# Patient Record
Sex: FEMALE | Race: WHITE | NOT HISPANIC OR LATINO | Employment: OTHER | URBAN - METROPOLITAN AREA
[De-identification: names, ages, dates, MRNs, and addresses within clinical notes are randomized per-mention and may not be internally consistent; named-entity substitution may affect disease eponyms.]

---

## 2018-01-29 LAB
ALT SERPL-CCNC: 21 IU/L (ref 8–45)
AST SERPL-CCNC: 17 IU/L (ref 2–40)
CHOLEST SERPL-MCNC: 219 MG/DL (ref 100–199)
CREAT SERPL-MCNC: 0.69 MG/DL (ref 0.57–1.11)
GFR SERPL CREATININE-BSD FRML MDRD: >60 ML/MIN/1.73M2
GLUCOSE SERPL-MCNC: 107 MG/DL (ref 65–100)
HBA1C MFR BLD: 5.3 % (ref 0–5.7)
HDLC SERPL-MCNC: 45 MG/DL
LDLC SERPL CALC-MCNC: 129 MG/DL
NONHDLC SERPL-MCNC: 174 MG/DL
POTASSIUM SERPL-SCNC: 4.3 MMOL/L (ref 3.5–5)
TRIGL SERPL-MCNC: 225 MG/DL

## 2018-06-19 LAB — PHQ9 SCORE: 3

## 2018-07-26 ENCOUNTER — TRANSFERRED RECORDS (OUTPATIENT)
Dept: HEALTH INFORMATION MANAGEMENT | Facility: CLINIC | Age: 58
End: 2018-07-26

## 2018-07-26 LAB
CREAT SERPL-MCNC: 0.63 MG/DL (ref 0.57–1.11)
GFR SERPL CREATININE-BSD FRML MDRD: >60 ML/MIN/1.73M2
GLUCOSE SERPL-MCNC: 108 MG/DL (ref 65–100)
POTASSIUM SERPL-SCNC: 3.9 MMOL/L (ref 3.5–5)

## 2019-01-10 ENCOUNTER — HOSPITAL ENCOUNTER (EMERGENCY)
Facility: CLINIC | Age: 59
Discharge: HOME | End: 2019-01-10
Attending: FAMILY MEDICINE | Admitting: FAMILY MEDICINE
Payer: COMMERCIAL

## 2019-01-10 ENCOUNTER — APPOINTMENT (OUTPATIENT)
Dept: GENERAL RADIOLOGY | Facility: CLINIC | Age: 59
End: 2019-01-10
Payer: COMMERCIAL

## 2019-01-10 VITALS
SYSTOLIC BLOOD PRESSURE: 130 MMHG | TEMPERATURE: 98.4 F | RESPIRATION RATE: 20 BRPM | OXYGEN SATURATION: 99 % | DIASTOLIC BLOOD PRESSURE: 79 MMHG | HEART RATE: 71 BPM | WEIGHT: 236 LBS | BODY MASS INDEX: 39.58 KG/M2

## 2019-01-10 DIAGNOSIS — J40 BRONCHITIS: ICD-10-CM

## 2019-01-10 DIAGNOSIS — J06.9 VIRAL UPPER RESPIRATORY ILLNESS: ICD-10-CM

## 2019-01-10 DIAGNOSIS — H65.91 OME (OTITIS MEDIA WITH EFFUSION), RIGHT: ICD-10-CM

## 2019-01-10 LAB
ALBUMIN SERPL-MCNC: 3.2 G/DL (ref 3.4–5)
ALP SERPL-CCNC: 79 U/L (ref 40–150)
ALT SERPL W P-5'-P-CCNC: 19 U/L (ref 0–50)
ANION GAP SERPL CALCULATED.3IONS-SCNC: 5 MMOL/L (ref 3–14)
AST SERPL W P-5'-P-CCNC: 16 U/L (ref 0–45)
BASOPHILS # BLD AUTO: 0 10E9/L (ref 0–0.2)
BASOPHILS NFR BLD AUTO: 0.4 %
BILIRUB SERPL-MCNC: 0.4 MG/DL (ref 0.2–1.3)
BUN SERPL-MCNC: 14 MG/DL (ref 7–30)
CALCIUM SERPL-MCNC: 7.9 MG/DL (ref 8.5–10.1)
CHLORIDE SERPL-SCNC: 109 MMOL/L (ref 94–109)
CO2 SERPL-SCNC: 28 MMOL/L (ref 20–32)
CREAT SERPL-MCNC: 0.65 MG/DL (ref 0.52–1.04)
CRP SERPL-MCNC: 23.1 MG/L (ref 0–8)
DIFFERENTIAL METHOD BLD: NORMAL
EOSINOPHIL NFR BLD AUTO: 3.2 %
ERYTHROCYTE [DISTWIDTH] IN BLOOD BY AUTOMATED COUNT: 13.3 % (ref 10–15)
FLUAV+FLUBV AG SPEC QL: NEGATIVE
FLUAV+FLUBV AG SPEC QL: NEGATIVE
GFR SERPL CREATININE-BSD FRML MDRD: >90 ML/MIN/{1.73_M2}
GLUCOSE SERPL-MCNC: 107 MG/DL (ref 70–99)
HCT VFR BLD AUTO: 38.9 % (ref 35–47)
HGB BLD-MCNC: 12.7 G/DL (ref 11.7–15.7)
IMM GRANULOCYTES # BLD: 0 10E9/L (ref 0–0.4)
IMM GRANULOCYTES NFR BLD: 0.4 %
LACTATE BLD-SCNC: 1.6 MMOL/L (ref 0.7–2)
LYMPHOCYTES # BLD AUTO: 1.9 10E9/L (ref 0.8–5.3)
LYMPHOCYTES NFR BLD AUTO: 35.6 %
MCH RBC QN AUTO: 31.8 PG (ref 26.5–33)
MCHC RBC AUTO-ENTMCNC: 32.6 G/DL (ref 31.5–36.5)
MCV RBC AUTO: 97 FL (ref 78–100)
MONOCYTES # BLD AUTO: 0.5 10E9/L (ref 0–1.3)
MONOCYTES NFR BLD AUTO: 9.1 %
NEUTROPHILS # BLD AUTO: 2.8 10E9/L (ref 1.6–8.3)
NEUTROPHILS NFR BLD AUTO: 51.3 %
NRBC # BLD AUTO: 0 10*3/UL
NRBC BLD AUTO-RTO: 0 /100
NT-PROBNP SERPL-MCNC: 78 PG/ML (ref 0–900)
PLATELET # BLD AUTO: 243 10E9/L (ref 150–450)
POTASSIUM SERPL-SCNC: 3.6 MMOL/L (ref 3.4–5.3)
PROT SERPL-MCNC: 7 G/DL (ref 6.8–8.8)
RBC # BLD AUTO: 4 10E12/L (ref 3.8–5.2)
SODIUM SERPL-SCNC: 142 MMOL/L (ref 133–144)
SPECIMEN SOURCE: NORMAL
WBC # BLD AUTO: 5.4 10E9/L (ref 4–11)

## 2019-01-10 PROCEDURE — 71046 X-RAY EXAM CHEST 2 VIEWS: CPT | Mod: TC

## 2019-01-10 PROCEDURE — 99284 EMERGENCY DEPT VISIT MOD MDM: CPT | Mod: 25 | Performed by: FAMILY MEDICINE

## 2019-01-10 PROCEDURE — 94640 AIRWAY INHALATION TREATMENT: CPT

## 2019-01-10 PROCEDURE — 99284 EMERGENCY DEPT VISIT MOD MDM: CPT | Mod: Z6 | Performed by: FAMILY MEDICINE

## 2019-01-10 PROCEDURE — 40000274 ZZH STATISTIC RCP CONSULT EA 30 MIN

## 2019-01-10 PROCEDURE — 25000125 ZZHC RX 250: Performed by: FAMILY MEDICINE

## 2019-01-10 PROCEDURE — 40000275 ZZH STATISTIC RCP TIME EA 10 MIN

## 2019-01-10 PROCEDURE — 80053 COMPREHEN METABOLIC PANEL: CPT | Performed by: FAMILY MEDICINE

## 2019-01-10 PROCEDURE — 85025 COMPLETE CBC W/AUTO DIFF WBC: CPT | Performed by: FAMILY MEDICINE

## 2019-01-10 PROCEDURE — 86140 C-REACTIVE PROTEIN: CPT | Performed by: FAMILY MEDICINE

## 2019-01-10 PROCEDURE — 87804 INFLUENZA ASSAY W/OPTIC: CPT | Performed by: FAMILY MEDICINE

## 2019-01-10 PROCEDURE — 83880 ASSAY OF NATRIURETIC PEPTIDE: CPT | Performed by: FAMILY MEDICINE

## 2019-01-10 PROCEDURE — 83605 ASSAY OF LACTIC ACID: CPT | Performed by: FAMILY MEDICINE

## 2019-01-10 RX ORDER — PREDNISONE 20 MG/1
20 TABLET ORAL 2 TIMES DAILY
Qty: 10 TABLET | Refills: 0 | Status: SHIPPED | OUTPATIENT
Start: 2019-01-10 | End: 2019-01-15

## 2019-01-10 RX ORDER — IPRATROPIUM BROMIDE AND ALBUTEROL SULFATE 2.5; .5 MG/3ML; MG/3ML
3 SOLUTION RESPIRATORY (INHALATION) ONCE
Status: COMPLETED | OUTPATIENT
Start: 2019-01-10 | End: 2019-01-10

## 2019-01-10 RX ORDER — ALBUTEROL SULFATE 90 UG/1
2 AEROSOL, METERED RESPIRATORY (INHALATION) EVERY 4 HOURS PRN
Qty: 1 INHALER | Refills: 0 | Status: SHIPPED | OUTPATIENT
Start: 2019-01-10 | End: 2019-12-11

## 2019-01-10 RX ORDER — ACETAMINOPHEN 500 MG
1000 TABLET ORAL EVERY 6 HOURS PRN
COMMUNITY

## 2019-01-10 RX ORDER — AZITHROMYCIN 250 MG/1
TABLET, FILM COATED ORAL
Qty: 6 TABLET | Refills: 0 | Status: SHIPPED | OUTPATIENT
Start: 2019-01-10 | End: 2019-12-11

## 2019-01-10 RX ADMIN — IPRATROPIUM BROMIDE AND ALBUTEROL SULFATE 3 ML: .5; 3 SOLUTION RESPIRATORY (INHALATION) at 07:31

## 2019-01-10 ASSESSMENT — ENCOUNTER SYMPTOMS
CONFUSION: 0
VOMITING: 0
DIARRHEA: 0
FEVER: 0
NAUSEA: 0
SHORTNESS OF BREATH: 0
DIZZINESS: 0
LIGHT-HEADEDNESS: 0
WHEEZING: 1
POLYDIPSIA: 0
DIAPHORESIS: 0
DYSURIA: 0
SINUS PAIN: 0
SORE THROAT: 0
EYE REDNESS: 0
COUGH: 1
TROUBLE SWALLOWING: 0
EYE PAIN: 0
CHEST TIGHTNESS: 0
BACK PAIN: 0
ABDOMINAL PAIN: 0
HEADACHES: 0
SINUS PRESSURE: 0
FATIGUE: 1
WEAKNESS: 1

## 2019-01-10 NOTE — ED PROVIDER NOTES
"  History     Chief Complaint   Patient presents with     Cough     HPI  Mariluz Stoner is a 58 year old female who presents to the emergency department with cough and congestion.  2 weeks ago the patient contracted a \"cold\".  With symptoms of runny nose and head congestion.  This has progressed into a cough and some chest congestion.  She is also been experiencing some wheezing.  She has no history of smoking or asthma.  She has never required an inhaler or prednisone for her breathing.  Her  has similar symptoms.  She just returned from [place] where she was for 10 days.  She was hoping her cold will improve however it only worsened.  She denies any fever chills or night sweats at this time.  She has no chest pain.  The patient had a history of breast cancer 5 years ago and underwent mastectomy radiation and chemotherapy.  She was considered cancer free until it was discovered that she has metastases in her spine.  She is scheduled to start chemotherapy next week.    Problem List:    There are no active problems to display for this patient.       Past Medical History:    Past Medical History:   Diagnosis Date     Breast cancer (H)      Obesity        Past Surgical History:    Past Surgical History:   Procedure Laterality Date     BREAST SURGERY      left tissue expander, LEFT MASTECTOMY     CHOLECYSTECTOMY       GYN SURGERY      oophorectomy     REMOVE TISSUE EXPANDER BREAST  2014    Procedure: REMOVAL OF LEFT BREAST TISSUE EXPANDER, IRRIGATION AND DEBRIDEMENT OF LEFT BREAST;  Surgeon: Marlon Luz MD;  Location: [facility]       Family History:    History reviewed. No pertinent family history.    Social History:  Marital Status:   [2]  Social History     Tobacco Use     Smoking status: Former Smoker     Last attempt to quit: 2000     Years since quittin.0     Smokeless tobacco: Never Used   Substance Use Topics     Alcohol use: Yes     Comment: social drinking     Drug use: Yes     " Types: Marijuana     Comment: daily for the past 10 days        Medications:      acetaminophen (TYLENOL) 500 MG tablet   albuterol (PROAIR HFA/PROVENTIL HFA/VENTOLIN HFA) 108 (90 Base) MCG/ACT inhaler   azithromycin (ZITHROMAX) 250 MG tablet   predniSONE (DELTASONE) 20 MG tablet   Pseudoephedrine-Ibuprofen (ADVIL COLD/SINUS PO)   Clindamycin HCl (CLEOCIN PO)   HYDROcodone-acetaminophen (NORCO) 5-325 MG per tablet   HYDROcodone-acetaminophen (NORCO) 5-325 MG per tablet   Probiotic Product (MISC INTESTINAL NEGIN REGULAT) PACK         Review of Systems   Constitutional: Positive for fatigue. Negative for diaphoresis and fever.   HENT: Positive for congestion and ear pain. Negative for sinus pressure, sinus pain, sore throat and trouble swallowing.         Complains of severe right ear pain   Eyes: Negative for pain and redness.   Respiratory: Positive for cough and wheezing. Negative for chest tightness and shortness of breath.    Cardiovascular: Negative for chest pain and leg swelling.   Gastrointestinal: Negative for abdominal pain, diarrhea, nausea and vomiting.   Endocrine: Negative for polydipsia and polyuria.   Genitourinary: Negative for dysuria.   Musculoskeletal: Negative for back pain.   Skin: Negative for pallor and rash.   Allergic/Immunologic: Negative for immunocompromised state.   Neurological: Positive for weakness. Negative for dizziness, light-headedness and headaches.   Psychiatric/Behavioral: Negative for confusion.   All other systems reviewed and are negative.      Physical Exam   BP: 150/86  Heart Rate: 75  Temp: 98.4  F (36.9  C)  Resp: 20  Weight: 107 kg (236 lb)  SpO2: 94 %      Physical Exam   Constitutional: She is oriented to person, place, and time. She appears well-developed and well-nourished. No distress.   HENT:   Head: Normocephalic and atraumatic.   Right Ear: External ear normal.   Left Ear: External ear normal.   Nose: Nose normal.   Mouth/Throat: Oropharynx is clear and moist.    Eyes: Conjunctivae and EOM are normal. Pupils are equal, round, and reactive to light.   Neck: Normal range of motion.   Cardiovascular: Normal rate, regular rhythm, normal heart sounds and intact distal pulses.   Pulmonary/Chest: No stridor. No tachypnea. No respiratory distress. She has no decreased breath sounds. She has wheezes. She has rhonchi.   Bilateral end expiratory wheezing.  Resolved with one nebulization treatment.  No rales.  Congested but nonproductive cough.  Breathing at ease with O2 sats in the high 90s on room air.  No increased work of breathing.  No tachypnea.   Abdominal: Soft. Bowel sounds are normal.   Musculoskeletal: Normal range of motion.   Neurological: She is alert and oriented to person, place, and time.   Skin: Skin is warm and dry. Capillary refill takes less than 2 seconds.   Psychiatric: She has a normal mood and affect.   Nursing note and vitals reviewed.      ED Course        Procedures               Critical Care time:  none               Results for orders placed or performed during the hospital encounter of 01/10/19 (from the past 24 hour(s))   CBC with platelets differential   Result Value Ref Range    WBC 5.4 4.0 - 11.0 10e9/L    RBC Count 4.00 3.8 - 5.2 10e12/L    Hemoglobin 12.7 11.7 - 15.7 g/dL    Hematocrit 38.9 35.0 - 47.0 %    MCV 97 78 - 100 fl    MCH 31.8 26.5 - 33.0 pg    MCHC 32.6 31.5 - 36.5 g/dL    RDW 13.3 10.0 - 15.0 %    Platelet Count 243 150 - 450 10e9/L    Diff Method Automated Method     % Neutrophils 51.3 %    % Lymphocytes 35.6 %    % Monocytes 9.1 %    % Eosinophils 3.2 %    % Basophils 0.4 %    % Immature Granulocytes 0.4 %    Nucleated RBCs 0 0 /100    Absolute Neutrophil 2.8 1.6 - 8.3 10e9/L    Absolute Lymphocytes 1.9 0.8 - 5.3 10e9/L    Absolute Monocytes 0.5 0.0 - 1.3 10e9/L    Absolute Basophils 0.0 0.0 - 0.2 10e9/L    Abs Immature Granulocytes 0.0 0 - 0.4 10e9/L    Absolute Nucleated RBC 0.0    Comprehensive metabolic panel   Result Value Ref  Range    Sodium 142 133 - 144 mmol/L    Potassium 3.6 3.4 - 5.3 mmol/L    Chloride 109 94 - 109 mmol/L    Carbon Dioxide 28 20 - 32 mmol/L    Anion Gap 5 3 - 14 mmol/L    Glucose 107 (H) 70 - 99 mg/dL    Urea Nitrogen 14 7 - 30 mg/dL    Creatinine 0.65 0.52 - 1.04 mg/dL    GFR Estimate >90 >60 mL/min/[1.73_m2]    GFR Estimate If Black >90 >60 mL/min/[1.73_m2]    Calcium 7.9 (L) 8.5 - 10.1 mg/dL    Bilirubin Total 0.4 0.2 - 1.3 mg/dL    Albumin 3.2 (L) 3.4 - 5.0 g/dL    Protein Total 7.0 6.8 - 8.8 g/dL    Alkaline Phosphatase 79 40 - 150 U/L    ALT 19 0 - 50 U/L    AST 16 0 - 45 U/L   Lactic acid whole blood   Result Value Ref Range    Lactic Acid 1.6 0.7 - 2.0 mmol/L   Nt probnp inpatient (BNP)   Result Value Ref Range    N-Terminal Pro BNP Inpatient 78 0 - 900 pg/mL   CRP inflammation   Result Value Ref Range    CRP Inflammation 23.1 (H) 0.0 - 8.0 mg/L   Influenza A/B antigen   Result Value Ref Range    Influenza A/B Agn Specimen Nasopharyngeal     Influenza A Negative NEG^Negative    Influenza B Negative NEG^Negative   XR Chest 2 Views    Narrative    CHEST TWO VIEWS  1/10/2019 8:17 AM     HISTORY:  Cough for two weeks.    COMPARISON: None.    FINDINGS:  Postoperative changes status post left mastectomy and  axillary kaela dissection with persistent axillary kaela dissection  clips are noted. Slightly increased interstitial markings in the left  lung are noted and could represent scarring or mild asymmetric  vascular congestion. An infiltrate is considered less likely. Right  lung is clear. Heart size and pulmonary vascularity are within normal  limits. No pneumothorax, significant pleural fluid collection, or  fracture.      Impression    IMPRESSION:  1. Slightly increased interstitial markings in the left lung likely  represent asymmetric pulmonary vascular congestion versus scarring or  atelectasis. An infiltrate is considered unlikely but is difficult to  entirely exclude.  2. Postop changes status post  probable left mastectomy and axillary  kaela dissection.       Medications   ipratropium - albuterol 0.5 mg/2.5 mg/3 mL (DUONEB) neb solution 3 mL (3 mLs Nebulization Given 1/10/19 0731)       Assessments & Plan (with Medical Decision Making)   MDM--58-year-old female who presents the emergency room with a 2-week history of cough and congestion.  Symptoms started off as a head cold and hour progressed to chest congestion and bronchitis.  Her chest x-ray shows no infiltrate, she has no fever and her white count is normal.  No evidence of a bacterial pneumonia.  She does have a red painful ear and has a right otitis media.  I discussed the options of antibiotics and the risk for return of C. difficile and the patient would really prefer to be on an antibiotic and is aware that the risk of C. difficile.  She will he started on a Z-Kit azithromycin.  She had a good response to the albuterol so I got her an albuterol inhaler and will give her 5 days of prednisone 20 twice daily for 5 days.  She is to return to the ER if she develops fever or any further difficulty breathing.  The patient is comfortable with this evaluation treatment and discharge plan and all her questions answered to her satisfaction and she is discharged in improved condition.  I have reviewed the nursing notes.    I have reviewed the findings, diagnosis, plan and need for follow up with the patient.             Medication List      Started    albuterol 108 (90 Base) MCG/ACT inhaler  Commonly known as:  PROAIR HFA/PROVENTIL HFA/VENTOLIN HFA  2 puffs, Inhalation, EVERY 4 HOURS PRN     azithromycin 250 MG tablet  Commonly known as:  ZITHROMAX  Take 2 pills today then 1 pill a day for 4 days     predniSONE 20 MG tablet  Commonly known as:  DELTASONE  20 mg, Oral, 2 TIMES DAILY            Final diagnoses:   Viral upper respiratory illness   Bronchitis   OME (otitis media with effusion), right       1/10/2019   [facility] EMERGENCY DEPARTMENT      Reece, Chato YE MD  01/10/19 0918

## 2019-01-10 NOTE — ED TRIAGE NOTES
Pt reports a cough that started 12/28 when she left for vacation that has gotten worse. States she was told at that time she had bone cancer and is scheduled to start treatment next week. Pt states while on vacation her spouse got sick and was in to the doctor and diagnosed with pneumonia.

## 2019-01-10 NOTE — PROGRESS NOTES
"S- Respiratory Therapy  B- Cough  A- Patient is on room air SpO2 = 97%.  Breath sounds clear and diminished, occasional cough loose congested occasionally producing clear to white secretions.   No history or reactive airways.  Breath sounds unchanged post neb and no change with cough patient states it just feels\" more moist\" post neb.   R- RCP will follow prn.   "

## 2019-01-10 NOTE — ED AVS SNAPSHOT
[facility] Emergency Department  [address] [place] [address]  Phone:  [phone]  Fax:  [phone]                                    Mariluz Stoner   MRN: [id number]    Department:  [facility] Emergency Department   Date of Visit:  1/10/2019           After Visit Summary Signature Page    I have received my discharge instructions, and my questions have been answered. I have discussed any challenges I see with this plan with the nurse or doctor.    ..........................................................................................................................................  Patient/Patient Representative Signature      ..........................................................................................................................................  Patient Representative Print Name and Relationship to Patient    ..................................................               ................................................  Date                                   Time    ..........................................................................................................................................  Reviewed by Signature/Title    ...................................................              ..............................................  Date                                               Time          22EPIC Rev 08/18

## 2019-02-06 ENCOUNTER — TRANSFERRED RECORDS (OUTPATIENT)
Dept: HEALTH INFORMATION MANAGEMENT | Facility: CLINIC | Age: 59
End: 2019-02-06

## 2019-04-15 ENCOUNTER — HOSPITAL ENCOUNTER (EMERGENCY)
Facility: CLINIC | Age: 59
Discharge: HOME | End: 2019-04-15
Attending: EMERGENCY MEDICINE | Admitting: EMERGENCY MEDICINE
Payer: COMMERCIAL

## 2019-04-15 ENCOUNTER — APPOINTMENT (OUTPATIENT)
Dept: GENERAL RADIOLOGY | Facility: CLINIC | Age: 59
End: 2019-04-15
Attending: EMERGENCY MEDICINE
Payer: COMMERCIAL

## 2019-04-15 VITALS
TEMPERATURE: 103.2 F | OXYGEN SATURATION: 100 % | RESPIRATION RATE: 16 BRPM | SYSTOLIC BLOOD PRESSURE: 142 MMHG | HEIGHT: 65 IN | BODY MASS INDEX: 39.99 KG/M2 | WEIGHT: 240 LBS | DIASTOLIC BLOOD PRESSURE: 77 MMHG | HEART RATE: 68 BPM

## 2019-04-15 DIAGNOSIS — J11.1 INFLUENZA-LIKE ILLNESS: ICD-10-CM

## 2019-04-15 LAB
ALBUMIN SERPL-MCNC: 3.4 G/DL (ref 3.4–5)
ALP SERPL-CCNC: 95 U/L (ref 40–150)
ALT SERPL W P-5'-P-CCNC: 22 U/L (ref 0–50)
ANION GAP SERPL CALCULATED.3IONS-SCNC: 8 MMOL/L (ref 3–14)
AST SERPL W P-5'-P-CCNC: 11 U/L (ref 0–45)
BASOPHILS # BLD AUTO: 0 10E9/L (ref 0–0.2)
BASOPHILS NFR BLD AUTO: 0.2 %
BILIRUB SERPL-MCNC: 0.2 MG/DL (ref 0.2–1.3)
BUN SERPL-MCNC: 13 MG/DL (ref 7–30)
CALCIUM SERPL-MCNC: 8.5 MG/DL (ref 8.5–10.1)
CHLORIDE SERPL-SCNC: 106 MMOL/L (ref 94–109)
CO2 SERPL-SCNC: 26 MMOL/L (ref 20–32)
CREAT SERPL-MCNC: 0.64 MG/DL (ref 0.52–1.04)
DIFFERENTIAL METHOD BLD: NORMAL
EOSINOPHIL NFR BLD AUTO: 1 %
ERYTHROCYTE [DISTWIDTH] IN BLOOD BY AUTOMATED COUNT: 13.2 % (ref 10–15)
FLUAV+FLUBV AG SPEC QL: NEGATIVE
FLUAV+FLUBV AG SPEC QL: NEGATIVE
GFR SERPL CREATININE-BSD FRML MDRD: >90 ML/MIN/{1.73_M2}
GLUCOSE SERPL-MCNC: 96 MG/DL (ref 70–99)
HCT VFR BLD AUTO: 38 % (ref 35–47)
HGB BLD-MCNC: 12.4 G/DL (ref 11.7–15.7)
IMM GRANULOCYTES # BLD: 0 10E9/L (ref 0–0.4)
IMM GRANULOCYTES NFR BLD: 0.5 %
LACTATE BLD-SCNC: 1.2 MMOL/L (ref 0.7–2)
LYMPHOCYTES # BLD AUTO: 0.8 10E9/L (ref 0.8–5.3)
LYMPHOCYTES NFR BLD AUTO: 12.3 %
MCH RBC QN AUTO: 31.2 PG (ref 26.5–33)
MCHC RBC AUTO-ENTMCNC: 32.6 G/DL (ref 31.5–36.5)
MCV RBC AUTO: 96 FL (ref 78–100)
MONOCYTES # BLD AUTO: 0.8 10E9/L (ref 0–1.3)
MONOCYTES NFR BLD AUTO: 12 %
NEUTROPHILS # BLD AUTO: 4.6 10E9/L (ref 1.6–8.3)
NEUTROPHILS NFR BLD AUTO: 74 %
NRBC # BLD AUTO: 0 10*3/UL
NRBC BLD AUTO-RTO: 0 /100
PLATELET # BLD AUTO: 203 10E9/L (ref 150–450)
POTASSIUM SERPL-SCNC: 3.8 MMOL/L (ref 3.4–5.3)
PROT SERPL-MCNC: 6.9 G/DL (ref 6.8–8.8)
RBC # BLD AUTO: 3.98 10E12/L (ref 3.8–5.2)
SODIUM SERPL-SCNC: 140 MMOL/L (ref 133–144)
SPECIMEN SOURCE: NORMAL
WBC # BLD AUTO: 6.2 10E9/L (ref 4–11)

## 2019-04-15 PROCEDURE — 80053 COMPREHEN METABOLIC PANEL: CPT | Performed by: EMERGENCY MEDICINE

## 2019-04-15 PROCEDURE — 25000125 ZZHC RX 250: Performed by: EMERGENCY MEDICINE

## 2019-04-15 PROCEDURE — 83605 ASSAY OF LACTIC ACID: CPT | Performed by: EMERGENCY MEDICINE

## 2019-04-15 PROCEDURE — 71046 X-RAY EXAM CHEST 2 VIEWS: CPT

## 2019-04-15 PROCEDURE — 99283 EMERGENCY DEPT VISIT LOW MDM: CPT | Mod: Z6 | Performed by: EMERGENCY MEDICINE

## 2019-04-15 PROCEDURE — 96374 THER/PROPH/DIAG INJ IV PUSH: CPT | Performed by: EMERGENCY MEDICINE

## 2019-04-15 PROCEDURE — 25000128 H RX IP 250 OP 636: Performed by: PHYSICIAN ASSISTANT

## 2019-04-15 PROCEDURE — 99284 EMERGENCY DEPT VISIT MOD MDM: CPT | Mod: 25 | Performed by: EMERGENCY MEDICINE

## 2019-04-15 PROCEDURE — 87804 INFLUENZA ASSAY W/OPTIC: CPT | Performed by: EMERGENCY MEDICINE

## 2019-04-15 PROCEDURE — 25000128 H RX IP 250 OP 636: Performed by: EMERGENCY MEDICINE

## 2019-04-15 PROCEDURE — 25000132 ZZH RX MED GY IP 250 OP 250 PS 637: Performed by: EMERGENCY MEDICINE

## 2019-04-15 PROCEDURE — 85025 COMPLETE CBC W/AUTO DIFF WBC: CPT | Performed by: EMERGENCY MEDICINE

## 2019-04-15 PROCEDURE — 96361 HYDRATE IV INFUSION ADD-ON: CPT | Performed by: EMERGENCY MEDICINE

## 2019-04-15 RX ORDER — OSELTAMIVIR PHOSPHATE 75 MG/1
75 CAPSULE ORAL 2 TIMES DAILY
Qty: 10 CAPSULE | Refills: 0 | Status: SHIPPED | OUTPATIENT
Start: 2019-04-15 | End: 2019-04-20

## 2019-04-15 RX ORDER — HEPARIN SODIUM (PORCINE) LOCK FLUSH IV SOLN 100 UNIT/ML 100 UNIT/ML
5 SOLUTION INTRAVENOUS
Status: DISCONTINUED | OUTPATIENT
Start: 2019-04-15 | End: 2019-04-15 | Stop reason: HOSPADM

## 2019-04-15 RX ORDER — KETOROLAC TROMETHAMINE 30 MG/ML
30 INJECTION, SOLUTION INTRAMUSCULAR; INTRAVENOUS ONCE
Status: COMPLETED | OUTPATIENT
Start: 2019-04-15 | End: 2019-04-15

## 2019-04-15 RX ORDER — ACETAMINOPHEN 500 MG
1000 TABLET ORAL ONCE
Status: COMPLETED | OUTPATIENT
Start: 2019-04-15 | End: 2019-04-15

## 2019-04-15 RX ORDER — SODIUM CHLORIDE 9 MG/ML
1000 INJECTION, SOLUTION INTRAVENOUS CONTINUOUS
Status: DISCONTINUED | OUTPATIENT
Start: 2019-04-15 | End: 2019-04-15 | Stop reason: HOSPADM

## 2019-04-15 RX ORDER — HEPARIN SODIUM,PORCINE 10 UNIT/ML
5-10 VIAL (ML) INTRAVENOUS EVERY 24 HOURS
Status: DISCONTINUED | OUTPATIENT
Start: 2019-04-15 | End: 2019-04-15 | Stop reason: HOSPADM

## 2019-04-15 RX ORDER — LIDOCAINE 40 MG/G
CREAM TOPICAL
Status: DISCONTINUED | OUTPATIENT
Start: 2019-04-15 | End: 2019-04-15 | Stop reason: HOSPADM

## 2019-04-15 RX ORDER — HEPARIN SODIUM,PORCINE 10 UNIT/ML
5-10 VIAL (ML) INTRAVENOUS
Status: DISCONTINUED | OUTPATIENT
Start: 2019-04-15 | End: 2019-04-15 | Stop reason: HOSPADM

## 2019-04-15 RX ADMIN — KETOROLAC TROMETHAMINE 30 MG: 30 INJECTION, SOLUTION INTRAMUSCULAR; INTRAVENOUS at 19:06

## 2019-04-15 RX ADMIN — LIDOCAINE: 40 CREAM TOPICAL at 16:15

## 2019-04-15 RX ADMIN — ACETAMINOPHEN 1000 MG: 500 TABLET ORAL at 19:31

## 2019-04-15 RX ADMIN — SODIUM CHLORIDE 1000 ML: 9 INJECTION, SOLUTION INTRAVENOUS at 17:56

## 2019-04-15 RX ADMIN — Medication 5 ML: at 19:32

## 2019-04-15 RX ADMIN — SODIUM CHLORIDE 1000 ML: 9 INJECTION, SOLUTION INTRAVENOUS at 17:14

## 2019-04-15 ASSESSMENT — MIFFLIN-ST. JEOR: SCORE: 1669.51

## 2019-04-15 NOTE — LETTER
April 15, 2019      To Whom It May Concern:      Mariluz Stoner was seen in our Emergency Department today, 04/15/19.  I expect her condition to improve over the next 7 days.  She may return to work/school when improved.    Sincerely,        Rod Dee MD

## 2019-04-15 NOTE — ED TRIAGE NOTES
She is extremely short of breath and she is currently being treated for bone cancer in her spine.  She is also getting more weak as the day goes on. She feels like she is heavy and suffocating.

## 2019-04-15 NOTE — ED PROVIDER NOTES
History     Chief Complaint   Patient presents with     Shortness of Breath     HPI  Mariluz IVON Gopie is a 58 year old female who presents to the emergency department complaining of fevers, cough, congestion, headache, body aches, shortness of breath, headache.   She has a history of cancer and is on chemotherapy.    Allergies:  No Known Allergies    Problem List:    There are no active problems to display for this patient.       Past Medical History:    Past Medical History:   Diagnosis Date     Breast cancer (H)      Obesity        Past Surgical History:    Past Surgical History:   Procedure Laterality Date     BREAST SURGERY      left tissue expander, LEFT MASTECTOMY     CHOLECYSTECTOMY       GYN SURGERY      oophorectomy     REMOVE TISSUE EXPANDER BREAST  2014    Procedure: REMOVAL OF LEFT BREAST TISSUE EXPANDER, IRRIGATION AND DEBRIDEMENT OF LEFT BREAST;  Surgeon: Marlon Luz MD;  Location: [facility]       Family History:    History reviewed. No pertinent family history.    Social History:  Marital Status:   [2]  Social History     Tobacco Use     Smoking status: Former Smoker     Last attempt to quit: 2000     Years since quittin.2     Smokeless tobacco: Never Used   Substance Use Topics     Alcohol use: Yes     Comment: social drinking     Drug use: Yes     Types: Marijuana     Comment: daily for the past 10 days        Medications:      oseltamivir (TAMIFLU) 75 MG capsule   acetaminophen (TYLENOL) 500 MG tablet   albuterol (PROAIR HFA/PROVENTIL HFA/VENTOLIN HFA) 108 (90 Base) MCG/ACT inhaler   azithromycin (ZITHROMAX) 250 MG tablet   Clindamycin HCl (CLEOCIN PO)   HYDROcodone-acetaminophen (NORCO) 5-325 MG per tablet   HYDROcodone-acetaminophen (NORCO) 5-325 MG per tablet   Probiotic Product (MISC INTESTINAL NEGIN REGULAT) PACK   Pseudoephedrine-Ibuprofen (ADVIL COLD/SINUS PO)         Review of Systems   All other systems reviewed and are negative.      Physical Exam   BP:  "146/73  Pulse: 99  Temp: 103.2  F (39.6  C)  Resp: 20  Height: 165.1 cm (5' 5\")  Weight: 108.9 kg (240 lb)  SpO2: 100 %      Physical Exam   Constitutional: She is oriented to person, place, and time. She appears well-developed and well-nourished. No distress.   HENT:   Head: Normocephalic and atraumatic.   Eyes: No scleral icterus.   Neck: Normal range of motion. Neck supple.   Cardiovascular: Regular rhythm and normal heart sounds.   Tachycardic   Pulmonary/Chest: Effort normal and breath sounds normal. No stridor. No respiratory distress.   Abdominal: Soft. There is no tenderness.   Musculoskeletal: Normal range of motion. She exhibits no edema or deformity.   Neurological: She is alert and oriented to person, place, and time.   Skin: Skin is warm and dry. Capillary refill takes less than 2 seconds. No rash noted. She is not diaphoretic. No erythema. No pallor.   Psychiatric: She has a normal mood and affect.       ED Course        Procedures             Results for orders placed or performed during the hospital encounter of 04/15/19 (from the past 24 hour(s))   Influenza A/B antigen   Result Value Ref Range    Influenza A/B Agn Specimen Nasopharyngeal     Influenza A Negative NEG^Negative    Influenza B Negative NEG^Negative   XR Chest 2 Views    Narrative    XR CHEST TWO VIEWS   4/15/2019 4:38 PM     HISTORY: Shortness of breath.     COMPARISON: Chest x-rays dated 1/10/2019.    FINDINGS:  Since the prior study there has been placement of a right  chest wall [id number] with internal jugular central venous catheter  with catheter tip projected in the region of the mid superior vena  cava. No pneumothorax or significant pleural fluid collection. No  acute fracture. Surgical clips are projected in the left axillary  region and there has been either a large left lumpectomy or a left  mastectomy. Cholecystectomy clips are again seen in the right upper  abdomen. Lungs are grossly clear. No pneumothorax or " significant  pleural fluid collection. Heart is normal in size. Vascularity is  mildly prominent. There is mild parahilar parabronchial cuffing.      Impression    IMPRESSION:   1. Mild vascular congestion. No definite cardiomegaly or pleural  effusions to suggest congestive heart failure.  2. Interval placement of right chest wall [id number] with central  venous catheter in appropriate positions and no pneumothorax.  3. Postop changes status post left axillary kaela dissection and  probable left mastectomy versus large lumpectomy.  4. No definite pneumonia.    CANDE ADAM MD   CBC with platelets differential   Result Value Ref Range    WBC 6.2 4.0 - 11.0 10e9/L    RBC Count 3.98 3.8 - 5.2 10e12/L    Hemoglobin 12.4 11.7 - 15.7 g/dL    Hematocrit 38.0 35.0 - 47.0 %    MCV 96 78 - 100 fl    MCH 31.2 26.5 - 33.0 pg    MCHC 32.6 31.5 - 36.5 g/dL    RDW 13.2 10.0 - 15.0 %    Platelet Count 203 150 - 450 10e9/L    Diff Method Automated Method     % Neutrophils 74.0 %    % Lymphocytes 12.3 %    % Monocytes 12.0 %    % Eosinophils 1.0 %    % Basophils 0.2 %    % Immature Granulocytes 0.5 %    Nucleated RBCs 0 0 /100    Absolute Neutrophil 4.6 1.6 - 8.3 10e9/L    Absolute Lymphocytes 0.8 0.8 - 5.3 10e9/L    Absolute Monocytes 0.8 0.0 - 1.3 10e9/L    Absolute Basophils 0.0 0.0 - 0.2 10e9/L    Abs Immature Granulocytes 0.0 0 - 0.4 10e9/L    Absolute Nucleated RBC 0.0    Comprehensive metabolic panel   Result Value Ref Range    Sodium 140 133 - 144 mmol/L    Potassium 3.8 3.4 - 5.3 mmol/L    Chloride 106 94 - 109 mmol/L    Carbon Dioxide 26 20 - 32 mmol/L    Anion Gap 8 3 - 14 mmol/L    Glucose 96 70 - 99 mg/dL    Urea Nitrogen 13 7 - 30 mg/dL    Creatinine 0.64 0.52 - 1.04 mg/dL    GFR Estimate >90 >60 mL/min/[1.73_m2]    GFR Estimate If Black >90 >60 mL/min/[1.73_m2]    Calcium 8.5 8.5 - 10.1 mg/dL    Bilirubin Total 0.2 0.2 - 1.3 mg/dL    Albumin 3.4 3.4 - 5.0 g/dL    Protein Total 6.9 6.8 - 8.8 g/dL    Alkaline  Phosphatase 95 40 - 150 U/L    ALT 22 0 - 50 U/L    AST 11 0 - 45 U/L   Lactic acid whole blood   Result Value Ref Range    Lactic Acid 1.2 0.7 - 2.0 mmol/L       Medications   0.9% sodium chloride BOLUS (0 mLs Intravenous Stopped 4/15/19 1925)     Followed by   sodium chloride 0.9% infusion ( Intravenous Canceled Entry 4/15/19 1924)   lidocaine (LMX4) kit ( Topical Given 4/15/19 1615)   sodium chloride (PF) 0.9% PF flush 10-20 mL (has no administration in time range)   heparin lock flush 10 UNIT/ML injection 5-10 mL ( Intracatheter Canceled Entry 4/15/19 1924)   heparin lock flush 10 UNIT/ML injection 5-10 mL (has no administration in time range)   heparin 100 UNIT/ML injection 5 mL (5 mLs Intracatheter Given by Other 4/15/19 1932)   sodium chloride (PF) 0.9% PF flush 10-20 mL (has no administration in time range)   ketorolac (TORADOL) injection 30 mg (30 mg Intravenous Given 4/15/19 1906)   acetaminophen (TYLENOL) tablet 1,000 mg (1,000 mg Oral Given 4/15/19 1931)       Assessments & Plan (with Medical Decision Making)  Influenza-like symptoms with a negative influenza swab.  Given the low sensitivity of influenza swabs and the high incidence of influenza at this time I think she still could have influenza.  She is treated with IV fluids and Toradol.  She has a normal lactate and normal white blood cell count.  There is no evidence for bacterial pneumonia.  With her high fevers body aches headache and cough and congestion I think influenza is still more likely.  Given she is a chemotherapy patient and she is within 48 hours of illness I offered Tamiflu after risk-benefit discussion the patient wanted to proceed.  Return to ER precautions were discussed.     I have reviewed the nursing notes.    I have reviewed the findings, diagnosis, plan and need for follow up with the patient.         Medication List      Started    oseltamivir 75 MG capsule  Commonly known as:  TAMIFLU  75 mg, Oral, 2 TIMES DAILY        ASK  your doctor about these medications    predniSONE 20 MG tablet  Commonly known as:  DELTASONE  20 mg, Oral, 2 TIMES DAILY  Ask about: Should I take this medication?            Final diagnoses:   Influenza-like illness       4/15/2019   [facility] EMERGENCY DEPARTMENT     Rod Dee MD  04/15/19 6133

## 2019-04-17 ENCOUNTER — TRANSFERRED RECORDS (OUTPATIENT)
Dept: HEALTH INFORMATION MANAGEMENT | Facility: CLINIC | Age: 59
End: 2019-04-17

## 2019-04-19 LAB
CREAT SERPL-MCNC: 0.52 MG/DL (ref 0.55–1.02)
GFR SERPL CREATININE-BSD FRML MDRD: >60 ML/MIN
POTASSIUM SERPL-SCNC: 3.5 MMOL/L (ref 3.5–5.1)

## 2019-04-28 LAB — HEMOCCULT STL QL IA: NEGATIVE

## 2019-08-22 ENCOUNTER — TRANSFERRED RECORDS (OUTPATIENT)
Dept: HEALTH INFORMATION MANAGEMENT | Facility: CLINIC | Age: 59
End: 2019-08-22

## 2019-11-14 ENCOUNTER — TRANSFERRED RECORDS (OUTPATIENT)
Dept: MULTI SPECIALTY CLINIC | Facility: CLINIC | Age: 59
End: 2019-11-14

## 2019-11-14 LAB
ALT SERPL-CCNC: 16 U/L (ref 8–45)
AST SERPL-CCNC: 17 U/L (ref 5–41)
CREAT SERPL-MCNC: 0.66 MG/DL (ref 0.57–1.11)
GFR SERPL CREATININE-BSD FRML MDRD: >60 ML/MIN/1.73M2
GLUCOSE SERPL-MCNC: 101 MG/DL (ref 70–105)
POTASSIUM SERPL-SCNC: 4 MMOL/L (ref 3.5–5.1)

## 2019-12-03 ENCOUNTER — TRANSFERRED RECORDS (OUTPATIENT)
Dept: HEALTH INFORMATION MANAGEMENT | Facility: CLINIC | Age: 59
End: 2019-12-03

## 2019-12-09 ENCOUNTER — TRANSFERRED RECORDS (OUTPATIENT)
Dept: HEALTH INFORMATION MANAGEMENT | Facility: CLINIC | Age: 59
End: 2019-12-09

## 2019-12-11 ENCOUNTER — OFFICE VISIT (OUTPATIENT)
Dept: FAMILY MEDICINE | Facility: OTHER | Age: 59
End: 2019-12-11
Payer: COMMERCIAL

## 2019-12-11 VITALS
BODY MASS INDEX: 38.54 KG/M2 | DIASTOLIC BLOOD PRESSURE: 78 MMHG | OXYGEN SATURATION: 94 % | SYSTOLIC BLOOD PRESSURE: 132 MMHG | WEIGHT: 231.3 LBS | TEMPERATURE: 97.8 F | HEART RATE: 80 BPM | HEIGHT: 65 IN | RESPIRATION RATE: 18 BRPM

## 2019-12-11 DIAGNOSIS — F33.0 MILD EPISODE OF RECURRENT MAJOR DEPRESSIVE DISORDER (H): ICD-10-CM

## 2019-12-11 DIAGNOSIS — Z23 NEED FOR PROPHYLACTIC VACCINATION AND INOCULATION AGAINST INFLUENZA: ICD-10-CM

## 2019-12-11 DIAGNOSIS — L30.9 DERMATITIS: ICD-10-CM

## 2019-12-11 DIAGNOSIS — C50.919 METASTATIC BREAST CANCER: Primary | ICD-10-CM

## 2019-12-11 DIAGNOSIS — I10 ESSENTIAL HYPERTENSION: ICD-10-CM

## 2019-12-11 PROBLEM — L23.7 CONTACT DERMATITIS DUE TO POISON IVY: Status: ACTIVE | Noted: 2019-12-11

## 2019-12-11 PROBLEM — L23.7 CONTACT DERMATITIS DUE TO POISON IVY: Status: RESOLVED | Noted: 2019-12-11 | Resolved: 2019-12-11

## 2019-12-11 PROCEDURE — 90471 IMMUNIZATION ADMIN: CPT | Performed by: INTERNAL MEDICINE

## 2019-12-11 PROCEDURE — 99203 OFFICE O/P NEW LOW 30 MIN: CPT | Mod: 25 | Performed by: INTERNAL MEDICINE

## 2019-12-11 PROCEDURE — 90682 RIV4 VACC RECOMBINANT DNA IM: CPT | Performed by: INTERNAL MEDICINE

## 2019-12-11 RX ORDER — ANASTROZOLE 1 MG/1
1 TABLET ORAL DAILY
COMMUNITY
Start: 2019-03-15 | End: 2020-10-27

## 2019-12-11 RX ORDER — LOPERAMIDE HCL 2 MG
2 CAPSULE ORAL PRN
Status: ON HOLD | COMMUNITY
Start: 2019-04-19 | End: 2023-10-06

## 2019-12-11 RX ORDER — PAROXETINE 20 MG/1
20 TABLET, FILM COATED ORAL DAILY
COMMUNITY
Start: 2019-05-07 | End: 2020-10-27

## 2019-12-11 RX ORDER — ALBUTEROL SULFATE 90 UG/1
2 AEROSOL, METERED RESPIRATORY (INHALATION)
COMMUNITY
Start: 2019-04-19 | End: 2021-10-07

## 2019-12-11 RX ORDER — ACYCLOVIR 800 MG/1
TABLET ORAL
Refills: 0 | COMMUNITY
Start: 2019-06-21 | End: 2019-12-11

## 2019-12-11 RX ORDER — LISINOPRIL 5 MG/1
5 TABLET ORAL DAILY
COMMUNITY
Start: 2019-12-09 | End: 2020-10-27

## 2019-12-11 RX ORDER — CETIRIZINE HYDROCHLORIDE 10 MG/1
10 TABLET ORAL DAILY
COMMUNITY
Start: 2018-12-12 | End: 2021-07-12

## 2019-12-11 ASSESSMENT — MIFFLIN-ST. JEOR: SCORE: 1625.05

## 2019-12-11 ASSESSMENT — PAIN SCALES - GENERAL: PAINLEVEL: MILD PAIN (2)

## 2019-12-11 NOTE — PROGRESS NOTES
Chrissy Stoner is a 59 year old female who presents to clinic today for the following health issues:    HPI   Chief Complaint   Patient presents with     hospitals Care                       Chief Complaint         The patient is a pleasant 59-year-old female who was seen for the first time.  She does have a history of metastatic breast cancer to the lumbar spine.  She is undergone radiation and is now on maintenance chemo.  She follows with her oncologist regularly and laboratory work is up-to-date per her oncologist.  She has a concurrent history of some hypertension and depression.  These are both controlled with medications as listed.  She has had no other significant concerns.  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                         PAST, FAMILY,SOCIAL HISTORY:     Medical  History:   has a past medical history of Breast cancer (H) and Obesity.     Surgical History:   has a past surgical history that includes Cholecystectomy; GYN surgery; Breast surgery; and Remove tissue expander breast (4/16/2014).     Social History:   reports that she quit smoking about 19 years ago. She has never used smokeless tobacco. She reports current alcohol use. She reports current drug use. Drug: Marijuana.     Family History:  family history is not on file.            MEDICATIONS  Current Outpatient Medications   Medication Sig Dispense Refill     acetaminophen (TYLENOL) 500 MG tablet Take 1,000 mg by mouth every 6 hours as needed for mild pain       albuterol (PROAIR HFA/PROVENTIL HFA/VENTOLIN HFA) 108 (90 Base) MCG/ACT inhaler Inhale 2 puffs into the lungs       anastrozole (ARIMIDEX) 1 MG tablet Take 1 mg by mouth daily       Calcium Carbonate-Vitamin D3 (CALCIUM 600/VITAMIN D) 600-400 MG-UNIT TABS Take 1 tablet by mouth daily       cetirizine (ZYRTEC) 10 MG tablet Take 10 mg by mouth daily       lisinopril  "(PRINIVIL/ZESTRIL) 5 MG tablet Take 5 mg by mouth daily       loperamide (IMODIUM) 2 MG capsule Take 2 mg by mouth as needed       medical cannabis (Patient's own supply) See Admin Instructions (The purpose of this order is to document that the patient reports taking medical cannabis.  This is not a prescription, and is not used to certify that the patient has a qualifying medical condition.)       PARoxetine (PAXIL) 20 MG tablet Take 20 mg by mouth daily       Probiotic Product (MISC INTESTINAL NEGIN REGULAT) PACK Take 1 Package by mouth 3 times daily           --------------------------------------------------------------------------------------------------------------------                              Review of Systems       LUNGS: Pt denies: cough, excess sputum, hemoptysis, or shortness of breath.   HEART: Pt denies: chest pain, arrhythmia, syncope, tachy or bradyarrhythmia.   GI: Pt denies: nausea, vomiting, diarrhea, constipation, melena, or hematochezia.   NEURO: Pt denies: seizures, strokes, diplopia, weakness, paraesthesias, or paralysis.   SKIN: Pt denies: itching, rashes, discoloration, or specific lesions of concern. Denies recent hair loss.  Previous left mastectomy with lymph node dissection and subsequent expansion and prosthesis is noted.   PSYCH: The patient denies significant depression, anxiety, mood imbalance. Specifically denies any suicidal ideation.                                     Examination    /78 (BP Location: Left arm, Patient Position: Sitting, Cuff Size: Adult Large)   Pulse 80   Temp 97.8  F (36.6  C) (Temporal)   Resp 18   Ht 1.651 m (5' 5\")   Wt 104.9 kg (231 lb 4.8 oz)   SpO2 94%   BMI 38.49 kg/m       Constitutional: The patient appears to be in no acute distress. The patient appears to be adequately hydrated. No acute respiratory or hemodynamic distress is noted at this time.   LUNGS: clear bilaterally, airflow is brisk, no intercostal retraction or stridor is " noted. No coughing is noted during visit.   HEART:  regular without rubs, clicks, gallops, or murmurs. PMI is nondisplaced. Upstrokes are brisk. S1,S2 are heard.   GI: Abdomen is soft, without rebound, guarding or tenderness. Bowel sounds are appropriate. No renal bruits are heard.  Abdomen is moderately obese.   NEURO: Pt is alert and appropriate. No neurologic lateralization is noted. Cranial nerves 2-12 are intact. Peripheral sensory and motor function are grossly normal.    SKIN:  warm and dry. No erythema, or rashes are noted. No specific lesions of concern are noted.  Mastectomy as discussed.  Evidence of contact dermatitis on the posterior aspect of the upper left arm is noted this involves a small portion of the axilla as well.   PSYCH: The patient appears grossly appropriate. Maintains good eye contact, does not have any jittery or atypical motion. Displays appropriate affect.  Frequently has recurrent cellulitis of the left upper extremity secondary to lymphedema and previous lymph node dissection.                                             Decision Making    1. Metastatic breast cancer (H)  Currently following with oncology at another system    2. Mild episode of recurrent major depressive disorder (H)  Controlled with medication    3. Essential hypertension  Stable, recently started lisinopril    4. Dermatitis  Most likely secondary to previous radiation                             FOLLOW UP   I have asked the patient to make an appointment for followup with me as needed.  She has a multitude of providers taking care of her secondary concerns.  I will be available for her primary care as needed.  She will notify me if she has any needs or concerns in the future.  Most of her health maintenance is up-to-date through the [employer] system and we will try to merge those records as they become available.            I have carefully explained the diagnosis and treatment options to the patient.  The patient has  displayed an understanding of the above, and all subsequent questions were answered.      DO CLYDE Ramos    Portions of this note were produced using [employer]  Although every attempt at real-time proof reading has been made, occasional grammar/syntax errors may have been missed.

## 2020-01-30 ENCOUNTER — TELEPHONE (OUTPATIENT)
Dept: FAMILY MEDICINE | Facility: OTHER | Age: 60
End: 2020-01-30

## 2020-01-30 NOTE — TELEPHONE ENCOUNTER
Panel Management Review      Patient has the following on her problem list:       Composite cancer screening  Chart review shows that this patient is due/due soon for the following.    Health Maintenance Due   Topic Date Due     PREVENTIVE CARE VISIT  1960     HEPATITIS C SCREENING  1960     ADVANCE CARE PLANNING  1960     DEPRESSION ACTION PLAN  1960     MAMMO SCREENING  1960     COLONOSCOPY  11/10/1970     HIV SCREENING  11/10/1975     PNEUMOCOCCAL IMMUNIZATION 19-64 HIGHEST RISK (1 of 3 - PCV13) 11/10/1979     PAP  11/10/1981     ZOSTER IMMUNIZATION (1 of 2) 11/10/2010     PHQ-9  12/19/2018       Summary:    Patient is due/failing the following:   COLONOSCOPY, MAMMOGRAM and PHQ9    Action needed:   After reviewing chart, due to history of breast cancer and being followed at another healthcare system, I did not call or send a letter    Type of outreach:    none    Questions for provider review:    None                                                                                                                                    Annika Hines MA     1/30/2020       Chart routed to none .

## 2020-03-11 ENCOUNTER — TELEPHONE (OUTPATIENT)
Dept: FAMILY MEDICINE | Facility: OTHER | Age: 60
End: 2020-03-11

## 2020-03-11 NOTE — TELEPHONE ENCOUNTER
Reason for call:  Patient reporting a symptom    Symptom or request: sore throat, achy, cough, no fever    Duration (how long have symptoms been present): 3 days    Have you been treated for this before? No    Additional comments: states has compromised immune system, seeking nurse advice, also will need a note to return work.     Phone Number patient can be reached at:  Cell number on file:    Telephone Information:   Mobile [phone]       Best Time:  Any time    Can we leave a detailed message on this number:  YES    Call taken on 3/11/2020 at 8:29 AM by Maggie Almanza

## 2020-03-11 NOTE — TELEPHONE ENCOUNTER
Please let the patient know that I have created a note stating that she has been ill.    In fact, I will drop it off at their house on my way home tonight.    [place]

## 2020-03-11 NOTE — TELEPHONE ENCOUNTER
Attempted to call patient, no answer. Left message for a return call to the clinic when available.     LINDA WinklerN, RN  [facility]

## 2020-03-11 NOTE — TELEPHONE ENCOUNTER
Called and spoke to the patient. She said she is pretty sure she has influenza. She said she had the flu last year and this feels very similar. Patient said her symptoms started Saturday with a sore throat and a fever. By Sunday the sore throat was better but her cough developed. Patient said she has severe body aches and a really terrible cough. Fever has gone down.     Patient said she is immunocompromised. She has breast cancer and on chemotherapy medication. Patient is wondering what to do. RN did discuss Tamiflu and being past the 48 hour window. Patient said she had Tamiflu last year and she doesn't think she wants it again this year. RN gave patient homecare advise. She does not really have urgent symptoms that would warrant an OV.     Patient said she missed work today and will likely need to stay home tomorrow and Friday. Patient does not really want to come to the clinic and expose anyone. She is wondering if Dr. Dash can give her a work note for missing work Wednesday 3/11/2020-Friday 3/13/2020.     Will route to provider to advise.     Lynda Jordan, LINDAN, RN  [facility]

## 2020-03-11 NOTE — LETTER
[facility]  [address] [facility] [address]  Phone: [phone]    March 11, 2020        Mariluz A Gopie  [address] [facility] [address]          To whom it may concern:    RE: Mariluz A Gopie    Patient has been ill and missed work 3/11-13/2020.    Please contact me for questions or concerns.      Sincerely,        Jean Dash, DO

## 2020-03-18 ENCOUNTER — OFFICE VISIT (OUTPATIENT)
Dept: FAMILY MEDICINE | Facility: OTHER | Age: 60
End: 2020-03-18
Payer: COMMERCIAL

## 2020-03-18 VITALS
WEIGHT: 226.8 LBS | BODY MASS INDEX: 37.79 KG/M2 | SYSTOLIC BLOOD PRESSURE: 128 MMHG | TEMPERATURE: 97.1 F | OXYGEN SATURATION: 98 % | DIASTOLIC BLOOD PRESSURE: 70 MMHG | RESPIRATION RATE: 18 BRPM | HEART RATE: 78 BPM | HEIGHT: 65 IN

## 2020-03-18 DIAGNOSIS — E66.01 MORBID OBESITY (H): ICD-10-CM

## 2020-03-18 DIAGNOSIS — I10 ESSENTIAL HYPERTENSION: ICD-10-CM

## 2020-03-18 DIAGNOSIS — S46.012D TRAUMATIC COMPLETE TEAR OF LEFT ROTATOR CUFF, SUBSEQUENT ENCOUNTER: ICD-10-CM

## 2020-03-18 DIAGNOSIS — C50.919 METASTATIC BREAST CANCER: ICD-10-CM

## 2020-03-18 DIAGNOSIS — I42.9 SECONDARY CARDIOMYOPATHY (H): ICD-10-CM

## 2020-03-18 DIAGNOSIS — Z01.818 PREOP GENERAL PHYSICAL EXAM: Primary | ICD-10-CM

## 2020-03-18 LAB
ALBUMIN UR-MCNC: NEGATIVE MG/DL
APPEARANCE UR: ABNORMAL
BILIRUB UR QL STRIP: NEGATIVE
COLOR UR AUTO: YELLOW
GLUCOSE UR STRIP-MCNC: NEGATIVE MG/DL
HGB UR QL STRIP: ABNORMAL
KETONES UR STRIP-MCNC: ABNORMAL MG/DL
LEUKOCYTE ESTERASE UR QL STRIP: NEGATIVE
MUCOUS THREADS #/AREA URNS LPF: PRESENT /LPF
NITRATE UR QL: NEGATIVE
NON-SQ EPI CELLS #/AREA URNS LPF: ABNORMAL /LPF
PH UR STRIP: 5.5 PH (ref 5–7)
RBC #/AREA URNS AUTO: ABNORMAL /HPF
SOURCE: ABNORMAL
SP GR UR STRIP: >1.03 (ref 1–1.03)
UROBILINOGEN UR STRIP-ACNC: 0.2 EU/DL (ref 0.2–1)
WBC #/AREA URNS AUTO: ABNORMAL /HPF

## 2020-03-18 PROCEDURE — 81001 URINALYSIS AUTO W/SCOPE: CPT | Performed by: INTERNAL MEDICINE

## 2020-03-18 PROCEDURE — 93000 ELECTROCARDIOGRAM COMPLETE: CPT | Performed by: INTERNAL MEDICINE

## 2020-03-18 PROCEDURE — 99214 OFFICE O/P EST MOD 30 MIN: CPT | Performed by: INTERNAL MEDICINE

## 2020-03-18 RX ORDER — METOPROLOL SUCCINATE 25 MG/1
25 TABLET, EXTENDED RELEASE ORAL DAILY
COMMUNITY
Start: 2020-01-20 | End: 2020-10-27

## 2020-03-18 ASSESSMENT — MIFFLIN-ST. JEOR: SCORE: 1600.67

## 2020-03-18 ASSESSMENT — PAIN SCALES - GENERAL: PAINLEVEL: NO PAIN (0)

## 2020-03-18 ASSESSMENT — PATIENT HEALTH QUESTIONNAIRE - PHQ9: SUM OF ALL RESPONSES TO PHQ QUESTIONS 1-9: 0

## 2020-03-18 NOTE — PROGRESS NOTES
[facility]  [address] [facility] [address]  [phone]  Dept: [phone]    PRE-OP EVALUATION:  Today's date: 3/18/2020    Mariluz Stoner (: 1960) presents for pre-operative evaluation assessment as requested by Dr. Stafford.  She requires evaluation and anesthesia risk assessment prior to undergoing surgery/procedure for treatment of shoulder .    Proposed Surgery/ Procedure: Shoulder rotator cuff, left  Date of Surgery/ Procedure: 2020  Time of Surgery/ Procedure: 8 AM  Hospital/Surgical Facility: [place]  Primary Physician: Jean Dash  Type of Anesthesia Anticipated: General    Patient has a Health Care Directive or Living Will:  YES     1. NO - Do you have a history of heart attack, stroke, stent, bypass or surgery on an artery in the head, neck, heart or legs?  2. NO - Do you ever have any pain or discomfort in your chest?  3. NO - Do you have a history of  Heart Failure?  4. NO - Are you troubled by shortness of breath when: walking on the level, up a slight hill or at night?  5. NO - Do you currently have a cold, bronchitis or other respiratory infection?  6. YES - DO YOU HAVE A COUGH, SHORTNESS OF BREATH OR WHEEZING?   7. NO - Do you sometimes get pains in the calves of your legs when you walk?  8. NO - Do you or anyone in your family have previous history of blood clots?  9. NO - Do you or does anyone in your family have a serious bleeding problem such as prolonged bleeding following surgeries or cuts?  10. NO - Have you ever had problems with anemia or been told to take iron pills?  11. NO - Have you had any abnormal blood loss such as black, tarry or bloody stools, or abnormal vaginal bleeding?  12. NO - Have you ever had a blood transfusion?  13. NO - Have you or any of your relatives ever had problems with anesthesia?  14. NO - Do you have sleep apnea, excessive snoring or daytime drowsiness?  15. NO - Do you have any prosthetic heart  valves?  16. NO - Do you have prosthetic joints?  17. NO - Is there any chance that you may be pregnant?      HPI:     HPI related to upcoming procedure:RCT with pain and decreased mobility.    See problem list for active medical problems.  Problems all longstanding and stable, except as noted/documented.  See ROS for pertinent symptoms related to these conditions.      MEDICAL HISTORY:     Patient Active Problem List    Diagnosis Date Noted     Metastatic breast cancer (H) 12/11/2019     Priority: Medium     Mild episode of recurrent major depressive disorder (H) 12/11/2019     Priority: Medium     Essential hypertension 12/11/2019     Priority: Medium     Dermatitis 12/11/2019     Priority: Medium      Past Medical History:   Diagnosis Date     Breast cancer (H)      Obesity      Past Surgical History:   Procedure Laterality Date     BREAST SURGERY      left tissue expander, LEFT MASTECTOMY     CHOLECYSTECTOMY       GYN SURGERY      oophorectomy     REMOVE TISSUE EXPANDER BREAST  4/16/2014    Procedure: REMOVAL OF LEFT BREAST TISSUE EXPANDER, IRRIGATION AND DEBRIDEMENT OF LEFT BREAST;  Surgeon: Marlon Luz MD;  Location: [facility]     Current Outpatient Medications   Medication Sig Dispense Refill     acetaminophen (TYLENOL) 500 MG tablet Take 1,000 mg by mouth every 6 hours as needed for mild pain       albuterol (PROAIR HFA/PROVENTIL HFA/VENTOLIN HFA) 108 (90 Base) MCG/ACT inhaler Inhale 2 puffs into the lungs       anastrozole (ARIMIDEX) 1 MG tablet Take 1 mg by mouth daily       Calcium Carbonate-Vitamin D3 (CALCIUM 600/VITAMIN D) 600-400 MG-UNIT TABS Take 1 tablet by mouth daily       cetirizine (ZYRTEC) 10 MG tablet Take 10 mg by mouth daily       lisinopril (PRINIVIL/ZESTRIL) 5 MG tablet Take 5 mg by mouth daily       loperamide (IMODIUM) 2 MG capsule Take 2 mg by mouth as needed       medical cannabis (Patient's own supply) See Admin Instructions (The purpose of this order is to document that the  "patient reports taking medical cannabis.  This is not a prescription, and is not used to certify that the patient has a qualifying medical condition.)       metoprolol succinate ER (TOPROL-XL) 25 MG 24 hr tablet Take 25 mg by mouth daily       PARoxetine (PAXIL) 20 MG tablet Take 20 mg by mouth daily       Probiotic Product (MISC INTESTINAL NEGIN REGULAT) PACK Take 1 Package by mouth 3 times daily       OTC products: None, except as noted above    No Known Allergies   Latex Allergy: NO    Social History     Tobacco Use     Smoking status: Former Smoker     Last attempt to quit: 2000     Years since quittin.2     Smokeless tobacco: Never Used   Substance Use Topics     Alcohol use: Yes     Comment: social drinking     History   Drug Use     Types: Marijuana     Comment: medical marijuana syrup        REVIEW OF SYSTEMS:   CONSTITUTIONAL: NEGATIVE for fever, chills, change in weight  INTEGUMENTARY/SKIN: NEGATIVE for worrisome rashes, moles or lesions  EYES: NEGATIVE for vision changes or irritation  ENT/MOUTH: NEGATIVE for ear, mouth and throat problems  RESP: NEGATIVE for significant cough or SOB  BREAST:positive for left breast cancer.  History of mastectomy with subsequent expander placement.  CV: NEGATIVE for chest pain, palpitations or peripheral edema  GI: NEGATIVE for nausea, abdominal pain, heartburn, or change in bowel habits  : NEGATIVE for frequency, dysuria, or hematuria  MUSCULOSKELETAL: Severe pain in left shoulder and arm with decreased range of motion associated with documented rotator cuff tear.  NEURO: NEGATIVE for weakness, dizziness or paresthesias  ENDOCRINE: NEGATIVE for temperature intolerance, skin/hair changes  HEME: NEGATIVE for bleeding problems  PSYCHIATRIC: NEGATIVE for changes in mood or affect    EXAM:   /70 (BP Location: Right arm, Patient Position: Sitting, Cuff Size: Adult Large)   Pulse 78   Temp 97.1  F (36.2  C) (Temporal)   Resp 18   Ht 1.645 m (5' 4.75\")   Wt " 102.9 kg (226 lb 12.8 oz)   SpO2 98%   BMI 38.03 kg/m      GENERAL APPEARANCE: healthy, alert and no distress     EYES: EOMI, PERRL     HENT: ear canals and TM's normal and nose and mouth without ulcers or lesions     NECK: no adenopathy, no asymmetry, masses, or scars and thyroid normal to palpation     RESP: lungs clear to auscultation - no rales, rhonchi or wheezes     CV: regular rates and rhythm, normal S1 S2, no S3 or S4 and no murmur, click or rub     ABDOMEN:  soft, nontender, no HSM or masses and bowel sounds normal     MS: Decreased range of motion left shoulder is noted.  Modest crepitance is present.  No neurologic lateralization or radicular findings present.     SKIN: no suspicious lesions or rashes     NEURO: Normal strength and tone, sensory exam grossly normal, mentation intact and speech normal     PSYCH: mentation appears normal. and affect normal/bright     LYMPHATICS: No cervical adenopathy    DIAGNOSTICS:   EKG: Normal sinus rhythm with some bradycardia noted.  Slow R wave progression anteriorly with suggest possible previous anterior infarction.    Available laboratory reviewed and unremarkable  IMPRESSION:   Reason for surgery/procedure: Rotator cuff tear of the left shoulder requiring surgical intervention for treatment of pain and immobility  Diagnosis/reason for consult: Perioperative risk assessment in a pleasant 59-year-old female with:  1. Preop general physical exam  - Urine Microscopic    2. Traumatic complete tear of left rotator cuff, subsequent encounter    3. Essential hypertension  - EKG 12-lead complete w/read - Clinics  - *UA reflex to Microscopic and Culture ([facility] (except [place] and [place])    4. Morbid obesity (H)    5. Metastatic breast cancer (H)    6. Secondary cardiomyopathy (H)  The proposed surgical procedure is considered INTERMEDIATE risk.    REVISED CARDIAC RISK INDEX  The patient has the following serious cardiovascular risks for  perioperative complications such as (MI, PE, VFib and 3  AV Block):  No serious cardiac risks  INTERPRETATION: 1 risks: Class II (low risk - 0.9% complication rate)    The patient has the following additional risks for perioperative complications:  No identified additional risks      ICD-10-CM    1. Preop general physical exam  Z01.818        RECOMMENDATIONS:         --Patient is to take all scheduled medications on the day of surgery EXCEPT for modifications listed below.  Lisinopril will be discontinued the morning of the procedure.  Patient will not take aspirin 5 days prior to the procedure.      APPROVAL GIVEN to proceed with proposed procedure, without further diagnostic evaluation       Signed Electronically by: Jean Dash DO    Copy of this evaluation report is provided to requesting physician.    [facility] Preop Guidelines    Revised Cardiac Risk Index

## 2020-06-08 ENCOUNTER — OFFICE VISIT (OUTPATIENT)
Dept: FAMILY MEDICINE | Facility: CLINIC | Age: 60
End: 2020-06-08
Payer: COMMERCIAL

## 2020-06-08 VITALS
OXYGEN SATURATION: 95 % | DIASTOLIC BLOOD PRESSURE: 72 MMHG | WEIGHT: 230 LBS | SYSTOLIC BLOOD PRESSURE: 128 MMHG | HEART RATE: 84 BPM | HEIGHT: 65 IN | TEMPERATURE: 98.6 F | RESPIRATION RATE: 16 BRPM | BODY MASS INDEX: 38.32 KG/M2

## 2020-06-08 DIAGNOSIS — M75.102 TEAR OF LEFT ROTATOR CUFF, UNSPECIFIED TEAR EXTENT, UNSPECIFIED WHETHER TRAUMATIC: ICD-10-CM

## 2020-06-08 DIAGNOSIS — Z01.818 PREOP GENERAL PHYSICAL EXAM: Primary | ICD-10-CM

## 2020-06-08 PROCEDURE — 99214 OFFICE O/P EST MOD 30 MIN: CPT | Performed by: NURSE PRACTITIONER

## 2020-06-08 ASSESSMENT — MIFFLIN-ST. JEOR: SCORE: 1615.18

## 2020-06-08 ASSESSMENT — PAIN SCALES - GENERAL: PAINLEVEL: NO PAIN (0)

## 2020-06-08 NOTE — PROGRESS NOTES
[address] [id number] [phone]  Dept: [phone]    PRE-OP EVALUATION:  Today's date: 2020    Mariluz Stoner (: 1960) presents for pre-operative evaluation assessment as requested by Dr. Stafford .  She requires evaluation and anesthesia risk assessment prior to undergoing surgery/procedure for treatment of left shoulder pain.    Fax number for surgical facility: [phone]   Primary Physician: Jean Dash  Type of Anesthesia Anticipated: [facility].     Patient has a Health Care Directive or Living Will:  YES     Preop Questions 2020   Who is doing your surgery? Dr Stafford   What are you having done?  LEFT SHOULDER ARTHROSCOPIC ROTATOR CUFF REPAIR WITH REGENETEN IMPLANT AUGMENTATION WITH SUBACROMIAL DECOMPRESION WITH POSSIBLE BICEP TENOTOMY    Date of Surgery/Procedure: 20   Facility or Hospital where procedure/surgery will be performed: [facility] [place]   1.  Do you have a history of Heart attack, stroke, stent, coronary bypass surgery, or other heart surgery? No   2.  Do you ever have any pain or discomfort in your chest? No   3.  Do you have a history of  Heart Failure? No   4.   Are you troubled by shortness of breath when:  walking on a level surface, or up a slight hill, or at night? YES - When exerting herself, not new, at baseline.  She feels this is related to chemotherapy and obesity     5.  Do you currently have a cold, bronchitis or other respiratory infection? No   6.  Do you have a cough, shortness of breath, or wheezing? No   7.  Do you sometimes get pains in the calves of your legs when you walk? No   8. Do you or anyone in your family have previous history of blood clots? No   9.  Do you or does anyone in your family have a serious bleeding problem such as prolonged bleeding following surgeries or cuts? No   10. Have you ever had problems with anemia or been told to take iron pills?  No   11. Have you had any abnormal blood loss such as black, tarry or bloody stools, or abnormal vaginal bleeding? No   12. Have you ever had a blood transfusion? No   13. Have you or any of your relatives ever had problems with anesthesia? No   14. Do you have sleep apnea, excessive snoring or daytime drowsiness? No   15. Do you have any prosthetic heart valves? No   16. Do you have prosthetic joints? No   17. Is there any chance that you may be pregnant? No         HPI:     HPI related to upcoming procedure: left shoulder rotator cuff tear       See problem list for active medical problems.  Problems all longstanding and stable, except as noted/documented.  See ROS for pertinent symptoms related to these conditions.      MEDICAL HISTORY:     Patient Active Problem List    Diagnosis Date Noted     Morbid obesity (H) 03/18/2020     Priority: Medium     Metastatic breast cancer (H) 12/11/2019     Priority: Medium     Mild episode of recurrent major depressive disorder (H) 12/11/2019     Priority: Medium     Essential hypertension 12/11/2019     Priority: Medium     Dermatitis 12/11/2019     Priority: Medium      Past Medical History:   Diagnosis Date     Breast cancer (H)      Obesity      Past Surgical History:   Procedure Laterality Date     BREAST SURGERY      left tissue expander, LEFT MASTECTOMY     CHOLECYSTECTOMY       GYN SURGERY      oophorectomy     REMOVE TISSUE EXPANDER BREAST  4/16/2014    Procedure: REMOVAL OF LEFT BREAST TISSUE EXPANDER, IRRIGATION AND DEBRIDEMENT OF LEFT BREAST;  Surgeon: Marlon Luz MD;  Location: [facility]     Current Outpatient Medications   Medication Sig Dispense Refill     acetaminophen (TYLENOL) 500 MG tablet Take 1,000 mg by mouth every 6 hours as needed for mild pain       albuterol (PROAIR HFA/PROVENTIL HFA/VENTOLIN HFA) 108 (90 Base) MCG/ACT inhaler Inhale 2 puffs into the lungs       anastrozole (ARIMIDEX) 1 MG tablet Take 1 mg by mouth daily       Calcium  Carbonate-Vitamin D3 (CALCIUM 600/VITAMIN D) 600-400 MG-UNIT TABS Take 1 tablet by mouth daily       cetirizine (ZYRTEC) 10 MG tablet Take 10 mg by mouth daily       lisinopril (PRINIVIL/ZESTRIL) 5 MG tablet Take 5 mg by mouth daily       loperamide (IMODIUM) 2 MG capsule Take 2 mg by mouth as needed       medical cannabis (Patient's own supply) See Admin Instructions (The purpose of this order is to document that the patient reports taking medical cannabis.  This is not a prescription, and is not used to certify that the patient has a qualifying medical condition.)       metoprolol succinate ER (TOPROL-XL) 25 MG 24 hr tablet Take 25 mg by mouth daily       PARoxetine (PAXIL) 20 MG tablet Take 20 mg by mouth daily       Probiotic Product (MISC INTESTINAL NEGIN REGULAT) PACK Take 1 Package by mouth 3 times daily       OTC products: None, except as noted above    No Known Allergies   Latex Allergy: NO    Social History     Tobacco Use     Smoking status: Former Smoker     Last attempt to quit: 2000     Years since quittin.4     Smokeless tobacco: Never Used   Substance Use Topics     Alcohol use: Yes     Comment: social drinking     History   Drug Use     Types: Marijuana     Comment: medical marijuana syrup        REVIEW OF SYSTEMS:   CONSTITUTIONAL: NEGATIVE for fever, chills, change in weight  INTEGUMENTARY/SKIN: NEGATIVE for worrisome rashes, moles or lesions  EYES: NEGATIVE for vision changes or irritation  ENT/MOUTH: NEGATIVE for ear, mouth and throat problems  RESP: NEGATIVE for significant cough or SOB  BREAST: NEGATIVE for masses, tenderness or discharge  CV: NEGATIVE for chest pain, palpitations or peripheral edema  GI: NEGATIVE for nausea, abdominal pain, heartburn, or change in bowel habits  : NEGATIVE for frequency, dysuria, or hematuria  MUSCULOSKELETAL: NEGATIVE for significant arthralgias or myalgia  NEURO: NEGATIVE for weakness, dizziness or paresthesias  ENDOCRINE: NEGATIVE for temperature  "intolerance, skin/hair changes  HEME: NEGATIVE for bleeding problems  PSYCHIATRIC: NEGATIVE for changes in mood or affect    EXAM:   /72 (BP Location: Right arm, Patient Position: Sitting, Cuff Size: Adult Large)   Pulse 84   Temp 98.6  F (37  C) (Temporal)   Resp 16   Ht 1.645 m (5' 4.75\")   Wt 104.3 kg (230 lb)   SpO2 95%   Breastfeeding No   BMI 38.57 kg/m      GENERAL APPEARANCE: alert, no distress and over weight     EYES: EOMI, PERRL     HENT: ear canals and TM's normal and nose and mouth without ulcers or lesions     NECK: no adenopathy, no asymmetry, masses, or scars and thyroid normal to palpation     RESP: lungs clear to auscultation - no rales, rhonchi or wheezes     CV: regular rates and rhythm, normal S1 S2, no S3 or S4 and no murmur, click or rub     ABDOMEN:  soft, nontender, no HSM or masses and bowel sounds normal     MS: extremities normal- no gross deformities noted, no evidence of inflammation in joints, FROM in all extremities.     SKIN: no suspicious lesions or rashes     NEURO: Normal strength and tone, sensory exam grossly normal, mentation intact and speech normal     PSYCH: mentation appears normal. and affect normal/bright     LYMPHATICS: No cervical adenopathy    DIAGNOSTICS:   EKG was done 3/18/2020 and showed:   Sinus  Bradycardia -Anterior infarct -probably not recent.    -  Negative precordial T-waves.       Recent Labs   Lab Test 11/14/19 04/19/19 04/15/19  1706 01/10/19  0759  01/29/18   HGB  --   --  12.4 12.7  --   --    PLT  --   --  203 243  --   --    NA  --   --  140 142  --   --    POTASSIUM 4.0 3.5 3.8 3.6   < > 4.3   CR 0.66 0.52* 0.64 0.65   < > 0.69   A1C  --   --   --   --   --  5.3    < > = values in this interval not displayed.        IMPRESSION:   Reason for surgery/procedure: left rotator cuff tear    The proposed surgical procedure is considered INTERMEDIATE risk.    REVISED CARDIAC RISK INDEX  The patient has the following serious cardiovascular risks " for perioperative complications such as (MI, PE, VFib and 3  AV Block):  No serious cardiac risks  INTERPRETATION: 0 risks: Class I (very low risk - 0.4% complication rate)    The patient has the following additional risks for perioperative complications:  Morbid obesity      ICD-10-CM    1. Preop general physical exam  Z01.818    2. Tear of left rotator cuff, unspecified tear extent, unspecified whether traumatic  M75.102        RECOMMENDATIONS:         --Patient is to take all scheduled medications on the day of surgery EXCEPT for modifications listed below.    Anticoagulant or Antiplatelet Medication Use  NSAIDS: Ibuprofen (Motrin):         Stop one day prior to surgery        ACE Inhibitor or Angiotensin Receptor Blocker (ARB) Use  Ace inhibitor or Angiotensin Receptor Blocker (ARB) and should HOLD this medication for the 24 hours prior to surgery.      APPROVAL GIVEN to proceed with proposed procedure, without further diagnostic evaluation       Signed Electronically by: VERONIKA English CNP    Copy of this evaluation report is provided to requesting physician.    [facility] Preop Guidelines    Revised Cardiac Risk Index

## 2020-06-08 NOTE — PATIENT INSTRUCTIONS
Hold your Lisinopril the morning of surgery.     Don't take any more Ibuprofen, Aleve, Naproxen or Aspirin prior to surgery.  Tylenol and/or prescription pain pills are okay to use if needed.         Before Your Surgery      Call your surgeon if there is any change in your health. This includes signs of a cold or flu (such as a sore throat, runny nose, cough, rash or fever).    Do not smoke, drink alcohol or take over the counter medicine (unless your surgeon or primary care doctor tells you to) for the 24 hours before and after surgery.    If you take prescribed drugs: Follow your doctor s orders about which medicines to take and which to stop until after surgery.    Eating and drinking prior to surgery: follow the instructions from your surgeon    Take a shower or bath the night before surgery. Use the soap your surgeon gave you to gently clean your skin. If you do not have soap from your surgeon, use your regular soap. Do not shave or scrub the surgery site.  Wear clean pajamas and have clean sheets on your bed.

## 2020-06-08 NOTE — NURSING NOTE
Health Maintenance Due   Topic Date Due     PREVENTIVE CARE VISIT  1960     HEPATITIS C SCREENING  1960     ADVANCE CARE PLANNING  1960     DEPRESSION ACTION PLAN  1960     MAMMO SCREENING  1960     HIV SCREENING  11/10/1975     PNEUMOCOCCAL IMMUNIZATION 19-64 HIGHEST RISK (1 of 3 - PCV13) 11/10/1979     PAP  11/10/1981     ZOSTER IMMUNIZATION (1 of 2) 11/10/2010     COLORECTAL CANCER SCREENING  04/28/2020      Cordelia Pardo LPN........6/8/2020 3:31 PM

## 2020-06-11 ENCOUNTER — TRANSFERRED RECORDS (OUTPATIENT)
Dept: HEALTH INFORMATION MANAGEMENT | Facility: CLINIC | Age: 60
End: 2020-06-11

## 2020-08-18 ENCOUNTER — HOSPITAL ENCOUNTER (OUTPATIENT)
Dept: MAMMOGRAPHY | Facility: CLINIC | Age: 60
End: 2020-08-18
Attending: INTERNAL MEDICINE
Payer: COMMERCIAL

## 2020-08-18 DIAGNOSIS — Z12.31 VISIT FOR SCREENING MAMMOGRAM: ICD-10-CM

## 2020-08-18 PROCEDURE — 77067 SCR MAMMO BI INCL CAD: CPT | Mod: 52

## 2020-09-22 ENCOUNTER — OFFICE VISIT (OUTPATIENT)
Dept: INTERNAL MEDICINE | Facility: CLINIC | Age: 60
End: 2020-09-22
Payer: COMMERCIAL

## 2020-09-22 VITALS
TEMPERATURE: 97.2 F | WEIGHT: 230.2 LBS | RESPIRATION RATE: 16 BRPM | HEART RATE: 79 BPM | BODY MASS INDEX: 38.6 KG/M2 | DIASTOLIC BLOOD PRESSURE: 74 MMHG | OXYGEN SATURATION: 97 % | SYSTOLIC BLOOD PRESSURE: 126 MMHG

## 2020-09-22 DIAGNOSIS — G62.9 NEUROPATHY: ICD-10-CM

## 2020-09-22 DIAGNOSIS — L98.9 SKIN LESION: Primary | ICD-10-CM

## 2020-09-22 DIAGNOSIS — C50.919 METASTATIC BREAST CANCER: ICD-10-CM

## 2020-09-22 DIAGNOSIS — F41.1 GAD (GENERALIZED ANXIETY DISORDER): ICD-10-CM

## 2020-09-22 PROCEDURE — 99214 OFFICE O/P EST MOD 30 MIN: CPT | Performed by: INTERNAL MEDICINE

## 2020-09-22 RX ORDER — ONDANSETRON 4 MG/1
4 TABLET, ORALLY DISINTEGRATING ORAL
COMMUNITY
Start: 2020-06-11 | End: 2021-10-07

## 2020-09-22 RX ORDER — DOXYCYCLINE 100 MG/1
TABLET ORAL
COMMUNITY
Start: 2020-07-27 | End: 2021-10-07

## 2020-09-22 RX ORDER — LISINOPRIL 2.5 MG/1
TABLET ORAL
COMMUNITY
Start: 2020-01-09 | End: 2020-10-27

## 2020-09-22 RX ORDER — SULFAMETHOXAZOLE/TRIMETHOPRIM 800-160 MG
TABLET ORAL
COMMUNITY
Start: 2019-12-14 | End: 2021-07-12

## 2020-09-22 ASSESSMENT — PAIN SCALES - GENERAL: PAINLEVEL: MODERATE PAIN (4)

## 2020-09-22 NOTE — PROGRESS NOTES
"Subjective     Mariluz IVON Mcnealpie is a 59 year old female who presents to clinic today for the following health issues:    HPI       Mole on upper lip. Would like it removed     Possible small toe fracture. Right foot. Injury occurred 3 month prior, doesn't seem to be healing \"seems as though it is dead and is afraid it will fall off\"      EMR reviewed including: History of chief complaint, pertinent distant history, medications, concurrent diagnoses.             Chief Complaint:  #1   Patient has a mole on her upper right lip which measures approximately 5 mm in diameter.  She would like this removed.  I have explained to her that I am not going to cut on her face.  I referred her to dermatology where an experienced/trained individual can remove the lesion from her face without worries of keloid, granulation etc.    #2   Has possibly fractured her right fourth toe.  Injured it about 3 months ago.  Sensation is decreased in that toe as well as the others due to her previous chemotherapy.  No discoloration.  Was previously bruised.  No angulation or deformity noted.    #3  Ongoing fatigue.  Has had recent shoulder surgery and notes persistent pain in shoulder.  Is suggesting that she probably will need to be off work.  Works in [employer].  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  [place].  Little help from  who is disabled.  Patient does all of the work herself.    #4   Patient has metastatic breast cancer and is being followed by oncology.  Did not tolerate many of the chemotherapeutics. Currently on Peridex.                       PAST, FAMILY,SOCIAL HISTORY:     Medical  History:   has a past medical history of Breast cancer (H) and Obesity.     Surgical History:   has a past surgical history that includes Cholecystectomy; GYN surgery; Breast surgery; and Remove tissue expander breast (4/16/2014).     Social History:   reports that she quit smoking about 20 years ago. She has never " used smokeless tobacco. She reports current alcohol use. She reports current drug use. Drug: Marijuana.     Family History:  family history is not on file.            MEDICATIONS  Current Outpatient Medications   Medication Sig Dispense Refill     anastrozole (ARIMIDEX) 1 MG tablet Take 1 mg by mouth daily       Calcium Carbonate-Vitamin D3 (CALCIUM 600/VITAMIN D) 600-400 MG-UNIT TABS Take 1 tablet by mouth daily       lisinopril (PRINIVIL/ZESTRIL) 5 MG tablet Take 5 mg by mouth daily       medical cannabis (Patient's own supply) See Admin Instructions (The purpose of this order is to document that the patient reports taking medical cannabis.  This is not a prescription, and is not used to certify that the patient has a qualifying medical condition.)       metoprolol succinate ER (TOPROL-XL) 25 MG 24 hr tablet Take 25 mg by mouth daily       PARoxetine (PAXIL) 20 MG tablet Take 20 mg by mouth daily       acetaminophen (TYLENOL) 500 MG tablet Take 1,000 mg by mouth every 6 hours as needed for mild pain       albuterol (PROAIR HFA/PROVENTIL HFA/VENTOLIN HFA) 108 (90 Base) MCG/ACT inhaler Inhale 2 puffs into the lungs       cetirizine (ZYRTEC) 10 MG tablet Take 10 mg by mouth daily       doxycycline monohydrate (ADOXA) 100 MG tablet TK 1 T PO BID WITH BREAKFAST AND DINNER       lisinopril (ZESTRIL) 2.5 MG tablet TAKE 1 TABLET (2.5 MG) BY MOUTH ONCE DAILY FOR 21 DAYS.       loperamide (IMODIUM) 2 MG capsule Take 2 mg by mouth as needed       ondansetron (ZOFRAN-ODT) 4 MG ODT tab Take 4 mg by mouth       Probiotic Product (MISC INTESTINAL NEGIN REGULAT) PACK Take 1 Package by mouth 3 times daily       sulfamethoxazole-trimethoprim (BACTRIM DS) 800-160 MG tablet TAKE 1 TABLET BY MOUTH TWICE A DAY           --------------------------------------------------------------------------------------------------------------------                              Review of Systems       LUNGS: Pt denies: cough, excess sputum,  hemoptysis, or shortness of breath.   HEART: Pt denies: chest pain, arrhythmia, syncope, tachy or bradyarrhythmia.   GI: Pt denies: nausea, vomiting, diarrhea, constipation, melena, or hematochezia.   NEURO: Pt denies: seizures, strokes, diplopia, focal weakness, paraesthesias, or paralysis.   SKIN: Pt denies: itching, rashes, discoloration, or specific lesions of concern. Denies recent hair loss.   PSYCH: The patient acknowledges ongoing anxiety and depression.  Denies any suicidal ideation.  States that she is tired and fatigued all the time.   MS: Pt denies: Lower extremity joint pain, swelling, or acute loss of function. Able to ambulate with little or no assistance.  Has persistent pain and weakness in the left shoulder.        Examination    /74   Pulse 79   Temp 97.2  F (36.2  C) (Temporal)   Resp 16   Wt 104.4 kg (230 lb 3.2 oz)   SpO2 97%   BMI 38.60 kg/m      Constitutional: The patient appears to be in no acute distress. The patient appears to be adequately hydrated. No acute respiratory or hemodynamic distress is noted at this time.   LUNGS: clear bilaterally, airflow is brisk, no intercostal retraction or stridor is noted. No coughing is noted during visit.   HEART:  regular without rubs, clicks, gallops, or murmurs. PMI is nondisplaced. Upstrokes are brisk. S1,S2 are heard.   GI: Abdomen is soft, without rebound, guarding or tenderness. Bowel sounds are appropriate. No renal bruits are heard.   NEURO: Pt is alert and appropriate. No neurologic lateralization is noted. Cranial nerves 2-12 are intact. Peripheral sensory and motor function are grossly normal.    SKIN:  warm and dry. No erythema, or rashes are noted. No specific lesions of concern are noted.    PSYCH: The patient appears grossly appropriate. Maintains good eye contact, does not have any jittery or atypical motion. Displays appropriate affect.   MS: Minimal crepitance is noted in the shoulder.  Range of motion is somewhat  "decreased due to discomfort.. No deformity is present. Muscle strength is appropriate and equal bilaterally. No acute joint erythema or swelling is present.         Decision Making       1. Skin lesion  Cerebral dermatology  - DERMATOLOGY ADULT REFERRAL    2. Metastatic breast cancer (H)  Follow-up with oncologist    3. Neuropathy  Add gabapentin if worsens.  No evidence of toe fracture    4. AUNG (generalized anxiety disorder)  Patient is on \"medical marijuana\" will not suggest any benzodiazepine medication at this time.  Recommend leg simplification program.  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                         FOLLOW UP   I have asked the patient to make an appointment for followup with me in 1 month            I have carefully explained the diagnosis and treatment options to the patient.  The patient has displayed an understanding of the above, and all subsequent questions were answered.      DO CLYDE Ramos    Portions of this note were produced using [employer]  Although every attempt at real-time proof reading has been made, occasional grammar/syntax errors may have been missed.      45 minutes were spent with the patient in direct face-to-face examinationand and discussion regarding the diagnosis and treatment options pertinent to current problems.               "

## 2020-09-24 NOTE — TELEPHONE ENCOUNTER
Reason for Call:  Medication or medication refill:    Do you use a [facility]?  Name of the pharmacy and phone number for the current request:  [facility] [place] - [phone]    Name of the medication requested: vicodin    Other request: Patient has a flare up of cellulitis, she called for a refill and was told by her pharmacy that she needs to call the clinic to request this medication, she said she was on this medication in the past. Mariluz is aware Dr Dash is out of clinic until 9/28/20 and is asking if another provider can address this request.    Can we leave a detailed message on this number? YES    Phone number patient can be reached at: Home number on file [phone] (home)    Best Time:     Call taken on 9/24/2020 at 2:33 PM by Kortney Lewis

## 2020-09-24 NOTE — TELEPHONE ENCOUNTER
Mariluz returned call, states she has an antibiotic that she will use and she will take tylenol. If she doesn't get better she will call back.

## 2020-09-24 NOTE — TELEPHONE ENCOUNTER
Huddled with Dr. Dash. He said this would require an office visit. She can do a virtual visit if she wishes but he will not prescribe this over a telephone message. For cellulitis, she would likely get an antibiotic vs a pain medication.     If patient calls back, please help schedule her a visit or her choice (virtual or face to face).    Attempted to call patient, no answer. RN did leave a detailed message (original message stated that leaving a detailed message was OK per patient) informing patient of Dr. Dash's advise.     LINDA WinklerN, RN  [facility]

## 2020-10-06 ENCOUNTER — OFFICE VISIT (OUTPATIENT)
Dept: DERMATOLOGY | Facility: CLINIC | Age: 60
End: 2020-10-06
Attending: INTERNAL MEDICINE
Payer: COMMERCIAL

## 2020-10-06 VITALS
SYSTOLIC BLOOD PRESSURE: 121 MMHG | RESPIRATION RATE: 16 BRPM | OXYGEN SATURATION: 98 % | DIASTOLIC BLOOD PRESSURE: 67 MMHG | HEART RATE: 90 BPM

## 2020-10-06 DIAGNOSIS — L81.4 LENTIGO: ICD-10-CM

## 2020-10-06 DIAGNOSIS — D18.01 ANGIOMA OF SKIN: ICD-10-CM

## 2020-10-06 DIAGNOSIS — L82.1 SEBORRHEIC KERATOSIS: Primary | ICD-10-CM

## 2020-10-06 DIAGNOSIS — C44.01 BASAL CELL CARCINOMA (BCC) OF SKIN OF RIGHT UPPER LIP: ICD-10-CM

## 2020-10-06 PROCEDURE — 11102 TANGNTL BX SKIN SINGLE LES: CPT | Performed by: DERMATOLOGY

## 2020-10-06 PROCEDURE — 99203 OFFICE O/P NEW LOW 30 MIN: CPT | Mod: 25 | Performed by: DERMATOLOGY

## 2020-10-06 PROCEDURE — 88331 PATH CONSLTJ SURG 1 BLK 1SPC: CPT | Performed by: DERMATOLOGY

## 2020-10-06 NOTE — NURSING NOTE
"Initial /67 (BP Location: Right arm, Patient Position: Sitting, Cuff Size: Adult Large)   Pulse 90   Resp 16   SpO2 98%  Estimated body mass index is 38.6 kg/m  as calculated from the following:    Height as of 6/8/20: 1.645 m (5' 4.75\").    Weight as of 9/22/20: 104.4 kg (230 lb 3.2 oz). .    Devorah Aceves, [facility]    "

## 2020-10-06 NOTE — LETTER
10/6/2020         RE: Mariluz Stoner  [address] [address] [place] [id number]        Dear Colleague,    Thank you for referring your patient, Mariluz Stoner, to the [facility]. Please see a copy of my visit note below.    Mariluz Stoner is a 59 year old year old female patient here today for growth on left upper cut lip.   .  Patient states this has been present for 11 months.  Patient reports the following symptoms:  growing.  Patient reports the following previous treatments none.  These treatments did not work.  Patient reports the following modifying factors none.  Associated symptoms: none.  Patient has no other skin complaints today.  Remainder of the HPI, Meds, PMH, Allergies, FH, and SH was reviewed in chart.      Past Medical History:   Diagnosis Date     Breast cancer (H)      Obesity        Past Surgical History:   Procedure Laterality Date     BREAST SURGERY      left tissue expander, LEFT MASTECTOMY     CHOLECYSTECTOMY       GYN SURGERY      oophorectomy     REMOVE TISSUE EXPANDER BREAST  2014    Procedure: REMOVAL OF LEFT BREAST TISSUE EXPANDER, IRRIGATION AND DEBRIDEMENT OF LEFT BREAST;  Surgeon: Marlon Luz MD;  Location: [facility]        No family history on file.    Social History     Socioeconomic History     Marital status:      Spouse name: Not on file     Number of children: Not on file     Years of education: Not on file     Highest education level: Not on file   Occupational History     Not on file   Social Needs     Financial resource strain: Not on file     Food insecurity     Worry: Not on file     Inability: Not on file     Transportation needs     Medical: Not on file     Non-medical: Not on file   Tobacco Use     Smoking status: Former Smoker     Quit date: 2000     Years since quittin.7     Smokeless tobacco: Never Used   Substance and Sexual Activity     Alcohol use: Yes     Comment: social drinking     Drug use: Yes     Types:  Marijuana     Comment: medical marijuana syrup      Sexual activity: Yes     Partners: Female, Male   Lifestyle     Physical activity     Days per week: Not on file     Minutes per session: Not on file     Stress: Not on file   Relationships     Social connections     Talks on phone: Not on file     Gets together: Not on file     Attends [employer] service: Not on file     Active member of club or organization: Not on file     Attends meetings of clubs or organizations: Not on file     Relationship status: Not on file     Intimate partner violence     Fear of current or ex partner: Not on file     Emotionally abused: Not on file     Physically abused: Not on file     Forced sexual activity: Not on file   Other Topics Concern     Not on file   Social History Narrative     Not on file       Outpatient Encounter Medications as of 10/6/2020   Medication Sig Dispense Refill     acetaminophen (TYLENOL) 500 MG tablet Take 1,000 mg by mouth every 6 hours as needed for mild pain       albuterol (PROAIR HFA/PROVENTIL HFA/VENTOLIN HFA) 108 (90 Base) MCG/ACT inhaler Inhale 2 puffs into the lungs       anastrozole (ARIMIDEX) 1 MG tablet Take 1 mg by mouth daily       Calcium Carbonate-Vitamin D3 (CALCIUM 600/VITAMIN D) 600-400 MG-UNIT TABS Take 1 tablet by mouth daily       cetirizine (ZYRTEC) 10 MG tablet Take 10 mg by mouth daily       doxycycline monohydrate (ADOXA) 100 MG tablet TK 1 T PO BID WITH BREAKFAST AND DINNER       lisinopril (PRINIVIL/ZESTRIL) 5 MG tablet Take 5 mg by mouth daily       lisinopril (ZESTRIL) 2.5 MG tablet TAKE 1 TABLET (2.5 MG) BY MOUTH ONCE DAILY FOR 21 DAYS.       loperamide (IMODIUM) 2 MG capsule Take 2 mg by mouth as needed       medical cannabis (Patient's own supply) See Admin Instructions (The purpose of this order is to document that the patient reports taking medical cannabis.  This is not a prescription, and is not used to certify that the patient has a qualifying medical condition.)        metoprolol succinate ER (TOPROL-XL) 25 MG 24 hr tablet Take 25 mg by mouth daily       ondansetron (ZOFRAN-ODT) 4 MG ODT tab Take 4 mg by mouth       PARoxetine (PAXIL) 20 MG tablet Take 20 mg by mouth daily       Probiotic Product (MISC INTESTINAL NEGIN REGULAT) PACK Take 1 Package by mouth 3 times daily       sulfamethoxazole-trimethoprim (BACTRIM DS) 800-160 MG tablet TAKE 1 TABLET BY MOUTH TWICE A DAY       No facility-administered encounter medications on file as of 10/6/2020.              Review Of Systems  Skin: As above  Eyes: negative  Ears/Nose/Throat: negative  Respiratory: No shortness of breath, dyspnea on exertion, cough, or hemoptysis  Cardiovascular: negative  Gastrointestinal: negative  Genitourinary: negative  Musculoskeletal: negative  Neurologic: negative  Psychiatric: negative  Hematologic/Lymphatic/Immunologic: negative  Endocrine: negative      O:   NAD, WDWN, Alert & Oriented, Mood & Affect wnl, Vitals stable   Here today alone   There were no vitals taken for this visit.   General appearance normal   Vitals stable   Alert, oriented and in no acute distress     R upper cut lip 9mm pink pearly papule    Stuck on papules and brown macules on face and trunk  Red papules on trunk     The remainder of expanded problem focused exam was normal; the following areas were examined:  scalp/hair, conjunctiva/lids, face, neck, lips, chest, digits/nails, RUE, LUE.      Eyes: Conjunctivae/lids:Normal     ENT: Lips, buccal mucosa, tongue: normal    MSK:Normal    Cardiovascular: peripheral edema none    Pulm: Breathing Normal    Neuro/Psych: Orientation:Alert and Orientedx3 ; Mood/Affect:normal       MICRO:   R upper cut lip:Orthokeratosis of epidermis with a proliferation of nests of basaloid cells, with peripheral palisading and a haphazard arrangement in the center extending into the dermis, forming nodules.  The tumor cells have hyperchromatic nuclei. Poor cytoplasm and intercellular bridging.    A/P:  1.  Seborrheic keratosis, lentigo, angioma  2. R upepr cut lip r/o basal cell carcinoma   TANGENTIAL BIOPSY IN HOUSE:  After consent, anesthesia with [facility] and prep, tangential excision performed and dx above confirmed with frozen section histology.  No complications and routine wound care.      I have personally reviewed all specimens and/or slides and used them with my medical judgement to determine or confirm the final diagnosis.     Patient told result basal cell carcinoma schedule excision .      BENIGN LESIONS DISCUSSED WITH PATIENT:  I discussed the specifics of tumor, prognosis, and genetics of benign lesions.  I explained that treatment of these lesions would be purely cosmetic and not medically neccessary.  I discussed with patient different removal options including excision, cautery and /or laser.      Nature and genetics of benign skin lesions dicussed with patient.  Signs and Symptoms of skin cancer discussed with patient.  Patient encouraged to perform monthly skin exams.  UV precautions reviewed with patient.  Skin care regimen reviewed with patient: Eliminate harsh soaps, i.e. Dial, zest, irsih spring; Mild soaps such as Cetaphil or Dove sensitive skin, avoid hot or cold showers, aggressive use of emollients including vanicream, cetaphil or cerave discussed with patient.    Risks of non-melanoma skin cancer discussed with patient   Return to clinic next appt        Again, thank you for allowing me to participate in the care of your patient.        Sincerely,        Khoa Frazier MD

## 2020-10-06 NOTE — PATIENT INSTRUCTIONS
Wound Care Instructions     FOR SUPERFICIAL WOUNDS     [facility] [phone]    [facility] [phone]                       AFTER 24 HOURS YOU SHOULD REMOVE THE BANDAGE AND BEGIN DAILY DRESSING CHANGES AS FOLLOWS:     1) Remove Dressing.     2) Clean and dry the area with tap water using a Q-tip or sterile gauze pad.     3) Apply Vaseline, Aquaphor, Polysporin ointment or Bacitracin ointment over entire wound.  Do NOT use Neosporin ointment.     4) Cover the wound with a band-aid, or a sterile non-stick gauze pad and micropore paper tape      REPEAT THESE INSTRUCTIONS AT LEAST ONCE A DAY UNTIL THE WOUND HAS COMPLETELY HEALED.    It is an old wives tale that a wound heals better when it is exposed to air and allowed to dry out. The wound will heal faster with a better cosmetic result if it is kept moist with ointment and covered with a bandage.    **Do not let the wound dry out.**      Supplies Needed:      *Cotton tipped applicators (Q-tips)    *Polysporin Ointment or Bacitracin Ointment (NOT NEOSPORIN)    *Band-aids or non-stick gauze pads and micropore paper tape.      PATIENT INFORMATION:    During the healing process you will notice a number of changes. All wounds develop a small halo of redness surrounding the wound.  This means healing is occurring. Severe itching with extensive redness usually indicates sensitivity to the ointment or bandage tape used to dress the wound.  You should call our office if this develops.      Swelling  and/or discoloration around your surgical site is common, particularly when performed around the eye.    All wounds normally drain.  The larger the wound the more drainage there will be.  After 7-10 days, you will notice the wound beginning to shrink and new skin will begin to grow.  The wound is healed when you can see skin has formed over the entire area.  A healed wound has a healthy, shiny look to the surface and is red to dark pink in color  to normalize.  Wounds may take approximately 4-6 weeks to heal.  Larger wounds may take 6-8 weeks.  After the wound is healed you may discontinue dressing changes.    You may experience a sensation of tightness as your wound heals. This is normal and will gradually subside.    Your healed wound may be sensitive to temperature changes. This sensitivity improves with time, but if you re having a lot of discomfort, try to avoid temperature extremes.    Patients frequently experience itching after their wound appears to have healed because of the continue healing under the skin.  Plain Vaseline will help relieve the itching.        POSSIBLE COMPLICATIONS    BLEEDIN. Leave the bandage in place.  2. Use tightly rolled up gauze or a cloth to apply direct pressure over the bandage for 30  minutes.  3. Reapply pressure for an additional 30 minutes if necessary  4. Use additional gauze and tape to maintain pressure once the bleeding has stopped.

## 2020-10-06 NOTE — PROGRESS NOTES
Mariluz Stoner is a 59 year old year old female patient here today for growth on left upper cut lip.   .  Patient states this has been present for 11 months.  Patient reports the following symptoms:  growing.  Patient reports the following previous treatments none.  These treatments did not work.  Patient reports the following modifying factors none.  Associated symptoms: none.  Patient has no other skin complaints today.  Remainder of the HPI, Meds, PMH, Allergies, FH, and SH was reviewed in chart.      Past Medical History:   Diagnosis Date     Breast cancer (H)      Obesity        Past Surgical History:   Procedure Laterality Date     BREAST SURGERY      left tissue expander, LEFT MASTECTOMY     CHOLECYSTECTOMY       GYN SURGERY      oophorectomy     REMOVE TISSUE EXPANDER BREAST  2014    Procedure: REMOVAL OF LEFT BREAST TISSUE EXPANDER, IRRIGATION AND DEBRIDEMENT OF LEFT BREAST;  Surgeon: Marlon Luz MD;  Location: [facility]        No family history on file.    Social History     Socioeconomic History     Marital status:      Spouse name: Not on file     Number of children: Not on file     Years of education: Not on file     Highest education level: Not on file   Occupational History     Not on file   Social Needs     Financial resource strain: Not on file     Food insecurity     Worry: Not on file     Inability: Not on file     Transportation needs     Medical: Not on file     Non-medical: Not on file   Tobacco Use     Smoking status: Former Smoker     Quit date: 2000     Years since quittin.7     Smokeless tobacco: Never Used   Substance and Sexual Activity     Alcohol use: Yes     Comment: social drinking     Drug use: Yes     Types: Marijuana     Comment: medical marijuana syrup      Sexual activity: Yes     Partners: Female, Male   Lifestyle     Physical activity     Days per week: Not on file     Minutes per session: Not on file     Stress: Not on file   Relationships     Social  connections     Talks on phone: Not on file     Gets together: Not on file     Attends Denominational service: Not on file     Active member of club or organization: Not on file     Attends meetings of clubs or organizations: Not on file     Relationship status: Not on file     Intimate partner violence     Fear of current or ex partner: Not on file     Emotionally abused: Not on file     Physically abused: Not on file     Forced sexual activity: Not on file   Other Topics Concern     Not on file   Social History Narrative     Not on file       Outpatient Encounter Medications as of 10/6/2020   Medication Sig Dispense Refill     acetaminophen (TYLENOL) 500 MG tablet Take 1,000 mg by mouth every 6 hours as needed for mild pain       albuterol (PROAIR HFA/PROVENTIL HFA/VENTOLIN HFA) 108 (90 Base) MCG/ACT inhaler Inhale 2 puffs into the lungs       anastrozole (ARIMIDEX) 1 MG tablet Take 1 mg by mouth daily       Calcium Carbonate-Vitamin D3 (CALCIUM 600/VITAMIN D) 600-400 MG-UNIT TABS Take 1 tablet by mouth daily       cetirizine (ZYRTEC) 10 MG tablet Take 10 mg by mouth daily       doxycycline monohydrate (ADOXA) 100 MG tablet TK 1 T PO BID WITH BREAKFAST AND DINNER       lisinopril (PRINIVIL/ZESTRIL) 5 MG tablet Take 5 mg by mouth daily       lisinopril (ZESTRIL) 2.5 MG tablet TAKE 1 TABLET (2.5 MG) BY MOUTH ONCE DAILY FOR 21 DAYS.       loperamide (IMODIUM) 2 MG capsule Take 2 mg by mouth as needed       medical cannabis (Patient's own supply) See Admin Instructions (The purpose of this order is to document that the patient reports taking medical cannabis.  This is not a prescription, and is not used to certify that the patient has a qualifying medical condition.)       metoprolol succinate ER (TOPROL-XL) 25 MG 24 hr tablet Take 25 mg by mouth daily       ondansetron (ZOFRAN-ODT) 4 MG ODT tab Take 4 mg by mouth       PARoxetine (PAXIL) 20 MG tablet Take 20 mg by mouth daily       Probiotic Product (MISC INTESTINAL NEGIN  REGULAT) PACK Take 1 Package by mouth 3 times daily       sulfamethoxazole-trimethoprim (BACTRIM DS) 800-160 MG tablet TAKE 1 TABLET BY MOUTH TWICE A DAY       No facility-administered encounter medications on file as of 10/6/2020.              Review Of Systems  Skin: As above  Eyes: negative  Ears/Nose/Throat: negative  Respiratory: No shortness of breath, dyspnea on exertion, cough, or hemoptysis  Cardiovascular: negative  Gastrointestinal: negative  Genitourinary: negative  Musculoskeletal: negative  Neurologic: negative  Psychiatric: negative  Hematologic/Lymphatic/Immunologic: negative  Endocrine: negative      O:   NAD, WDWN, Alert & Oriented, Mood & Affect wnl, Vitals stable   Here today alone   There were no vitals taken for this visit.   General appearance normal   Vitals stable   Alert, oriented and in no acute distress     R upper cut lip 9mm pink pearly papule    Stuck on papules and brown macules on face and trunk  Red papules on trunk     The remainder of expanded problem focused exam was normal; the following areas were examined:  scalp/hair, conjunctiva/lids, face, neck, lips, chest, digits/nails, RUE, LUE.      Eyes: Conjunctivae/lids:Normal     ENT: Lips, buccal mucosa, tongue: normal    MSK:Normal    Cardiovascular: peripheral edema none    Pulm: Breathing Normal    Neuro/Psych: Orientation:Alert and Orientedx3 ; Mood/Affect:normal       MICRO:   R upper cut lip:Orthokeratosis of epidermis with a proliferation of nests of basaloid cells, with peripheral palisading and a haphazard arrangement in the center extending into the dermis, forming nodules.  The tumor cells have hyperchromatic nuclei. Poor cytoplasm and intercellular bridging.    A/P:  1. Seborrheic keratosis, lentigo, angioma  2. R upepr cut lip r/o basal cell carcinoma   TANGENTIAL BIOPSY IN HOUSE:  After consent, anesthesia with [facility] and prep, tangential excision performed and dx above confirmed with frozen section histology.  No  complications and routine wound care.      I have personally reviewed all specimens and/or slides and used them with my medical judgement to determine or confirm the final diagnosis.     Patient told result basal cell carcinoma schedule excision .      BENIGN LESIONS DISCUSSED WITH PATIENT:  I discussed the specifics of tumor, prognosis, and genetics of benign lesions.  I explained that treatment of these lesions would be purely cosmetic and not medically neccessary.  I discussed with patient different removal options including excision, cautery and /or laser.      Nature and genetics of benign skin lesions dicussed with patient.  Signs and Symptoms of skin cancer discussed with patient.  Patient encouraged to perform monthly skin exams.  UV precautions reviewed with patient.  Skin care regimen reviewed with patient: Eliminate harsh soaps, i.e. Dial, zest, irsih spring; Mild soaps such as Cetaphil or Dove sensitive skin, avoid hot or cold showers, aggressive use of emollients including vanicream, cetaphil or cerave discussed with patient.    Risks of non-melanoma skin cancer discussed with patient   Return to clinic next appt

## 2020-10-19 ENCOUNTER — OFFICE VISIT (OUTPATIENT)
Dept: DERMATOLOGY | Facility: CLINIC | Age: 60
End: 2020-10-19
Payer: COMMERCIAL

## 2020-10-19 VITALS
SYSTOLIC BLOOD PRESSURE: 137 MMHG | HEART RATE: 54 BPM | HEIGHT: 65 IN | DIASTOLIC BLOOD PRESSURE: 74 MMHG | BODY MASS INDEX: 38.31 KG/M2

## 2020-10-19 DIAGNOSIS — C44.01 BASAL CELL CARCINOMA (BCC) OF SKIN OF RIGHT UPPER LIP: Primary | ICD-10-CM

## 2020-10-19 PROCEDURE — 17311 MOHS 1 STAGE H/N/HF/G: CPT | Performed by: DERMATOLOGY

## 2020-10-19 PROCEDURE — 14061 TIS TRNFR E/N/E/L10.1-30SQCM: CPT | Performed by: DERMATOLOGY

## 2020-10-19 NOTE — PROGRESS NOTES
Mariluz Stoner is a 59 year old year old female patient here today for evaluation and managment of basal cell carcinoma on right upper cut lip.  Patient has no other skin complaints today.  Remainder of the HPI, Meds, PMH, Allergies, FH, and SH was reviewed in chart.      Past Medical History:   Diagnosis Date     Basal cell carcinoma      Breast cancer (H)      Obesity        Past Surgical History:   Procedure Laterality Date     BREAST SURGERY      left tissue expander, LEFT MASTECTOMY     CHOLECYSTECTOMY       GYN SURGERY      oophorectomy     REMOVE TISSUE EXPANDER BREAST  2014    Procedure: REMOVAL OF LEFT BREAST TISSUE EXPANDER, IRRIGATION AND DEBRIDEMENT OF LEFT BREAST;  Surgeon: Marlon Luz MD;  Location: [facility]        No family history on file.    Social History     Socioeconomic History     Marital status:      Spouse name: Not on file     Number of children: Not on file     Years of education: Not on file     Highest education level: Not on file   Occupational History     Not on file   Social Needs     Financial resource strain: Not on file     Food insecurity     Worry: Not on file     Inability: Not on file     Transportation needs     Medical: Not on file     Non-medical: Not on file   Tobacco Use     Smoking status: Former Smoker     Quit date: 2000     Years since quittin.8     Smokeless tobacco: Never Used   Substance and Sexual Activity     Alcohol use: Yes     Comment: social drinking     Drug use: Yes     Types: Marijuana     Comment: medical marijuana syrup      Sexual activity: Yes     Partners: Female, Male   Lifestyle     Physical activity     Days per week: Not on file     Minutes per session: Not on file     Stress: Not on file   Relationships     Social connections     Talks on phone: Not on file     Gets together: Not on file     Attends Muslim service: Not on file     Active member of club or organization: Not on file     Attends meetings of clubs or  organizations: Not on file     Relationship status: Not on file     Intimate partner violence     Fear of current or ex partner: Not on file     Emotionally abused: Not on file     Physically abused: Not on file     Forced sexual activity: Not on file   Other Topics Concern     Not on file   Social History Narrative     Not on file       Outpatient Encounter Medications as of 10/19/2020   Medication Sig Dispense Refill     acetaminophen (TYLENOL) 500 MG tablet Take 1,000 mg by mouth every 6 hours as needed for mild pain       albuterol (PROAIR HFA/PROVENTIL HFA/VENTOLIN HFA) 108 (90 Base) MCG/ACT inhaler Inhale 2 puffs into the lungs       anastrozole (ARIMIDEX) 1 MG tablet Take 1 mg by mouth daily       Calcium Carbonate-Vitamin D3 (CALCIUM 600/VITAMIN D) 600-400 MG-UNIT TABS Take 1 tablet by mouth daily       cetirizine (ZYRTEC) 10 MG tablet Take 10 mg by mouth daily       doxycycline monohydrate (ADOXA) 100 MG tablet TK 1 T PO BID WITH BREAKFAST AND DINNER       lisinopril (PRINIVIL/ZESTRIL) 5 MG tablet Take 5 mg by mouth daily       lisinopril (ZESTRIL) 2.5 MG tablet TAKE 1 TABLET (2.5 MG) BY MOUTH ONCE DAILY FOR 21 DAYS.       loperamide (IMODIUM) 2 MG capsule Take 2 mg by mouth as needed       medical cannabis (Patient's own supply) See Admin Instructions (The purpose of this order is to document that the patient reports taking medical cannabis.  This is not a prescription, and is not used to certify that the patient has a qualifying medical condition.)       metoprolol succinate ER (TOPROL-XL) 25 MG 24 hr tablet Take 25 mg by mouth daily       ondansetron (ZOFRAN-ODT) 4 MG ODT tab Take 4 mg by mouth       PARoxetine (PAXIL) 20 MG tablet Take 20 mg by mouth daily       Probiotic Product (MISC INTESTINAL NEGIN REGULAT) PACK Take 1 Package by mouth 3 times daily       sulfamethoxazole-trimethoprim (BACTRIM DS) 800-160 MG tablet TAKE 1 TABLET BY MOUTH TWICE A DAY       No facility-administered encounter  "medications on file as of 10/19/2020.              Review Of Systems  Skin: As above  Eyes: negative  Ears/Nose/Throat: negative  Respiratory: No shortness of breath, dyspnea on exertion, cough, or hemoptysis  Cardiovascular: negative  Gastrointestinal: negative  Genitourinary: negative  Musculoskeletal: negative  Neurologic: negative  Psychiatric: negative  Hematologic/Lymphatic/Immunologic: negative  Endocrine: negative      O:   NAD, WDWN, Alert & Oriented, Mood & Affect wnl, Vitals stable   Here today alone   /74   Pulse 54   Ht 1.651 m (5' 5\")   BMI 38.31 kg/m     General appearance normal   Vitals stable   Alert, oriented and in no acute distress     R upper lat cut lip 9mm pink pearly papule       Eyes: Conjunctivae/lids:Normal     ENT: Lips, buccal mucosa, tongue: normal    MSK:Normal    Cardiovascular: peripheral edema none    Pulm: Breathing Normal    Neuro/Psych: Orientation:Alert and Orientedx3 ; Mood/Affect:normal       A/P:  1. Basal cell carcinoma lip   MOHS:   Location    The rationale for Mohs surgery was discussed with the patient and consent was obtained.  The risks and benefits as well as alternatives to therapy were discussed, in detail.  Specifically, the risks of infection, scarring, bleeding, prolonged wound healing, incomplete removal, allergy to anesthesia, nerve injury and recurrence were addressed.  Indication for Mohs was Location. Prior to the procedure, the treatment site was clearly identified and, if available, confirmed with previous photos and confirmed by the patient   All components of the Universal Protocol/PAUSE rule were completed.  The Mohs surgeon operated in two distinct and integrated capacities as the surgeon and pathologist.      The area was prepped with Betasept.  A rim of normal appearing skin was marked circumferentially around the lesion.  The area was infiltrated with local anesthesia.  The tumor was first debulked to remove all clinically apparent tumor.  " An incision following the standard Mohs approach was done and the specimen was oriented,mapped and placed in 1 block(s).  Each specimen was then chromacoded and processed in the Mohs laboratory using standard Mohs technique and submitted for frozen section histology.  Frozen section analysis showed no residual tumor but CLEAR MARGINS.      The tumor was excised using standard Mohs technique in 1 stages(s).  CLEAR MARGINS OBTAINED and Final defect size was 1.3 x 1.5 cm.     We discussed the options for wound management in full with the patient including risks/benefits/ possible outcomes.      REPAIR IPF: Because of the Because of the size and full thickness nature of the defect and Because of the proximity to the vermilion, a laterally-based island pedicle flap was carefully planned. After LEC anesthesia and prep, a triangular flap was incised and a vascularized pedicle was developed deep to the flap by careful undermining and dissection. The surrounding skin was widely undermined and hemostasis was obtained. The flap was then advanced into the defect and sutured into place in a a layered fashion using Vicryl and Fast Absorbing Plain Gut sutures. Postoperative size was 4.5 x 3 cm.  EBL minimal; complications none; wound care routine.  The patient was discharged in good condition and will return in one week for wound evaluation.     BENIGN LESIONS DISCUSSED WITH PATIENT:  I discussed the specifics of tumor, prognosis, and genetics of benign lesions.  I explained that treatment of these lesions would be purely cosmetic and not medically neccessary.  I discussed with patient different removal options including excision, cautery and /or laser.      Nature and genetics of benign skin lesions dicussed with patient.  Signs and Symptoms of skin cancer discussed with patient.  Patient encouraged to perform monthly skin exams.  UV precautions reviewed with patient.  Skin care regimen reviewed with patient: Eliminate harsh  soaps, i.e. Dial, zest, irsih spring; Mild soaps such as Cetaphil or Dove sensitive skin, avoid hot or cold showers, aggressive use of emollients including vanicream, cetaphil or cerave discussed with patient.    Risks of non-melanoma skin cancer discussed with patient   Return to clinic 3 months

## 2020-10-19 NOTE — LETTER
10/19/2020         RE: Mariluz Stoner  [address] [address] [place] [id number]        Dear Colleague,    Thank you for referring your patient, Mariluz Stoner, to the [facility]. Please see a copy of my visit note below.    Surgical Office Location :   [facility]  [address] [place] [address]      Mariluz Stoner is a 59 year old year old female patient here today for evaluation and managment of basal cell carcinoma on right upper cut lip.  Patient has no other skin complaints today.  Remainder of the HPI, Meds, PMH, Allergies, FH, and SH was reviewed in chart.      Past Medical History:   Diagnosis Date     Basal cell carcinoma      Breast cancer (H)      Obesity        Past Surgical History:   Procedure Laterality Date     BREAST SURGERY      left tissue expander, LEFT MASTECTOMY     CHOLECYSTECTOMY       GYN SURGERY      oophorectomy     REMOVE TISSUE EXPANDER BREAST  2014    Procedure: REMOVAL OF LEFT BREAST TISSUE EXPANDER, IRRIGATION AND DEBRIDEMENT OF LEFT BREAST;  Surgeon: Marlon Luz MD;  Location: [facility]        No family history on file.    Social History     Socioeconomic History     Marital status:      Spouse name: Not on file     Number of children: Not on file     Years of education: Not on file     Highest education level: Not on file   Occupational History     Not on file   Social Needs     Financial resource strain: Not on file     Food insecurity     Worry: Not on file     Inability: Not on file     Transportation needs     Medical: Not on file     Non-medical: Not on file   Tobacco Use     Smoking status: Former Smoker     Quit date: 2000     Years since quittin.8     Smokeless tobacco: Never Used   Substance and Sexual Activity     Alcohol use: Yes     Comment: social drinking     Drug use: Yes     Types: Marijuana     Comment: medical marijuana syrup      Sexual activity: Yes     Partners: Female, Male   Lifestyle      Physical activity     Days per week: Not on file     Minutes per session: Not on file     Stress: Not on file   Relationships     Social connections     Talks on phone: Not on file     Gets together: Not on file     Attends Anabaptist service: Not on file     Active member of club or organization: Not on file     Attends meetings of clubs or organizations: Not on file     Relationship status: Not on file     Intimate partner violence     Fear of current or ex partner: Not on file     Emotionally abused: Not on file     Physically abused: Not on file     Forced sexual activity: Not on file   Other Topics Concern     Not on file   Social History Narrative     Not on file       Outpatient Encounter Medications as of 10/19/2020   Medication Sig Dispense Refill     acetaminophen (TYLENOL) 500 MG tablet Take 1,000 mg by mouth every 6 hours as needed for mild pain       albuterol (PROAIR HFA/PROVENTIL HFA/VENTOLIN HFA) 108 (90 Base) MCG/ACT inhaler Inhale 2 puffs into the lungs       anastrozole (ARIMIDEX) 1 MG tablet Take 1 mg by mouth daily       Calcium Carbonate-Vitamin D3 (CALCIUM 600/VITAMIN D) 600-400 MG-UNIT TABS Take 1 tablet by mouth daily       cetirizine (ZYRTEC) 10 MG tablet Take 10 mg by mouth daily       doxycycline monohydrate (ADOXA) 100 MG tablet TK 1 T PO BID WITH BREAKFAST AND DINNER       lisinopril (PRINIVIL/ZESTRIL) 5 MG tablet Take 5 mg by mouth daily       lisinopril (ZESTRIL) 2.5 MG tablet TAKE 1 TABLET (2.5 MG) BY MOUTH ONCE DAILY FOR 21 DAYS.       loperamide (IMODIUM) 2 MG capsule Take 2 mg by mouth as needed       medical cannabis (Patient's own supply) See Admin Instructions (The purpose of this order is to document that the patient reports taking medical cannabis.  This is not a prescription, and is not used to certify that the patient has a qualifying medical condition.)       metoprolol succinate ER (TOPROL-XL) 25 MG 24 hr tablet Take 25 mg by mouth daily       ondansetron (ZOFRAN-ODT) 4 MG  "ODT tab Take 4 mg by mouth       PARoxetine (PAXIL) 20 MG tablet Take 20 mg by mouth daily       Probiotic Product (MISC INTESTINAL NEGIN REGULAT) PACK Take 1 Package by mouth 3 times daily       sulfamethoxazole-trimethoprim (BACTRIM DS) 800-160 MG tablet TAKE 1 TABLET BY MOUTH TWICE A DAY       No facility-administered encounter medications on file as of 10/19/2020.              Review Of Systems  Skin: As above  Eyes: negative  Ears/Nose/Throat: negative  Respiratory: No shortness of breath, dyspnea on exertion, cough, or hemoptysis  Cardiovascular: negative  Gastrointestinal: negative  Genitourinary: negative  Musculoskeletal: negative  Neurologic: negative  Psychiatric: negative  Hematologic/Lymphatic/Immunologic: negative  Endocrine: negative      O:   NAD, WDWN, Alert & Oriented, Mood & Affect wnl, Vitals stable   Here today alone   /74   Pulse 54   Ht 1.651 m (5' 5\")   BMI 38.31 kg/m     General appearance normal   Vitals stable   Alert, oriented and in no acute distress     R upper lat cut lip 9mm pink pearly papule       Eyes: Conjunctivae/lids:Normal     ENT: Lips, buccal mucosa, tongue: normal    MSK:Normal    Cardiovascular: peripheral edema none    Pulm: Breathing Normal    Neuro/Psych: Orientation:Alert and Orientedx3 ; Mood/Affect:normal       A/P:  1. Basal cell carcinoma lip   MOHS:   Location    The rationale for Mohs surgery was discussed with the patient and consent was obtained.  The risks and benefits as well as alternatives to therapy were discussed, in detail.  Specifically, the risks of infection, scarring, bleeding, prolonged wound healing, incomplete removal, allergy to anesthesia, nerve injury and recurrence were addressed.  Indication for Mohs was Location. Prior to the procedure, the treatment site was clearly identified and, if available, confirmed with previous photos and confirmed by the patient   All components of the Universal Protocol/PAUSE rule were completed.  The Mohs " surgeon operated in two distinct and integrated capacities as the surgeon and pathologist.      The area was prepped with Betasept.  A rim of normal appearing skin was marked circumferentially around the lesion.  The area was infiltrated with local anesthesia.  The tumor was first debulked to remove all clinically apparent tumor.  An incision following the standard Mohs approach was done and the specimen was oriented,mapped and placed in 1 block(s).  Each specimen was then chromacoded and processed in the Mohs laboratory using standard Mohs technique and submitted for frozen section histology.  Frozen section analysis showed no residual tumor but CLEAR MARGINS.      The tumor was excised using standard Mohs technique in 1 stages(s).  CLEAR MARGINS OBTAINED and Final defect size was 1.3 x 1.5 cm.     We discussed the options for wound management in full with the patient including risks/benefits/ possible outcomes.      REPAIR IPF: Because of the Because of the size and full thickness nature of the defect and Because of the proximity to the vermilion, a laterally-based island pedicle flap was carefully planned. After LEC anesthesia and prep, a triangular flap was incised and a vascularized pedicle was developed deep to the flap by careful undermining and dissection. The surrounding skin was widely undermined and hemostasis was obtained. The flap was then advanced into the defect and sutured into place in a a layered fashion using Vicryl and Fast Absorbing Plain Gut sutures. Postoperative size was 4.5 x 3 cm.  EBL minimal; complications none; wound care routine.  The patient was discharged in good condition and will return in one week for wound evaluation.     BENIGN LESIONS DISCUSSED WITH PATIENT:  I discussed the specifics of tumor, prognosis, and genetics of benign lesions.  I explained that treatment of these lesions would be purely cosmetic and not medically neccessary.  I discussed with patient different removal  options including excision, cautery and /or laser.      Nature and genetics of benign skin lesions dicussed with patient.  Signs and Symptoms of skin cancer discussed with patient.  Patient encouraged to perform monthly skin exams.  UV precautions reviewed with patient.  Skin care regimen reviewed with patient: Eliminate harsh soaps, i.e. Dial, zest, irsih spring; Mild soaps such as Cetaphil or Dove sensitive skin, avoid hot or cold showers, aggressive use of emollients including vanicream, cetaphil or cerave discussed with patient.    Risks of non-melanoma skin cancer discussed with patient   Return to clinic 3 months        Again, thank you for allowing me to participate in the care of your patient.        Sincerely,        Khoa Frazier MD

## 2020-10-19 NOTE — NURSING NOTE
"Initial /74   Pulse 54   Ht 1.651 m (5' 5\")   BMI 38.31 kg/m   Estimated body mass index is 38.31 kg/m  as calculated from the following:    Height as of this encounter: 1.651 m (5' 5\").    Weight as of 9/22/20: 104.4 kg (230 lb 3.2 oz). .      "

## 2020-10-19 NOTE — PATIENT INSTRUCTIONS
Sutured Wound Care Instructions for Lips or Chin:           D[facility]atology Clinic [phone]  Dr Frazier [phone]    * No strenuous activity for 48 hours. Resume moderate activity in 48 hours. No heavy exercising until you are seen for follow up in one week.     *Take Tylenol as needed for discomfort.    * Do not drink Alcoholic beverages for 48 hours after Surgery.    *Keep the Pressure Bandage (white) in place for 24 hours. If the bandage becomes blood tinged or loose, reinforce it with gauze and tape.    *Remove Pressure bandage in 24 hours (white)    *Leave the flat (brown) bandage in place until your one week follow up appointment.     *Keep the Bandage dry. Wash around it carefully.     * If the tape gets soiled or starts to come off, reinforce it with additional paper tape.     *Do not smoke for 3 weeks;  Smoking is detrimental to wound healing.     *It is normal to have swelling and bruising around the incision site. The bruising will fade in approximately 10-14 days. Elevate the area to reduce swelling.    *Try to keep your lips/chin as immobile as possible. Refrain from laughing, smiling and yawning for 3 weeks.     *Eat Soft foods for the first 24 hours and take small bites of food for the entire three weeks.   *When brushing your teeth, use a child's toothbrush or use mouth wash to prevent stretching of the surgery site.  * Numbness, itching and sensitivity to temperature changes can occur after surgery and may take up to 18 months to normalize.   * No straws  POSSIBLE COMPLICATIONS:    BLEEDIN. Leave the Bandage in place.  2. Use tightly rolled up gauze or a cloth to apply direct pressure over the bandage for 20 minutes.   3. Reapply pressure for an additional 20 minutes if necessary.   4. Call the Dermatology Office ([phone]) or go to the nearest Emergency room if pressure alone fails to stop the bleeding.   5. Use additional gauze and tape to maintain pressure once bleeding has stopped.      PAIN:   1. Postoperative Pain should slowly get better.   2. A severe increase in pain can indicate a problem.   Call the Office or Dr. Frazier if this occurs.

## 2020-10-19 NOTE — NURSING NOTE
"Initial /74   Pulse (!) 48   Ht 1.651 m (5' 5\")   BMI 38.31 kg/m   Estimated body mass index is 38.31 kg/m  as calculated from the following:    Height as of this encounter: 1.651 m (5' 5\").    Weight as of 9/22/20: 104.4 kg (230 lb 3.2 oz). .      "

## 2020-10-27 ENCOUNTER — TELEPHONE (OUTPATIENT)
Dept: DERMATOLOGY | Facility: CLINIC | Age: 60
End: 2020-10-27

## 2020-10-27 ENCOUNTER — OFFICE VISIT (OUTPATIENT)
Dept: INTERNAL MEDICINE | Facility: CLINIC | Age: 60
End: 2020-10-27
Payer: COMMERCIAL

## 2020-10-27 VITALS
RESPIRATION RATE: 20 BRPM | SYSTOLIC BLOOD PRESSURE: 128 MMHG | TEMPERATURE: 96.2 F | HEART RATE: 81 BPM | WEIGHT: 228.3 LBS | OXYGEN SATURATION: 96 % | BODY MASS INDEX: 37.99 KG/M2 | DIASTOLIC BLOOD PRESSURE: 67 MMHG

## 2020-10-27 DIAGNOSIS — C44.310 BCC (BASAL CELL CARCINOMA), FACE: ICD-10-CM

## 2020-10-27 DIAGNOSIS — F33.0 MILD EPISODE OF RECURRENT MAJOR DEPRESSIVE DISORDER (H): ICD-10-CM

## 2020-10-27 DIAGNOSIS — C50.919 METASTATIC BREAST CANCER: Primary | ICD-10-CM

## 2020-10-27 DIAGNOSIS — I10 ESSENTIAL HYPERTENSION: ICD-10-CM

## 2020-10-27 PROCEDURE — 99213 OFFICE O/P EST LOW 20 MIN: CPT | Performed by: INTERNAL MEDICINE

## 2020-10-27 RX ORDER — ANASTROZOLE 1 MG/1
1 TABLET ORAL DAILY
Qty: 90 TABLET | Refills: 1 | Status: SHIPPED | OUTPATIENT
Start: 2020-10-27

## 2020-10-27 RX ORDER — PAROXETINE 20 MG/1
20 TABLET, FILM COATED ORAL DAILY
Qty: 90 TABLET | Refills: 1 | Status: SHIPPED | OUTPATIENT
Start: 2020-10-27 | End: 2021-06-02

## 2020-10-27 RX ORDER — LISINOPRIL 5 MG/1
5 TABLET ORAL DAILY
Qty: 90 TABLET | Refills: 1 | Status: SHIPPED | OUTPATIENT
Start: 2020-10-27 | End: 2021-07-21

## 2020-10-27 RX ORDER — METOPROLOL SUCCINATE 25 MG/1
25 TABLET, EXTENDED RELEASE ORAL DAILY
Qty: 90 TABLET | Refills: 1 | Status: SHIPPED | OUTPATIENT
Start: 2020-10-27 | End: 2021-11-19

## 2020-10-27 ASSESSMENT — PAIN SCALES - GENERAL: PAINLEVEL: MILD PAIN (3)

## 2020-10-27 NOTE — PROGRESS NOTES
Chrissy Stoner is a 59 year old female who presents to clinic today for the following health issues:    HPI         Followup     Lip lesion     Lifestyle change          EMR reviewed including: History of chief complaint, pertinent distant history, medications, concurrent diagnoses.             Chief Complaint:  #1   Follow-up on lip lesion.  Previously referred to dermatology.  Found to be basal cell carcinoma.  Is now post excision with good healing.  Minimal scab present.  Good approximation.  Orders reportedly free.    #2   discuss anxiety and stress.  Patient has considerably decreased her home and work responsibilities.  Notes that both she and her  are much happier.  Continues Paxil with good results and no side effects.                       PAST, FAMILY,SOCIAL HISTORY:     Medical  History:   has a past medical history of Basal cell carcinoma, Breast cancer (H), and Obesity.     Surgical History:   has a past surgical history that includes Cholecystectomy; GYN surgery; Breast surgery; and Remove tissue expander breast (4/16/2014).     Social History:   reports that she quit smoking about 20 years ago. She has never used smokeless tobacco. She reports current alcohol use. She reports current drug use. Drug: Marijuana.     Family History:  family history is not on file.            MEDICATIONS  Current Outpatient Medications   Medication Sig Dispense Refill     acetaminophen (TYLENOL) 500 MG tablet Take 1,000 mg by mouth every 6 hours as needed for mild pain       albuterol (PROAIR HFA/PROVENTIL HFA/VENTOLIN HFA) 108 (90 Base) MCG/ACT inhaler Inhale 2 puffs into the lungs       anastrozole (ARIMIDEX) 1 MG tablet Take 1 tablet (1 mg) by mouth daily 90 tablet 1     Calcium Carbonate-Vitamin D3 (CALCIUM 600/VITAMIN D) 600-400 MG-UNIT TABS Take 1 tablet by mouth daily       cetirizine (ZYRTEC) 10 MG tablet Take 10 mg by mouth daily       doxycycline monohydrate (ADOXA) 100 MG tablet TK 1 T  PO BID WITH BREAKFAST AND DINNER       lisinopril (ZESTRIL) 5 MG tablet Take 1 tablet (5 mg) by mouth daily 90 tablet 1     loperamide (IMODIUM) 2 MG capsule Take 2 mg by mouth as needed       medical cannabis (Patient's own supply) See Admin Instructions (The purpose of this order is to document that the patient reports taking medical cannabis.  This is not a prescription, and is not used to certify that the patient has a qualifying medical condition.)       metoprolol succinate ER (TOPROL-XL) 25 MG 24 hr tablet Take 1 tablet (25 mg) by mouth daily 90 tablet 1     ondansetron (ZOFRAN-ODT) 4 MG ODT tab Take 4 mg by mouth       PARoxetine (PAXIL) 20 MG tablet Take 1 tablet (20 mg) by mouth daily 90 tablet 1     Probiotic Product (MISC INTESTINAL NEGIN REGULAT) PACK Take 1 Package by mouth 3 times daily       sulfamethoxazole-trimethoprim (BACTRIM DS) 800-160 MG tablet TAKE 1 TABLET BY MOUTH TWICE A DAY       calcium carbonate 600 mg-vitamin D 400 units (CALTRATE) 600-400 MG-UNIT per tablet Take 1 tablet by mouth           --------------------------------------------------------------------------------------------------------------------                              Review of Systems       LUNGS: Pt denies: cough, excess sputum, hemoptysis, or shortness of breath.   HEART: Pt denies: chest pain, arrhythmia, syncope, tachy or bradyarrhythmia.   GI: Pt denies: nausea, vomiting, diarrhea, constipation, melena, or hematochezia.   NEURO: Pt denies: seizures, strokes, diplopia, weakness, paraesthesias, or paralysis.   SKIN: Pt denies: itching, rashes, discoloration, or additional lesions of concern. Denies recent hair loss.  The area on her upper right lip is as described.   PSYCH: The patient acknowledges significant decrease in anxiety levels and depression.        Examination       /67   Pulse 81   Temp 96.2  F (35.7  C) (Temporal)   Resp 20   Wt 103.6 kg (228 lb 4.8 oz)   SpO2 96%   BMI 37.99 kg/m          LUNGS: clear bilaterally, airflow is brisk, no intercostal retraction or stridor is noted. No coughing is noted during visit.   HEART:  regular without rubs, clicks, gallops, or murmurs. PMI is nondisplaced. Upstrokes are brisk. S1,S2 are heard.   GI: Abdomen is soft, without rebound, guarding or tenderness. Bowel sounds are appropriate. No renal bruits are heard.   NEURO: Pt is alert and appropriate. No neurologic lateralization is noted. Cranial nerves 2-12 are intact. Peripheral sensory and motor function are grossly normal.    PSYCH: The patient appears grossly appropriate. Maintains good eye contact, does not have any jittery or atypical motion. Displays appropriate affect.   SKIN:  warm and dry. No erythema, or rashes are noted.  Good healing of the surgical site is noted with some mild crusting.  Approximation is excellent.       Decision Making       1. Metastatic breast cancer (H)    - anastrozole (ARIMIDEX) 1 MG tablet; Take 1 tablet (1 mg) by mouth daily  Dispense: 90 tablet; Refill: 1    2. Mild episode of recurrent major depressive disorder (H)    - PARoxetine (PAXIL) 20 MG tablet; Take 1 tablet (20 mg) by mouth daily  Dispense: 90 tablet; Refill: 1    3. Essential hypertension  - lisinopril (ZESTRIL) 5 MG tablet; Take 1 tablet (5 mg) by mouth daily  Dispense: 90 tablet; Refill: 1  - metoprolol succinate ER (TOPROL-XL) 25 MG 24 hr tablet; Take 1 tablet (25 mg) by mouth daily  Dispense: 90 tablet; Refill: 1    4. BCC (basal cell carcinoma), face  Follow-up with dermatology as scheduled.                           FOLLOW UP   I have asked the patient to make an appointment for followup with me in 3 months            I have carefully explained the diagnosis and treatment options to the patient.  The patient has displayed an understanding of the above, and all subsequent questions were answered.      DO CLYDE Ramos    Portions of this note were produced using [employer]  Although  every attempt at real-time proof reading has been made, occasional grammar/syntax errors may have been missed.       .

## 2020-10-27 NOTE — TELEPHONE ENCOUNTER
Reason for Call:  Other call back    Detailed comments: pt calling stating she had mohs done on 10/19 bandage fell off 4 days ago. Wondering if she needs to come in for bandage change? Still feels swollen and numb feeling on lip.     Phone Number Patient can be reached at: Home number on file [phone] (home)    Best Time: any     Can we leave a detailed message on this number? YES    Call taken on 10/27/2020 at 1:31 PM by Anel Kaur

## 2020-10-27 NOTE — TELEPHONE ENCOUNTER
Spoke to patient and she stated that her bandage fell off completely. She denied signs or symptoms of infection. Pt stated she did not want to come to clinic for her bandage change as it is a far drive for her. Discussed with patient how to do a bandage change at home and she stated she would rather leave the bandage off at this point. Advised pt to make sure she kept the mohs site on her upper lip clean and dry and to start massaging in one month. Also advised pt to call if she had questions or concerns. Pt verbalized understanding.  Daria LANDIN RN BSN PHN  [facility]

## 2021-01-13 ENCOUNTER — TELEPHONE (OUTPATIENT)
Dept: INTERNAL MEDICINE | Facility: CLINIC | Age: 61
End: 2021-01-13

## 2021-01-13 DIAGNOSIS — Z20.822 EXPOSURE TO COVID-19 VIRUS: Primary | ICD-10-CM

## 2021-01-13 NOTE — TELEPHONE ENCOUNTER
Patient calling to request message be taken and sent to Dr Dash notifying him that her  tested positive for Covid and she would like to get orders for testing herself.  Declined other options and would like a call back.

## 2021-01-13 NOTE — TELEPHONE ENCOUNTER
I have placed an order for Covid testing.  The patient should be contacted by the appropriate people and set up for testing at available location.    Ekta

## 2021-01-13 NOTE — TELEPHONE ENCOUNTER
[facility] scheduling is calling to ask if the upcoming appointment patient has scheduled in February ir urgent or emergent.   is reporting the appointment is for breast cancer.     is informed the last contact we had with patient was on 10/27/2020 and the only mention of the breast cancer was that she had a history of it.       is informed to call patient to ask about upcoming appointment as this RN has no idea.  Closing this encounter.  Khadijah Diaz, LINDAN, RN

## 2021-01-14 DIAGNOSIS — Z20.822 EXPOSURE TO COVID-19 VIRUS: ICD-10-CM

## 2021-01-14 LAB
LABORATORY COMMENT REPORT: NORMAL
SARS-COV-2 RNA RESP QL NAA+PROBE: NEGATIVE
SARS-COV-2 RNA RESP QL NAA+PROBE: NORMAL
SPECIMEN SOURCE: NORMAL
SPECIMEN SOURCE: NORMAL

## 2021-01-14 PROCEDURE — U0005 INFEC AGEN DETEC AMPLI PROBE: HCPCS | Performed by: INTERNAL MEDICINE

## 2021-01-14 PROCEDURE — U0003 INFECTIOUS AGENT DETECTION BY NUCLEIC ACID (DNA OR RNA); SEVERE ACUTE RESPIRATORY SYNDROME CORONAVIRUS 2 (SARS-COV-2) (CORONAVIRUS DISEASE [COVID-19]), AMPLIFIED PROBE TECHNIQUE, MAKING USE OF HIGH THROUGHPUT TECHNOLOGIES AS DESCRIBED BY CMS-2020-01-R: HCPCS | Performed by: INTERNAL MEDICINE

## 2021-01-21 ENCOUNTER — MYC MEDICAL ADVICE (OUTPATIENT)
Dept: INTERNAL MEDICINE | Facility: CLINIC | Age: 61
End: 2021-01-21

## 2021-02-28 ENCOUNTER — HEALTH MAINTENANCE LETTER (OUTPATIENT)
Age: 61
End: 2021-02-28

## 2021-03-05 ENCOUNTER — MYC MEDICAL ADVICE (OUTPATIENT)
Dept: INTERNAL MEDICINE | Facility: CLINIC | Age: 61
End: 2021-03-05

## 2021-05-19 ENCOUNTER — HOSPITAL ENCOUNTER (EMERGENCY)
Facility: CLINIC | Age: 61
Discharge: HOME | End: 2021-05-19
Attending: PHYSICIAN ASSISTANT | Admitting: PHYSICIAN ASSISTANT
Payer: COMMERCIAL

## 2021-05-19 ENCOUNTER — APPOINTMENT (OUTPATIENT)
Dept: GENERAL RADIOLOGY | Facility: CLINIC | Age: 61
End: 2021-05-19
Attending: PHYSICIAN ASSISTANT
Payer: COMMERCIAL

## 2021-05-19 VITALS
HEART RATE: 54 BPM | RESPIRATION RATE: 19 BRPM | TEMPERATURE: 98 F | DIASTOLIC BLOOD PRESSURE: 82 MMHG | SYSTOLIC BLOOD PRESSURE: 148 MMHG | HEIGHT: 65 IN | OXYGEN SATURATION: 97 % | BODY MASS INDEX: 37.65 KG/M2 | WEIGHT: 226 LBS
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
## 2021-05-19 DIAGNOSIS — S80.00XA CONTUSION OF KNEE: ICD-10-CM

## 2021-05-19 PROCEDURE — 73564 X-RAY EXAM KNEE 4 OR MORE: CPT | Mod: RT

## 2021-05-19 PROCEDURE — 99283 EMERGENCY DEPT VISIT LOW MDM: CPT | Performed by: PHYSICIAN ASSISTANT

## 2021-05-19 PROCEDURE — 99284 EMERGENCY DEPT VISIT MOD MDM: CPT | Performed by: PHYSICIAN ASSISTANT

## 2021-05-19 PROCEDURE — 250N000013 HC RX MED GY IP 250 OP 250 PS 637: Performed by: PHYSICIAN ASSISTANT

## 2021-05-19 RX ORDER — ACETAMINOPHEN 325 MG/1
975 TABLET ORAL ONCE
Status: COMPLETED | OUTPATIENT
Start: 2021-05-19 | End: 2021-05-19

## 2021-05-19 RX ADMIN — ACETAMINOPHEN 975 MG: 325 TABLET, FILM COATED ORAL at 11:12

## 2021-05-19 ASSESSMENT — MIFFLIN-ST. JEOR: SCORE: 1596.01

## 2021-05-19 NOTE — LETTER
May 19, 2021      To Whom It May Concern:      Mariluz Stoner was seen in our Emergency Department today, 05/19/21.  I expect her condition to improve over the next 5-7 days.  She may return to work on 5/21/2021 with the below restrictions.  Please excuse her from work for the remainder of today and tomorrow due to her injury.  She has a knee contusion and needs to rest, ice, and elevate her knee.    *No carrying anything over 5 pounds for the next 1 week.  This would cause too much instability on her leg and increase her risk for fall.  *Please give her the opportunity to rest and elevate her knee for 10 minutes every 2-3 hours.    Duration of limitations - 5 days.      Sincerely,        Hiren Robles PA-C

## 2021-05-19 NOTE — DISCHARGE INSTRUCTIONS
It was a pleasure working with you today!  I hope your condition improves rapidly!     Please REST your knee today.  Elevate your knee as much as possible above your heart level.  Ice your knee for 20 minutes every couple hours for the next 1-2 days.  You can use Tylenol up to 1000 mg every 6 hours as needed for pain.

## 2021-05-19 NOTE — LETTER
May 19, 2021      To Whom It May Concern:      Mariluz Stoner was seen in our Emergency Department today, 05/19/21.  I expect her condition to improve over the next 5-7 days.  She may return to work on 5/20/2021 with the below restrictions.  Please excuse her from work for the remainder of today due to her injury.  She has a knee contusion and needs to rest, ice, and elevate her knee today.    *No carrying anything over 5 pounds for the next 1 week.  This would cause too much instability on her leg and increase her risk for fall.  *Please give her the opportunity to rest and elevate her knee for 10 minutes every 2-3 hours.    Duration of limitations - 5 days.      Sincerely,            Hiren Robles PA-C

## 2021-05-19 NOTE — ED PROVIDER NOTES
History     Chief Complaint   Patient presents with     Knee Pain     HPI  Mariluz Stoner is a 60 year old female who presents for evaluation of a fall that occurred at work.  She tripped on a pallet and fell directly onto her bilateral anterior knee areas.  She has pain primarily of her right anterior knee at this time.  Better when she is just laying or sitting, but has significant shooting pain with any type of movement which she rates about 5-6 on a scale of 10.  Has not taken anything to treat her symptoms to this point.  She does work at [employer], and does a lot of walking, standing, and lifting.  She denies any pain of her bilateral hips, thigh, lower leg, ankle, or foot.  She reports no LOC or head trauma.  She states that she purely tripped and there is nothing else that instigated her fall/injury.  No open wounds.          Allergies:  Allergies   Allergen Reactions     Naproxen      Other reaction(s): Hematologic  Vaginal bleeding       Problem List:    Patient Active Problem List    Diagnosis Date Noted     Morbid obesity (H) 03/18/2020     Priority: Medium     Metastatic breast cancer (H) 12/11/2019     Priority: Medium     Mild episode of recurrent major depressive disorder (H) 12/11/2019     Priority: Medium     Essential hypertension 12/11/2019     Priority: Medium     Dermatitis 12/11/2019     Priority: Medium        Past Medical History:    Past Medical History:   Diagnosis Date     Basal cell carcinoma      Breast cancer (H)      Obesity        Past Surgical History:    Past Surgical History:   Procedure Laterality Date     BREAST SURGERY      left tissue expander, LEFT MASTECTOMY     CHOLECYSTECTOMY       GYN SURGERY      oophorectomy     REMOVE TISSUE EXPANDER BREAST  4/16/2014    Procedure: REMOVAL OF LEFT BREAST TISSUE EXPANDER, IRRIGATION AND DEBRIDEMENT OF LEFT BREAST;  Surgeon: Marlon Luz MD;  Location: [facility]       Family History:    History reviewed. No pertinent family  "history.    Social History:  Marital Status:   [2]  Social History     Tobacco Use     Smoking status: Former Smoker     Quit date: 2000     Years since quittin.3     Smokeless tobacco: Never Used   Substance Use Topics     Alcohol use: Yes     Comment: social drinking     Drug use: Yes     Types: Marijuana     Comment: medical marijuana syrup         Medications:    acetaminophen (TYLENOL) 500 MG tablet  albuterol (PROAIR HFA/PROVENTIL HFA/VENTOLIN HFA) 108 (90 Base) MCG/ACT inhaler  anastrozole (ARIMIDEX) 1 MG tablet  calcium carbonate 600 mg-vitamin D 400 units (CALTRATE) 600-400 MG-UNIT per tablet  Calcium Carbonate-Vitamin D3 (CALCIUM 600/VITAMIN D) 600-400 MG-UNIT TABS  cetirizine (ZYRTEC) 10 MG tablet  doxycycline monohydrate (ADOXA) 100 MG tablet  lisinopril (ZESTRIL) 5 MG tablet  loperamide (IMODIUM) 2 MG capsule  medical cannabis (Patient's own supply)  metoprolol succinate ER (TOPROL-XL) 25 MG 24 hr tablet  ondansetron (ZOFRAN-ODT) 4 MG ODT tab  PARoxetine (PAXIL) 20 MG tablet  Probiotic Product (MISC INTESTINAL NEGIN REGULAT) PACK  sulfamethoxazole-trimethoprim (BACTRIM DS) 800-160 MG tablet          Review of Systems   All other systems reviewed and are negative.      Physical Exam   BP: 138/75  Pulse: 58  Temp: 98  F (36.7  C)  Resp: 18  Height: 165.1 cm (5' 5\")  Weight: 102.5 kg (226 lb)  SpO2: 99 %      Physical Exam  Vitals signs and nursing note reviewed.   Constitutional:       General: She is not in acute distress.     Appearance: She is not diaphoretic.   HENT:      Head: Normocephalic and atraumatic.      Right Ear: External ear normal.      Left Ear: External ear normal.      Nose: Nose normal.      Mouth/Throat:      Pharynx: No oropharyngeal exudate.   Eyes:      General: No scleral icterus.        Right eye: No discharge.         Left eye: No discharge.      Conjunctiva/sclera: Conjunctivae normal.      Pupils: Pupils are equal, round, and reactive to light.   Neck:      " Musculoskeletal: Normal range of motion and neck supple.      Thyroid: No thyromegaly.   Cardiovascular:      Rate and Rhythm: Normal rate and regular rhythm.      Heart sounds: Normal heart sounds. No murmur.   Pulmonary:      Effort: Pulmonary effort is normal. No respiratory distress.      Breath sounds: Normal breath sounds. No wheezing or rales.   Chest:      Chest wall: No tenderness.   Abdominal:      General: Bowel sounds are normal. There is no distension.      Palpations: Abdomen is soft. There is no mass.      Tenderness: There is no abdominal tenderness. There is no guarding or rebound.   Musculoskeletal:         General: No deformity.      Right hip: Normal. She exhibits normal range of motion, normal strength, no tenderness and no bony tenderness.      Right knee: She exhibits decreased range of motion. She exhibits no swelling, no effusion, no ecchymosis, no deformity, no laceration, no erythema and no LCL laxity. Tenderness (anterior knee over the patella) found.      Left knee: Normal. She exhibits normal range of motion, no swelling, no effusion, no ecchymosis, no deformity, no laceration, no erythema, normal alignment, no LCL laxity, normal meniscus and no MCL laxity. No tenderness found.      Right ankle: Normal. She exhibits normal range of motion, no swelling, no ecchymosis, no deformity and no laceration. No tenderness.      Comments: Normal range of motion of the right hip without pain during these motions.   Lymphadenopathy:      Cervical: No cervical adenopathy.   Skin:     General: Skin is warm and dry.      Capillary Refill: Capillary refill takes less than 2 seconds.      Findings: No erythema or rash.   Neurological:      Mental Status: She is alert and oriented to person, place, and time.      Cranial Nerves: No cranial nerve deficit.   Psychiatric:         Behavior: Behavior normal.         Thought Content: Thought content normal.         ED Course        Procedures                Critical Care time:  none               Results for orders placed or performed during the hospital encounter of 05/19/21 (from the past 24 hour(s))   XR Knee Right G/E 4 Views    Narrative    KNEE RIGHT FOUR OR MORE VIEWS May 19, 2021 10:41 AM     HISTORY: Fall, anterior knee pain.    COMPARISON: None.    FINDINGS: There is no fracture or significant joint effusion. Joint  spaces are well maintained. Small osteophytes are suggested in the  medial compartment of the knee.      Impression    IMPRESSION: Mild degenerative changes the medial compartment knee.  Otherwise negative right knee x-rays. No acute fracture, malalignment,  or significant joint effusion.         Medications   acetaminophen (TYLENOL) tablet 975 mg (975 mg Oral Given 5/19/21 1112)       Assessments & Plan (with Medical Decision Making)     Contusion of knee  Fall     60 year old female presents for evaluation of a fall that occurred at work.  She tripped on a pallet and landed on her bilateral knees but primarily has pain of her right knee.  See HPI for details.  On exam she has tenderness of the right anterior knee.  No other abnormalities identified.  She was given Tylenol for discomfort.  X-ray displays no evidence for fracture.  Patient was reassured.  RICE therapy recommended.  We wrapped her knee with an Ace bandage.  I offered her a knee immobilizer and crutches, but she declined.  We did walk around the department and she was able to do so on her own without issue identified by nursing.  Therefore, she was discharged home.  Tylenol as needed for discomfort.  Ice the knee for 20 minutes every couple hours.  She requested a note for work for the next 2 days given her concern that she would not be able to stand or walk for extended periods of time.  I released her from work for the next 2 days, and then return with restrictions as noted above.  Follow-up with sports medicine on Monday, 5 days from now, for recheck to reassess her  situation.  Patient was in agreement.     I have reviewed the nursing notes.    I have reviewed the findings, diagnosis, plan and need for follow up with the patient.       Discharge Medication List as of 5/19/2021 11:23 AM          Final diagnoses:   Contusion of knee   Fall     Disclaimer: This note consists of symbols derived from keyboarding, dictation and/or voice recognition software. As a result, there may be errors in the script that have gone undetected. Please consider this when interpreting information found in this chart.    5/19/2021   [facility] EMERGENCY DEPT     Hiren Robles PA-C  05/19/21 [id number]

## 2021-05-31 DIAGNOSIS — F33.0 MILD EPISODE OF RECURRENT MAJOR DEPRESSIVE DISORDER (H): ICD-10-CM

## 2021-06-02 ENCOUNTER — MYC MEDICAL ADVICE (OUTPATIENT)
Dept: INTERNAL MEDICINE | Facility: CLINIC | Age: 61
End: 2021-06-02

## 2021-06-02 RX ORDER — PAROXETINE 20 MG/1
TABLET, FILM COATED ORAL
Qty: 90 TABLET | Refills: 0 | Status: SHIPPED | OUTPATIENT
Start: 2021-06-02 | End: 2021-09-08

## 2021-06-02 NOTE — TELEPHONE ENCOUNTER
Routing refill request to provider for review/approval because:  PHQ-9 overdue    LINDA WinklerN, RN  [facility]

## 2021-06-09 DIAGNOSIS — F33.0 MILD EPISODE OF RECURRENT MAJOR DEPRESSIVE DISORDER (H): ICD-10-CM

## 2021-06-09 RX ORDER — PAROXETINE 20 MG/1
TABLET, FILM COATED ORAL
Qty: 90 TABLET | Refills: 0 | OUTPATIENT
Start: 2021-06-09

## 2021-06-09 NOTE — TELEPHONE ENCOUNTER
"Denied, sent to this pharmacy on 6/2/2021 for 90 tablets  Requested Prescriptions   Pending Prescriptions Disp Refills     PARoxetine (PAXIL) 20 MG tablet [Pharmacy Med Name: PARoxetine HCl 20 MG Oral Tablet] 90 tablet 0     Sig: Take 1 tablet by mouth once daily   Last Written Prescription Date:  6/2/2021  Last Fill Quantity: 90,  # refills: 0   Last office visit: 10/27/2020 with prescribing provider:     Future Office Visit:        SSRIs Protocol Failed - 6/9/2021  1:49 PM        Failed - PHQ-9 score less than 5 in past 6 months     Please review last PHQ-9 score.           Failed - Recent (6 mo) or future (30 days) visit within the authorizing provider's specialty     Patient had office visit in the last 6 months or has a visit in the next 30 days with authorizing provider or within the authorizing provider's specialty.  See \"Patient Info\" tab in inbasket, or \"Choose Columns\" in Meds & Orders section of the refill encounter.            Passed - Medication is active on med list        Passed - Patient is age 18 or older        Passed - No active pregnancy on record        Passed - No positive pregnancy test in last 12 months         Jillian Menard RN    "

## 2021-07-12 ENCOUNTER — OFFICE VISIT (OUTPATIENT)
Dept: INTERNAL MEDICINE | Facility: CLINIC | Age: 61
End: 2021-07-12
Payer: COMMERCIAL

## 2021-07-12 ENCOUNTER — LAB (OUTPATIENT)
Dept: LAB | Facility: CLINIC | Age: 61
End: 2021-07-12
Payer: COMMERCIAL

## 2021-07-12 VITALS
BODY MASS INDEX: 38.65 KG/M2 | DIASTOLIC BLOOD PRESSURE: 70 MMHG | RESPIRATION RATE: 20 BRPM | HEART RATE: 74 BPM | SYSTOLIC BLOOD PRESSURE: 124 MMHG | WEIGHT: 232 LBS | TEMPERATURE: 98.8 F | OXYGEN SATURATION: 98 % | HEIGHT: 65 IN

## 2021-07-12 DIAGNOSIS — C50.919 METASTATIC BREAST CANCER: ICD-10-CM

## 2021-07-12 DIAGNOSIS — F33.0 MILD EPISODE OF RECURRENT MAJOR DEPRESSIVE DISORDER (H): Primary | ICD-10-CM

## 2021-07-12 DIAGNOSIS — Z01.818 PREOP GENERAL PHYSICAL EXAM: ICD-10-CM

## 2021-07-12 DIAGNOSIS — M25.512 CHRONIC LEFT SHOULDER PAIN: ICD-10-CM

## 2021-07-12 DIAGNOSIS — I10 ESSENTIAL HYPERTENSION: ICD-10-CM

## 2021-07-12 DIAGNOSIS — G89.29 CHRONIC LEFT SHOULDER PAIN: ICD-10-CM

## 2021-07-12 LAB
ALBUMIN UR-MCNC: NEGATIVE MG/DL
APPEARANCE UR: CLEAR
BACTERIA #/AREA URNS HPF: ABNORMAL /HPF
BILIRUB UR QL STRIP: NEGATIVE
COLOR UR AUTO: YELLOW
GLUCOSE UR STRIP-MCNC: NEGATIVE MG/DL
HGB UR QL STRIP: ABNORMAL
KETONES UR STRIP-MCNC: NEGATIVE MG/DL
LEUKOCYTE ESTERASE UR QL STRIP: ABNORMAL
MUCOUS THREADS #/AREA URNS LPF: PRESENT /LPF
NITRATE UR QL: NEGATIVE
PH UR STRIP: 5 [PH] (ref 5–7)
RBC URINE: 2 /HPF
SP GR UR STRIP: 1.02 (ref 1–1.03)
SQUAMOUS EPITHELIAL: 2 /HPF
UROBILINOGEN UR STRIP-MCNC: NORMAL MG/DL
WBC URINE: 3 /HPF

## 2021-07-12 PROCEDURE — U0003 INFECTIOUS AGENT DETECTION BY NUCLEIC ACID (DNA OR RNA); SEVERE ACUTE RESPIRATORY SYNDROME CORONAVIRUS 2 (SARS-COV-2) (CORONAVIRUS DISEASE [COVID-19]), AMPLIFIED PROBE TECHNIQUE, MAKING USE OF HIGH THROUGHPUT TECHNOLOGIES AS DESCRIBED BY CMS-2020-01-R: HCPCS

## 2021-07-12 PROCEDURE — U0005 INFEC AGEN DETEC AMPLI PROBE: HCPCS

## 2021-07-12 PROCEDURE — 93000 ELECTROCARDIOGRAM COMPLETE: CPT | Performed by: INTERNAL MEDICINE

## 2021-07-12 PROCEDURE — 81001 URINALYSIS AUTO W/SCOPE: CPT | Performed by: INTERNAL MEDICINE

## 2021-07-12 PROCEDURE — 99214 OFFICE O/P EST MOD 30 MIN: CPT | Performed by: INTERNAL MEDICINE

## 2021-07-12 ASSESSMENT — PAIN SCALES - GENERAL: PAINLEVEL: NO PAIN (0)

## 2021-07-12 ASSESSMENT — PATIENT HEALTH QUESTIONNAIRE - PHQ9: SUM OF ALL RESPONSES TO PHQ QUESTIONS 1-9: 1

## 2021-07-12 ASSESSMENT — MIFFLIN-ST. JEOR: SCORE: 1623.23

## 2021-07-12 NOTE — PATIENT INSTRUCTIONS

## 2021-07-12 NOTE — PROGRESS NOTES
[address] [address]  Phone: [phone]  Primary Provider: Jean Dash  Pre-op Performing Provider: JEAN DASH      PREOPERATIVE EVALUATION:  Today's date: 7/12/2021    Mariluz Stoner is a 60 year old female who presents for a preoperative evaluation.    Surgical Information:  Surgery/Procedure: Left shoulder replacement   Surgery Location: [facility]   Surgeon: Dr. Villa  Surgery Date: 7/14/2021  Time of Surgery:   Where patient plans to recover: At home with family  Fax number for surgical facility: Note does not need to be faxed, will be available electronically in Epic.    Type of Anesthesia Anticipated: General    Assessment & Plan     The proposed surgical procedure is considered INTERMEDIATE risk.    Preop general physical exam    Chronic left shoulder pain    Essential hypertension  - UA Macro with Reflex to Micro and Culture - lab collect; Future  - EKG 12-lead complete w/read - Clinics    Metastatic breast cancer (H)    Mild episode of recurrent major depressive disorder (H)  - DEPRESSION ACTION PLAN (DAP)     Implanted Device: IV access port    Risks and Recommendations:  The patient has the following additional risks and recommendations for perioperative complications:   - No identified additional risk factors other than previously addressed    Medication Instructions:   - ACE/ARB: HOLD due to exceptional risk of hypotension during surgery.     RECOMMENDATION:  APPROVAL GIVEN to proceed with proposed procedure, without further diagnostic evaluation.    Review of external notes as documented above     Subjective     HPI related to upcoming procedure: Patient has a history of persistent left shoulder.  Has had previous surgery without success.  Is now anticipating total shoulder arthroplasty      Preop Questions 6/8/2020   1. Have you ever had a heart attack or stroke? No   3. Do you have chest pain with  activity? No   4. Do you have a history of  heart failure? No   5. Do you currently have a cold, bronchitis or symptoms of other infection? No   6. Do you have a cough, shortness of breath, or wheezing? No   7. Do you or anyone in your family have previous history of blood clots? No   8. Do you or does anyone in your family have a serious bleeding problem such as prolonged bleeding following surgeries or cuts? No   9. Have you ever had problems with anemia or been told to take iron pills? No   10. Have you had any abnormal blood loss such as black, tarry or bloody stools, or abnormal vaginal bleeding? No   11. Have you ever had a blood transfusion? No   13. Have you or any of your relatives ever had problems with anesthesia? No   14. Do you have sleep apnea, excessive snoring or daytime drowsiness? No   15. Do you have any artifical heart valves or other implanted medical devices like a pacemaker, defibrillator, or continuous glucose monitor? No-  Has a port.    16. Do you have artificial joints? No   18. Is there any chance that you may be pregnant? No     Health Care Directive:  Patient does not have a Health Care Directive or Living Will: Discussed advance care planning with patient; however, patient declined at this time.    Preoperative Review of :   reviewed - no record of controlled substances prescribed.      Status of Chronic Conditions:  See problem list for active medical problems.  Problems all longstanding and stable, except as noted/documented.  See ROS for pertinent symptoms related to these conditions.      Review of Systems  CONSTITUTIONAL: NEGATIVE for fever, chills, change in weight  INTEGUMENTARY/SKIN: NEGATIVE for worrisome rashes, moles or lesions  EYES: NEGATIVE for vision changes or irritation  ENT/MOUTH: NEGATIVE for ear, mouth and throat problems  RESP: NEGATIVE for significant cough or SOB  BREAST: NEGATIVE for masses, tenderness or discharge  CV: NEGATIVE for chest pain,  palpitations or peripheral edema  GI: NEGATIVE for nausea, abdominal pain, heartburn, or change in bowel habits  : NEGATIVE for frequency, dysuria, or hematuria  MUSCULOSKELETAL:Persistent left shoulder pain as described.  NEURO: NEGATIVE for weakness, dizziness or paresthesias  ENDOCRINE: NEGATIVE for temperature intolerance, skin/hair changes  HEME: NEGATIVE for bleeding problems  PSYCHIATRIC: NEGATIVE for changes in mood or affect    Patient Active Problem List    Diagnosis Date Noted     Morbid obesity (H) 03/18/2020     Priority: Medium     Metastatic breast cancer (H) 12/11/2019     Priority: Medium     Mild episode of recurrent major depressive disorder (H) 12/11/2019     Priority: Medium     Essential hypertension 12/11/2019     Priority: Medium     Dermatitis 12/11/2019     Priority: Medium      Past Medical History:   Diagnosis Date     Basal cell carcinoma      Breast cancer (H)      Obesity      Past Surgical History:   Procedure Laterality Date     BREAST SURGERY      left tissue expander, LEFT MASTECTOMY     CHOLECYSTECTOMY       GYN SURGERY      oophorectomy     REMOVE TISSUE EXPANDER BREAST  4/16/2014    Procedure: REMOVAL OF LEFT BREAST TISSUE EXPANDER, IRRIGATION AND DEBRIDEMENT OF LEFT BREAST;  Surgeon: Marlon Luz MD;  Location: [facility]     Current Outpatient Medications   Medication Sig Dispense Refill     acetaminophen (TYLENOL) 500 MG tablet Take 1,000 mg by mouth every 6 hours as needed for mild pain       albuterol (PROAIR HFA/PROVENTIL HFA/VENTOLIN HFA) 108 (90 Base) MCG/ACT inhaler Inhale 2 puffs into the lungs       anastrozole (ARIMIDEX) 1 MG tablet Take 1 tablet (1 mg) by mouth daily 90 tablet 1     calcium carbonate 600 mg-vitamin D 400 units (CALTRATE) 600-400 MG-UNIT per tablet Take 1 tablet by mouth       Calcium Carbonate-Vitamin D3 (CALCIUM 600/VITAMIN D) 600-400 MG-UNIT TABS Take 1 tablet by mouth daily       cetirizine (ZYRTEC) 10 MG tablet Take 10 mg by mouth daily        doxycycline monohydrate (ADOXA) 100 MG tablet TK 1 T PO BID WITH BREAKFAST AND DINNER       lisinopril (ZESTRIL) 5 MG tablet Take 1 tablet (5 mg) by mouth daily 90 tablet 1     loperamide (IMODIUM) 2 MG capsule Take 2 mg by mouth as needed       medical cannabis (Patient's own supply) See Admin Instructions (The purpose of this order is to document that the patient reports taking medical cannabis.  This is not a prescription, and is not used to certify that the patient has a qualifying medical condition.)       metoprolol succinate ER (TOPROL-XL) 25 MG 24 hr tablet Take 1 tablet (25 mg) by mouth daily 90 tablet 1     ondansetron (ZOFRAN-ODT) 4 MG ODT tab Take 4 mg by mouth       PARoxetine (PAXIL) 20 MG tablet Take 1 tablet by mouth once daily 90 tablet 0     Probiotic Product (MISC INTESTINAL NEGIN REGULAT) PACK Take 1 Package by mouth 3 times daily       sulfamethoxazole-trimethoprim (BACTRIM DS) 800-160 MG tablet TAKE 1 TABLET BY MOUTH TWICE A DAY         Allergies   Allergen Reactions     Naproxen      Other reaction(s): Hematologic  Vaginal bleeding        Social History     Tobacco Use     Smoking status: Former Smoker     Quit date: 2000     Years since quittin.5     Smokeless tobacco: Never Used   Substance Use Topics     Alcohol use: Yes     Comment: social drinking     History reviewed. No pertinent family history.  History   Drug Use     Types: Marijuana     Comment: medical marijuana syrup          Objective     There were no vitals taken for this visit.    Physical Exam    GENERAL APPEARANCE: healthy, alert and no distress     EYES: EOMI, PERRL     HENT: ear canals and TM's normal and nose and mouth without ulcers or lesions     NECK: no adenopathy, no asymmetry, masses, or scars and thyroid normal to palpation     RESP: lungs clear to auscultation - no rales, rhonchi or wheezes     CV: regular rates and rhythm, normal S1 S2, no S3 or S4 and no murmur, click or rub     ABDOMEN:  soft,  nontender, no HSM or masses and bowel sounds normal     MS: Shoulder evaluation deferred to the patient's orthopedic surgeon     SKIN: no suspicious lesions or rashes     NEURO: Normal strength and tone, sensory exam grossly normal, mentation intact and speech normal     PSYCH: mentation appears normal. and affect normal/bright     LYMPHATICS: No cervical adenopathy    Recent Labs   Lab Test 11/14/19  0000   POTASSIUM 4.0   CR 0.66        Diagnostics:  Lab work reviewed from recent visits and unremarkable.    WBC OP 4.3 - 10.8 K/UL 6.5        RBC OP 4.20 - 5.40 M/UL 4.16Low         HEMOGLOBIN OP 12.0 - 16.0 gm/dL 13.0        HEMATOCRIT OP 36.0 - 48.0 % 39.8        MCV OP 80 - 100 fl 96        MCH OP 27.0 - 33.0 pg 31.3        MCHC OP 33.0 - 36.0 gm/dL 32.7Low         RDW OP 11.5 - 14.5 % 13.3        PLATELET COUNT  - 400 K/        MPV OP 6.5 - 12.0  8.9        PMN % OP  % 56.6        LYMPHOCYTE % OP  % 31.1        MONOCYTE % OP  % 9.6        EOSINOPHIL % OP  % 2.5        BASOPHIL % OP  % 0.2        PMN ABSOLUTE OP 1.80 - 7.80 K/uL 3.68        LYMPHOCYTE ABSOLUTE OP 1.00 - 4.00 K/uL 2.02        MONOCYTE ABSOLUTE OP 0.00 - 1.00 K/uL 0.62        EOSINOPHIL ABSOLUTE OP 0.00 - 0.45 K/uL 0.16        BASOPHIL ABSOLUTE OP 0.00 - 0.20 K/uL 0.01      SODIUM 136 - 145 mmol/L 141        POTASSIUM 3.5 - 5.1 mmol/L 3.7        CHLORIDE 98 - 112 mmol/L 110        CARBON DIOXIDE 21 - 32 mmol/L 25        BUN (UREA NITRO) 7 - 24 mg/dL 17        CREATININE 0.55 - 1.02 mg/dL 0.52Low         EST GFR (CKD-EPI) >60 mL/min >60        EST GFR IF AFRICAN AM >60 mL/min >60        GLUCOSE 74 - 106 mg/dL 91        CALCIUM, SERUM 8.5 - 10.1 mg/dL 8.9        ANION GAP 0.0 - 15.0 mmol/L 6.0        ALBUMIN 3.4 - 5.0 g/dL 3.9        BILIRUBIN-TOTAL 0.2 - 1.0 mg/dL 0.4        ALKALINE P'TASE 45 - 117 IU/L 100        PROTEIN TOTAL 6.4 - 8.2 g/dL 6.9        AST (SGOT) 12 - 37 IU/L 16        ALT 12 - 68 IU/L 22     EKG demonstrates normal  first-degree AV block.      Revised Cardiac Risk Index (RCRI):  The patient has the following serious cardiovascular risks for perioperative complications:   - No serious cardiac risks = 0 points     RCRI Interpretation: 1 point: Class II (low risk - 0.9% complication rate)             Time spent With/On Patient  Length of time   32 minutes      Chart reviewed  yes    Review of outside records yes    Review of test results yes    Interpretation of results yes     Patient visit  yes  Documentation  yes  Discussion with family no  Discussion with providers no        Signed Electronically by: Jean Dash DO  Copy of this evaluation report is provided to requesting physician.

## 2021-07-13 LAB — SARS-COV-2 RNA RESP QL NAA+PROBE: NEGATIVE

## 2021-07-20 DIAGNOSIS — I10 ESSENTIAL HYPERTENSION: ICD-10-CM

## 2021-07-21 RX ORDER — LISINOPRIL 5 MG/1
TABLET ORAL
Qty: 150 TABLET | Refills: 0 | Status: SHIPPED | OUTPATIENT
Start: 2021-07-21 | End: 2021-12-29

## 2021-07-21 NOTE — TELEPHONE ENCOUNTER
Routing refill request to provider for review/approval because:  Labs not current:  Creatinine, potassium     Sandie Avalos RN

## 2021-07-28 ENCOUNTER — TRANSFERRED RECORDS (OUTPATIENT)
Dept: HEALTH INFORMATION MANAGEMENT | Facility: CLINIC | Age: 61
End: 2021-07-28

## 2021-08-25 ENCOUNTER — TRANSFERRED RECORDS (OUTPATIENT)
Dept: HEALTH INFORMATION MANAGEMENT | Facility: CLINIC | Age: 61
End: 2021-08-25

## 2021-09-06 DIAGNOSIS — F33.0 MILD EPISODE OF RECURRENT MAJOR DEPRESSIVE DISORDER (H): ICD-10-CM

## 2021-09-08 RX ORDER — PAROXETINE 20 MG/1
20 TABLET, FILM COATED ORAL DAILY
Qty: 90 TABLET | Refills: 0 | Status: SHIPPED | OUTPATIENT
Start: 2021-09-08 | End: 2021-10-07

## 2021-09-08 NOTE — TELEPHONE ENCOUNTER
Routing to team to set up F2F appointment for patient for depression follow up.  Patient has been given #[phone] to get to appointment per triage protocol.

## 2021-10-03 ENCOUNTER — HEALTH MAINTENANCE LETTER (OUTPATIENT)
Age: 61
End: 2021-10-03

## 2021-10-06 ENCOUNTER — TRANSFERRED RECORDS (OUTPATIENT)
Dept: HEALTH INFORMATION MANAGEMENT | Facility: CLINIC | Age: 61
End: 2021-10-06

## 2021-10-07 ENCOUNTER — OFFICE VISIT (OUTPATIENT)
Dept: INTERNAL MEDICINE | Facility: CLINIC | Age: 61
End: 2021-10-07
Payer: COMMERCIAL

## 2021-10-07 VITALS
TEMPERATURE: 97.2 F | WEIGHT: 240.7 LBS | SYSTOLIC BLOOD PRESSURE: 114 MMHG | RESPIRATION RATE: 22 BRPM | DIASTOLIC BLOOD PRESSURE: 72 MMHG | HEART RATE: 88 BPM | BODY MASS INDEX: 40.05 KG/M2 | OXYGEN SATURATION: 98 %

## 2021-10-07 DIAGNOSIS — C50.919 METASTATIC BREAST CANCER: ICD-10-CM

## 2021-10-07 DIAGNOSIS — I89.0 LYMPHEDEMA: Primary | ICD-10-CM

## 2021-10-07 DIAGNOSIS — F33.0 MILD EPISODE OF RECURRENT MAJOR DEPRESSIVE DISORDER (H): ICD-10-CM

## 2021-10-07 DIAGNOSIS — I10 ESSENTIAL HYPERTENSION: ICD-10-CM

## 2021-10-07 PROCEDURE — 99214 OFFICE O/P EST MOD 30 MIN: CPT | Performed by: INTERNAL MEDICINE

## 2021-10-07 RX ORDER — PAROXETINE 20 MG/1
20 TABLET, FILM COATED ORAL DAILY
Qty: 90 TABLET | Refills: 3 | Status: SHIPPED | OUTPATIENT
Start: 2021-10-07 | End: 2021-12-10

## 2021-10-07 ASSESSMENT — PAIN SCALES - GENERAL: PAINLEVEL: MILD PAIN (3)
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
## 2021-10-08 ASSESSMENT — PATIENT HEALTH QUESTIONNAIRE - PHQ9: SUM OF ALL RESPONSES TO PHQ QUESTIONS 1-9: 7

## 2021-11-19 DIAGNOSIS — I10 ESSENTIAL HYPERTENSION: ICD-10-CM

## 2021-11-19 RX ORDER — METOPROLOL SUCCINATE 25 MG/1
TABLET, EXTENDED RELEASE ORAL
Qty: 90 TABLET | Refills: 0 | Status: SHIPPED | OUTPATIENT
Start: 2021-11-19 | End: 2022-02-21

## 2021-12-09 DIAGNOSIS — F33.0 MILD EPISODE OF RECURRENT MAJOR DEPRESSIVE DISORDER (H): ICD-10-CM

## 2021-12-10 RX ORDER — PAROXETINE 20 MG/1
TABLET, FILM COATED ORAL
Qty: 90 TABLET | Refills: 0 | Status: SHIPPED | OUTPATIENT
Start: 2021-12-10 | End: 2022-03-01

## 2021-12-28 DIAGNOSIS — I10 ESSENTIAL HYPERTENSION: ICD-10-CM

## 2021-12-29 RX ORDER — LISINOPRIL 5 MG/1
TABLET ORAL
Qty: 60 TABLET | Refills: 0 | Status: SHIPPED | OUTPATIENT
Start: 2021-12-29 | End: 2022-01-12

## 2021-12-29 NOTE — TELEPHONE ENCOUNTER
Routing refill request to provider for review/approval because:  Labs not current:  CRE, K+      Linda EmanuelRN

## 2022-01-03 ENCOUNTER — TELEPHONE (OUTPATIENT)
Dept: ADMISSION | Facility: CLINIC | Age: 62
End: 2022-01-03
Payer: COMMERCIAL

## 2022-01-03 NOTE — TELEPHONE ENCOUNTER
Reason for Call:  Form, our goal is to have forms completed with 72 hours, however, some forms may require a visit or additional information.    Type of letter, form or note:  disability    Who is the form from?: Patient    Where did the form come from: Patient or family brought in       What clinic location was the form placed at?: [facility]     Where the form was placed: 's mail Box/Folder    What number is listed as a contact on the form?: Patients home number [phone] and cell is [phone].       Additional comments: When form is complete call patient and she will .     Call taken on 1/3/2022 at 1:35 PM by Petey Ludny

## 2022-01-04 NOTE — TELEPHONE ENCOUNTER
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            Your friendly neighborhood Ekta"

## 2022-01-04 NOTE — TELEPHONE ENCOUNTER
Patient needs and office visit to have this complete.     I have attempted to contact this patient by phone with the following results: left message to return my call on answering machine.

## 2022-01-12 ENCOUNTER — OFFICE VISIT (OUTPATIENT)
Dept: INTERNAL MEDICINE | Facility: CLINIC | Age: 62
End: 2022-01-12
Payer: COMMERCIAL

## 2022-01-12 VITALS
HEIGHT: 65 IN | DIASTOLIC BLOOD PRESSURE: 84 MMHG | WEIGHT: 239 LBS | BODY MASS INDEX: 39.82 KG/M2 | RESPIRATION RATE: 16 BRPM | OXYGEN SATURATION: 100 % | SYSTOLIC BLOOD PRESSURE: 120 MMHG | TEMPERATURE: 97.4 F | HEART RATE: 85 BPM

## 2022-01-12 DIAGNOSIS — I10 ESSENTIAL HYPERTENSION: ICD-10-CM

## 2022-01-12 DIAGNOSIS — I89.0 LYMPHEDEMA OF LEFT ARM: Primary | ICD-10-CM

## 2022-01-12 DIAGNOSIS — C50.919 METASTATIC BREAST CANCER: ICD-10-CM

## 2022-01-12 PROCEDURE — 99214 OFFICE O/P EST MOD 30 MIN: CPT | Performed by: INTERNAL MEDICINE

## 2022-01-12 RX ORDER — LISINOPRIL 5 MG/1
5 TABLET ORAL DAILY
Qty: 60 TABLET | Refills: 0 | Status: SHIPPED | OUTPATIENT
Start: 2022-01-12 | End: 2022-02-22

## 2022-01-12 ASSESSMENT — PAIN SCALES - GENERAL: PAINLEVEL: MILD PAIN (2)

## 2022-01-12 ASSESSMENT — MIFFLIN-ST. JEOR: SCORE: 1649.98

## 2022-01-12 NOTE — PROGRESS NOTES
"      Chrissy Mcgraw is a 61 year old who presents for the following health issues     HPI     Chief Complaint   Patient presents with     Forms     Disability      EMR reviewed including:             Complaint, History of Chief Complaint, Corresponding Review of Systems, and Complaint Specific Physical Examination.    #1   Patient presents for disability forms.  History of metastatic breast cancer going back several years.  Patient has lymphedema in left arm.  Notes that she has been unable to work since last July.  Patient seen by me once in the interim for lymphedema.  Generally follows with oncology and surgery.  (Other institution)  Notes that she is unable to work.  Drives car.  Maintains activities of daily living without compromise.  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  Forms filled out reflecting limited disability with respect to repetitive use of the left hand.  Patient is not totally disabled.        Exam:  4+ lymphedema is noted in the upper left extremity with some discoloration purpling of the hand.  A sleeve is in place.        Patient has been interviewed, applicable history and applied review of systems have been performed.    Vital Signs:   /84 (BP Location: Right arm, Patient Position: Sitting, Cuff Size: Adult Large)   Pulse 85   Temp 97.4  F (36.3  C) (Temporal)   Resp 16   Ht 1.651 m (5' 5\")   Wt 108.4 kg (239 lb)   SpO2 100%   BMI 39.77 kg/m        Decision Making    Problem and Complexity     1. Metastatic breast cancer (H)  Follow-up with oncology    2. Lymphedema of left arm  Continue elevation, sleep, lymphedema pump.  Patient disabled from occupations that require the use of both arms and hands    3. Essential hypertension  Continue blood pressure management  - lisinopril (ZESTRIL) 5 MG tablet; Take 1 tablet (5 mg) by mouth daily  Dispense: 60 tablet; Refill: 0                                FOLLOW UP   I have asked the patient to make an appointment for followup " with me in 2 months        I have carefully explained the diagnosis and treatment options to the patient.  The patient has displayed an understanding of the above, and all subsequent questions were answered.      DO CLYDE Ramos    Portions of this note were produced using [employer]  Although every attempt at real-time proof reading has been made, occasional grammar/syntax errors may have been missed.

## 2022-01-24 ENCOUNTER — OFFICE VISIT (OUTPATIENT)
Dept: DERMATOLOGY | Facility: CLINIC | Age: 62
End: 2022-01-24
Payer: COMMERCIAL

## 2022-01-24 VITALS — DIASTOLIC BLOOD PRESSURE: 74 MMHG | OXYGEN SATURATION: 98 % | SYSTOLIC BLOOD PRESSURE: 103 MMHG | HEART RATE: 60 BPM

## 2022-01-24 DIAGNOSIS — D18.01 ANGIOMA OF SKIN: ICD-10-CM

## 2022-01-24 DIAGNOSIS — Z85.828 HISTORY OF SKIN CANCER: Primary | ICD-10-CM

## 2022-01-24 DIAGNOSIS — D23.9 DERMAL NEVUS: ICD-10-CM

## 2022-01-24 DIAGNOSIS — L82.1 SEBORRHEIC KERATOSIS: ICD-10-CM

## 2022-01-24 DIAGNOSIS — L81.4 LENTIGO: ICD-10-CM

## 2022-01-24 PROCEDURE — 99213 OFFICE O/P EST LOW 20 MIN: CPT | Performed by: DERMATOLOGY

## 2022-01-24 NOTE — NURSING NOTE
"Initial /74   Pulse 60   SpO2 98%  Estimated body mass index is 39.77 kg/m  as calculated from the following:    Height as of 1/12/22: 1.651 m (5' 5\").    Weight as of 1/12/22: 108.4 kg (239 lb). .      "

## 2022-01-24 NOTE — LETTER
2022         RE: Mariluz Stoner  [id number] [address] [place] [id number]        Dear Colleague,    Thank you for referring your patient, Mariluz Stoner, to the [facility]. Please see a copy of my visit note below.    Mariluz Stoner is an extremely pleasant 61 year old year old female patient here today for spot on left calf.   .   Patient states this has been present for a while.  Patient reports the following symptoms:  rough.  Patient reports the following previous treatments none.  These treatments did not work.  Patient reports the following modifying factors none.  Associated symptoms: none.  Patient has no other skin complaints today.  Remainder of the HPI, Meds, PMH, Allergies, FH, and SH was reviewed in chart.      Past Medical History:   Diagnosis Date     Basal cell carcinoma      Breast cancer (H)      Obesity        Past Surgical History:   Procedure Laterality Date     BREAST SURGERY      left tissue expander, LEFT MASTECTOMY     CHOLECYSTECTOMY       GYN SURGERY      oophorectomy     REMOVE TISSUE EXPANDER BREAST  2014    Procedure: REMOVAL OF LEFT BREAST TISSUE EXPANDER, IRRIGATION AND DEBRIDEMENT OF LEFT BREAST;  Surgeon: Marlon Luz MD;  Location: [facility]        No family history on file.    Social History     Socioeconomic History     Marital status:      Spouse name: Not on file     Number of children: Not on file     Years of education: Not on file     Highest education level: Not on file   Occupational History     Not on file   Tobacco Use     Smoking status: Former Smoker     Quit date: 2000     Years since quittin.0     Smokeless tobacco: Never Used   Vaping Use     Vaping Use: Never used   Substance and Sexual Activity     Alcohol use: Yes     Comment: social drinking     Drug use: Yes     Types: Marijuana     Comment: medical marijuana syrup      Sexual activity: Yes     Partners: Female, Male   Other Topics Concern     Not on file    Social History Narrative     Not on file     Social Determinants of Health     Financial Resource Strain: Not on file   Food Insecurity: Not on file   Transportation Needs: Not on file   Physical Activity: Not on file   Stress: Not on file   Social Connections: Not on file   Intimate Partner Violence: Not on file   Housing Stability: Not on file       Outpatient Encounter Medications as of 1/24/2022   Medication Sig Dispense Refill     acetaminophen (TYLENOL) 500 MG tablet Take 1,000 mg by mouth every 6 hours as needed for mild pain       anastrozole (ARIMIDEX) 1 MG tablet Take 1 tablet (1 mg) by mouth daily 90 tablet 1     calcium carbonate 600 mg-vitamin D 400 units (CALTRATE) 600-400 MG-UNIT per tablet Take 1 tablet by mouth       Calcium Carbonate-Vitamin D3 (CALCIUM 600/VITAMIN D) 600-400 MG-UNIT TABS Take 1 tablet by mouth daily       lisinopril (ZESTRIL) 5 MG tablet Take 1 tablet (5 mg) by mouth daily 60 tablet 0     loperamide (IMODIUM) 2 MG capsule Take 2 mg by mouth as needed       medical cannabis (Patient's own supply) See Admin Instructions (The purpose of this order is to document that the patient reports taking medical cannabis.  This is not a prescription, and is not used to certify that the patient has a qualifying medical condition.)       metoprolol succinate ER (TOPROL-XL) 25 MG 24 hr tablet Take 1 tablet by mouth once daily 90 tablet 0     PARoxetine (PAXIL) 20 MG tablet TAKE 1 TABLET BY MOUTH ONCE DAILY APPOINTMENT  NEEDED  FOR  ADDITIONAL  REFILLS 90 tablet 0     No facility-administered encounter medications on file as of 1/24/2022.             O:   NAD, WDWN, Alert & Oriented, Mood & Affect wnl, Vitals stable   Here today alone   General appearance normal   Vitals stable   Alert, oriented and in no acute distress      Following lymph nodes palpated: Occipital, Cervical, Supraclavicular no lad  L calf stuck on papule      Stuck on papules and brown macules on trunk and ext   Red papules on  trunk  Flesh colored papules on trunk     The remainder of the full exam was normal; the following areas were examined:  conjunctiva/lids, oral mucosa, neck, peripheral vascular system, abdomen, lymph nodes, digits/nails, eccrine and apocrine glands, scalp/hair, face, neck, chest, abdomen, buttocks, back, RUE, LUE, RLE, LLE       Eyes: Conjunctivae/lids:Normal     ENT: Lips, buccal mucosa, tongue: normal    MSK:Normal    Cardiovascular: peripheral edema none    Pulm: Breathing Normal    Lymph Nodes: No Head and Neck Lymphadenopathy     Neuro/Psych: Orientation:Alert and Orientedx3 ; Mood/Affect:normal       A/P:  1. Seborrheic keratosis, lentigo, angioma, dermal nevus, hx of non-melanoma skin cancer   It was a pleasure speaking to Mariluz A Gopie today.  Previous clinic notes and pertinent laboratory tests were reviewed prior to Mariluz A Gopie's visit.  BENIGN LESIONS DISCUSSED WITH PATIENT:  I discussed the specifics of tumor, prognosis, and genetics of benign lesions.  I explained that treatment of these lesions would be purely cosmetic and not medically neccessary.  I discussed with patient different removal options including excision, cautery and /or laser.      Nature and genetics of benign skin lesions dicussed with patient.  Signs and Symptoms of skin cancer discussed with patient.  Patient encouraged to perform monthly skin exams.  UV precautions reviewed with patient.  Risks of non-melanoma skin cancer discussed with patient   Return to clinic 12 months        Again, thank you for allowing me to participate in the care of your patient.        Sincerely,        Khoa Frazier MD

## 2022-01-24 NOTE — PROGRESS NOTES
Mariluz Stoner is an extremely pleasant 61 year old year old female patient here today for spot on left calf.   .   Patient states this has been present for a while.  Patient reports the following symptoms:  rough.  Patient reports the following previous treatments none.  These treatments did not work.  Patient reports the following modifying factors none.  Associated symptoms: none.  Patient has no other skin complaints today.  Remainder of the HPI, Meds, PMH, Allergies, FH, and SH was reviewed in chart.      Past Medical History:   Diagnosis Date     Basal cell carcinoma      Breast cancer (H)      Obesity        Past Surgical History:   Procedure Laterality Date     BREAST SURGERY      left tissue expander, LEFT MASTECTOMY     CHOLECYSTECTOMY       GYN SURGERY      oophorectomy     REMOVE TISSUE EXPANDER BREAST  2014    Procedure: REMOVAL OF LEFT BREAST TISSUE EXPANDER, IRRIGATION AND DEBRIDEMENT OF LEFT BREAST;  Surgeon: Marlon Luz MD;  Location: [facility]        No family history on file.    Social History     Socioeconomic History     Marital status:      Spouse name: Not on file     Number of children: Not on file     Years of education: Not on file     Highest education level: Not on file   Occupational History     Not on file   Tobacco Use     Smoking status: Former Smoker     Quit date: 2000     Years since quittin.0     Smokeless tobacco: Never Used   Vaping Use     Vaping Use: Never used   Substance and Sexual Activity     Alcohol use: Yes     Comment: social drinking     Drug use: Yes     Types: Marijuana     Comment: medical marijuana syrup      Sexual activity: Yes     Partners: Female, Male   Other Topics Concern     Not on file   Social History Narrative     Not on file     Social Determinants of Health     Financial Resource Strain: Not on file   Food Insecurity: Not on file   Transportation Needs: Not on file   Physical Activity: Not on file   Stress: Not on file    Social Connections: Not on file   Intimate Partner Violence: Not on file   Housing Stability: Not on file       Outpatient Encounter Medications as of 1/24/2022   Medication Sig Dispense Refill     acetaminophen (TYLENOL) 500 MG tablet Take 1,000 mg by mouth every 6 hours as needed for mild pain       anastrozole (ARIMIDEX) 1 MG tablet Take 1 tablet (1 mg) by mouth daily 90 tablet 1     calcium carbonate 600 mg-vitamin D 400 units (CALTRATE) 600-400 MG-UNIT per tablet Take 1 tablet by mouth       Calcium Carbonate-Vitamin D3 (CALCIUM 600/VITAMIN D) 600-400 MG-UNIT TABS Take 1 tablet by mouth daily       lisinopril (ZESTRIL) 5 MG tablet Take 1 tablet (5 mg) by mouth daily 60 tablet 0     loperamide (IMODIUM) 2 MG capsule Take 2 mg by mouth as needed       medical cannabis (Patient's own supply) See Admin Instructions (The purpose of this order is to document that the patient reports taking medical cannabis.  This is not a prescription, and is not used to certify that the patient has a qualifying medical condition.)       metoprolol succinate ER (TOPROL-XL) 25 MG 24 hr tablet Take 1 tablet by mouth once daily 90 tablet 0     PARoxetine (PAXIL) 20 MG tablet TAKE 1 TABLET BY MOUTH ONCE DAILY APPOINTMENT  NEEDED  FOR  ADDITIONAL  REFILLS 90 tablet 0     No facility-administered encounter medications on file as of 1/24/2022.             O:   NAD, WDWN, Alert & Oriented, Mood & Affect wnl, Vitals stable   Here today alone   General appearance normal   Vitals stable   Alert, oriented and in no acute distress      Following lymph nodes palpated: Occipital, Cervical, Supraclavicular no lad  L calf stuck on papule      Stuck on papules and brown macules on trunk and ext   Red papules on trunk  Flesh colored papules on trunk     The remainder of the full exam was normal; the following areas were examined:  conjunctiva/lids, oral mucosa, neck, peripheral vascular system, abdomen, lymph nodes, digits/nails, eccrine and apocrine  glands, scalp/hair, face, neck, chest, abdomen, buttocks, back, RUE, LUE, RLE, LLE       Eyes: Conjunctivae/lids:Normal     ENT: Lips, buccal mucosa, tongue: normal    MSK:Normal    Cardiovascular: peripheral edema none    Pulm: Breathing Normal    Lymph Nodes: No Head and Neck Lymphadenopathy     Neuro/Psych: Orientation:Alert and Orientedx3 ; Mood/Affect:normal       A/P:  1. Seborrheic keratosis, lentigo, angioma, dermal nevus, hx of non-melanoma skin cancer   It was a pleasure speaking to Mariluz A Gopie today.  Previous clinic notes and pertinent laboratory tests were reviewed prior to Mariluz A Gopie's visit.  BENIGN LESIONS DISCUSSED WITH PATIENT:  I discussed the specifics of tumor, prognosis, and genetics of benign lesions.  I explained that treatment of these lesions would be purely cosmetic and not medically neccessary.  I discussed with patient different removal options including excision, cautery and /or laser.      Nature and genetics of benign skin lesions dicussed with patient.  Signs and Symptoms of skin cancer discussed with patient.  Patient encouraged to perform monthly skin exams.  UV precautions reviewed with patient.  Risks of non-melanoma skin cancer discussed with patient   Return to clinic 12 months

## 2022-02-19 DIAGNOSIS — I10 ESSENTIAL HYPERTENSION: ICD-10-CM

## 2022-02-21 RX ORDER — METOPROLOL SUCCINATE 25 MG/1
TABLET, EXTENDED RELEASE ORAL
Qty: 90 TABLET | Refills: 0 | Status: ON HOLD | OUTPATIENT
Start: 2022-02-21 | End: 2023-10-06

## 2022-02-22 RX ORDER — LISINOPRIL 5 MG/1
TABLET ORAL
Qty: 60 TABLET | Refills: 0 | Status: ON HOLD | OUTPATIENT
Start: 2022-02-22 | End: 2023-10-06

## 2022-02-27 DIAGNOSIS — F33.0 MILD EPISODE OF RECURRENT MAJOR DEPRESSIVE DISORDER (H): ICD-10-CM

## 2022-03-01 RX ORDER — PAROXETINE 20 MG/1
TABLET, FILM COATED ORAL
Qty: 90 TABLET | Refills: 1 | Status: SHIPPED | OUTPATIENT
Start: 2022-03-01

## 2022-03-01 NOTE — TELEPHONE ENCOUNTER
Routing refill request to provider for review/approval because:  PHQ-9 >4 (score of 7)    LINDA WinklerN, RN  [facility]

## 2022-03-20 ENCOUNTER — HEALTH MAINTENANCE LETTER (OUTPATIENT)
Age: 62
End: 2022-03-20

## 2022-03-31 ENCOUNTER — VIRTUAL VISIT (OUTPATIENT)
Dept: INTERNAL MEDICINE | Facility: CLINIC | Age: 62
End: 2022-03-31
Payer: COMMERCIAL

## 2022-03-31 DIAGNOSIS — E66.01 MORBID OBESITY (H): ICD-10-CM

## 2022-03-31 DIAGNOSIS — I10 ESSENTIAL HYPERTENSION: ICD-10-CM

## 2022-03-31 DIAGNOSIS — N39.3 STRESS INCONTINENCE OF URINE: Primary | ICD-10-CM

## 2022-03-31 PROCEDURE — 99214 OFFICE O/P EST MOD 30 MIN: CPT | Mod: 95 | Performed by: INTERNAL MEDICINE

## 2022-03-31 RX ORDER — TOLTERODINE 2 MG/1
2 CAPSULE, EXTENDED RELEASE ORAL DAILY
Qty: 30 CAPSULE | Refills: 0 | Status: SHIPPED | OUTPATIENT
Start: 2022-03-31 | End: 2022-05-16

## 2022-03-31 ASSESSMENT — PATIENT HEALTH QUESTIONNAIRE - PHQ9: SUM OF ALL RESPONSES TO PHQ QUESTIONS 1-9: 4

## 2022-03-31 NOTE — PROGRESS NOTES
How would you like to obtain your AVS? Spine Pain Management  If the video visit is dropped, the invitation should be resent by: Text to cell phone: [phone]  Will anyone else be joining your video visit? No         EMR reviewed including:             Complaint, History of Chief Complaint, Corresponding Review of Systems, and Complaint Specific Physical Examination.    #1   Increased urinary incontinence.  Patient describes stress incontinence.   When she stands up, bends over, or exerts any abdominal pressure.  Denies any burning or frequency.  Denies any gross hematuria.  Denies flank pain.  Denies any suprapubic tenderness.        Exam:  She is alert and appropriate.  Appears to be in no cardiopulmonary distress.  As pretty much all you get with the telephone camera.        #2   Follow-up on hypertension  Continues lisinopril, metoprolol without difficulty.  Denies cough, lightheadedness or symptoms of bradycardia.        Exam:  Same as above        Patient has been interviewed, applicable history and applied review of systems have been performed.    Vital Signs:   There were no vitals taken for this visit.      Decision Making    Problem and Complexity     1. Stress incontinence of urine  We will start on a trial of Detrol.  Patient will notify me of her response and progress.      2. Morbid obesity (H)  Recommend caloric restriction and exercise as tolerated    3. Essential hypertension  Continue current medications.  Lab work at next face-to-face visit will include renal function electrolytes                                FOLLOW UP   I have asked the patient to make an appointment for followup with me in 3 months or as needed.  She will continue to follow-up with her oncologist.  Patient is aware that she is due for colorectal screening and Pap smear.        I have carefully explained the diagnosis and treatment options to the patient.  The patient has displayed an understanding of the above, and all subsequent  questions were answered.      DO CLYDE Ramos    Portions of this note were produced using [employer]  Although every attempt at real-time proof reading has been made, occasional grammar/syntax errors may have been missed.          Video information:  Start time:   5:40 PM  End time:   5:52 PM  Duration:   12 minutes  Origination:   By patient at her home  Format: [employer]  Provider location:     In [place], in my room, at my little table.

## 2022-05-05 ENCOUNTER — HOSPITAL ENCOUNTER (OUTPATIENT)
Dept: MAMMOGRAPHY | Facility: CLINIC | Age: 62
Discharge: HOME | End: 2022-05-05
Attending: INTERNAL MEDICINE | Admitting: INTERNAL MEDICINE
Payer: COMMERCIAL

## 2022-05-05 DIAGNOSIS — Z12.31 VISIT FOR SCREENING MAMMOGRAM: ICD-10-CM

## 2022-05-05 PROCEDURE — 77067 SCR MAMMO BI INCL CAD: CPT | Mod: 52

## 2022-05-16 ENCOUNTER — MYC REFILL (OUTPATIENT)
Dept: INTERNAL MEDICINE | Facility: CLINIC | Age: 62
End: 2022-05-16
Payer: COMMERCIAL

## 2022-05-16 DIAGNOSIS — N39.3 STRESS INCONTINENCE OF URINE: ICD-10-CM

## 2022-05-19 RX ORDER — TOLTERODINE 2 MG/1
2 CAPSULE, EXTENDED RELEASE ORAL DAILY
Qty: 90 CAPSULE | Refills: 1 | Status: SHIPPED | OUTPATIENT
Start: 2022-05-19 | End: 2023-06-01

## 2022-05-19 NOTE — TELEPHONE ENCOUNTER
Prescription approved per [facility] Refill Protocol.    Devorah Holbrook, BSN, RN

## 2022-07-08 ENCOUNTER — OFFICE VISIT (OUTPATIENT)
Dept: OBGYN | Facility: CLINIC | Age: 62
End: 2022-07-08
Payer: COMMERCIAL

## 2022-07-08 VITALS
DIASTOLIC BLOOD PRESSURE: 74 MMHG | SYSTOLIC BLOOD PRESSURE: 125 MMHG | HEART RATE: 71 BPM | WEIGHT: 242.8 LBS | BODY MASS INDEX: 40.4 KG/M2 | OXYGEN SATURATION: 91 %

## 2022-07-08 DIAGNOSIS — N81.11 CYSTOCELE, MIDLINE: ICD-10-CM

## 2022-07-08 DIAGNOSIS — Z12.4 CERVICAL CANCER SCREENING: Primary | ICD-10-CM

## 2022-07-08 DIAGNOSIS — N81.6 RECTOCELE: ICD-10-CM

## 2022-07-08 PROCEDURE — 99202 OFFICE O/P NEW SF 15 MIN: CPT | Performed by: OBSTETRICS & GYNECOLOGY

## 2022-07-08 PROCEDURE — 87624 HPV HI-RISK TYP POOLED RSLT: CPT | Performed by: OBSTETRICS & GYNECOLOGY

## 2022-07-08 PROCEDURE — G0145 SCR C/V CYTO,THINLAYER,RESCR: HCPCS | Performed by: OBSTETRICS & GYNECOLOGY

## 2022-07-08 NOTE — PATIENT INSTRUCTIONS
If you have any questions regarding your visit, Please contact your care team.    [facility]: [phone]      Women s Health CLINIC HOURS TELEPHONE NUMBER   Holly Islas DO.    NIKA Willis -Surgery Scheduler  [place] - Surgery Scheduler    ROSEANNE Plummer, RN  ROSEANNE Bradshaw     Monday, Thursday  [place]  7am-3pm    Tuesday, Wednesday  [place]  7am-3pm    E/O Friday &   [place]    Typical Surgery Days: Thursday or Friday   [facility]  [address] [place] [address] [phone] Phone  [phone] Fax    [address] [address] [phone] Phone    Imaging [phone] Phone    [facility] Labor and Delivery:  [phone] Phone     **Surgeries** Our Surgery Schedulers will contact you to schedule. If you do not receive a call within 3 business days, please call [phone].    Urgent Care locations:  [facility] Saturday and    9 am - 5 pm    Monday-Friday   12 pm - 8 pm  Saturday and    9 am - 5 pm   [phone]       If you need a medication refill, please contact your pharmacy. Please allow 3 business days for your refill to be completed.  As always, Thank you for trusting us with your healthcare needs!

## 2022-07-08 NOTE — PROGRESS NOTES
SUBJECTIVE:       HPI: Mariluz Stoner is a 61 year old   who presents today for pap only visit.  Declines WWE today.    Personal history of metastatic breast cancer, followed by oncology.      Ob Hx:      Gyn Hx: No LMP recorded. Patient is postmenopausal.     Last pap was  neg hpv             reports that she quit smoking about 22 years ago. She has never used smokeless tobacco.      Today's PHQ-2 Score: No flowsheet data found.  Today's PHQ-9 Score:   PHQ-9 SCORE 3/31/2022   PHQ-9 Total Score 4     Today's AUNG-7 Score: No flowsheet data found.    Problem list and histories reviewed & adjusted, as indicated.  Additional history: as documented.    Patient Active Problem List   Diagnosis     Metastatic breast cancer (H)     Mild episode of recurrent major depressive disorder (H)     Essential hypertension     Dermatitis     Morbid obesity (H)     Past Surgical History:   Procedure Laterality Date     BREAST SURGERY      left tissue expander, LEFT MASTECTOMY     CHOLECYSTECTOMY       GYN SURGERY      oophorectomy     REMOVE TISSUE EXPANDER BREAST  2014    Procedure: REMOVAL OF LEFT BREAST TISSUE EXPANDER, IRRIGATION AND DEBRIDEMENT OF LEFT BREAST;  Surgeon: Marlon Luz MD;  Location: [facility]      Social History     Tobacco Use     Smoking status: Former Smoker     Quit date: 2000     Years since quittin.5     Smokeless tobacco: Never Used   Substance Use Topics     Alcohol use: Yes     Comment: social drinking           acetaminophen (TYLENOL) 500 MG tablet, Take 1,000 mg by mouth every 6 hours as needed for mild pain  anastrozole (ARIMIDEX) 1 MG tablet, Take 1 tablet (1 mg) by mouth daily  Calcium Carb-Cholecalciferol (CALCIUM CARBONATE-VITAMIN D3) 600-400 MG-UNIT TABS, Take 1 tablet by mouth daily  calcium carbonate 600 mg-vitamin D 400 units (CALTRATE) 600-400 MG-UNIT per tablet, Take 1 tablet by mouth  lisinopril (ZESTRIL) 5 MG tablet, Take 1 tablet by mouth once  daily  loperamide (IMODIUM) 2 MG capsule, Take 2 mg by mouth as needed  medical cannabis (Patient's own supply), See Admin Instructions (The purpose of this order is to document that the patient reports taking medical cannabis.  This is not a prescription, and is not used to certify that the patient has a qualifying medical condition.)  metoprolol succinate ER (TOPROL-XL) 25 MG 24 hr tablet, Take 1 tablet by mouth once daily  PARoxetine (PAXIL) 20 MG tablet, TAKE 1 TABLET BY MOUTH ONCE DAILY .  APPOINTMENT  NEEDED  FOR  ADDITIONAL  REFILLS  tolterodine ER (DETROL LA) 2 MG 24 hr capsule, Take 1 capsule (2 mg) by mouth daily    No current facility-administered medications on file prior to visit.    Allergies   Allergen Reactions     Naproxen      Other reaction(s): Hematologic  Vaginal bleeding       ROS:  10 Point review of systems negative other noted above in HPI    OBJECTIVE:     /74   Pulse 71   Wt 110.1 kg (242 lb 12.8 oz)   SpO2 91%   Breastfeeding No   BMI 40.40 kg/m    Body mass index is 40.4 kg/m .      Gen: Alert, oriented, appropriately interactive, NAD  Resp: no audible wheeze, cough, or visible cyanosis.  No visible retractions or increased work of breathing.  Able to speak fully in complete sentences.  Breasts: deferred/declined  Abdomen: soft, non tender, non distended  External genitalia: no lesions; normal appearing external genitalia, bartholins glands, urethra, skenes glands  Vagina: no masses or lesions or discharge, atrophic Grade 1 cystocele. Grade 2-3 rectocele noted.  Cervix: no masses or lesions or discharge   Bimanual exam:   Nontender pelvic floor muscles  Urethra: nontender   Bladder: nontender and without massess, well supported   Uterus: midline, anteverted, small, mobile  no masses, non-tender  Adnexa: no masses or tenderness appreciated   No cervical motion tenderness  Neuro: Cranial nerves grossly intact, mentation intact and speech normal  Psych: mentation appears normal,  affect normal/bright, judgement and insight intact, normal speech and appearance well-groomed      In-Clinic Test Results:  No results found for this or any previous visit (from the past 24 hour(s)).    ASSESSMENT/PLAN:                                                      Mariluz Stoner is a 61 year old  who presents today for WWE      ICD-10-CM    1. Cervical cancer screening  Z12.4 Pap Screen with HPV - recommended age 30 - 65 years   2. Rectocele  N81.6    3. Cystocele, midline  N81.11        Pap with cotesting  Cystocele and rectocele noted. If bothersome, recommend to return to discuss further as well as options for management.    Continue to see pcp for routine medical care, annual exams        DO ROBYN Santo [facility]

## 2022-07-13 LAB
BKR LAB AP GYN ADEQUACY: NORMAL
BKR LAB AP GYN INTERPRETATION: NORMAL
BKR LAB AP HPV REFLEX: NORMAL
BKR LAB AP PREVIOUS ABNORMAL: NORMAL
PATH REPORT.COMMENTS IMP SPEC: NORMAL
PATH REPORT.COMMENTS IMP SPEC: NORMAL
PATH REPORT.RELEVANT HX SPEC: NORMAL

## 2022-07-15 LAB
HUMAN PAPILLOMA VIRUS 16 DNA: NEGATIVE
HUMAN PAPILLOMA VIRUS 18 DNA: NEGATIVE
HUMAN PAPILLOMA VIRUS FINAL DIAGNOSIS: NORMAL
HUMAN PAPILLOMA VIRUS OTHER HR: NEGATIVE

## 2022-09-04 ENCOUNTER — HEALTH MAINTENANCE LETTER (OUTPATIENT)
Age: 62
End: 2022-09-04

## 2023-01-13 ENCOUNTER — OFFICE VISIT (OUTPATIENT)
Dept: OPTOMETRY | Facility: CLINIC | Age: 63
End: 2023-01-13
Payer: COMMERCIAL

## 2023-01-13 DIAGNOSIS — H53.143 PHOTOPHOBIA OF BOTH EYES: ICD-10-CM

## 2023-01-13 DIAGNOSIS — H52.4 PRESBYOPIA: ICD-10-CM

## 2023-01-13 DIAGNOSIS — H25.13 SENILE NUCLEAR SCLEROSIS, BILATERAL: Primary | ICD-10-CM

## 2023-01-13 PROCEDURE — 92004 COMPRE OPH EXAM NEW PT 1/>: CPT | Performed by: OPTOMETRIST

## 2023-01-13 PROCEDURE — 92015 DETERMINE REFRACTIVE STATE: CPT | Performed by: OPTOMETRIST

## 2023-01-13 ASSESSMENT — CONF VISUAL FIELD
OS_NORMAL: 1
OD_INFERIOR_NASAL_RESTRICTION: 0
OS_SUPERIOR_NASAL_RESTRICTION: 0
OS_SUPERIOR_TEMPORAL_RESTRICTION: 0
OD_NORMAL: 1
METHOD: COUNTING FINGERS
OD_SUPERIOR_TEMPORAL_RESTRICTION: 0
OD_SUPERIOR_NASAL_RESTRICTION: 0
OD_INFERIOR_TEMPORAL_RESTRICTION: 0
OS_INFERIOR_TEMPORAL_RESTRICTION: 0
OS_INFERIOR_NASAL_RESTRICTION: 0

## 2023-01-13 ASSESSMENT — TONOMETRY
OD_IOP_MMHG: 15
IOP_METHOD: ICARE
OS_IOP_MMHG: 12

## 2023-01-13 ASSESSMENT — VISUAL ACUITY
OD_SC+: -2
METHOD: SNELLEN - LINEAR
OD_SC: 20/30
OS_SC: 20/25
OS_SC+: -2

## 2023-01-13 ASSESSMENT — EXTERNAL EXAM - LEFT EYE: OS_EXAM: NORMAL

## 2023-01-13 ASSESSMENT — REFRACTION_MANIFEST
OD_CYLINDER: +1.00
OD_SPHERE: -0.50
OD_ADD: +2.50
OD_AXIS: 020
OS_SPHERE: PLANO
OS_ADD: +2.50
OS_CYLINDER: +0.50
OS_AXIS: 040

## 2023-01-13 ASSESSMENT — CUP TO DISC RATIO
OS_RATIO: 0.35
OD_RATIO: 0.35

## 2023-01-13 ASSESSMENT — SLIT LAMP EXAM - LIDS
COMMENTS: NORMAL
COMMENTS: NORMAL

## 2023-01-13 ASSESSMENT — EXTERNAL EXAM - RIGHT EYE: OD_EXAM: NORMAL

## 2023-01-13 NOTE — PROGRESS NOTES
Assessment/Plan  (H25.13) Senile nuclear sclerosis, bilateral  (primary encounter diagnosis)  Comment: Becoming visually significant  Plan: Monitor only for now. Return to clinic if distance symptoms do not improve with glasses. Return to clinic with worsening glare/haloes.     (H52.4) Presbyopia  Plan: REFRACTION [[id number]]        Discussed findings with patient. New spectacle prescription dispensed to patient. Patient is welcome to return to clinic with prolonged adaptation difficulties. Sunglasses may also be helpful due to photosensitivity.       Complete documentation of historical and exam elements from today's encounter can  be found in the full encounter summary report (not reduplicated in this progress  note). I personally obtained the chief complaint(s) and history of present illness. I  confirmed and edited as necessary the review of systems, past medical/surgical  history, family history, social history, and examination findings as documented by  others; and I examined the patient myself. I personally reviewed the relevant tests,  images, and reports as documented above. I formulated and edited as necessary the  assessment and plan and discussed the findings and management plan with the  patient and family.    Yusef Mcfarlane OD

## 2023-01-13 NOTE — NURSING NOTE
Chief Complaints and History of Present Illnesses   Patient presents with     COMPREHENSIVE EYE EXAM     Chief Complaint(s) and History of Present Illness(es)     COMPREHENSIVE EYE EXAM            Laterality: both eyes    Onset: gradual    Quality: blurred vision    Context: night driving    Course: gradually worsening    Associated symptoms: glare and floaters.  Negative for eye pain and flashes    Treatments tried: no treatments          Comments    Mariluz Stoner is a 62 year old female who presents for a comprehensive exam. Last eye exam was several years ago. Since exam, vision is gradually worsening. Night driving and glare has become bothersome. Hx of floaters - stable. No flashes. Denies using lubricating drops. No eye pain.     José Fournier COT 12:36 PM January 13, 2023

## 2023-04-03 PROBLEM — C50.919 PRIMARY MALIGNANT NEOPLASM OF BREAST WITH METASTASIS (H): Status: ACTIVE | Noted: 2019-12-11

## 2023-04-30 ENCOUNTER — HEALTH MAINTENANCE LETTER (OUTPATIENT)
Age: 63
End: 2023-04-30

## 2023-05-29 ENCOUNTER — MYC MEDICAL ADVICE (OUTPATIENT)
Dept: INTERNAL MEDICINE | Facility: CLINIC | Age: 63
End: 2023-05-29
Payer: COMMERCIAL

## 2023-05-29 DIAGNOSIS — N39.3 STRESS INCONTINENCE OF URINE: ICD-10-CM

## 2023-05-30 NOTE — TELEPHONE ENCOUNTER
She feels like there is something in her toe.    There is no redness on her toe.  She had her  look at it and they can't see it.  She does feel it.    She states there is some pain with walking.  Mild swelling.  No fever.  Patient is wondering if she can be fit in to have her foot looked at?    Gina Garcia RN on 5/30/2023 at 1:19 PM

## 2023-05-31 NOTE — TELEPHONE ENCOUNTER
Unfortunately, my schedule is rather booked.    It may be beneficial to look toward a podiatry appointment if an opening exists in his schedule.    Otherwise, any available provider who has an opening would suffice.    Ekta

## 2023-06-01 RX ORDER — TOLTERODINE 2 MG/1
CAPSULE, EXTENDED RELEASE ORAL
Qty: 30 CAPSULE | Refills: 0 | Status: SHIPPED | OUTPATIENT
Start: 2023-06-01 | End: 2023-09-29

## 2023-06-01 RX ORDER — TOLTERODINE 2 MG/1
CAPSULE, EXTENDED RELEASE ORAL
Qty: 30 CAPSULE | Refills: 0 | OUTPATIENT
Start: 2023-06-01

## 2023-06-01 NOTE — TELEPHONE ENCOUNTER
Pending Prescriptions:                       Disp   Refills    tolterodine ER (DETROL LA) 2 MG 24 hr caps*30 cap*0        Sig: Take 1 capsule by mouth once daily    Routing refill request to provider for review/approval because:  A break in medication:  Last refill  5/19/2023 for 90 capsules and 1 refill.

## 2023-06-13 ENCOUNTER — OFFICE VISIT (OUTPATIENT)
Dept: INTERNAL MEDICINE | Facility: CLINIC | Age: 63
End: 2023-06-13
Payer: COMMERCIAL

## 2023-06-13 ENCOUNTER — HOSPITAL ENCOUNTER (OUTPATIENT)
Dept: ULTRASOUND IMAGING | Facility: CLINIC | Age: 63
Discharge: HOME | End: 2023-06-13
Attending: INTERNAL MEDICINE | Admitting: INTERNAL MEDICINE
Payer: COMMERCIAL

## 2023-06-13 VITALS
DIASTOLIC BLOOD PRESSURE: 70 MMHG | BODY MASS INDEX: 40.05 KG/M2 | SYSTOLIC BLOOD PRESSURE: 122 MMHG | HEART RATE: 78 BPM | WEIGHT: 240.7 LBS | TEMPERATURE: 97.3 F | OXYGEN SATURATION: 93 %

## 2023-06-13 DIAGNOSIS — M79.89 LEFT ARM SWELLING: ICD-10-CM

## 2023-06-13 DIAGNOSIS — K51.00 PANCOLITIS (H): ICD-10-CM

## 2023-06-13 DIAGNOSIS — R06.02 SOB (SHORTNESS OF BREATH): Primary | ICD-10-CM

## 2023-06-13 DIAGNOSIS — Z11.59 NEED FOR HEPATITIS C SCREENING TEST: ICD-10-CM

## 2023-06-13 DIAGNOSIS — C79.51 SECONDARY MALIGNANT NEOPLASM OF BONE (H): ICD-10-CM

## 2023-06-13 DIAGNOSIS — Z11.4 SCREENING FOR HIV (HUMAN IMMUNODEFICIENCY VIRUS): ICD-10-CM

## 2023-06-13 DIAGNOSIS — E78.5 HYPERLIPIDEMIA LDL GOAL <130: ICD-10-CM

## 2023-06-13 DIAGNOSIS — C50.919 PRIMARY MALIGNANT NEOPLASM OF BREAST WITH METASTASIS (H): ICD-10-CM

## 2023-06-13 DIAGNOSIS — E66.01 MORBID OBESITY (H): ICD-10-CM

## 2023-06-13 DIAGNOSIS — Z12.11 SCREEN FOR COLON CANCER: ICD-10-CM

## 2023-06-13 DIAGNOSIS — F33.0 MILD EPISODE OF RECURRENT MAJOR DEPRESSIVE DISORDER (H): ICD-10-CM

## 2023-06-13 PROCEDURE — 99214 OFFICE O/P EST MOD 30 MIN: CPT | Performed by: INTERNAL MEDICINE

## 2023-06-13 PROCEDURE — 93971 EXTREMITY STUDY: CPT | Mod: LT

## 2023-06-13 ASSESSMENT — PATIENT HEALTH QUESTIONNAIRE - PHQ9
SUM OF ALL RESPONSES TO PHQ QUESTIONS 1-9: 9
SUM OF ALL RESPONSES TO PHQ QUESTIONS 1-9: 9
10. IF YOU CHECKED OFF ANY PROBLEMS, HOW DIFFICULT HAVE THESE PROBLEMS MADE IT FOR YOU TO DO YOUR WORK, TAKE CARE OF THINGS AT HOME, OR GET ALONG WITH OTHER PEOPLE: SOMEWHAT DIFFICULT

## 2023-09-19 ENCOUNTER — HOSPITAL ENCOUNTER (OUTPATIENT)
Dept: CT IMAGING | Facility: CLINIC | Age: 63
Discharge: HOME | End: 2023-09-19
Attending: OBSTETRICS & GYNECOLOGY
Payer: COMMERCIAL

## 2023-09-19 ENCOUNTER — INFUSION THERAPY VISIT (OUTPATIENT)
Dept: INFUSION THERAPY | Facility: CLINIC | Age: 63
End: 2023-09-19
Attending: OBSTETRICS & GYNECOLOGY
Payer: COMMERCIAL

## 2023-09-19 ENCOUNTER — OFFICE VISIT (OUTPATIENT)
Dept: OBGYN | Facility: CLINIC | Age: 63
End: 2023-09-19
Payer: COMMERCIAL

## 2023-09-19 VITALS
BODY MASS INDEX: 39.94 KG/M2 | OXYGEN SATURATION: 94 % | DIASTOLIC BLOOD PRESSURE: 73 MMHG | WEIGHT: 240 LBS | SYSTOLIC BLOOD PRESSURE: 122 MMHG | HEART RATE: 78 BPM

## 2023-09-19 DIAGNOSIS — R14.0 BLOATING: ICD-10-CM

## 2023-09-19 DIAGNOSIS — K62.5 RECTAL BLEEDING: ICD-10-CM

## 2023-09-19 DIAGNOSIS — R10.84 ABDOMINAL PAIN, GENERALIZED: Primary | ICD-10-CM

## 2023-09-19 DIAGNOSIS — Z87.19 HISTORY OF COLONIC DIVERTICULITIS: ICD-10-CM

## 2023-09-19 DIAGNOSIS — R19.7 DIARRHEA, UNSPECIFIED TYPE: ICD-10-CM

## 2023-09-19 DIAGNOSIS — R10.84 ABDOMINAL PAIN, GENERALIZED: ICD-10-CM

## 2023-09-19 DIAGNOSIS — C50.919 PRIMARY MALIGNANT NEOPLASM OF BREAST WITH METASTASIS (H): ICD-10-CM

## 2023-09-19 LAB
ALBUMIN UR-MCNC: NEGATIVE MG/DL
APPEARANCE UR: ABNORMAL
BACTERIA #/AREA URNS HPF: ABNORMAL /HPF
BILIRUB UR QL STRIP: NEGATIVE
COLOR UR AUTO: YELLOW
GLUCOSE UR STRIP-MCNC: NEGATIVE MG/DL
HGB UR QL STRIP: ABNORMAL
KETONES UR STRIP-MCNC: NEGATIVE MG/DL
LEUKOCYTE ESTERASE UR QL STRIP: ABNORMAL
MUCOUS THREADS #/AREA URNS LPF: PRESENT /LPF
NITRATE UR QL: NEGATIVE
PH UR STRIP: 5 [PH] (ref 5–7)
RADIOLOGIST FLAGS: ABNORMAL
RBC URINE: 4 /HPF
SP GR UR STRIP: 1.02 (ref 1–1.03)
SQUAMOUS EPITHELIAL: 2 /HPF
UROBILINOGEN UR STRIP-MCNC: 4 MG/DL
WBC URINE: 4 /HPF

## 2023-09-19 PROCEDURE — 81001 URINALYSIS AUTO W/SCOPE: CPT | Performed by: OBSTETRICS & GYNECOLOGY

## 2023-09-19 PROCEDURE — 250N000011 HC RX IP 250 OP 636: Performed by: OBSTETRICS & GYNECOLOGY

## 2023-09-19 PROCEDURE — 250N000009 HC RX 250: Performed by: OBSTETRICS & GYNECOLOGY

## 2023-09-19 PROCEDURE — 250N000011 HC RX IP 250 OP 636: Mod: JZ | Performed by: OBSTETRICS & GYNECOLOGY

## 2023-09-19 PROCEDURE — 74177 CT ABD & PELVIS W/CONTRAST: CPT

## 2023-09-19 PROCEDURE — 99214 OFFICE O/P EST MOD 30 MIN: CPT | Performed by: OBSTETRICS & GYNECOLOGY

## 2023-09-19 RX ORDER — IOPAMIDOL 755 MG/ML
500 INJECTION, SOLUTION INTRAVASCULAR ONCE
Status: COMPLETED | OUTPATIENT
Start: 2023-09-19 | End: 2023-09-19

## 2023-09-19 RX ORDER — HEPARIN SODIUM (PORCINE) LOCK FLUSH IV SOLN 100 UNIT/ML 100 UNIT/ML
500 SOLUTION INTRAVENOUS ONCE
Status: COMPLETED | OUTPATIENT
Start: 2023-09-19 | End: 2023-09-19

## 2023-09-19 RX ADMIN — IOPAMIDOL 100 ML: 755 INJECTION, SOLUTION INTRAVENOUS at 15:25

## 2023-09-19 RX ADMIN — SODIUM CHLORIDE 60 ML: 9 INJECTION, SOLUTION INTRAVENOUS at 15:26

## 2023-09-19 RX ADMIN — HEPARIN 500 UNITS: 100 SYRINGE at 15:37

## 2023-09-19 NOTE — PROGRESS NOTES
"  SUBJECTIVE:       HPI: Mariluz Stoner is a 62 year old  who presents today for abdominal pain and other concerns.    Patient c/o abdominal and back pain, located in the lower pelvic and back in the middle. Felt like she was having contractions with severe cramping. This has been going on for 3 days and getting better. She has had bloating and \"hardness.\"  She has had hip numbness for the past few months. The pain occurs all day and has been trying to sleep through it. Notices it while getting up, may improve with movement through the day. Was able to eat tea and toast yesterday. Rest makes the pain better.  She has tried tylenol with some improvement. She tried a heating pad this morning.       Denies dysuria, hematuria, constipation.. +hemorrhoids that have been bleeding recently. Bright red. +diarrhea  and stool incontinence that improved slightly with imodium.  She has a known history of diverticulitis in the past, and this may be similar to this.  She is partially blaming some of this on eating cashews the other day. Only other new food recently was granola with oats and honey.  No recent trauma.  No nausea or vomiting. +fevers and chills with these symptoms but has not checked her temp.    Patient is not sexually active. No vaginal bleeding. Nothing in the vagina recently.    Patient has a personal history of metastatic breast cancer with known spine lesions, followed by oncology and on Anastrozole      Ob Hx:  s/p   OB History    Para Term  AB Living   3 3 3 0 0 3   SAB IAB Ectopic Multiple Live Births   0 0 0 0 3      # Outcome Date GA Lbr Anton/2nd Weight Sex Delivery Anes PTL Lv   3 Term 06/15/94    M    VINCENT   2 Term 07/15/85    M    VINCENT   1 Term 81    M    VINCENT      Obstetric Comments   2 other pregnancy-rather not say if miscarriage or     .      Gyn Hx: No LMP recorded. Patient is postmenopausal.     Last pap was 22 nil neg hpv         reports " that she quit smoking about 23 years ago. Her smoking use included cigarettes. She has never used smokeless tobacco.      Today's PHQ-2 Score:        No data to display              Today's PHQ-9 Score:       2023     4:00 PM   PHQ-9 SCORE   PHQ-9 Total Score MyChart 9 (Mild depression)   PHQ-9 Total Score 9     Today's AUNG-7 Score:        No data to display                Problem list and histories reviewed & adjusted, as indicated.  Additional history: as documented.    Patient Active Problem List   Diagnosis    Metastatic breast cancer    Mild episode of recurrent major depressive disorder (H)    Essential hypertension    Dermatitis    Morbid obesity (H)    Secondary malignant neoplasm of bone (H)    Pancolitis (H)     Past Surgical History:   Procedure Laterality Date    BREAST SURGERY      left tissue expander, LEFT MASTECTOMY    CHOLECYSTECTOMY      GYN SURGERY      oophorectomy    REMOVE TISSUE EXPANDER BREAST  2014    Procedure: REMOVAL OF LEFT BREAST TISSUE EXPANDER, IRRIGATION AND DEBRIDEMENT OF LEFT BREAST;  Surgeon: Marlon Luz MD;  Location: [facility]      Social History     Tobacco Use    Smoking status: Former     Types: Cigarettes     Quit date: 2000     Years since quittin.7    Smokeless tobacco: Never   Substance Use Topics    Alcohol use: Yes     Comment: social drinking      No data available              acetaminophen (TYLENOL) 500 MG tablet, Take 1,000 mg by mouth every 6 hours as needed for mild pain  anastrozole (ARIMIDEX) 1 MG tablet, Take 1 tablet (1 mg) by mouth daily  Calcium Carb-Cholecalciferol (CALCIUM CARBONATE-VITAMIN D3) 600-400 MG-UNIT TABS, Take 1 tablet by mouth daily  calcium carbonate 600 mg-vitamin D 400 units (CALTRATE) 600-400 MG-UNIT per tablet, Take 1 tablet by mouth  lisinopril (ZESTRIL) 5 MG tablet, Take 1 tablet by mouth once daily  loperamide (IMODIUM) 2 MG capsule, Take 2 mg by mouth as needed  medical cannabis (Patient's own supply), See Admin  Instructions (The purpose of this order is to document that the patient reports taking medical cannabis.  This is not a prescription, and is not used to certify that the patient has a qualifying medical condition.)  metoprolol succinate ER (TOPROL-XL) 25 MG 24 hr tablet, Take 1 tablet by mouth once daily  PARoxetine (PAXIL) 20 MG tablet, TAKE 1 TABLET BY MOUTH ONCE DAILY .  APPOINTMENT  NEEDED  FOR  ADDITIONAL  REFILLS  tolterodine ER (DETROL LA) 2 MG 24 hr capsule, Take 1 capsule by mouth once daily (Patient not taking: Reported on 9/19/2023)    No current facility-administered medications on file prior to visit.    Allergies   Allergen Reactions    Naproxen      Other reaction(s): Hematologic  Vaginal bleeding       ROS:  10 Point review of systems negative other noted above in HPI    OBJECTIVE:     /73 (BP Location: Right arm, Patient Position: Sitting, Cuff Size: Adult Large)   Pulse 78   Wt 108.9 kg (240 lb)   SpO2 94%   BMI 39.94 kg/m    Body mass index is 39.94 kg/m .      Gen: Alert, oriented, appropriately interactive, NAD  Resp: no audible wheeze, cough, or visible cyanosis.  No visible retractions or increased work of breathing.  Able to speak fully in complete sentences.  Abdomen: soft but bloated, generalized tenderness across lower abdomen, midline more pronounced. No r/r/g  External genitalia: no lesions; normal appearing external genitalia, bartholins glands, urethra, skenes glands  Vagina: no masses or lesions or discharge or bleeding/blood, atrophic  Cervix: no masses or lesions or discharge   Anus: No fissures or external hemorrhoids visible  Bimanual exam:   Nontender pelvic floor muscles  Urethra: nontender   Bladder: nontender and without massess, well supported   Uterus: midline, anteverted, small, mobile  no masses, generalized pelvic tenderness  Adnexa: no masses appreciated  Exam limited by body habitus, generalized tenderness present  No cervical motion tenderness  Psych:  mentation appears normal, affect normal/bright, judgement and insight intact, normal speech and appearance well-groomed        In-Clinic Test Results:  No results found for this or any previous visit (from the past 24 hour(s)).  EXAM: WHOLE-BODY FDG PET/CT SCAN     DATE: 8/8/2023 12:21 PM     CLINICAL DATA: C5 0.012 malignant neoplasm of the nipple and areola, left female breast-restaging.     COMPARISON: FDG PET/CT scan of 4/24/2023.     TECHNIQUE: Following intravenous administration of 95-rdazif-4-deoxyglucose (FDG) and a standard uptake, the patient was imaged on a [employer] whole body PET and CT scanner.  Multiple bed position acquisitions were obtained by the PET scanner, and contiguous images were obtained by the CT scanner along the length of the patient's body from mid-skull level to mid-thigh level.     FDG dose: 9.4 millicuries administered intravenously.     BLOOD GLUCOSE:  103 mg/dl.     BMI: 39.9.     FINDINGS:     Reference normal background uptake values:   Mediastinum: 3.1 maximum SUV   Liver: 4.2 maximum SUV     Head and Neck: Slight uptake in a tiny left supraclavicular node again seen; maximum SUV today 2.7, previously 6.3. No pathologic uptake elsewhere.     Chest: Low-grade increased uptake in a level 1 right axillary node with maximum SUV of 2.2, previously 3.1. Postsurgical changes in the left breast and left axilla with no changes to suggest local recurrence of malignancy. No abnormal pulmonary or pleural uptake of FDG. No mediastinal or hilar uptake. Right-sided Port-A-Cath tip near the cavoatrial junction. Physiologic myocardial uptake.     Abdomen and Pelvis: Physiologic uptake within solid abdominal organs. Focus of increased uptake in the left adrenal gland with maximum SUV of 6.3 today, previous history 0.9; CT images do not show a discrete adrenal nodule to correspond. Physiologic uptake within upper renal collecting systems, ureters, urinary bladder, and bowel. No abdominal or pelvic  adenopathy. Cholecystectomy. Colonic diverticulosis without evidence of diverticulitis.     Bones: No finding suggestive of malignancy involving. Degenerative uptake near the lateral aspect of the right humeral head. Left reverse left total shoulder arthroplasty.       ASSESSMENT/PLAN:                                                      Mariluz Stoner is a 62 year old  who presents today for abdominal pain, other concerns      ICD-10-CM    1. Abdominal pain, generalized  R10.84 CT Abdomen Pelvis w Contrast     CANCELED: UA with Microscopic reflex to Culture - lab collect     CANCELED: CT Abdomen wo & w & Pelvis w Contrast      2. History of colonic diverticulitis  Z87.19 CT Abdomen Pelvis w Contrast     CANCELED: CT Abdomen wo & w & Pelvis w Contrast      3. Metastatic breast cancer  C50.919 CT Abdomen Pelvis w Contrast     CANCELED: CT Abdomen wo & w & Pelvis w Contrast      4. Bloating  R14.0 CT Abdomen Pelvis w Contrast     CANCELED: CT Abdomen wo & w & Pelvis w Contrast      5. Diarrhea, unspecified type  R19.7 CT Abdomen Pelvis w Contrast     CANCELED: CT Abdomen wo & w & Pelvis w Contrast      6. Rectal bleeding  K62.5             We discussed that there are many possibly etiologies for pelvic-abdominal pain, which may include but are not limited to GI etiologies (IBS/IBD, diverticulitis, acute gastroenteritis), gyn etiologies (ovarian cyst/mass, ovarian torsion, PID, uterine fibroids, endometriosis). muskuloskeletal or pelvic floor dysfunction, (interstitial cystitis, acute cystitis, pyelonephritis), etc.   Based on her symptoms, acute cystitis, gastroenteritis, diverticulitis and msk-etiologies are higher on ddx. More work-up indicated at this time. Low suspicion for gyn-related etiologies at this time. Suspect rectal bleeding due to hemorrhoids but warrants further work-up in the setting of her history. Recommend this start with her Primary care provider.  UA, CT A/P was ordered for further  evaluation and will be complete today.   Recommend follow-up with Primary care provider this week given constellation of symptoms and medical history/co morbidities.   Red flag symptoms reviewed, discussed when to go to the ER for further evaluation.  Patient in agreement with plan. All questions answered.    I personally reviewed her most recent oncology and Primary care provider notes, recent PET scan report, labs +3.    35 minutes spent on the date of the encounter doing chart review, history and exam, documentation and further activities as noted above    Holly Islas DO  [facility]

## 2023-09-19 NOTE — PROGRESS NOTES
Infusion Nursing Note:  Mariluz Stoner presents today for Port access for Radiology.    Patient seen by provider today: Yes, Dr. Islas.    present during visit today: Not Applicable.    Note: N/A.      Intravenous Access:  Implanted Port.    Treatment Conditions:  Not Applicable.      Post CT Assessment:  Blood return noted pre and post CT scan.  Site patent and intact, free from redness, edema or discomfort.  No evidence of extravasations.  Access discontinued per protocol.       Discharge Plan:   Patient discharged in stable condition accompanied by: self.  Departure Mode: Ambulatory.      Iona Garcia RN

## 2023-09-19 NOTE — PATIENT INSTRUCTIONS
If you have any questions regarding your visit, Please contact your care team.    [email or web address]: [phone]      [facility] CLINIC HOURS TELEPHONE NUMBER   Holly Islas DO.    NIKA Willis-Surgery Scheduler  [place] - Surgery Scheduler    ROSEANNE Plummer, ROSEANNE Bradshaw RN     Monday, Thursday  [place]  7am-3pm    Tuesday, Wednesday  [place]  7am-3pm    E[place]O Friday &   [place]    Typical Surgery Days: Thursday or Friday   [facility]  [address] [place] [address] [phone] Phone  [phone] Fax    [address] [address] [phone] Phone    Imaging [phone] Phone    [facility]:  [phone] Phone     **Surgeries** Our Surgery Schedulers will contact you to schedule. If you do not receive a call within 3 business days, please call [phone].    Urgent Care locations:  [facility] Saturday and    9 am - 5 pm    Monday-Friday   12 pm - 8 pm  Saturday and    9 am - 5 pm   [phone]       If you need a medication refill, please contact your pharmacy. Please allow 3 business days for your refill to be completed.  As always, Thank you for trusting us with your healthcare needs!

## 2023-09-28 ENCOUNTER — LAB (OUTPATIENT)
Dept: LAB | Facility: CLINIC | Age: 63
End: 2023-09-28
Payer: COMMERCIAL

## 2023-09-28 DIAGNOSIS — R10.84 ABDOMINAL PAIN, GENERALIZED: ICD-10-CM

## 2023-09-28 PROCEDURE — 87086 URINE CULTURE/COLONY COUNT: CPT

## 2023-09-29 ENCOUNTER — HOSPITAL ENCOUNTER (INPATIENT)
Facility: CLINIC | Age: 63
LOS: 7 days | Discharge: HOME | DRG: 392 | End: 2023-10-06
Attending: EMERGENCY MEDICINE | Admitting: FAMILY MEDICINE
Payer: COMMERCIAL

## 2023-09-29 ENCOUNTER — APPOINTMENT (OUTPATIENT)
Dept: CT IMAGING | Facility: CLINIC | Age: 63
DRG: 392 | End: 2023-09-29
Attending: EMERGENCY MEDICINE
Payer: COMMERCIAL

## 2023-09-29 ENCOUNTER — NURSE TRIAGE (OUTPATIENT)
Dept: INTERNAL MEDICINE | Facility: CLINIC | Age: 63
End: 2023-09-29
Payer: COMMERCIAL

## 2023-09-29 DIAGNOSIS — K57.92 ACUTE DIVERTICULITIS: ICD-10-CM

## 2023-09-29 DIAGNOSIS — G47.00 INSOMNIA, UNSPECIFIED TYPE: ICD-10-CM

## 2023-09-29 DIAGNOSIS — I10 ESSENTIAL HYPERTENSION: ICD-10-CM

## 2023-09-29 DIAGNOSIS — C50.919 STAGE IV BREAST CANCER IN FEMALE (H): ICD-10-CM

## 2023-09-29 DIAGNOSIS — K57.32 DIVERTICULITIS OF COLON: ICD-10-CM

## 2023-09-29 DIAGNOSIS — I42.7 CARDIOMYOPATHY SECONDARY TO DRUG (H): Primary | ICD-10-CM

## 2023-09-29 PROBLEM — R00.1 SINUS BRADYCARDIA: Status: ACTIVE | Noted: 2023-09-29

## 2023-09-29 PROBLEM — T78.40XA WHEEZING DUE TO ALLERGY: Status: ACTIVE | Noted: 2023-09-29

## 2023-09-29 PROBLEM — R06.2 WHEEZING DUE TO ALLERGY: Status: ACTIVE | Noted: 2023-09-29

## 2023-09-29 LAB
ALBUMIN SERPL BCG-MCNC: 3.7 G/DL (ref 3.5–5.2)
ALP SERPL-CCNC: 93 U/L (ref 35–104)
ALT SERPL W P-5'-P-CCNC: 15 U/L (ref 0–50)
ANION GAP SERPL CALCULATED.3IONS-SCNC: 11 MMOL/L (ref 7–15)
AST SERPL W P-5'-P-CCNC: 17 U/L (ref 0–45)
BACTERIA UR CULT: NORMAL
BASOPHILS # BLD AUTO: 0 10E3/UL (ref 0–0.2)
BASOPHILS NFR BLD AUTO: 0 %
BILIRUB SERPL-MCNC: 0.4 MG/DL
BUN SERPL-MCNC: 8.9 MG/DL (ref 8–23)
CALCIUM SERPL-MCNC: 9 MG/DL (ref 8.8–10.2)
CHLORIDE SERPL-SCNC: 104 MMOL/L (ref 98–107)
CREAT SERPL-MCNC: 0.58 MG/DL (ref 0.51–0.95)
CRP SERPL-MCNC: 44.9 MG/L
DEPRECATED HCO3 PLAS-SCNC: 24 MMOL/L (ref 22–29)
EGFRCR SERPLBLD CKD-EPI 2021: >90 ML/MIN/1.73M2
EOSINOPHIL # BLD AUTO: 0.2 10E3/UL (ref 0–0.7)
EOSINOPHIL NFR BLD AUTO: 2 %
ERYTHROCYTE [DISTWIDTH] IN BLOOD BY AUTOMATED COUNT: 12.9 % (ref 10–15)
ERYTHROCYTE [SEDIMENTATION RATE] IN BLOOD BY WESTERGREN METHOD: 34 MM/HR (ref 0–30)
GLUCOSE SERPL-MCNC: 97 MG/DL (ref 70–99)
HCT VFR BLD AUTO: 37.4 % (ref 35–47)
HGB BLD-MCNC: 12.6 G/DL (ref 11.7–15.7)
IMM GRANULOCYTES # BLD: 0 10E3/UL
IMM GRANULOCYTES NFR BLD: 1 %
LYMPHOCYTES # BLD AUTO: 1.6 10E3/UL (ref 0.8–5.3)
LYMPHOCYTES NFR BLD AUTO: 26 %
MCH RBC QN AUTO: 31.7 PG (ref 26.5–33)
MCHC RBC AUTO-ENTMCNC: 33.7 G/DL (ref 31.5–36.5)
MCV RBC AUTO: 94 FL (ref 78–100)
MONOCYTES # BLD AUTO: 0.7 10E3/UL (ref 0–1.3)
MONOCYTES NFR BLD AUTO: 11 %
NEUTROPHILS # BLD AUTO: 3.8 10E3/UL (ref 1.6–8.3)
NEUTROPHILS NFR BLD AUTO: 60 %
NRBC # BLD AUTO: 0 10E3/UL
NRBC BLD AUTO-RTO: 0 /100
PLATELET # BLD AUTO: 344 10E3/UL (ref 150–450)
POTASSIUM SERPL-SCNC: 3.8 MMOL/L (ref 3.4–5.3)
PROT SERPL-MCNC: 6.8 G/DL (ref 6.4–8.3)
RBC # BLD AUTO: 3.97 10E6/UL (ref 3.8–5.2)
SODIUM SERPL-SCNC: 139 MMOL/L (ref 135–145)
TROPONIN T SERPL HS-MCNC: 10 NG/L
TROPONIN T SERPL HS-MCNC: 12 NG/L
WBC # BLD AUTO: 6.3 10E3/UL (ref 4–11)

## 2023-09-29 PROCEDURE — 250N000011 HC RX IP 250 OP 636: Performed by: EMERGENCY MEDICINE

## 2023-09-29 PROCEDURE — 85025 COMPLETE CBC W/AUTO DIFF WBC: CPT | Performed by: EMERGENCY MEDICINE

## 2023-09-29 PROCEDURE — 84484 ASSAY OF TROPONIN QUANT: CPT | Performed by: FAMILY MEDICINE

## 2023-09-29 PROCEDURE — 99223 1ST HOSP IP/OBS HIGH 75: CPT | Mod: AI | Performed by: FAMILY MEDICINE

## 2023-09-29 PROCEDURE — 99285 EMERGENCY DEPT VISIT HI MDM: CPT | Performed by: EMERGENCY MEDICINE

## 2023-09-29 PROCEDURE — 86140 C-REACTIVE PROTEIN: CPT | Performed by: EMERGENCY MEDICINE

## 2023-09-29 PROCEDURE — 258N000003 HC RX IP 258 OP 636: Performed by: EMERGENCY MEDICINE

## 2023-09-29 PROCEDURE — 99285 EMERGENCY DEPT VISIT HI MDM: CPT | Mod: 25 | Performed by: EMERGENCY MEDICINE

## 2023-09-29 PROCEDURE — 96361 HYDRATE IV INFUSION ADD-ON: CPT | Performed by: EMERGENCY MEDICINE

## 2023-09-29 PROCEDURE — 258N000003 HC RX IP 258 OP 636: Performed by: FAMILY MEDICINE

## 2023-09-29 PROCEDURE — 36415 COLL VENOUS BLD VENIPUNCTURE: CPT | Performed by: EMERGENCY MEDICINE

## 2023-09-29 PROCEDURE — 85652 RBC SED RATE AUTOMATED: CPT | Performed by: EMERGENCY MEDICINE

## 2023-09-29 PROCEDURE — 999N000157 HC STATISTIC RCP TIME EA 10 MIN

## 2023-09-29 PROCEDURE — 96375 TX/PRO/DX INJ NEW DRUG ADDON: CPT | Performed by: EMERGENCY MEDICINE

## 2023-09-29 PROCEDURE — 250N000013 HC RX MED GY IP 250 OP 250 PS 637: Performed by: FAMILY MEDICINE

## 2023-09-29 PROCEDURE — 93005 ELECTROCARDIOGRAM TRACING: CPT

## 2023-09-29 PROCEDURE — 96376 TX/PRO/DX INJ SAME DRUG ADON: CPT | Performed by: EMERGENCY MEDICINE

## 2023-09-29 PROCEDURE — 250N000011 HC RX IP 250 OP 636: Performed by: FAMILY MEDICINE

## 2023-09-29 PROCEDURE — 120N000001 HC R&B MED SURG/OB

## 2023-09-29 PROCEDURE — 96374 THER/PROPH/DIAG INJ IV PUSH: CPT | Mod: 59 | Performed by: EMERGENCY MEDICINE

## 2023-09-29 PROCEDURE — 80053 COMPREHEN METABOLIC PANEL: CPT | Performed by: EMERGENCY MEDICINE

## 2023-09-29 PROCEDURE — 250N000009 HC RX 250: Performed by: EMERGENCY MEDICINE

## 2023-09-29 PROCEDURE — 74177 CT ABD & PELVIS W/CONTRAST: CPT

## 2023-09-29 PROCEDURE — 999N000156 HC STATISTIC RCP CONSULT EA 30 MIN

## 2023-09-29 PROCEDURE — 99222 1ST HOSP IP/OBS MODERATE 55: CPT | Performed by: SPECIALIST

## 2023-09-29 RX ORDER — HEPARIN SODIUM,PORCINE 10 UNIT/ML
5-10 VIAL (ML) INTRAVENOUS
Status: DISCONTINUED | OUTPATIENT
Start: 2023-09-29 | End: 2023-09-29

## 2023-09-29 RX ORDER — PROCHLORPERAZINE MALEATE 5 MG
10 TABLET ORAL EVERY 6 HOURS PRN
Status: DISCONTINUED | OUTPATIENT
Start: 2023-09-29 | End: 2023-10-06 | Stop reason: HOSPADM

## 2023-09-29 RX ORDER — HYDROMORPHONE HYDROCHLORIDE 1 MG/ML
0.5 INJECTION, SOLUTION INTRAMUSCULAR; INTRAVENOUS; SUBCUTANEOUS
Status: DISCONTINUED | OUTPATIENT
Start: 2023-09-29 | End: 2023-10-06 | Stop reason: HOSPADM

## 2023-09-29 RX ORDER — VITAMIN B COMPLEX
25 TABLET ORAL AT BEDTIME
COMMUNITY

## 2023-09-29 RX ORDER — METOPROLOL SUCCINATE 25 MG/1
25 TABLET, EXTENDED RELEASE ORAL DAILY
Status: DISCONTINUED | OUTPATIENT
Start: 2023-09-30 | End: 2023-10-06 | Stop reason: HOSPADM

## 2023-09-29 RX ORDER — NALOXONE HYDROCHLORIDE 0.4 MG/ML
0.4 INJECTION, SOLUTION INTRAMUSCULAR; INTRAVENOUS; SUBCUTANEOUS
Status: DISCONTINUED | OUTPATIENT
Start: 2023-09-29 | End: 2023-10-06 | Stop reason: HOSPADM

## 2023-09-29 RX ORDER — HEPARIN SODIUM (PORCINE) LOCK FLUSH IV SOLN 100 UNIT/ML 100 UNIT/ML
5-10 SOLUTION INTRAVENOUS
Status: DISCONTINUED | OUTPATIENT
Start: 2023-09-29 | End: 2023-09-29

## 2023-09-29 RX ORDER — ONDANSETRON 2 MG/ML
4 INJECTION INTRAMUSCULAR; INTRAVENOUS EVERY 6 HOURS PRN
Status: DISCONTINUED | OUTPATIENT
Start: 2023-09-29 | End: 2023-10-06 | Stop reason: HOSPADM

## 2023-09-29 RX ORDER — ACETAMINOPHEN 650 MG/1
650 SUPPOSITORY RECTAL EVERY 4 HOURS PRN
Status: DISCONTINUED | OUTPATIENT
Start: 2023-09-29 | End: 2023-09-29

## 2023-09-29 RX ORDER — NALOXONE HYDROCHLORIDE 0.4 MG/ML
0.2 INJECTION, SOLUTION INTRAMUSCULAR; INTRAVENOUS; SUBCUTANEOUS
Status: DISCONTINUED | OUTPATIENT
Start: 2023-09-29 | End: 2023-10-06 | Stop reason: HOSPADM

## 2023-09-29 RX ORDER — HEPARIN SODIUM,PORCINE 10 UNIT/ML
5-10 VIAL (ML) INTRAVENOUS EVERY 24 HOURS
Status: DISCONTINUED | OUTPATIENT
Start: 2023-09-29 | End: 2023-10-06 | Stop reason: HOSPADM

## 2023-09-29 RX ORDER — HEPARIN SODIUM (PORCINE) LOCK FLUSH IV SOLN 100 UNIT/ML 100 UNIT/ML
5-10 SOLUTION INTRAVENOUS
Status: DISCONTINUED | OUTPATIENT
Start: 2023-09-29 | End: 2023-10-06 | Stop reason: HOSPADM

## 2023-09-29 RX ORDER — ALBUTEROL SULFATE 90 UG/1
2 AEROSOL, METERED RESPIRATORY (INHALATION) EVERY 4 HOURS PRN
COMMUNITY
Start: 2022-03-16 | End: 2024-09-21

## 2023-09-29 RX ORDER — AMPICILLIN AND SULBACTAM 2; 1 G/1; G/1
3 INJECTION, POWDER, FOR SOLUTION INTRAMUSCULAR; INTRAVENOUS ONCE
Status: COMPLETED | OUTPATIENT
Start: 2023-09-29 | End: 2023-09-29

## 2023-09-29 RX ORDER — ACETAMINOPHEN 325 MG/1
650 TABLET ORAL EVERY 4 HOURS PRN
Status: DISCONTINUED | OUTPATIENT
Start: 2023-09-29 | End: 2023-10-06 | Stop reason: HOSPADM

## 2023-09-29 RX ORDER — ENOXAPARIN SODIUM 100 MG/ML
40 INJECTION SUBCUTANEOUS EVERY 24 HOURS
Status: DISCONTINUED | OUTPATIENT
Start: 2023-09-29 | End: 2023-10-06 | Stop reason: HOSPADM

## 2023-09-29 RX ORDER — HYDROMORPHONE HYDROCHLORIDE 1 MG/ML
0.3 INJECTION, SOLUTION INTRAMUSCULAR; INTRAVENOUS; SUBCUTANEOUS
Status: DISCONTINUED | OUTPATIENT
Start: 2023-09-29 | End: 2023-10-06 | Stop reason: HOSPADM

## 2023-09-29 RX ORDER — DEXTROSE MONOHYDRATE, SODIUM CHLORIDE, AND POTASSIUM CHLORIDE 50; 1.49; 4.5 G/1000ML; G/1000ML; G/1000ML
INJECTION, SOLUTION INTRAVENOUS CONTINUOUS
Status: DISCONTINUED | OUTPATIENT
Start: 2023-09-29 | End: 2023-10-02

## 2023-09-29 RX ORDER — PAROXETINE 20 MG/1
20 TABLET, FILM COATED ORAL DAILY
Status: DISCONTINUED | OUTPATIENT
Start: 2023-09-30 | End: 2023-10-06 | Stop reason: HOSPADM

## 2023-09-29 RX ORDER — LISINOPRIL 2.5 MG/1
5 TABLET ORAL DAILY
Status: DISCONTINUED | OUTPATIENT
Start: 2023-09-30 | End: 2023-10-03

## 2023-09-29 RX ORDER — PIPERACILLIN SODIUM, TAZOBACTAM SODIUM 3; .375 G/15ML; G/15ML
3.38 INJECTION, POWDER, LYOPHILIZED, FOR SOLUTION INTRAVENOUS EVERY 6 HOURS
Status: DISCONTINUED | OUTPATIENT
Start: 2023-09-29 | End: 2023-10-06 | Stop reason: HOSPADM

## 2023-09-29 RX ORDER — ONDANSETRON 4 MG/1
4 TABLET, ORALLY DISINTEGRATING ORAL EVERY 6 HOURS PRN
Status: DISCONTINUED | OUTPATIENT
Start: 2023-09-29 | End: 2023-10-06 | Stop reason: HOSPADM

## 2023-09-29 RX ORDER — ACETAMINOPHEN 650 MG/1
650 SUPPOSITORY RECTAL EVERY 4 HOURS PRN
Status: DISCONTINUED | OUTPATIENT
Start: 2023-09-29 | End: 2023-10-06 | Stop reason: HOSPADM

## 2023-09-29 RX ORDER — IOPAMIDOL 755 MG/ML
500 INJECTION, SOLUTION INTRAVASCULAR ONCE
Status: COMPLETED | OUTPATIENT
Start: 2023-09-29 | End: 2023-09-29

## 2023-09-29 RX ORDER — PROCHLORPERAZINE 25 MG
25 SUPPOSITORY, RECTAL RECTAL EVERY 12 HOURS PRN
Status: DISCONTINUED | OUTPATIENT
Start: 2023-09-29 | End: 2023-10-06 | Stop reason: HOSPADM

## 2023-09-29 RX ORDER — ANASTROZOLE 1 MG/1
1 TABLET ORAL DAILY
Status: DISCONTINUED | OUTPATIENT
Start: 2023-09-30 | End: 2023-10-06 | Stop reason: HOSPADM

## 2023-09-29 RX ORDER — ONDANSETRON 2 MG/ML
4 INJECTION INTRAMUSCULAR; INTRAVENOUS ONCE
Status: COMPLETED | OUTPATIENT
Start: 2023-09-29 | End: 2023-09-29

## 2023-09-29 RX ORDER — ALBUTEROL SULFATE 0.83 MG/ML
2.5 SOLUTION RESPIRATORY (INHALATION) EVERY 4 HOURS PRN
Status: DISCONTINUED | OUTPATIENT
Start: 2023-09-29 | End: 2023-10-06 | Stop reason: HOSPADM

## 2023-09-29 RX ORDER — ACETAMINOPHEN 325 MG/1
650 TABLET ORAL EVERY 4 HOURS PRN
Status: DISCONTINUED | OUTPATIENT
Start: 2023-09-29 | End: 2023-09-29

## 2023-09-29 RX ORDER — HYDROMORPHONE HYDROCHLORIDE 1 MG/ML
0.5 INJECTION, SOLUTION INTRAMUSCULAR; INTRAVENOUS; SUBCUTANEOUS EVERY 30 MIN PRN
Status: DISCONTINUED | OUTPATIENT
Start: 2023-09-29 | End: 2023-09-29

## 2023-09-29 RX ADMIN — SODIUM CHLORIDE 1000 ML: 9 INJECTION, SOLUTION INTRAVENOUS at 11:57

## 2023-09-29 RX ADMIN — IOPAMIDOL 100 ML: 755 INJECTION, SOLUTION INTRAVENOUS at 12:19

## 2023-09-29 RX ADMIN — ENOXAPARIN SODIUM 40 MG: 40 INJECTION SUBCUTANEOUS at 21:41

## 2023-09-29 RX ADMIN — HYDROMORPHONE HYDROCHLORIDE 0.5 MG: 1 INJECTION, SOLUTION INTRAMUSCULAR; INTRAVENOUS; SUBCUTANEOUS at 21:32

## 2023-09-29 RX ADMIN — HYDROMORPHONE HYDROCHLORIDE 0.5 MG: 1 INJECTION, SOLUTION INTRAMUSCULAR; INTRAVENOUS; SUBCUTANEOUS at 13:27

## 2023-09-29 RX ADMIN — POTASSIUM CHLORIDE, DEXTROSE MONOHYDRATE AND SODIUM CHLORIDE: 150; 5; 450 INJECTION, SOLUTION INTRAVENOUS at 16:10

## 2023-09-29 RX ADMIN — HYDROMORPHONE HYDROCHLORIDE 0.5 MG: 1 INJECTION, SOLUTION INTRAMUSCULAR; INTRAVENOUS; SUBCUTANEOUS at 11:53

## 2023-09-29 RX ADMIN — PIPERACILLIN AND TAZOBACTAM 3.38 G: 3; .375 INJECTION, POWDER, FOR SOLUTION INTRAVENOUS at 16:08

## 2023-09-29 RX ADMIN — AMPICILLIN SODIUM AND SULBACTAM SODIUM 3 G: 2; 1 INJECTION, POWDER, FOR SOLUTION INTRAMUSCULAR; INTRAVENOUS at 13:21

## 2023-09-29 RX ADMIN — ACETAMINOPHEN 650 MG: 325 TABLET, FILM COATED ORAL at 19:33

## 2023-09-29 RX ADMIN — PIPERACILLIN AND TAZOBACTAM 3.38 G: 3; .375 INJECTION, POWDER, FOR SOLUTION INTRAVENOUS at 21:35

## 2023-09-29 RX ADMIN — SODIUM CHLORIDE 60 ML: 9 INJECTION, SOLUTION INTRAVENOUS at 12:18

## 2023-09-29 RX ADMIN — ONDANSETRON 4 MG: 2 INJECTION INTRAMUSCULAR; INTRAVENOUS at 11:53

## 2023-09-29 ASSESSMENT — ACTIVITIES OF DAILY LIVING (ADL)
DRESSING/BATHING_DIFFICULTY: NO
ADLS_ACUITY_SCORE: 35
ADLS_ACUITY_SCORE: 18
WALKING_OR_CLIMBING_STAIRS_DIFFICULTY: NO
ADLS_ACUITY_SCORE: 18
TOILETING_ISSUES: NO
ADLS_ACUITY_SCORE: 18
ADLS_ACUITY_SCORE: 35
CHANGE_IN_FUNCTIONAL_STATUS_SINCE_ONSET_OF_CURRENT_ILLNESS/INJURY: NO
FALL_HISTORY_WITHIN_LAST_SIX_MONTHS: NO
ADLS_ACUITY_SCORE: 18
WEAR_GLASSES_OR_BLIND: NO
DIFFICULTY_EATING/SWALLOWING: NO
CONCENTRATING,_REMEMBERING_OR_MAKING_DECISIONS_DIFFICULTY: NO
DOING_ERRANDS_INDEPENDENTLY_DIFFICULTY: NO
ADLS_ACUITY_SCORE: 35

## 2023-09-29 NOTE — MEDICATION SCRIBE - ADMISSION MEDICATION HISTORY
Medication Scribe Admission Medication History    Admission medication history is complete. The information provided in this note is only as accurate as the sources available at the time of the update.    Medication reconciliation/reorder completed by provider prior to medication history? No    Information Source(s): Patient via in-person    Pertinent Information: patient reports she completed a 5 day course of Cephalexin, 1 capsule TID this past Tuesday. She had this ABX on hand. She was also taking an unknown mushroom supplement known to help with digestive issues but is not planning on continuing with it; did not add onto PTA med list.     Changes made to PTA medication list:  Added: Albuterol Inhaler PRN, from reconcile list / confirmed by patient   Cholecalciferol D3 from reconcile list / confirmed by patient   Ergocalciferol D2 from reconcile list / confirmed by patient   Deleted: calcium carb -cholecalciferol ; duplicate therapy   Tolterodine ER 2 mg; patient reports not taking, cannot afford medications, unknown if provider aware or not   Changed: None    Medication Affordability:  Not including over the counter (OTC) medications, was there a time in the past 3 months when you did not take your medications as prescribed because of cost?: No    Allergies reviewed with patient and updates made in EHR: yes    Medication History Completed By: RANDY GANDARA 9/29/2023 2:31 PM    Prior to Admission medications    Medication Sig Last Dose Taking? Auth Provider Long Term End Date   acetaminophen (TYLENOL) 500 MG tablet Take 1,000 mg by mouth every 6 hours as needed for mild pain 9/28/2023 at am Yes Reported, Patient     albuterol (PROAIR HFA/PROVENTIL HFA/VENTOLIN HFA) 108 (90 Base) MCG/ACT inhaler Inhale 2 puffs into the lungs every 4 hours as needed for shortness of breath or wheezing More than a month at on hand Yes Reported, Patient No    anastrozole (ARIMIDEX) 1 MG tablet Take 1 tablet (1 mg) by mouth daily  9/29/2023 at am Yes Jean Dash, DO Yes    calcium carbonate 600 mg-vitamin D 400 units (CALTRATE) 600-400 MG-UNIT per tablet Take 1 tablet by mouth 9/29/2023 at am Yes Reported, Patient     Ergocalciferol 50 MCG (2000 UT) TABS Take 2,000 Units by mouth daily 9/29/2023 at am Yes Reported, Patient     lisinopril (ZESTRIL) 5 MG tablet Take 1 tablet by mouth once daily 9/29/2023 at am Yes Jean Dash, DO Yes    loperamide (IMODIUM) 2 MG capsule Take 2 mg by mouth as needed 9/28/2023 at PM Yes Reported, Patient     medical cannabis (Patient's own supply) See Admin Instructions (The purpose of this order is to document that the patient reports taking medical cannabis.  This is not a prescription, and is not used to certify that the patient has a qualifying medical condition.) 9/28/2023 at evening Yes Reported, Patient     metoprolol succinate ER (TOPROL-XL) 25 MG 24 hr tablet Take 1 tablet by mouth once daily 9/29/2023 at am Yes Jean Dash, DO Yes    PARoxetine (PAXIL) 20 MG tablet TAKE 1 TABLET BY MOUTH ONCE DAILY .  APPOINTMENT  NEEDED  FOR  ADDITIONAL  REFILLS  Patient taking differently: Take 20 mg by mouth daily 9/29/2023 at am Yes Jean Dash, DO Yes    Vitamin D3 (VITAMIN D-1000 MAX ST) 25 mcg (1000 units) tablet Take 25 mcg by mouth daily 9/29/2023 at am Yes Reported, Patient

## 2023-09-29 NOTE — PROGRESS NOTES
Pt's pulse oximeter HR-49, radial pulse 50 and asymptomatic. Informed Dr. Ceballos. Ordered for EKG.

## 2023-09-29 NOTE — CONSULTS
[facility] Surgery Consult    Mariluz Stoner MRN# [id number]   Age: 62 year old YOB: 1960     Date of Admission:  9/29/2023    Reason for consult: Diverticulitis with intramural abscess       Requesting physician: Dr. Ceballos       Level of consult: Consult, follow and place orders           Impression and Plan:   Impression:   Patient with diverticulitis and intramural abscess on CT scan.  No sepsis or peritoneal signs.      Plan:   Would plan for n.p.o., IV fluids and IV antibiotics.  If no further improvement or worsening may need Norma procedure.           Chief Complaint:   Left lower quadrant pain since the 17th     History is obtained from the patient         History of Present Illness:   This 62 year old female who on 17 September developed left lower quadrant pain.  She attempted to book an appointment through [employer] to be evaluated.  Her primary care provider was scheduled out several weeks.  She attempted to make a GI appointment then actually made a GYN appointment.  She was seen by her gynecologist who ordered a CAT scan.  Subsequent CAT scan on the 19th revealed diverticulitis.  The patient does have known diverticulosis and has had 1 previous attack of diverticulitis.  She reports her last colonoscopy within the past 5 years and revealed several polyps.  She did not hear regard back regarding the CT results and took some cephalexin she had at home on her own.  The pain continue to worsen she presented to the ER today.  Subsequent CT in the ER revealed worsening of her diverticulitis with a possible intramural abscess which I am now asked to see her.  Denies fevers chills but did force herself to vomit.  She is passing gas.  Currently she is resting comfortably on the bed complaining of left lower quadrant pain.       Past Medical History:     Past Medical History:   Diagnosis Date    Basal cell carcinoma     Breast cancer (H)     Obesity              Past Surgical History:      Past Surgical History:   Procedure Laterality Date    BREAST SURGERY      left tissue expander, LEFT MASTECTOMY    CHOLECYSTECTOMY      GYN SURGERY      oophorectomy    REMOVE TISSUE EXPANDER BREAST  2014    Procedure: REMOVAL OF LEFT BREAST TISSUE EXPANDER, IRRIGATION AND DEBRIDEMENT OF LEFT BREAST;  Surgeon: Marlon Luz MD;  Location: [facility]             Social History:     Social History     Tobacco Use    Smoking status: Former     Types: Cigarettes     Quit date: 2000     Years since quittin.7    Smokeless tobacco: Never   Substance Use Topics    Alcohol use: Yes     Comment: social drinking             Family History:   No family history on file.           Allergies:     Allergies   Allergen Reactions    Naproxen      Other reaction(s): Hematologic  Vaginal bleeding             Medications:     Current Facility-Administered Medications   Medication    heparin 100 unit/mL injection 5-10 mL    HYDROmorphone (PF) (DILAUDID) injection 0.5 mg     Current Outpatient Medications   Medication Sig    acetaminophen (TYLENOL) 500 MG tablet Take 1,000 mg by mouth every 6 hours as needed for mild pain    albuterol (PROAIR HFA/PROVENTIL HFA/VENTOLIN HFA) 108 (90 Base) MCG/ACT inhaler Inhale 2 puffs into the lungs every 4 hours as needed for shortness of breath or wheezing    anastrozole (ARIMIDEX) 1 MG tablet Take 1 tablet (1 mg) by mouth daily    calcium carbonate 600 mg-vitamin D 400 units (CALTRATE) 600-400 MG-UNIT per tablet Take 1 tablet by mouth    Ergocalciferol 50 MCG (2000 UT) TABS Take 2,000 Units by mouth daily    lisinopril (ZESTRIL) 5 MG tablet Take 1 tablet by mouth once daily    loperamide (IMODIUM) 2 MG capsule Take 2 mg by mouth as needed    medical cannabis (Patient's own supply) See Admin Instructions (The purpose of this order is to document that the patient reports taking medical cannabis.  This is not a prescription, and is not used to certify that the patient has a qualifying  medical condition.)    metoprolol succinate ER (TOPROL-XL) 25 MG 24 hr tablet Take 1 tablet by mouth once daily    PARoxetine (PAXIL) 20 MG tablet TAKE 1 TABLET BY MOUTH ONCE DAILY .  APPOINTMENT  NEEDED  FOR  ADDITIONAL  REFILLS (Patient taking differently: Take 20 mg by mouth daily)    Vitamin D3 (VITAMIN D-1000 MAX ST) 25 mcg (1000 units) tablet Take 25 mcg by mouth daily             Review of Systems:   The review of systems was positive for the following findings.  None.  The remainder of the review of systems was unremarkable.          Physical Exam:   PE:  B/P: 122/70, T: 98, P: 72, R: 18  General: well developed, well nourished WF who appears their stated age  HEENT: NC/AT, EOMI, (-)icterus, (-)injection  Neck: Supple, No JVD  Chest: CTA  Heart: S1, S2, (-)m/r/g  Abd: Soft, left lower quadrant and right lower quadrant tenderness with guarding.  Left is worse than right., non distended,  no masses  Ext; Warm, no edema  Psych: AAOx3  Neuro: No focal deficits            Data:   All laboratory data reviewed  Results for orders placed or performed during the hospital encounter of 09/29/23 (from the past 24 hour(s))   CBC with platelets differential    Narrative    The following orders were created for panel order CBC with platelets differential.  Procedure                               Abnormality         Status                     ---------                               -----------         ------                     CBC with platelets and d...[[id number]]                      Final result                 Please view results for these tests on the individual orders.   Comprehensive metabolic panel   Result Value Ref Range    Sodium 139 135 - 145 mmol/L    Potassium 3.8 3.4 - 5.3 mmol/L    Carbon Dioxide (CO2) 24 22 - 29 mmol/L    Anion Gap 11 7 - 15 mmol/L    Urea Nitrogen 8.9 8.0 - 23.0 mg/dL    Creatinine 0.58 0.51 - 0.95 mg/dL    GFR Estimate >90 >60 mL/min/1.73m2    Calcium 9.0 8.8 - 10.2 mg/dL    Chloride 104  98 - 107 mmol/L    Glucose 97 70 - 99 mg/dL    Alkaline Phosphatase 93 35 - 104 U/L    AST 17 0 - 45 U/L    ALT 15 0 - 50 U/L    Protein Total 6.8 6.4 - 8.3 g/dL    Albumin 3.7 3.5 - 5.2 g/dL    Bilirubin Total 0.4 <=1.2 mg/dL   Erythrocyte sedimentation rate auto   Result Value Ref Range    Erythrocyte Sedimentation Rate 34 (H) 0 - 30 mm/hr   CRP inflammation   Result Value Ref Range    CRP Inflammation 44.90 (H) <5.00 mg/L   CBC with platelets and differential   Result Value Ref Range    WBC Count 6.3 4.0 - 11.0 10e3/uL    RBC Count 3.97 3.80 - 5.20 10e6/uL    Hemoglobin 12.6 11.7 - 15.7 g/dL    Hematocrit 37.4 35.0 - 47.0 %    MCV 94 78 - 100 fL    MCH 31.7 26.5 - 33.0 pg    MCHC 33.7 31.5 - 36.5 g/dL    RDW 12.9 10.0 - 15.0 %    Platelet Count 344 150 - 450 10e3/uL    % Neutrophils 60 %    % Lymphocytes 26 %    % Monocytes 11 %    % Eosinophils 2 %    % Basophils 0 %    % Immature Granulocytes 1 %    NRBCs per 100 WBC 0 <1 /100    Absolute Neutrophils 3.8 1.6 - 8.3 10e3/uL    Absolute Lymphocytes 1.6 0.8 - 5.3 10e3/uL    Absolute Monocytes 0.7 0.0 - 1.3 10e3/uL    Absolute Eosinophils 0.2 0.0 - 0.7 10e3/uL    Absolute Basophils 0.0 0.0 - 0.2 10e3/uL    Absolute Immature Granulocytes 0.0 <=0.4 10e3/uL    Absolute NRBCs 0.0 10e3/uL   CT ABDOMEN PELVIS W CONTRAST    Narrative    CT ABDOMEN/PELVIS WITH CONTRAST September 29, 2023 12:22 PM    CLINICAL HISTORY: Left lower quadrant abdominal pain.    TECHNIQUE: CT scan of the abdomen and pelvis was performed following  injection of IV contrast. Multiplanar reformats were obtained. Dose  reduction techniques were used.  CONTRAST: ISOVUE-370, 100 mL.    COMPARISON: None.    FINDINGS:   LOWER CHEST: 0.3 cm indeterminate nodule in the right middle lobe  (series 3 image 3) was not included on the previous exam.    HEPATOBILIARY: Cholecystectomy. No hepatic masses.    PANCREAS: Normal.    SPLEEN: Normal.    ADRENAL GLANDS: Normal.    KIDNEYS/BLADDER: Nonobstructing 0.2 cm  stone in the lower pole of the  right kidney. Small right renal cyst would require no specific  follow-up. Few tiny hypodensities in both kidneys are too small to  characterize, but may also represent cysts. No hydronephrosis.    BOWEL: Colonic diverticulosis. Focal bowel wall thickening with  moderate surrounding inflammatory stranding in the proximal sigmoid  colon, consistent with acute diverticulitis. Small area of low density  within the thickened sigmoid colon measures 1.7 cm, and could  represent a developing intramural abscess (series 3 image 174; series  4 image 43). No other associated fluid collections. No bowel  obstruction. Unremarkable appendix.    PELVIC ORGANS: Unremarkable.    LYMPH NODES: No enlarged lymph nodes are identified in the abdomen or  pelvis.    VASCULATURE: Unremarkable.    ADDITIONAL FINDINGS: None.    MUSCULOSKELETAL: Unremarkable.      Impression    IMPRESSION:   1.  Acute sigmoid diverticulitis.  2.  A 1.7 cm region of low density within the thickened sigmoid colon  could represent a developing intramural abscess.  3.  Indeterminate 0.3 cm right middle lobe pulmonary nodule. Please  refer to follow-up guidelines below.    Recommendations for one or multiple incidental lung nodules < 6mm :    Low risk patients: No routine follow-up.    High risk patients: Optional follow-up CT at 12 months; if  unchanged, no further follow-up.    *Low Risk: Minimal or absent history of smoking or other known risk  factors.  *Nonsolid (ground glass) or partly solid nodules may require longer  follow-up to exclude indolent adenocarcinoma.  *Recommendations based on Guidelines for the Management of Incidental  Pulmonary Nodules Detected at CT: From the Fleischner Society 2017,  Radiology 2017.  *Guidelines apply to incidental nodules in patients who are 35 years  or older.  *Guidelines do not apply to lung cancer screening, patients with  immunosuppression, or patients with known primary  cancer.    DAYSI COHEN MD         SYSTEM ID:  [id number]     All imaging studies reviewed by me.     Omari Chavez MD, FACS

## 2023-09-29 NOTE — PROGRESS NOTES
S-(situation): Patient arrives to room 256 via cart from ED    B-(background): Admitted d/t Acute  sigmoid diverticulitis    A-(assessment): A and Ox4. No complaint of pain upon admission. Arthur nauseated and was relieved after trying to vomit. With port site and IV infusing. Bowel sound hypoactive. Patient verbalized last BM was 2 days ago.    R-(recommendations): Orders reviewed with patient. Will monitor patient per MD orders.     Inpatient nursing criteria listed below were met:    Health care directives status obtained and documented: Yes  VTE ordered/documented:  Yes  Skin issues/needs documented:No  Isolation addressed and Signage used: NA  Fall Prevention: Care plan updated NA Education given and documented Yes  Care Plan initiated and Co-Morbidities added: Yes  Education Assessment documented:Yes  Admission Education Documented: Yes  If present CAUTI/CLABI Education done: NA  New medication patient education completed and documented (Possible Side Effects of Common Medications handout): Yes  Allergies Reviewed: Yes  Admission Medication Reconciliation completed: Yes  Home medications if not able to send immediately home with family stored here: NA  Reminder note placed in discharge instructions regarding home meds: NA  Individualized care needs/preferences addressed and charted: No  Provider Notified that patient has arrived to the unit: Yes

## 2023-09-29 NOTE — TELEPHONE ENCOUNTER
For the last 2 weeks patient had had abdominal pain on the lower left side.  Patient had a CT on 09/19/23 and asked that this be reviewed.    Patient mentioned that she has diverticulitis and was going to changer her diet to get rid of it on her own.  She went in an did a UA yesterday.  Symptoms:  - mild fever  - headache  - decreased appetite since vomiting yesterday   - cramping - similar to contractions  - pain 10/10 when moving/walking/repositioning and 3/10 with sitting or not moving  - some pain with urination this morning - states she woke up with a full bladder and could barely make it to the bathroom    Patient found that she had some relief after vomiting.  She also mentioned that having a BM helps.  She has been drinking tea which has also been beneficial.    Highly recommended that patient go to ED due to her severe abdominal pain.  Patient plans to go to [place] ED now; her  will drive her.    RN reviewed red flag symptoms with patient and when to seek emergency care.   Patient agreed and verbalized understanding.    FYI - patient would like you to know.      PROTOCOL: Go To ED Now    Reason for Disposition   SEVERE abdominal pain (e.g., excruciating)    Additional Information   Negative: Passed out (i.e., fainted, collapsed and was not responding)   Negative: Shock suspected (e.g., cold/pale/clammy skin, too weak to stand, low BP, rapid pulse)   Negative: Sounds like a life-threatening emergency to the triager   Negative: Followed an abdomen (stomach) injury   Negative: Chest pain   Negative: Abdominal pain and pregnant < 20 weeks   Negative: Abdominal pain and pregnant 20 or more weeks   Negative: Pain is mainly in upper abdomen (if needed ask: 'is it mainly above the belly button?')   Negative: Abdomen bloating or swelling are main symptoms    Protocols used: Abdominal Pain - Female-A-[place]

## 2023-09-29 NOTE — ED NOTES
ED Nursing criteria listed below was addressed during verbal handoff:     Abnormal vitals: N/A  Abnormal results: Yes  Med Reconciliation completed: Yes  Meds given in ED: Yes  Any Overdue Meds: N/A  Core Measures: N/A  Isolation: N/A  Special needs: Yes  Skin assessment: Yes    Observation Patient  Education provided: N/A

## 2023-09-29 NOTE — H&P
[facility]    History and Physical - Hospitalist Service       Date of Admission:  9/29/2023    Assessment & Plan      Mariluz Stoner is a 62 year old female with past medical history of metastatic breast cancer currently in remission on Arimidex, cardiomyopathy from chemo agent with last known EF 41% in 2019, hypertension, depression admitted on 9/29/2023 with acute sigmoid diverticulitis with probable intramural abscess. She began having symptoms approximately 2 weeks ago and was actually seen in the clinic on 9/19/2023 at which time a CT scan of her abdomen was performed and the sigmoid diverticulitis was identified however it does not appear that antibiotic treatment was initiated.  Patient did have some cephalexin leftover from a previous infection and decided to start taking that medication but without improvement of her symptoms and as her pain worsened she decided to represent to the emergency department today.  She does not have SIRS criteria but repeat CT scan shows worsening sigmoid diverticulitis with probable 1.7 cm intermural abscess.  Case has been discussed with Dr. Chavez, general surgeon on call, who has reviewed the patient's case and current images and is recommending admission for medical management with n.p.o. except meds and ice chips, IV Zosyn, IV fluids in hopes of avoiding surgical intervention in upcoming days.    Active Problems:    Acute diverticulitis with probable intramural abscess    Assessment: Occurring for the past 2 weeks, now with suspected intermural abscess as outlined above.  Patient is afebrile, nontachycardic, white blood cell count is normal and no SIRS criteria are met.  CRP is elevated at 44.90 and SED is elevated at 34.      Plan:   -Admit patient to inpatient status  -Start IV Zosyn 3.375 mg every 6 hours for interabdominal infection as recommended by general surgery team  -N.p.o. except medications and small amounts of ice chips  -IV  fluids and IV pain medication  -General surgery consult and evaluate patient today and continue to assist in management during this hospital stay.      Metastatic breast cancer with bone metastasis - in remission    Assessment: Patient had diagnosis of stage III breast cancer and had left mastectomy in 2013 followed by radiation and chemo and was started on tamoxifen in 2014.  She had recurrence of disease in her spine in 2018 and underwent radiation and chemo again with good results and patient has been on Arimidex since 2019 with recent oncology evaluation in 8/23 showing no evidence of recurrence of disease     Plan:   -will hold Arimidex for the next few days until diverticulitis infection starts to improve and then restart.  Arimidex does have a 50 hour half life so hold it for 3-4 days should not cause severe increase in disease recurrence       Cardiomyopathy    Assessment:  Patient had cardiomyopathy develop as a consequence of the chemotherapy she received in 2019 - echo from that time showed EF 41% (had been 50-55% prior to chemo initiation) and chemo was stopped.  Patient has been on metoprolol and lisinopril but has not needed diuretics, no issues with swelling or shortness of breath.  No signs of overload at this time.  It does not appear patient has had an echo since original diagnosis.     Plan:    - continue metoprolol and lisinopril  -start IV fluids at 100 ml/hr but monitor daily weights and for signs of volume overload with reduction or consideration of diuretic if appropriate   -if patient does have volume issues or if surgery is needed, would recommend echocardiogram be performed during this stay to reassess EF - unfortunately is not available until 10/2/23 at this facility.  If patient's volume status remains stable and surgery is not needed, would still recommend outpatient echo to re-evaluate severity of cardiomyopathy going forward.       Mild episode of recurrent major depressive disorder  (H)    Assessment: on Paxil with symptoms well controlled overall.  Patient has been struggling in her relationship with her  which has been challenging but she is trying to do things to promote her own wellness and is motivated to continue with this.      Plan:   -continue home Paxil 20 mg daily dosing  -encouraged patient to continue with her wellness efforts and consider counseling going forward if she is needing help processing her coping and wellness skills      Essential hypertension    Assessment: Patient has well controlled blood pressures on metoprolol XL 25 mg daily as well as lisinopril 5 mg daily.  Patient's blood pressure during the ED stay has been normal to slightly elevated and there is no concern for sepsis at this time.     Plan:   -continue home regimen of metoprolol XL 25 mg daily and lisinopril 5 mg daily with hold parameters for blood pressures less than 100.  If patient starts to develop signs concerning for sepsis, will need to consider holding medications to avoid hypotension       Allergy induced wheezing    Assessment: patient reports certain allergens can cause her to wheeze (mainly around the farm animals) and she has a prn albuterol inhaler she can use but has not needed it in many weeks and does not expect any issues during this hospital stay    Plan:   -We will provide albuterol nebulizers to be available as needed if wheezing does occur.       Diet: NPO for Medical/Clinical Reasons Except for: Ice Chips, Meds    DVT Prophylaxis: Enoxaparin (Lovenox) SQ  Orozco Catheter: Not present  Lines: PRESENT      Port a Cath 02/05/14 Single Lumen Right Chest wall-Site Assessment: WDL      Cardiac Monitoring: None  Code Status: Full Code    Clinically Significant Risk Factors Present on Admission                  # Hypertension: Noted on problem list                 Disposition Plan      Expected Discharge Date: 10/01/2023                  Maribel Ceballos MD  Hospitalist  [facility]  Securely message with Ct (more info)  Text page via [facility] Paging/Directory     ______________________________________________________________________    Chief Complaint   Worsening abdominal pain    History is obtained from the patient    History of Present Illness   Mariluz Stoner is a 62 year old female with PMHx of breast cancer, cardiomyopathy, depression, HTN who was diagnosed with acute diverticulitis via CT scan on 9/19/23 but unfortunately was not started on antibiotics who has had progressively worsening pain and now has worsening diverticulitis with concern for 1.7 cm intramural abscess as above.        Past Medical History    Past Medical History:   Diagnosis Date    Basal cell carcinoma     Breast cancer (H)     Obesity        Past Surgical History   Past Surgical History:   Procedure Laterality Date    BREAST SURGERY      left tissue expander, LEFT MASTECTOMY    CHOLECYSTECTOMY      GYN SURGERY      oophorectomy    REMOVE TISSUE EXPANDER BREAST  4/16/2014    Procedure: REMOVAL OF LEFT BREAST TISSUE EXPANDER, IRRIGATION AND DEBRIDEMENT OF LEFT BREAST;  Surgeon: Marlon Luz MD;  Location: [facility]       Prior to Admission Medications   Prior to Admission Medications   Prescriptions Last Dose Informant Patient Reported? Taking?   Calcium Carb-Cholecalciferol (CALCIUM CARBONATE-VITAMIN D3) 600-400 MG-UNIT TABS   Yes No   Sig: Take 1 tablet by mouth daily   Ergocalciferol 50 MCG (2000 UT) TABS   Yes Yes   Sig: Take 2,000 Units by mouth daily   PARoxetine (PAXIL) 20 MG tablet   No No   Sig: TAKE 1 TABLET BY MOUTH ONCE DAILY .  APPOINTMENT  NEEDED  FOR  ADDITIONAL  REFILLS   Vitamin D3 (VITAMIN D-1000 MAX ST) 25 mcg (1000 units) tablet   Yes No   Sig: Take 25 mcg by mouth daily   acetaminophen (TYLENOL) 500 MG tablet 9/28/2023 at am  Yes Yes   Sig: Take 1,000 mg by mouth every 6 hours as needed for mild pain   albuterol (PROAIR HFA/PROVENTIL  HFA/VENTOLIN HFA) 108 (90 Base) MCG/ACT inhaler   Yes Yes   Sig: Inhale 2 puffs into the lungs every 4 hours as needed for shortness of breath or wheezing   anastrozole (ARIMIDEX) 1 MG tablet   No No   Sig: Take 1 tablet (1 mg) by mouth daily   calcium carbonate 600 mg-vitamin D 400 units (CALTRATE) 600-400 MG-UNIT per tablet   Yes No   Sig: Take 1 tablet by mouth   lisinopril (ZESTRIL) 5 MG tablet   No No   Sig: Take 1 tablet by mouth once daily   loperamide (IMODIUM) 2 MG capsule   Yes No   Sig: Take 2 mg by mouth as needed   medical cannabis (Patient's own supply)   Yes No   Sig: See Admin Instructions (The purpose of this order is to document that the patient reports taking medical cannabis.  This is not a prescription, and is not used to certify that the patient has a qualifying medical condition.)   metoprolol succinate ER (TOPROL-XL) 25 MG 24 hr tablet   No No   Sig: Take 1 tablet by mouth once daily   tolterodine ER (DETROL LA) 2 MG 24 hr capsule   No No   Sig: Take 1 capsule by mouth once daily   Patient not taking: Reported on 2023      Facility-Administered Medications: None        Review of Systems    CONSTITUTIONAL:positive for subjective fevers, no weight loss  INTEGUMENTARY/SKIN: NEGATIVE for worrisome rashes, moles or lesions  CV: NEGATIVE for chest pain, palpitations or peripheral edema  GI: positive for nausea/vomiting, abdominal pain in the left lower quadrant, minimal appetite  : NEGATIVE for frequency, dysuria, or hematuria  MUSCULOSKELETAL: NEGATIVE for significant arthralgias or myalgia  NEURO: NEGATIVE for weakness, dizziness or paresthesias  HEME: NEGATIVE for bleeding problems  PSYCHIATRIC: NEGATIVE for changes in mood or affect    Social History   I have reviewed this patient's social history and updated it with pertinent information if needed.  Social History     Tobacco Use    Smoking status: Former     Types: Cigarettes     Quit date: 2000     Years since quittin.7     Smokeless tobacco: Never   Vaping Use    Vaping Use: Never used   Substance Use Topics    Alcohol use: Yes     Comment: social drinking    Drug use: Yes     Types: Marijuana     Comment: medical marijuana syrup         Physical Exam   Vital Signs: Temp: 98  F (36.7  C) Temp src: Oral BP: 122/70 Pulse: 72   Resp: 18 SpO2: 97 %      Weight: 237 lbs 0 oz    Constitutional: awake, alert, cooperative, no apparent distress, and appears stated age  Respiratory: No increased work of breathing, good air exchange, clear to auscultation bilaterally, no crackles or wheezing  Cardiovascular: RRR  GI: bowel sounds present, abdomen is obese but soft, patient does have moderate tenderness on palpation of the left lower quadrant but no guarding or rebound tenderness noted  Skin: no redness, warmth, or swelling and no rashes  Musculoskeletal: no lower extremity pitting edema present  Neurologic: Awake, alert, oriented to name, place and situation     Medical Decision Making       65 MINUTES SPENT BY ME on the date of service doing chart review, history, exam, documentation & further activities per the note.      Data     I have personally reviewed the following data over the past 24 hrs:    6.3  \   12.6   / 344     139 104 8.9 /  97   3.8 24 0.58 \     ALT: 15 AST: 17 AP: 93 TBILI: 0.4   ALB: 3.7 TOT PROTEIN: 6.8 LIPASE: N/A     Trop: 12 BNP: N/A     Procal: N/A CRP: 44.90 (H) Lactic Acid: N/A

## 2023-09-29 NOTE — PROGRESS NOTES
Upon arrival to the floor patient was found to have sinus bradycardia in the 49-50 range present.  Patient asymptomatic.  She is on metoprolol at baseline and did have a dose today.     EKG - first degree AV block but patient also has a new left bundle branch block that was not present at her most recent EKG in 2021.  Patient having no changes pain, shortness of breath, angina symptoms recently.     Plan:   -will add troponin on to blood work previously performed in ED and perform another one now to ensure stability   -hold beta blocker for now and place patient on telemetry to monitor closely for the next 48 hours  -if patient does need surgical intervention going forward would want to have echocardiogram and possibly other cardiac work up as appropriate to evaluate cause for LBBB  -if patient does not need surgery should have cardiac work up performed an outpatient basis in near future.     Patient was informed of the above.    Electronically Signed:  Maribel Ceballos MD

## 2023-09-29 NOTE — ED PROVIDER NOTES
History     Chief Complaint   Patient presents with    Abdominal Pain     HPI  Mariluz Stoner is a 62 year old female who presents left lower quad abdominal pain.  Progressively worsening since second week of September.  Was seen September 19 by primary care provider.  CT confirmed acute diverticulitis with significant inflammation in the mid sigmoid.  No complications since abscess or perforation.  The patient apparently took an old antibiotic that she had at home.  She believes it was cephalexin.  Continued pain 8/10 intensity.  Nausea without vomiting.  No blood in stools.  Has had some loose stools.  Pain is now severe, constant x3 days and worse with movement.    CT ABDOMEN PELVIS W CONTRAST 9/19/2023 3:39 PM     CLINICAL HISTORY: Abdominal pain, generalized; History of colonic  diverticulitis; Primary malignant neoplasm of breast with metastasis  (H); Bloating; Diarrhea, unspecified type     TECHNIQUE: CT scan of the abdomen and pelvis was performed following  injection of IV contrast. Multiplanar reformats were obtained. Dose  reduction techniques were used.  CONTRAST: Isovue 370, 100mL     COMPARISON: None.     FINDINGS:   LOWER CHEST: Unremarkable.     HEPATOBILIARY: Cholecystectomy. No evidence of biliary obstruction.     PANCREAS: Normal.     SPLEEN: Normal.     ADRENAL GLANDS: Normal.     KIDNEYS/BLADDER: Likely bilateral renal cysts, no specific follow-up  recommended. No hydronephrosis. Urinary bladder is partially  decompressed but otherwise unremarkable.     BOWEL: Extensive colonic diverticulosis with focal inflammation  surrounding a diverticulum in the mid sigmoid colon (series 3 image  169). No evidence of large volume pneumoperitoneum, drainable fluid  collection or associated bowel obstruction. Normal appendix.     PELVIC ORGANS: Normal.     ADDITIONAL FINDINGS: Scattered calcified atherosclerosis.     MUSCULOSKELETAL: No acute bony abnormality.                                                                       IMPRESSION:   1.  Acute, uncomplicated sigmoid diverticulitis. Consider follow-up  colonoscopy after treatment if not recently performed.        [Access Center: Acute sigmoid diverticulitis.]     This report will be copied to the [facility] to ensure a  provider acknowledges the finding. Access Center is available Monday  through Friday 8am-3:30 pm.      SHELBY DOWLING MD        Allergies:  Allergies   Allergen Reactions    Naproxen      Other reaction(s): Hematologic  Vaginal bleeding       Problem List:    Patient Active Problem List    Diagnosis Date Noted    Secondary malignant neoplasm of bone (H) 2023     Priority: Medium    Pancolitis (H) 2023     Priority: Medium    Morbid obesity (H) 2020     Priority: Medium    Metastatic breast cancer 2019     Priority: Medium    Mild episode of recurrent major depressive disorder (H) 2019     Priority: Medium    Essential hypertension 2019     Priority: Medium    Dermatitis 2019     Priority: Medium        Past Medical History:    Past Medical History:   Diagnosis Date    Basal cell carcinoma     Breast cancer (H)     Obesity        Past Surgical History:    Past Surgical History:   Procedure Laterality Date    BREAST SURGERY      left tissue expander, LEFT MASTECTOMY    CHOLECYSTECTOMY      GYN SURGERY      oophorectomy    REMOVE TISSUE EXPANDER BREAST  2014    Procedure: REMOVAL OF LEFT BREAST TISSUE EXPANDER, IRRIGATION AND DEBRIDEMENT OF LEFT BREAST;  Surgeon: Marlon Luz MD;  Location: [facility]       Family History:    No family history on file.    Social History:  Marital Status:   [2]  Social History     Tobacco Use    Smoking status: Former     Types: Cigarettes     Quit date: 2000     Years since quittin.7    Smokeless tobacco: Never   Vaping Use    Vaping Use: Never used   Substance Use Topics    Alcohol use: Yes     Comment: social drinking    Drug use: Yes      Types: Marijuana     Comment: medical marijuana syrup         Medications:    acetaminophen (TYLENOL) 500 MG tablet  anastrozole (ARIMIDEX) 1 MG tablet  Calcium Carb-Cholecalciferol (CALCIUM CARBONATE-VITAMIN D3) 600-400 MG-UNIT TABS  calcium carbonate 600 mg-vitamin D 400 units (CALTRATE) 600-400 MG-UNIT per tablet  lisinopril (ZESTRIL) 5 MG tablet  loperamide (IMODIUM) 2 MG capsule  medical cannabis (Patient's own supply)  metoprolol succinate ER (TOPROL-XL) 25 MG 24 hr tablet  PARoxetine (PAXIL) 20 MG tablet  tolterodine ER (DETROL LA) 2 MG 24 hr capsule          Review of Systems   All other systems reviewed and are negative.      Physical Exam   BP: 122/70  Pulse: 72  Temp: 98  F (36.7  C)  Resp: 18  Weight: 107.5 kg (237 lb)  SpO2: 97 %      Physical Exam  Vitals and nursing note reviewed.   Constitutional:       Appearance: She is not ill-appearing.   HENT:      Head: Normocephalic.      Nose: Nose normal.   Eyes:      Conjunctiva/sclera: Conjunctivae normal.   Cardiovascular:      Rate and Rhythm: Normal rate.   Pulmonary:      Effort: Pulmonary effort is normal.   Abdominal:      General: Bowel sounds are decreased. There is distension.      Palpations: Abdomen is rigid.      Tenderness: There is abdominal tenderness in the left lower quadrant. There is guarding and rebound.   Skin:     General: Skin is warm.      Capillary Refill: Capillary refill takes less than 2 seconds.      Findings: No rash.   Neurological:      General: No focal deficit present.      Mental Status: She is alert and oriented to person, place, and time.   Psychiatric:         Mood and Affect: Mood normal.         Behavior: Behavior normal.           ED Course                 Procedures                Results for orders placed or performed during the hospital encounter of 09/29/23 (from the past 24 hour(s))   CBC with platelets differential    Narrative    The following orders were created for panel order CBC with platelets  differential.  Procedure                               Abnormality         Status                     ---------                               -----------         ------                     CBC with platelets and d...[[id number]]                      Final result                 Please view results for these tests on the individual orders.   Comprehensive metabolic panel   Result Value Ref Range    Sodium 139 135 - 145 mmol/L    Potassium 3.8 3.4 - 5.3 mmol/L    Carbon Dioxide (CO2) 24 22 - 29 mmol/L    Anion Gap 11 7 - 15 mmol/L    Urea Nitrogen 8.9 8.0 - 23.0 mg/dL    Creatinine 0.58 0.51 - 0.95 mg/dL    GFR Estimate >90 >60 mL/min/1.73m2    Calcium 9.0 8.8 - 10.2 mg/dL    Chloride 104 98 - 107 mmol/L    Glucose 97 70 - 99 mg/dL    Alkaline Phosphatase 93 35 - 104 U/L    AST 17 0 - 45 U/L    ALT 15 0 - 50 U/L    Protein Total 6.8 6.4 - 8.3 g/dL    Albumin 3.7 3.5 - 5.2 g/dL    Bilirubin Total 0.4 <=1.2 mg/dL   Erythrocyte sedimentation rate auto   Result Value Ref Range    Erythrocyte Sedimentation Rate 34 (H) 0 - 30 mm/hr   CRP inflammation   Result Value Ref Range    CRP Inflammation 44.90 (H) <5.00 mg/L   CBC with platelets and differential   Result Value Ref Range    WBC Count 6.3 4.0 - 11.0 10e3/uL    RBC Count 3.97 3.80 - 5.20 10e6/uL    Hemoglobin 12.6 11.7 - 15.7 g/dL    Hematocrit 37.4 35.0 - 47.0 %    MCV 94 78 - 100 fL    MCH 31.7 26.5 - 33.0 pg    MCHC 33.7 31.5 - 36.5 g/dL    RDW 12.9 10.0 - 15.0 %    Platelet Count 344 150 - 450 10e3/uL    % Neutrophils 60 %    % Lymphocytes 26 %    % Monocytes 11 %    % Eosinophils 2 %    % Basophils 0 %    % Immature Granulocytes 1 %    NRBCs per 100 WBC 0 <1 /100    Absolute Neutrophils 3.8 1.6 - 8.3 10e3/uL    Absolute Lymphocytes 1.6 0.8 - 5.3 10e3/uL    Absolute Monocytes 0.7 0.0 - 1.3 10e3/uL    Absolute Eosinophils 0.2 0.0 - 0.7 10e3/uL    Absolute Basophils 0.0 0.0 - 0.2 10e3/uL    Absolute Immature Granulocytes 0.0 <=0.4 10e3/uL    Absolute NRBCs 0.0 10e3/uL    CT ABDOMEN PELVIS W CONTRAST    Narrative    CT ABDOMEN/PELVIS WITH CONTRAST September 29, 2023 12:22 PM    CLINICAL HISTORY: Left lower quadrant abdominal pain.    TECHNIQUE: CT scan of the abdomen and pelvis was performed following  injection of IV contrast. Multiplanar reformats were obtained. Dose  reduction techniques were used.  CONTRAST: ISOVUE-370, 100 mL.    COMPARISON: None.    FINDINGS:   LOWER CHEST: 0.3 cm indeterminate nodule in the right middle lobe  (series 3 image 3) was not included on the previous exam.    HEPATOBILIARY: Cholecystectomy. No hepatic masses.    PANCREAS: Normal.    SPLEEN: Normal.    ADRENAL GLANDS: Normal.    KIDNEYS/BLADDER: Nonobstructing 0.2 cm stone in the lower pole of the  right kidney. Small right renal cyst would require no specific  follow-up. Few tiny hypodensities in both kidneys are too small to  characterize, but may also represent cysts. No hydronephrosis.    BOWEL: Colonic diverticulosis. Focal bowel wall thickening with  moderate surrounding inflammatory stranding in the proximal sigmoid  colon, consistent with acute diverticulitis. Small area of low density  within the thickened sigmoid colon measures 1.7 cm, and could  represent a developing intramural abscess (series 3 image 174; series  4 image 43). No other associated fluid collections. No bowel  obstruction. Unremarkable appendix.    PELVIC ORGANS: Unremarkable.    LYMPH NODES: No enlarged lymph nodes are identified in the abdomen or  pelvis.    VASCULATURE: Unremarkable.    ADDITIONAL FINDINGS: None.    MUSCULOSKELETAL: Unremarkable.      Impression    IMPRESSION:   1.  Acute sigmoid diverticulitis.  2.  A 1.7 cm region of low density within the thickened sigmoid colon  could represent a developing intramural abscess.  3.  Indeterminate 0.3 cm right middle lobe pulmonary nodule. Please  refer to follow-up guidelines below.    Recommendations for one or multiple incidental lung nodules < 6mm :    Low risk  patients: No routine follow-up.    High risk patients: Optional follow-up CT at 12 months; if  unchanged, no further follow-up.    *Low Risk: Minimal or absent history of smoking or other known risk  factors.  *Nonsolid (ground glass) or partly solid nodules may require longer  follow-up to exclude indolent adenocarcinoma.  *Recommendations based on Guidelines for the Management of Incidental  Pulmonary Nodules Detected at CT: From the Fleischner Society 2017,  Radiology 2017.  *Guidelines apply to incidental nodules in patients who are 35 years  or older.  *Guidelines do not apply to lung cancer screening, patients with  immunosuppression, or patients with known primary cancer.    DAYSI COHEN MD         SYSTEM ID:  [id number]       Medications   sodium chloride 0.9% BOLUS 1,000 mL (has no administration in time range)   ondansetron (ZOFRAN) injection 4 mg (has no administration in time range)   HYDROmorphone (PF) (DILAUDID) injection 0.5 mg (has no administration in time range)       Assessments & Plan (with Medical Decision Making)  62-year-old female presenting with localized left lower quad abdominal pain.  September 19 CT of the abdomen pelvis confirmed acute diverticulitis affecting mid sigmoid colon.  Patient initiated antibiotic therapy with cephalexin.  This medication that she had available at home.  She believes she had some difficulty communicating with her doctor because of cellular phone disruption so she purchased a new phone.  Presents with having had intermittent fever and chills with abdominal pain 8/10 intensity.  She also is immune suppressed.  9 years ago diagnosed with stage III breast cancer.  It unfortunately returned 2 years ago and was found to the spine.  She has undergone chemotherapy and radiation therapy for second round with imaging this last August showing no further visible cancer.  She is followed at the [facility] [place].  States that if she needs to be  transferred from [facility] to a higher level of care she prefers  [facility] because that has is where she receives her cancer care.  Patient has no leukocytosis but does have elevated ESR and CRP.  CT identified sigmoid diverticulitis with an intramural abscess measuring approximate 1.7 cm.  Recommendations include hospitalization for IV antibiotics.  I started her on Unasyn 3 g IV in the ED.  Spoke with Dr. Omari Styles who will see the patient for surgical consultation and help comanage.  He recommended switching to Zosyn as part of her hospital care plan.  Will discuss admission with on-call hospitalist.  Patient in agreement to stay.       I have reviewed the nursing notes.    I have reviewed the findings, diagnosis, plan and need for follow up with the patient.          New Prescriptions    No medications on file       Final diagnoses:   Acute diverticulitis - Sigmoid colon with intramural abscess measuring 1.7 cm   Stage IV breast cancer in female (H)       9/29/2023   [facility] EMERGENCY DEPT       Allen Rod Robert, DO  09/29/23 [id number]

## 2023-09-30 LAB
ANION GAP SERPL CALCULATED.3IONS-SCNC: 8 MMOL/L (ref 7–15)
BUN SERPL-MCNC: 8.7 MG/DL (ref 8–23)
CALCIUM SERPL-MCNC: 8.4 MG/DL (ref 8.8–10.2)
CHLORIDE SERPL-SCNC: 104 MMOL/L (ref 98–107)
CREAT SERPL-MCNC: 0.55 MG/DL (ref 0.51–0.95)
CRP SERPL-MCNC: 38.04 MG/L
DEPRECATED HCO3 PLAS-SCNC: 25 MMOL/L (ref 22–29)
EGFRCR SERPLBLD CKD-EPI 2021: >90 ML/MIN/1.73M2
ERYTHROCYTE [DISTWIDTH] IN BLOOD BY AUTOMATED COUNT: 12.8 % (ref 10–15)
GLUCOSE BLDC GLUCOMTR-MCNC: 101 MG/DL (ref 70–99)
GLUCOSE SERPL-MCNC: 97 MG/DL (ref 70–99)
HCT VFR BLD AUTO: 36.6 % (ref 35–47)
HGB BLD-MCNC: 11.9 G/DL (ref 11.7–15.7)
MCH RBC QN AUTO: 31.2 PG (ref 26.5–33)
MCHC RBC AUTO-ENTMCNC: 32.5 G/DL (ref 31.5–36.5)
MCV RBC AUTO: 96 FL (ref 78–100)
PLATELET # BLD AUTO: 328 10E3/UL (ref 150–450)
POTASSIUM SERPL-SCNC: 4.1 MMOL/L (ref 3.4–5.3)
RBC # BLD AUTO: 3.81 10E6/UL (ref 3.8–5.2)
SODIUM SERPL-SCNC: 137 MMOL/L (ref 135–145)
WBC # BLD AUTO: 5.9 10E3/UL (ref 4–11)

## 2023-09-30 PROCEDURE — 99232 SBSQ HOSP IP/OBS MODERATE 35: CPT | Performed by: NURSE PRACTITIONER

## 2023-09-30 PROCEDURE — 258N000003 HC RX IP 258 OP 636: Performed by: FAMILY MEDICINE

## 2023-09-30 PROCEDURE — 85027 COMPLETE CBC AUTOMATED: CPT | Performed by: FAMILY MEDICINE

## 2023-09-30 PROCEDURE — 86140 C-REACTIVE PROTEIN: CPT | Performed by: FAMILY MEDICINE

## 2023-09-30 PROCEDURE — 250N000011 HC RX IP 250 OP 636: Mod: JZ | Performed by: SURGERY

## 2023-09-30 PROCEDURE — 120N000001 HC R&B MED SURG/OB

## 2023-09-30 PROCEDURE — 250N000011 HC RX IP 250 OP 636: Mod: JZ | Performed by: FAMILY MEDICINE

## 2023-09-30 PROCEDURE — 80048 BASIC METABOLIC PNL TOTAL CA: CPT | Performed by: FAMILY MEDICINE

## 2023-09-30 PROCEDURE — 250N000013 HC RX MED GY IP 250 OP 250 PS 637: Performed by: FAMILY MEDICINE

## 2023-09-30 RX ADMIN — ACETAMINOPHEN 650 MG: 325 TABLET, FILM COATED ORAL at 04:30

## 2023-09-30 RX ADMIN — PAROXETINE 20 MG: 20 TABLET, FILM COATED ORAL at 09:16

## 2023-09-30 RX ADMIN — PIPERACILLIN AND TAZOBACTAM 3.38 G: 3; .375 INJECTION, POWDER, FOR SOLUTION INTRAVENOUS at 09:47

## 2023-09-30 RX ADMIN — LISINOPRIL 5 MG: 2.5 TABLET ORAL at 09:16

## 2023-09-30 RX ADMIN — ONDANSETRON 4 MG: 2 INJECTION INTRAMUSCULAR; INTRAVENOUS at 09:43

## 2023-09-30 RX ADMIN — HYDROMORPHONE HYDROCHLORIDE 0.3 MG: 1 INJECTION, SOLUTION INTRAMUSCULAR; INTRAVENOUS; SUBCUTANEOUS at 18:43

## 2023-09-30 RX ADMIN — HYDROMORPHONE HYDROCHLORIDE 0.5 MG: 1 INJECTION, SOLUTION INTRAMUSCULAR; INTRAVENOUS; SUBCUTANEOUS at 14:49

## 2023-09-30 RX ADMIN — HYDROMORPHONE HYDROCHLORIDE 0.5 MG: 1 INJECTION, SOLUTION INTRAMUSCULAR; INTRAVENOUS; SUBCUTANEOUS at 05:35

## 2023-09-30 RX ADMIN — FAMOTIDINE 20 MG: 10 INJECTION, SOLUTION INTRAVENOUS at 21:18

## 2023-09-30 RX ADMIN — ENOXAPARIN SODIUM 40 MG: 40 INJECTION SUBCUTANEOUS at 21:25

## 2023-09-30 RX ADMIN — POTASSIUM CHLORIDE, DEXTROSE MONOHYDRATE AND SODIUM CHLORIDE: 150; 5; 450 INJECTION, SOLUTION INTRAVENOUS at 15:16

## 2023-09-30 RX ADMIN — ONDANSETRON 4 MG: 2 INJECTION INTRAMUSCULAR; INTRAVENOUS at 22:28

## 2023-09-30 RX ADMIN — PIPERACILLIN AND TAZOBACTAM 3.38 G: 3; .375 INJECTION, POWDER, FOR SOLUTION INTRAVENOUS at 04:20

## 2023-09-30 RX ADMIN — PIPERACILLIN AND TAZOBACTAM 3.38 G: 3; .375 INJECTION, POWDER, FOR SOLUTION INTRAVENOUS at 15:25

## 2023-09-30 RX ADMIN — FAMOTIDINE 20 MG: 10 INJECTION, SOLUTION INTRAVENOUS at 11:51

## 2023-09-30 RX ADMIN — PIPERACILLIN AND TAZOBACTAM 3.38 G: 3; .375 INJECTION, POWDER, FOR SOLUTION INTRAVENOUS at 21:21

## 2023-09-30 RX ADMIN — POTASSIUM CHLORIDE, DEXTROSE MONOHYDRATE AND SODIUM CHLORIDE: 150; 5; 450 INJECTION, SOLUTION INTRAVENOUS at 04:22

## 2023-09-30 RX ADMIN — HYDROMORPHONE HYDROCHLORIDE 0.3 MG: 1 INJECTION, SOLUTION INTRAMUSCULAR; INTRAVENOUS; SUBCUTANEOUS at 22:28

## 2023-09-30 ASSESSMENT — ACTIVITIES OF DAILY LIVING (ADL)
ADLS_ACUITY_SCORE: 18

## 2023-09-30 NOTE — PROGRESS NOTES
Subjective:     Seen and examined.  Patient feels somewhat improved.  Some flatus.  No BM.  Pain no different.    Overnight patient had bradycardia and findings of new LBBB.  Primary team aware and working up.    I/O last 3 completed shifts:  In: 1000 [I.V.:1000]  Out: 1000 [Urine:1000]     No current outpatient medications on file.         ROUTINE IP LABS (Last four results)  BMP  Recent Labs   Lab 09/30/23  0514 09/29/23  1152    139   POTASSIUM 4.1 3.8   CHLORIDE 104 104   SINDY 8.4* 9.0   CO2 25 24   BUN 8.7 8.9   CR 0.55 0.58   GLC 97 97     CBC  Recent Labs   Lab 09/30/23  0514 09/29/23  1152   WBC 5.9 6.3   RBC 3.81 3.97   HGB 11.9 12.6   HCT 36.6 37.4   MCV 96 94   MCH 31.2 31.7   MCHC 32.5 33.7   RDW 12.8 12.9    344     INRNo lab results found in last 7 days.         Tmax: 97.5  --->  Tcurrent: 97.5   B/P: 131/67  P: 55  R: 18  SpO2:96% RA        EXAM  AOX4 NAD  Obese, mod TTP LLQ, suprapubic without rebound, + voluntary guarding.  Remainder of abdomen is benign  No CCE      A/P:   Sigmoid diverticulitis  ABX: Zosyn  DVT PPX: Lovenox  GI PPX: Pepcid  Diet: NPO  IVF: D5 0.45 +20K @100    Disp: Labs improving.  She has been on IV ABX for just under 24 hours.  Continue current cares.  Abdomen is non surgical.  Patient does not want a colostomy if at all possible.  Advised her to ambulate to allow better flatus/bowel function.  Discussed with Destini REID.      Buster Hein DO on 9/30/2023 at 11:15 AM

## 2023-09-30 NOTE — PROGRESS NOTES
"Pt complained of headache. Verbalized \"Feels likes a hunger pain\" Blood sugar checked 101 mg/dl. Pt refused for Tylenol tab. PRN Dilaudid IV given.  "

## 2023-09-30 NOTE — PROGRESS NOTES
[facility]    Medicine Progress Note - Hospitalist Service    Date of Admission:  9/29/2023    Assessment & Plan      Mariluz Stoner is a 62 year old female with past medical history of metastatic breast cancer currently in remission on Arimidex, cardiomyopathy from chemo agent with last known EF 41% in 2019, hypertension, depression admitted on 9/29/2023 with acute sigmoid diverticulitis with probable intramural abscess. She began having symptoms approximately 2 weeks ago and was actually seen in the clinic on 9/19/2023 at which time a CT scan of her abdomen was performed and the sigmoid diverticulitis was identified however it does not appear that antibiotic treatment was initiated.  Patient did have some cephalexin leftover from a previous infection and decided to start taking that medication but without improvement of her symptoms and as her pain worsened she decided to represent to the emergency department today.  She does not have SIRS criteria but repeat CT scan shows worsening sigmoid diverticulitis with probable 1.7 cm intermural abscess.  Case has been discussed with Dr. Chavez, general surgeon on call, who has reviewed the patient's case and current images and is recommending admission for medical management with n.p.o. except meds and ice chips, IV Zosyn, IV fluids in hopes of avoiding surgical intervention in upcoming days.    Active Problems:    Acute diverticulitis with probable intramural abscess    Assessment: Occurring for the past 2 weeks, now with suspected intermural abscess as outlined above.  Patient still afebrile, CRP down some today, normal WBC.      Discussed with Dr. Hein, General Surgery who recommends continued IV anbx, npo, ambulation.    Plan:     -continue IV Zosyn 3.375 mg every 6 hours for interabdominal infection as recommended by general surgery team  -N.p.o. except medications and small amounts of ice chips  -IV fluids and IV pain  medication  -General surgery following      Metastatic breast cancer with bone metastasis - in remission    Assessment: Patient had diagnosis of stage III breast cancer and had left mastectomy in 2013 followed by radiation and chemo and was started on tamoxifen in 2014.  She had recurrence of disease in her spine in 2018 and underwent radiation and chemo again with good results and patient has been on Arimidex since 2019 with recent oncology evaluation in 8/23 showing no evidence of recurrence of disease     Plan:   -will hold Arimidex for the next few days until diverticulitis infection starts to improve and then restart.  Arimidex does have a 50 hour half life so hold it for 3-4 days should not cause severe increase in disease recurrence (hold start on admission day).      Cardiomyopathy    Assessment:  Patient had cardiomyopathy develop as a consequence of the chemotherapy she received in 2019 - echo from that time showed EF 41% (had been 50-55% prior to chemo initiation) and chemo was stopped.  Patient has been on metoprolol and lisinopril but has not needed diuretics, no issues with swelling or shortness of breath.  No signs of overload at this time.  It does not appear patient has had an echo since original diagnosis.   She has had some brief asymptomatic episodes of bradycardia.  Metoprolol succinate held as of 9/29.  Patient has a new LBB.  Serial HS troponin done this admission negative.  No CP.      Plan:    - continue lisinopril  - hold metoprolol succinate for now  -decrease IV fluids slightly to 75 ml/hr (weight up 1.5 kg)  -continue daily weights and for signs of volume overload with reduction or consideration of diuretic if appropriate   -recommend echocardiogram be performed during this stay to reassess EF - unfortunately is not available until 10/2/23 at this facility.  If patient's volume status remains stable and surgery is not needed, would still recommend outpatient Cardiology to re-evaluate  severity of cardiomyopathy and new LBB       Mild episode of recurrent major depressive disorder (H)    Assessment: on Paxil with symptoms well controlled overall.  Patient has been struggling in her relationship with her  which has been challenging but she is trying to do things to promote her own wellness and is motivated to continue with this.      Plan:   -continue home Paxil 20 mg daily dosing  -encouraged patient to continue with her wellness efforts and consider counseling going forward if she is needing help processing her coping and wellness skills      Essential hypertension    Assessment: Patient has well controlled blood pressures on lisinopril 5 mg daily and with metoprolol succinate being held.      Plan:   -holding metoprolol XL 25 mg daily due to transient bradycardia   -lisinopril 5 mg daily with hold parameters for blood pressures less than 100.  If patient starts to develop signs concerning for sepsis, will need to consider holding medications to avoid hypotension       Allergy induced wheezing    Assessment: patient reports certain allergens can cause her to wheeze (mainly around the farm animals) and she has a prn albuterol inhaler she can use but has not needed it in many weeks and does not expect any issues during this hospital stay    Plan:   -We will provide albuterol nebulizers to be available as needed if wheezing does occur.       Diet: NPO for Medical/Clinical Reasons Except for: Ice Chips, Meds    DVT Prophylaxis: Enoxaparin (Lovenox) SQ  Orozco Catheter: Not present  Lines: PRESENT      Port a Cath 02/05/14 Single Lumen Right Chest wall-Site Assessment: WDL      Cardiac Monitoring: ACTIVE order. Indication: Bradycardias (48 hours)  Code Status: Full Code      Clinically Significant Risk Factors          # Hypocalcemia: Lowest Ca = 8.4 mg/dL in last 2 days, will monitor and replace as appropriate         # Hypertension: Noted on problem list        # Severe Obesity: Estimated body  "mass index is 40.02 kg/m  as calculated from the following:    Height as of this encounter: 1.651 m (5' 5\").    Weight as of this encounter: 109.1 kg (240 lb 8 oz)., PRESENT ON ADMISSION            Disposition Plan         The patient's care was discussed with the  entire care Team.    Destini Deutsch CNP  Hospitalist Service  [facility]  Securely message with [employer] (more info)  Text page via [employer] Paging/Directory   ______________________________________________________________________    Interval History   One episode of bradycardia overnight    Physical Exam   Vital Signs: Temp: 97.7  F (36.5  C) Temp src: Oral BP: 123/61 Pulse: 60   Resp: 16 SpO2: 96 % O2 Device: None (Room air)    Weight: 240 lbs 8 oz    Gen:  Lying in bed no acute distress  HEENT:  normocephalic, atraumatic, oropharynx clear  Resp:  CTA bibasilarly  Card:  S1,S2, RRR no murmur, rub or gallop  Abd:  Soft, non distended, non tender,  normoactive bowel sounds   Neuro:  Neuro exam non focal      Medical Decision Making       40 MINUTES SPENT BY ME on the date of service doing chart review, history, exam, documentation & further activities per the note.  MANAGEMENT DISCUSSED with the following over the past 24 hours:        Data     I have personally reviewed the following data over the past 24 hrs:    5.9  \   11.9   / 328     137 104 8.7 /  97   4.1 25 0.55 \     Trop: 10 BNP: N/A     Procal: N/A CRP: 38.04 (H) Lactic Acid: N/A         "

## 2023-09-30 NOTE — PLAN OF CARE
Goal Outcome Evaluation:      Plan of Care Reviewed With: patient    Overall Patient Progress: no changeOverall Patient Progress: no change    Outcome Evaluation: Pt had 1 episode of nausea and vomitted less than 10ml. PRN Zofran given. Pt verbalized passing gas 3x. Complained of abdominal pain once 6/10 pain score. PRN Dilaudid and was somewhat effective. Pt ambulated to hallway twice. VSS.

## 2023-09-30 NOTE — PLAN OF CARE
Goal Outcome Evaluation:      Plan of Care Reviewed With: patient    Overall Patient Progress: no changeOverall Patient Progress: no change     Vitals stable with exception of Hr decreasing into the 50's, 1 TELE strip had HR 34. 1St degree with BBB on TELE. PRN tylenol 650 mg given at 1930 and 0430. PRN dilaudid given at 2130. Trop 10, trending down. Metoprolol on hold. Bowel sounds hypoactive. Lung sounds diminished.

## 2023-09-30 NOTE — CONSULTS
Care Management Note:     Care Management team received referral due to elevated risk score.      Per IDT rounds, EMR review, discussion with pt's hospital medical provider, it has been anticipated that pt will discharge to home with no identifiable discharge needs.     Patient has been up walking in halls independently       [facility] referral placed to assist with PCP appointment scheduling.     CM to complete hand off to clinic care coordination team at discharge.      Care Management will close referral at this time, but please place new consult should pt develop new needs during this stay.       DAMON Vaughn  [facility] [phone]   [facility]  [phone]

## 2023-10-01 LAB
ANION GAP SERPL CALCULATED.3IONS-SCNC: 7 MMOL/L (ref 7–15)
BUN SERPL-MCNC: 7.3 MG/DL (ref 8–23)
CALCIUM SERPL-MCNC: 8.3 MG/DL (ref 8.8–10.2)
CHLORIDE SERPL-SCNC: 104 MMOL/L (ref 98–107)
CREAT SERPL-MCNC: 0.62 MG/DL (ref 0.51–0.95)
CRP SERPL-MCNC: 19.74 MG/L
DEPRECATED HCO3 PLAS-SCNC: 26 MMOL/L (ref 22–29)
EGFRCR SERPLBLD CKD-EPI 2021: >90 ML/MIN/1.73M2
ERYTHROCYTE [DISTWIDTH] IN BLOOD BY AUTOMATED COUNT: 12.7 % (ref 10–15)
GLUCOSE BLDC GLUCOMTR-MCNC: 85 MG/DL (ref 70–99)
GLUCOSE SERPL-MCNC: 106 MG/DL (ref 70–99)
HCT VFR BLD AUTO: 34.3 % (ref 35–47)
HGB BLD-MCNC: 11.3 G/DL (ref 11.7–15.7)
MCH RBC QN AUTO: 31.4 PG (ref 26.5–33)
MCHC RBC AUTO-ENTMCNC: 32.9 G/DL (ref 31.5–36.5)
MCV RBC AUTO: 95 FL (ref 78–100)
PLATELET # BLD AUTO: 280 10E3/UL (ref 150–450)
POTASSIUM SERPL-SCNC: 3.7 MMOL/L (ref 3.4–5.3)
RBC # BLD AUTO: 3.6 10E6/UL (ref 3.8–5.2)
SODIUM SERPL-SCNC: 137 MMOL/L (ref 135–145)
WBC # BLD AUTO: 4.5 10E3/UL (ref 4–11)

## 2023-10-01 PROCEDURE — 250N000011 HC RX IP 250 OP 636: Mod: JZ | Performed by: FAMILY MEDICINE

## 2023-10-01 PROCEDURE — 250N000011 HC RX IP 250 OP 636: Mod: JZ | Performed by: SURGERY

## 2023-10-01 PROCEDURE — 120N000001 HC R&B MED SURG/OB

## 2023-10-01 PROCEDURE — 250N000013 HC RX MED GY IP 250 OP 250 PS 637: Performed by: FAMILY MEDICINE

## 2023-10-01 PROCEDURE — 85027 COMPLETE CBC AUTOMATED: CPT | Performed by: NURSE PRACTITIONER

## 2023-10-01 PROCEDURE — 86140 C-REACTIVE PROTEIN: CPT | Performed by: NURSE PRACTITIONER

## 2023-10-01 PROCEDURE — 99232 SBSQ HOSP IP/OBS MODERATE 35: CPT | Performed by: NURSE PRACTITIONER

## 2023-10-01 PROCEDURE — 80048 BASIC METABOLIC PNL TOTAL CA: CPT | Performed by: NURSE PRACTITIONER

## 2023-10-01 PROCEDURE — 258N000003 HC RX IP 258 OP 636: Performed by: FAMILY MEDICINE

## 2023-10-01 RX ADMIN — PROCHLORPERAZINE EDISYLATE 10 MG: 5 INJECTION INTRAMUSCULAR; INTRAVENOUS at 02:45

## 2023-10-01 RX ADMIN — LISINOPRIL 5 MG: 2.5 TABLET ORAL at 09:17

## 2023-10-01 RX ADMIN — FAMOTIDINE 20 MG: 10 INJECTION, SOLUTION INTRAVENOUS at 20:31

## 2023-10-01 RX ADMIN — HYDROMORPHONE HYDROCHLORIDE 0.5 MG: 1 INJECTION, SOLUTION INTRAMUSCULAR; INTRAVENOUS; SUBCUTANEOUS at 09:59

## 2023-10-01 RX ADMIN — PIPERACILLIN AND TAZOBACTAM 3.38 G: 3; .375 INJECTION, POWDER, FOR SOLUTION INTRAVENOUS at 04:31

## 2023-10-01 RX ADMIN — POTASSIUM CHLORIDE, DEXTROSE MONOHYDRATE AND SODIUM CHLORIDE: 150; 5; 450 INJECTION, SOLUTION INTRAVENOUS at 14:28

## 2023-10-01 RX ADMIN — ENOXAPARIN SODIUM 40 MG: 40 INJECTION SUBCUTANEOUS at 21:18

## 2023-10-01 RX ADMIN — FAMOTIDINE 20 MG: 10 INJECTION, SOLUTION INTRAVENOUS at 09:17

## 2023-10-01 RX ADMIN — ACETAMINOPHEN 650 MG: 325 TABLET, FILM COATED ORAL at 02:42

## 2023-10-01 RX ADMIN — POTASSIUM CHLORIDE, DEXTROSE MONOHYDRATE AND SODIUM CHLORIDE: 150; 5; 450 INJECTION, SOLUTION INTRAVENOUS at 02:42

## 2023-10-01 RX ADMIN — ACETAMINOPHEN 650 MG: 325 TABLET, FILM COATED ORAL at 21:17

## 2023-10-01 RX ADMIN — PIPERACILLIN AND TAZOBACTAM 3.38 G: 3; .375 INJECTION, POWDER, FOR SOLUTION INTRAVENOUS at 15:11

## 2023-10-01 RX ADMIN — PIPERACILLIN AND TAZOBACTAM 3.38 G: 3; .375 INJECTION, POWDER, FOR SOLUTION INTRAVENOUS at 09:17

## 2023-10-01 RX ADMIN — PAROXETINE 20 MG: 20 TABLET, FILM COATED ORAL at 09:17

## 2023-10-01 RX ADMIN — PIPERACILLIN AND TAZOBACTAM 3.38 G: 3; .375 INJECTION, POWDER, FOR SOLUTION INTRAVENOUS at 21:19

## 2023-10-01 ASSESSMENT — ACTIVITIES OF DAILY LIVING (ADL)
ADLS_ACUITY_SCORE: 18

## 2023-10-01 NOTE — PLAN OF CARE
Goal Outcome Evaluation:      Plan of Care Reviewed With: patient    Overall Patient Progress: improvingOverall Patient Progress: improving    Outcome Evaluation: Pt advanced to clear liquid diet as verbalized by surgery Dr. Burroughs. Pt ambulated in the hallway SBA 3x. Complained of abdominal pain once, PRN Dilaudid given and was effective. On heart monitor with 1st degree AV block+BBB. Advanced to clear liquid diet and tolerated.

## 2023-10-01 NOTE — PROGRESS NOTES
[facility]    Medicine Progress Note - Hospitalist Service    Date of Admission:  9/29/2023    Assessment & Plan   #Acute diverticulitis with probable intramural abscess.  CT scan 9/29/2023 was acute sigmoid diverticulitis.  1.7 cm regional low-density within the thickened sigmoid colon possibly representative of developing intramural abscess.  CRP trending downward 19.74 this a.m. WBC 4.5  Plan:  Continue Zosyn 3.375 g IV every 6 hours.  Continue to monitor CRP and WBC.  Keep NPO today and reassess in AM to determine when to resume diet.  If the patient develops fever, worsening abdominal pain, peritoneal signs, leukocytosis will repeat CT scan of the abdomen.    #Metastatic breast cancer with bone metastases-in remission.  Arimidex on hold at present.    #Cardiomyopathy secondary to chemotherapy.  2019 LVEF 41%.  Therapy was subsequently discontinued.  Weight is trending upward. Not in fluid overload   Plan:  Echocardiogram in a.m.    #Episodes of bradycardia.  Patient also found to have new LBBB from previous EKG 2021.  Plan:  Metroprolol currently on hold due to episodic bradycardia.  Patient scheduled for echocardiogram in a.m.  Continue cardiac monitor    #Recurrent major depression.  Plan:  Continue home dose Paxil    #Essential hypertension blood pressure well controlled on lisinopril.  Plan:  Continue with lisinopril.  Metroprolol 25 mg daily held due to bradycardia.  Hold parameters for antihypertensives.    #Incidental 0.3 cm right middle lobe pulmonary nodule.  Will need follow-up as outpatient.  Plan:  Patient will need follow-up CT scan to monitor right middle lobe nodule       Diet: NPO for Medical/Clinical Reasons Except for: Ice Chips, Meds    DVT Prophylaxis: Enoxaparin (Lovenox) SQ  Orozco Catheter: Not present  Lines: PRESENT      Port a Cath 02/05/14 Single Lumen Right Chest wall-Site Assessment: WDL      Cardiac Monitoring: ACTIVE order. Indication: Bradycardias  "(48 hours)  Code Status: Full Code      Clinically Significant Risk Factors          # Hypocalcemia: Lowest Ca = 8.3 mg/dL in last 2 days, will monitor and replace as appropriate         # Hypertension: Noted on problem list        # Severe Obesity: Estimated body mass index is 40.27 kg/m  as calculated from the following:    Height as of this encounter: 1.651 m (5' 5\").    Weight as of this encounter: 109.8 kg (242 lb)., PRESENT ON ADMISSION            Disposition Plan         The patient's care was discussed with the Attending Physician, Dr. Mclaughlin and Patient.    VERONIKA Love [facility]  Hospitalist Service  [facility]  Securely message with [employer] (more info)  Text page via [facility] Paging/Directory   ______________________________________________________________________    Interval History   Reviewed overnight notes.  Patient continuing to need antiemetics as well as pain medications.    Physical Exam   Vital Signs: Temp: 97.7  F (36.5  C) Temp src: Oral BP: (!) 142/69 Pulse: 63   Resp: 18 SpO2: 97 % O2 Device: None (Room air)    Weight: 242 lbs 0 oz    Constitutional: 62-year-old female in bed.  She is on room air.  Does not appear to be in acute distress.  Eyes: Sclera anicteric. Conjunctiva normal  Hematologic / Lymphatic: No bleeding or bruising  Respiratory: No increased work of breathing while at rest.  Her lungs are clear and equal bilaterally.  No wheezes, rales or rhonchi.  No retractions.  Cardiovascular: Normal apical impulse, regular rate and rhythm, normal S1 and S2, no S3 or S4, and no murmur noted  GI: Abdomen is soft mildly tender left lower quadrant.  Bowel sounds active.  Skin: Warm and dry.  No cyanosis, pallor or rash.  Musculoskeletal: Trace of lower extremity edema.  Neurologic: Alert and oriented x4.  No focal neurological deficits.  Neuropsychiatric: General: calm and normal eye contact  Level of consciousness: drowsy  Affect: Lookout  Orientation: oriented " to self, place, time and situation  Memory and insight: normal and thought process normal    Medical Decision Making       45 MINUTES SPENT BY ME on the date of service doing chart review, history, exam, documentation & further activities per the note.      Data     I have personally reviewed the following data over the past 24 hrs:    4.5  \   11.3 (L)   / 280     137 104 7.3 (L) /  106 (H)   3.7 26 0.62 \     Procal: N/A CRP: 19.74 (H) Lactic Acid: N/A         Imaging results reviewed over the past 24 hrs:   No results found for this or any previous visit (from the past 24 hour(s)).

## 2023-10-01 NOTE — PLAN OF CARE
"Goal Outcome Evaluation:      Plan of Care Reviewed With: patient    Overall Patient Progress: no changeOverall Patient Progress: no change    Outcome Evaluation: Pt given Zofran x1 and compazine x1 ovenright.  Tolerated PO tylenol for headache.  NPO besides ice ships/sips with meds.  1 dose IV dilaudid given for ABD pain overnight.tele shows SB with BBB.  IVF at 100 ml/hr    BP (!) 147/54 (BP Location: Right arm, Patient Position: Semi-Chang's, Cuff Size: Adult Regular)   Pulse 65   Temp 98.1  F (36.7  C) (Oral)   Resp 18   Ht 1.651 m (5' 5\")   Wt 109.8 kg (242 lb)   SpO2 97%   BMI 40.27 kg/m     "

## 2023-10-01 NOTE — PLAN OF CARE
Goal Outcome Evaluation:      Plan of Care Reviewed With: patient    Overall Patient Progress: no changeOverall Patient Progress: no change     Vitals stable, Pt reported minor H/A, declined intervention this shift. IV zosyn given. Pt walked the hallways prior to going to bed.

## 2023-10-01 NOTE — PROGRESS NOTES
Subjective:   Seen and examined.  No acute events overnight.  She feels much better.  She wants food.  Ambulating      I/O last 3 completed shifts:  In: 1494 [I.V.:1494]  Out: 1650 [Urine:1650]     No current outpatient medications on file.         ROUTINE IP LABS (Last four results)  BMP  Recent Labs   Lab 10/01/23  0613 09/30/23  1848 09/30/23  0514 09/29/23  1152     --  137 139   POTASSIUM 3.7  --  4.1 3.8   CHLORIDE 104  --  104 104   SINDY 8.3*  --  8.4* 9.0   CO2 26  --  25 24   BUN 7.3*  --  8.7 8.9   CR 0.62  --  0.55 0.58   * 101* 97 97     CBC  Recent Labs   Lab 10/01/23  0612 09/30/23  0514 09/29/23  1152   WBC 4.5 5.9 6.3   RBC 3.60* 3.81 3.97   HGB 11.3* 11.9 12.6   HCT 34.3* 36.6 37.4   MCV 95 96 94   MCH 31.4 31.2 31.7   MCHC 32.9 32.5 33.7   RDW 12.7 12.8 12.9    328 344     INRNo lab results found in last 7 days.         Tmax: 98.4  --->  Tcurrent: 98.4   B/P: 141/64  P: 60  R: 18  SpO2:93%        EXAM  AOX4 NAD  Mild TTP LLQ without R/R/G  No CCE        A/P:   Sigmoid diverticulitis  ABX: Zosyn  DVT PPX: Lovenox  GI PPX: Pepcid  Diet: Clear liquids  IVF: D5 0.45 +20K @100    Start clears.  Having echo tomorrow.  Ambulate.  Labs improving, vitals stable, abdomen non surgical.    Buster Hein, DO on 10/1/2023 at 12:00 PM

## 2023-10-02 ENCOUNTER — APPOINTMENT (OUTPATIENT)
Dept: CARDIOLOGY | Facility: CLINIC | Age: 63
DRG: 392 | End: 2023-10-02
Attending: FAMILY MEDICINE
Payer: COMMERCIAL

## 2023-10-02 LAB
ANION GAP SERPL CALCULATED.3IONS-SCNC: 7 MMOL/L (ref 7–15)
BASOPHILS # BLD AUTO: 0 10E3/UL (ref 0–0.2)
BASOPHILS NFR BLD AUTO: 0 %
BUN SERPL-MCNC: 4.5 MG/DL (ref 8–23)
CALCIUM SERPL-MCNC: 8.5 MG/DL (ref 8.8–10.2)
CHLORIDE SERPL-SCNC: 104 MMOL/L (ref 98–107)
CREAT SERPL-MCNC: 0.6 MG/DL (ref 0.51–0.95)
CRP SERPL-MCNC: 20.55 MG/L
DEPRECATED HCO3 PLAS-SCNC: 25 MMOL/L (ref 22–29)
EGFRCR SERPLBLD CKD-EPI 2021: >90 ML/MIN/1.73M2
EOSINOPHIL # BLD AUTO: 0.1 10E3/UL (ref 0–0.7)
EOSINOPHIL NFR BLD AUTO: 3 %
ERYTHROCYTE [DISTWIDTH] IN BLOOD BY AUTOMATED COUNT: 12.5 % (ref 10–15)
GLUCOSE SERPL-MCNC: 107 MG/DL (ref 70–99)
HCT VFR BLD AUTO: 34.3 % (ref 35–47)
HGB BLD-MCNC: 11.3 G/DL (ref 11.7–15.7)
IMM GRANULOCYTES # BLD: 0 10E3/UL
IMM GRANULOCYTES NFR BLD: 0 %
LVEF ECHO: NORMAL
LYMPHOCYTES # BLD AUTO: 1.2 10E3/UL (ref 0.8–5.3)
LYMPHOCYTES NFR BLD AUTO: 24 %
MCH RBC QN AUTO: 31.2 PG (ref 26.5–33)
MCHC RBC AUTO-ENTMCNC: 32.9 G/DL (ref 31.5–36.5)
MCV RBC AUTO: 95 FL (ref 78–100)
MONOCYTES # BLD AUTO: 0.5 10E3/UL (ref 0–1.3)
MONOCYTES NFR BLD AUTO: 11 %
NEUTROPHILS # BLD AUTO: 3 10E3/UL (ref 1.6–8.3)
NEUTROPHILS NFR BLD AUTO: 62 %
NRBC # BLD AUTO: 0 10E3/UL
NRBC BLD AUTO-RTO: 0 /100
NT-PROBNP SERPL-MCNC: 1089 PG/ML (ref 0–900)
PLATELET # BLD AUTO: 293 10E3/UL (ref 150–450)
POTASSIUM SERPL-SCNC: 3.7 MMOL/L (ref 3.4–5.3)
RBC # BLD AUTO: 3.62 10E6/UL (ref 3.8–5.2)
SODIUM SERPL-SCNC: 136 MMOL/L (ref 135–145)
WBC # BLD AUTO: 4.9 10E3/UL (ref 4–11)

## 2023-10-02 PROCEDURE — 258N000003 HC RX IP 258 OP 636: Performed by: FAMILY MEDICINE

## 2023-10-02 PROCEDURE — 85025 COMPLETE CBC W/AUTO DIFF WBC: CPT | Performed by: NURSE PRACTITIONER

## 2023-10-02 PROCEDURE — 86140 C-REACTIVE PROTEIN: CPT | Performed by: NURSE PRACTITIONER

## 2023-10-02 PROCEDURE — 99232 SBSQ HOSP IP/OBS MODERATE 35: CPT | Performed by: SPECIALIST

## 2023-10-02 PROCEDURE — 255N000002 HC RX 255 OP 636: Performed by: INTERNAL MEDICINE

## 2023-10-02 PROCEDURE — 250N000011 HC RX IP 250 OP 636: Mod: JZ | Performed by: FAMILY MEDICINE

## 2023-10-02 PROCEDURE — 120N000001 HC R&B MED SURG/OB

## 2023-10-02 PROCEDURE — 83880 ASSAY OF NATRIURETIC PEPTIDE: CPT | Performed by: NURSE PRACTITIONER

## 2023-10-02 PROCEDURE — 80048 BASIC METABOLIC PNL TOTAL CA: CPT | Performed by: NURSE PRACTITIONER

## 2023-10-02 PROCEDURE — 250N000013 HC RX MED GY IP 250 OP 250 PS 637: Performed by: PEDIATRICS

## 2023-10-02 PROCEDURE — 250N000011 HC RX IP 250 OP 636: Mod: JZ | Performed by: SURGERY

## 2023-10-02 PROCEDURE — 250N000013 HC RX MED GY IP 250 OP 250 PS 637: Performed by: NURSE PRACTITIONER

## 2023-10-02 PROCEDURE — 250N000013 HC RX MED GY IP 250 OP 250 PS 637: Performed by: FAMILY MEDICINE

## 2023-10-02 PROCEDURE — 93306 TTE W/DOPPLER COMPLETE: CPT | Mod: 26 | Performed by: INTERNAL MEDICINE

## 2023-10-02 PROCEDURE — 999N000208 ECHOCARDIOGRAM COMPLETE

## 2023-10-02 RX ORDER — MULTIPLE VITAMINS W/ MINERALS TAB 9MG-400MCG
1 TAB ORAL DAILY
Status: DISCONTINUED | OUTPATIENT
Start: 2023-10-02 | End: 2023-10-06 | Stop reason: HOSPADM

## 2023-10-02 RX ORDER — CARVEDILOL 3.12 MG/1
3.12 TABLET ORAL 2 TIMES DAILY WITH MEALS
Status: DISCONTINUED | OUTPATIENT
Start: 2023-10-02 | End: 2023-10-06 | Stop reason: HOSPADM

## 2023-10-02 RX ADMIN — FAMOTIDINE 20 MG: 10 INJECTION, SOLUTION INTRAVENOUS at 20:05

## 2023-10-02 RX ADMIN — MULTIPLE VITAMINS W/ MINERALS TAB 1 TABLET: TAB at 12:04

## 2023-10-02 RX ADMIN — HYDROMORPHONE HYDROCHLORIDE 0.3 MG: 1 INJECTION, SOLUTION INTRAMUSCULAR; INTRAVENOUS; SUBCUTANEOUS at 05:29

## 2023-10-02 RX ADMIN — PIPERACILLIN AND TAZOBACTAM 3.38 G: 3; .375 INJECTION, POWDER, FOR SOLUTION INTRAVENOUS at 21:45

## 2023-10-02 RX ADMIN — Medication 1 MG: at 00:55

## 2023-10-02 RX ADMIN — CARVEDILOL 3.12 MG: 3.12 TABLET, FILM COATED ORAL at 17:18

## 2023-10-02 RX ADMIN — PIPERACILLIN AND TAZOBACTAM 3.38 G: 3; .375 INJECTION, POWDER, FOR SOLUTION INTRAVENOUS at 03:47

## 2023-10-02 RX ADMIN — PROCHLORPERAZINE EDISYLATE 10 MG: 5 INJECTION INTRAMUSCULAR; INTRAVENOUS at 17:19

## 2023-10-02 RX ADMIN — ONDANSETRON 4 MG: 2 INJECTION INTRAMUSCULAR; INTRAVENOUS at 12:30

## 2023-10-02 RX ADMIN — PAROXETINE 20 MG: 20 TABLET, FILM COATED ORAL at 09:14

## 2023-10-02 RX ADMIN — LISINOPRIL 5 MG: 2.5 TABLET ORAL at 09:14

## 2023-10-02 RX ADMIN — FAMOTIDINE 20 MG: 10 INJECTION, SOLUTION INTRAVENOUS at 09:14

## 2023-10-02 RX ADMIN — POTASSIUM CHLORIDE, DEXTROSE MONOHYDRATE AND SODIUM CHLORIDE: 150; 5; 450 INJECTION, SOLUTION INTRAVENOUS at 14:15

## 2023-10-02 RX ADMIN — HUMAN ALBUMIN MICROSPHERES AND PERFLUTREN 5 ML: 10; .22 INJECTION, SOLUTION INTRAVENOUS at 12:02

## 2023-10-02 RX ADMIN — Medication 1 MG: at 22:26

## 2023-10-02 RX ADMIN — POTASSIUM CHLORIDE, DEXTROSE MONOHYDRATE AND SODIUM CHLORIDE: 150; 5; 450 INJECTION, SOLUTION INTRAVENOUS at 01:26

## 2023-10-02 RX ADMIN — PIPERACILLIN AND TAZOBACTAM 3.38 G: 3; .375 INJECTION, POWDER, FOR SOLUTION INTRAVENOUS at 09:14

## 2023-10-02 RX ADMIN — Medication 5 ML: at 22:26

## 2023-10-02 RX ADMIN — PIPERACILLIN AND TAZOBACTAM 3.38 G: 3; .375 INJECTION, POWDER, FOR SOLUTION INTRAVENOUS at 17:18

## 2023-10-02 RX ADMIN — ENOXAPARIN SODIUM 40 MG: 40 INJECTION SUBCUTANEOUS at 21:45

## 2023-10-02 ASSESSMENT — ACTIVITIES OF DAILY LIVING (ADL)
ADLS_ACUITY_SCORE: 18

## 2023-10-02 NOTE — PROGRESS NOTES
[facility]    Medicine Progress Note - Hospitalist Service    Date of Admission:  9/29/2023    Assessment & Plan   #Acute diverticulitis with probable intramural abscess.  CT scan 9/29/2023 was acute sigmoid diverticulitis.  1.7 cm regional low-density within the thickened sigmoid colon possibly representative of developing intramural abscess.  CRP trended downward 20.55 this a.m. WBC 4.9. Afebrile. Pt reporting 5 Bms soft/liquid. Most recent statring to form   Plan:  Continue Zosyn 3.375 g IV every 6 hours.  Continue to monitor CRP and WBC.  Continue clear liquid diet  If the patient develops fever, worsening abdominal pain, peritoneal signs, leukocytosis will repeat CT scan of the abdomen.  Per surgery recommendations, if not improving by end of week consider repeat CT      #Metastatic breast cancer with bone metastases-in remission.  Arimidex on hold at present.     #Cardiomyopathy secondary to chemotherapy.  2019 LVEF 41%.  Therapy was subsequently discontinued.  Weight 108.9 kg-slightly lower from yesterday. Not in fluid overload   Echo   Left Ventricle  The left ventricle is normal in size. There is mild eccentric left ventricular  hypertrophy. Left ventricular systolic function is severely reduced. The  visual ejection fraction is 25-30%. Grade I or early diastolic dysfunction.  Septal motion is consistent with conduction abnormality. There is severe  global hypokinesia of the left ventricle.    Plan:  Referral to Cardiology as OP      #Episodes of bradycardia.  Patient also found to have new LBBB from previous EKG 2021. She has 1st degree AV block Monitor shows HR low 60s, 1st deg AV block and LBBB  Plan:  Metroprolol currently on hold due to episodic bradycardia.  Started on low dose Coreg 3.125mg bid   Continue cardiac monitor     #Recurrent major depression.  Plan:  Continue home dose Paxil     #Essential hypertension blood pressure well controlled on lisinopril. Blood  "pressures are trending upward   Plan:  Lisinopril increased to 10 mg daily   Metroprolol 25 mg daily held due to bradycardia.  Hold parameters for antihypertensives.     #Incidental 0.3 cm right middle lobe pulmonary nodule.  Will need follow-up as outpatient.  Plan:  Patient will need follow-up CT scan to monitor right middle lobe nodule          Diet: Clear Liquid Diet    DVT Prophylaxis: Enoxaparin (Lovenox) SQ  Orozco Catheter: Not present  Lines: PRESENT      Port a Cath 02/05/14 Single Lumen Right Chest wall-Site Assessment: WDL      Cardiac Monitoring: ACTIVE order. Indication: Bradycardias (48 hours)  Code Status: Full Code      Clinically Significant Risk Factors                  # Hypertension: Noted on problem list        # Obesity: Estimated body mass index is 39.97 kg/m  as calculated from the following:    Height as of this encounter: 1.651 m (5' 5\").    Weight as of this encounter: 109 kg (240 lb 3.2 oz)., PRESENT ON ADMISSION            Disposition Plan         The patient's care was discussed with the Attending Physician, Dr. Boo  and Patient.    VERONIKA Love [facility]  Hospitalist Service  [facility]  Securely message with [employer] (more info)  Text page via [employer] [facility] Paging/Directory   ______________________________________________________________________    Interval History   Reviewed overnight notes.  No significant events.  Discussed case with Dr. Chavez    Physical Exam   Vital Signs: Temp: 98  F (36.7  C) Temp src: Oral BP: (!) 146/75 Pulse: 65   Resp: 20 SpO2: 95 % O2 Device: None (Room air)    Weight: 240 lbs 3.2 oz    Constitutional: 62-year-old female in bed.  She is on room air.  She is awake.  Does not appear to be in any distress.  Hematologic / Lymphatic: No bleeding or bruising.  She does have left upper extremity swelling.  The patient is status post [place] for breast cancer and this is chronic lymphedema.  Respiratory: No increased work of " breathing while at rest.  Lungs are clear and equal bilaterally without wheezes, Rales or rhonchi.  No retractions.  Cardiovascular: S1, S2.  Regular rate and rhythm.  Heart rate in the 60s.  Monitor shows sinus rhythm first-degree AV block with a left bundle branch block.  GI: Abdomen soft, nontender to palpation.  Sounds are hypoactive.  Skin: Warm and dry.  No cyanosis or pallor.  No rash.  Musculoskeletal: Left upper extremity swelling that is chronic in nature.  No other edema.  Neurologic: Alert and oriented x4.  No focal neurological deficits.  Neuropsychiatric: General: normal, calm, and normal eye contact  Level of consciousness: alert / normal  Affect: normal  Orientation: oriented to self, place, time and situation  Memory and insight: normal, memory for past and recent events intact, and thought process normal    Medical Decision Making       45 MINUTES SPENT BY ME on the date of service doing chart review, history, exam, documentation & further activities per the note.      Data         Imaging results reviewed over the past 24 hrs:   No results found for this or any previous visit (from the past 24 hour(s)).

## 2023-10-02 NOTE — PROGRESS NOTES
CLINICAL NUTRITION SERVICES - ASSESSMENT NOTE     Nutrition Prescription    RECOMMENDATIONS FOR MDs/PROVIDERS TO ORDER:  Please continue to advance diet as tolerated    Malnutrition Status:    Unable to determine due to lack of NFPE, nutrition hx PTA - will meet with pt as able as diet advances    Recommendations already ordered by Registered Dietitian (RD):  Will offer Ensure clear (any flavor) BID with breakfast and dinner meals to support intake, will adjust per preference/tolerance and offer additional intervention as able    Multivitamin with minerals - ordered due to clear liquid status, intake less than 75% of estimated needs    Future/Additional Recommendations:  Will follow to monitor oral intake, diet advancement, GI symptoms/BMs, weight changes, and nutritional supplement tolerance     REASON FOR ASSESSMENT  Mariluz Stoner is a/an 62 year old female assessed by the dietitian for: identified at risk via screening criteria    MST score of 2: recent weight loss of 2-13 lbs, poor appetite noted    NUTRITION HISTORY  Pt admitted with left lower quadrant abdominal pain, progressively worse since second week of Sept. Was seen by PCP on Sept. 19th and CT confirmed acute diverticulitis, significant inflammation in the mid sigmoid. No complications , Took an old antibiotic she had at home, believes it was cephalexin. Continued pain, nausea without vomiting. Loose stools but no blood noted. Pain is now severe, constant for 3 days PTA, worse with movement.     Found to have acute diverticulitis with probable intramural abscess     Dxhx of metastatic breast cancer, mild episode of recurrent major depressive disorder, HTN, secondary malignant neoplasm of bone, hx of wheezing due to allergy, cardiomyopathy secondary to drug, sinus bradycardia, dermatitis, morbid obesity, pancolitis     *bradycardia during admission and new finding of LBBB.     CURRENT NUTRITION ORDERS  Diet: Orders Placed This Encounter      Clear  "Liquid Diet    Intake/Tolerance: 240 mL of fluid recorded in flow sheet, tolerating clears per chart review. GI is abnormal - abdominal discomfort present, abdomen is tender to palpitation in all quadrants, appearance is rounded. Small emesis noted 9/30.Bowel sounds are normoactive. Last BM was 9/28 per pt report.    LABS  Labs reviewed - low urea nitrogen, low calcium, elevated CRP, slightly elevated BGs, low hemoglobin and hematocrit    MEDICATIONS  Medications reviewed - arimidex (held), lovenox injection, IV pepcid, zestril, toprol XL (held), paxil, IV zosyn, dextrose IVF at 100 mL/hr = 2,400 mL per day, PRN tylenol last given yesterday 10/1, PRN IV dilaudid last given today 10/2, PRN melatonin given today 10/2, PRN IV zofran last given 9/30, PRN IV compazine last given yesterday 10/1    ANTHROPOMETRICS  Height: 165.1 cm (5' 5\")  Most Recent Weight: 109 kg (240 lb 3.2 oz) via standing scale    IBW: 125 lbs  ADJ BW: 154 lbs  BMI: Obesity Grade II BMI 35-39.9  Weight History:   Wt Readings from Last 15 Encounters:   10/02/23 109 kg (240 lb 3.2 oz)   09/19/23 108.9 kg (240 lb)   06/13/23 109.2 kg (240 lb 11.2 oz)   07/08/22 110.1 kg (242 lb 12.8 oz)   01/12/22 108.4 kg (239 lb)   10/07/21 109.2 kg (240 lb 11.2 oz)   07/12/21 105.2 kg (232 lb)   05/19/21 102.5 kg (226 lb)   10/27/20 103.6 kg (228 lb 4.8 oz)   09/22/20 104.4 kg (230 lb 3.2 oz)   06/08/20 104.3 kg (230 lb)   03/18/20 102.9 kg (226 lb 12.8 oz)   12/11/19 104.9 kg (231 lb 4.8 oz)   04/15/19 108.9 kg (240 lb)   01/10/19 107 kg (236 lb)   Per chart review:   241 lbs on 8/24/23  239 lbs on 4/6/23    0.8% weight loss in approx. 1 year and 3 months (does not meet criteria for malnutrition)    Unsure if weight dipped down and then went back up between chart weights but overall relatively stable.    Dosing Weight: 70 kg using ADJ BW    ASSESSED NUTRITION NEEDS  Estimated Energy Needs: 1,750-2,100 kcals/day (25 - 30 kcals/kg)  Justification: Increased needs and " Repletion  Estimated Protein Needs:  grams protein/day (1.2 - 1.5 grams of pro/kg)  Justification: Hypercatabolism with acute illness, Increased needs, and Repletion  Estimated Fluid Needs: 2,100 mL/day (30 mL/kg)   Justification: Maintenance    PHYSICAL FINDINGS  See malnutrition section below.    MALNUTRITION  % Intake: Unable to assess PTA  % Weight Loss: Weight loss does not meet criteria  Subcutaneous Fat Loss: Unable to assess  Muscle Loss: Unable to assess  Fluid Accumulation/Edema: None noted  Malnutrition Diagnosis: Unable to determine due to lack of NFPE, nutrition hx PTA    NUTRITION DIAGNOSIS  Inadequate oral intake related to acute on chronic illness, GI distress, poor appetite as evidenced by clear liquid status day 4 and increased needs above baseline      INTERVENTIONS  Implementation  Nutrition Education: Will be provided if education needs arise     Collaboration with other providers  Medical food supplement therapy - Ensure clear due to clear liquid status, will adjust as able per preference/tolerance and diet advancement   Multi-trace element supplement therapy - ordered due to intake less than 75% of estimated needs    Goals  Patient to consume % of nutritionally adequate meal trays TID, or the equivalent with supplements/snacks  Pt weight will remain stable during admission  Pt will tolerate diet advancement  Pt will tolerate nutritional supplement     Monitoring/Evaluation  Progress toward goals will be monitored and evaluated per protocol.  Joanie Foote RD, LD  Clinical Dietitian  Office: [phone]  Weekend pager: [phone]

## 2023-10-02 NOTE — PLAN OF CARE
Goal Outcome Evaluation:      Plan of Care Reviewed With: patient    Overall Patient Progress: improvingOverall Patient Progress: improving     Vitals stable. Pt reported H/A 2100, prn tylenol 650 mg given. Bowel sounds more active this shift, tolerating clear liquid diet.

## 2023-10-02 NOTE — PLAN OF CARE
"Goal Outcome Evaluation:      Plan of Care Reviewed With: patient    Overall Patient Progress: improvingOverall Patient Progress: improving    Outcome Evaluation: A & O x4, able to make needs known.  Takes pills whole.  VSS.  Tele on, heart in 1st degree AVB and BBB.  On RA w SpO2 > 90%, lungs clear.  R chest port patent, infusing D5/NS/K+ cont. at 100mL/hr.  No c/o pain throughout shift.  Pt c/o nausea and had a small emesis after lunch, given prn IV Zofran, partially effective.  Pt had ECHO this AM.  Bowel sounds hyperactive all quads.  Pt had BM this afternoon, states she had not had one in two weeks (however per pt chart, had a BM on 9/28).    BP (!) 167/68 (BP Location: Right arm)   Pulse 70   Temp 98.5  F (36.9  C) (Oral)   Resp 18   Ht 1.651 m (5' 5\")   Wt 109.6 kg (241 lb 11.2 oz)   SpO2 98%   BMI 40.22 kg/m      Yaritza Hunt RN on 10/2/2023 at 2:39 PM      "

## 2023-10-02 NOTE — PROGRESS NOTES
Surgery    Subjective:  Patient is feeling better this morning.  Pain is improved.  Have some bradycardia which is getting a currently getting a cardiac work-up.  Currently on clears.    Objective:  Vitals:    10/02/23 0249 10/02/23 0250 10/02/23 0811 10/02/23 0823   BP:  138/58 (!) 146/75    BP Location:  Right arm Right arm    Patient Position:       Cuff Size:       Pulse: (P) 53 52 65    Resp: (P) 16 18 20    Temp: (P) 98.2  F (36.8  C) 98.2  F (36.8  C) 98  F (36.7  C)    TempSrc: (P) Oral Oral Oral    SpO2:  98% 95%    Weight:    109 kg (240 lb 3.2 oz)   Height:         Abdomen: Soft, nondistended, left lower quadrant tenderness with guarding.  Improved from Friday.      Results for orders placed or performed during the hospital encounter of 09/29/23 (from the past 24 hour(s))   Glucose by meter   Result Value Ref Range    GLUCOSE BY METER POCT 85 70 - 99 mg/dL   CBC with Platelets & Differential    Narrative    The following orders were created for panel order CBC with Platelets & Differential.  Procedure                               Abnormality         Status                     ---------                               -----------         ------                     CBC with platelets and d...[[id number]]  Abnormal            Final result                 Please view results for these tests on the individual orders.   Basic metabolic panel   Result Value Ref Range    Sodium 136 135 - 145 mmol/L    Potassium 3.7 3.4 - 5.3 mmol/L    Chloride 104 98 - 107 mmol/L    Carbon Dioxide (CO2) 25 22 - 29 mmol/L    Anion Gap 7 7 - 15 mmol/L    Urea Nitrogen 4.5 (L) 8.0 - 23.0 mg/dL    Creatinine 0.60 0.51 - 0.95 mg/dL    GFR Estimate >90 >60 mL/min/1.73m2    Calcium 8.5 (L) 8.8 - 10.2 mg/dL    Glucose 107 (H) 70 - 99 mg/dL   CRP inflammation   Result Value Ref Range    CRP Inflammation 20.55 (H) <5.00 mg/L   CBC with platelets and differential   Result Value Ref Range    WBC Count 4.9 4.0 - 11.0 10e3/uL    RBC Count 3.62  (L) 3.80 - 5.20 10e6/uL    Hemoglobin 11.3 (L) 11.7 - 15.7 g/dL    Hematocrit 34.3 (L) 35.0 - 47.0 %    MCV 95 78 - 100 fL    MCH 31.2 26.5 - 33.0 pg    MCHC 32.9 31.5 - 36.5 g/dL    RDW 12.5 10.0 - 15.0 %    Platelet Count 293 150 - 450 10e3/uL    % Neutrophils 62 %    % Lymphocytes 24 %    % Monocytes 11 %    % Eosinophils 3 %    % Basophils 0 %    % Immature Granulocytes 0 %    NRBCs per 100 WBC 0 <1 /100    Absolute Neutrophils 3.0 1.6 - 8.3 10e3/uL    Absolute Lymphocytes 1.2 0.8 - 5.3 10e3/uL    Absolute Monocytes 0.5 0.0 - 1.3 10e3/uL    Absolute Eosinophils 0.1 0.0 - 0.7 10e3/uL    Absolute Basophils 0.0 0.0 - 0.2 10e3/uL    Absolute Immature Granulocytes 0.0 <=0.4 10e3/uL    Absolute NRBCs 0.0 10e3/uL       Assessment/plan:  This is 62-year-old lady with sigmoid diverticulitis and intramural abscess.  Improved on IV antibiotics.  Would continue IV antibiotics.  Leave diet on clears until abdominal exam improves further.  If no further improvement would consider CT later in the week.  Cardiac work-up as per hospitalist.    Omari Chavez MD, FACS

## 2023-10-02 NOTE — PLAN OF CARE
"Goal Outcome Evaluation:      Plan of Care Reviewed With: patient    Overall Patient Progress: improvingOverall Patient Progress: improving    Outcome Evaluation: Four hour update note.  VSS, blood pressures elevated 150's-160's systolic.  Given prn compazine in preperation for dinner, effective.  Went [place] a walk in the [place].  Has had multiple large BM's today.  No c/o pain throughout shift.  Received IV Zosyn and cont. fluids discontinued per order.  Pt received Yoni wipe bathe late afternoon.    BP (!) 159/75 (BP Location: Right arm, Patient Position: Supine)   Pulse 70   Temp 99.1  F (37.3  C) (Oral)   Resp 22   Ht 1.651 m (5' 5\")   Wt 109.6 kg (241 lb 11.2 oz)   SpO2 94%   BMI 40.22 kg/m      Yaritza Hunt RN on 10/2/2023 at 6:28 PM    "

## 2023-10-02 NOTE — PLAN OF CARE
"Goal Outcome Evaluation:      Plan of Care Reviewed With: patient    Overall Patient Progress: no changeOverall Patient Progress: no change    Outcome Evaluation: A&Ox4. Sats upper 90s RA. LS clear. Tele reads 1st degree AVB + BBB. Dilaudid 0.3 mg given x1 for LLQ pain. Port-a-cath infusing IVF @ 100 mL/hr. IV Zosyn x1. Clear liquid diet, tolerating OK. Pt C/O intermittent cramping throughout night, potentially worsening with drinking fluids. Melatonin 1 mg given. ECHO in morning.     /58 (BP Location: Right arm)   Pulse 52   Temp 98.2  F (36.8  C) (Oral)   Resp 18   Ht 1.651 m (5' 5\")   Wt 109.8 kg (242 lb)   SpO2 98%   BMI 40.27 kg/m     "
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
## 2023-10-02 NOTE — PROGRESS NOTES
[facility]    Medicine Progress Note - Hospitalist Service    Date of Admission:  9/29/2023    Assessment & Plan   #Acute diverticulitis with probable intramural abscess.  CT scan 9/29/2023 was acute sigmoid diverticulitis.  1.7 cm regional low-density within the thickened sigmoid colon possibly representative of developing intramural abscess.  CRP trending downward 19.74 this a.m. WBC 4.5  Plan:  Continue Zosyn 3.375 g IV every 6 hours.Will transition to PO abx at dismissal   Continue to monitor CRP and WBC.  Continue clears  If the patient develops fever, worsening abdominal pain, peritoneal signs, leukocytosis will repeat CT scan of the abdomen.     #Metastatic breast cancer with bone metastases-in remission.  Arimidex on hold at present.     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  2019 LVEF 41%.  Therapy was subsequently discontinued.  Weight is trending upward slightly. Not in fluid overload. LVEF today has decreased to 25-30% with grade 1 DD  Echo 10-2-23  The left ventricle is normal in size.  Left ventricular systolic function is severely reduced.  The visual ejection fraction is 25-30%.  Septal motion is consistent with conduction abnormality.  There is severe global hypokinesia of the left ventricle.  The right ventricle is normal in size and function.  Trace mitral tricuspid valve regurgitation.  Normal inferior vena cava.    Plan:  BNP 1089   She will need follow up with her Cardiology team as she has changes in most recent echo, new LBBB  Continue with Lisinopril  Metoprolol on hold due to bradycardia      #Episodes of bradycardia.  Patient also found to have new LBBB from previous EKG 2021. HR remains low 60s  Plan:  Metroprolol currently on hold due to episodic bradycardia.  Continue cardiac monitor     #Recurrent major depression.  Plan:  Continue home dose Paxil     #Essential hypertension blood pressure well controlled on  "lisinopril.  Plan:  Continue with lisinopril.  Metroprolol 25 mg daily held due to bradycardia.  Hold parameters for antihypertensives.     #Incidental 0.3 cm right middle lobe pulmonary nodule.  Will need follow-up as outpatient.  Plan:  Patient will need follow-up CT scan to monitor right middle lobe nodule    Discussed results of echo with patient. Recommend she follow up with a Cardiologist.  Patient has never seen cardiology.  When it was found that she had cardiomyopathy to chemotherapy by her Oncologist discontinued the chemo.  Will make referral to Cardiology here for OP management near future       Diet: Clear Liquid Diet  Snacks/Supplements Adult: Ensure Clear; With Meals    DVT Prophylaxis: Enoxaparin (Lovenox) SQ  Orozco Catheter: Not present  Lines: PRESENT      Port a Cath 02/05/14 Single Lumen Right Chest wall-Site Assessment: WDL      Cardiac Monitoring: ACTIVE order. Indication: Acute decompensated heart failure (48 hours)  Code Status: Full Code      Clinically Significant Risk Factors                  # Hypertension: Noted on problem list  # Chronic heart failure with reduced ejection fraction: last echo with EF <40%       # Severe Obesity: Estimated body mass index is 40.22 kg/m  as calculated from the following:    Height as of this encounter: 1.651 m (5' 5\").    Weight as of this encounter: 109.6 kg (241 lb 11.2 oz)., PRESENT ON ADMISSION            Disposition Plan         The patient's care was discussed with the Attending Physician, Dr. Mclaughlin and Patient.    VERONIAK Love [facility]  Hospitalist Service  Formerly [facility]  Securely message with [employer] (more info)  Text page via [employer] Paging/Directory   ______________________________________________________________________    Interval History   Reviewed overnight notes.  No significant events.  Discussed case with Dr. Chavez.  Reviewed echocardiogram results.    Physical Exam   Vital Signs: Temp: 98.5  F (36.9  C) " Temp src: Oral BP: (!) 167/68 Pulse: 70   Resp: 18 SpO2: 98 % O2 Device: None (Room air)    Weight: 241 lbs 11.2 oz    Constitutional: 62-year-old female in bed.  She is on room air.  Does not appear to be in acute distress.  Hematologic / Lymphatic: No bleeding or bruising.  Respiratory: No increased work of breathing while at rest.  Lungs clear to auscultation bilaterally.  No wheezes, rales or rhonchi.  No retractions.  Cardiovascular: S1, S2.  Regular rate and rhythm.  Monitor shows sinus rhythm with bundle branch block rate low 60s.  No rub, gallop, thrill or murmur.  GI: Abdomen is soft, mildly tender left lower quadrant.  Bowel sounds hypoactive.  Skin: Warm and dry.  No cyanosis or pallor.  No rash.  Musculoskeletal: no lower extremity pitting edema present  Neurologic: Patient is alert and oriented.  No focal neurological deficits.  Neuropsychiatric: General: normal, calm, and normal eye contact  Level of consciousness: alert / normal  Affect: normal  Orientation: oriented to self, place, time and situation  Memory and insight: normal, memory for past and recent events intact, and thought process normal    Medical Decision Making       45 MINUTES SPENT BY ME on the date of service doing chart review, history, exam, documentation & further activities per the note.      Data     I have personally reviewed the following data over the past 24 hrs:    4.9  \   11.3 (L)   / 293     136 104 4.5 (L) /  107 (H)   3.7 25 0.60 \     Trop: N/A BNP: 1,089 (H)     Procal: N/A CRP: 20.55 (H) Lactic Acid: N/A         Imaging results reviewed over the past 24 hrs:   Recent Results (from the past 24 hour(s))   Echocardiogram Complete   Result Value    LVEF  25-30%    Narrative    [id number]  [id number]  [id number]  [id number]^KALINA^MEJIA^JAKY     [facility]  Echocardiography Laboratory  [address] [place] [address]     Name: YADY WEINER  MRN: [id number]  : 1960  Study Date:  10/02/2023 11:30 AM  Age: 62 yrs  Gender: Female  Patient Location: [facility]  Reason For Study: Bradycardia - Sinus, LBBB  History: HTN,Breast Cancer  Ordering Physician: MEJIA GILMAN  Performed By: Terrie Pimentel     BSA: 2.1 m2  Height: 65 in  Weight: 242 lb  HR: 61  BP: 151/78 mmHg  ______________________________________________________________________________  Procedure  Complete Portable Echo Adult. Optison (NDC #[id number]) given intravenously.  ______________________________________________________________________________  Interpretation Summary     The left ventricle is normal in size.  Left ventricular systolic function is severely reduced.  The visual ejection fraction is 25-30%.  Septal motion is consistent with conduction abnormality.  There is severe global hypokinesia of the left ventricle.  The right ventricle is normal in size and function.  Trace mitral tricuspid valve regurgitation.  Normal inferior vena cava.     There is no comparison study available.  ______________________________________________________________________________  Left Ventricle  The left ventricle is normal in size. There is mild eccentric left ventricular  hypertrophy. Left ventricular systolic function is severely reduced. The  visual ejection fraction is 25-30%. Grade I or early diastolic dysfunction.  Septal motion is consistent with conduction abnormality. There is severe  global hypokinesia of the left ventricle.     Right Ventricle  The right ventricle is normal in size and function.     Atria  Normal left atrial size. Right atrial size is normal. There is no atrial shunt  seen.     Mitral Valve  The mitral valve leaflets appear normal. There is no evidence of stenosis,  fluttering, or prolapse. There is trace mitral regurgitation. There is no  mitral valve stenosis.     Tricuspid Valve  Normal tricuspid valve. There is trace tricuspid regurgitation. The right  ventricular systolic pressure is approximated at  19.0 mmHg plus the right  atrial pressure. Right ventricle systolic pressure estimate normal.     Aortic Valve  The aortic valve is trileaflet. The aortic valve is not well visualized. No  aortic regurgitation is present. No aortic stenosis is present.     Pulmonic Valve  The pulmonic valve is not well visualized. There is trace pulmonic valvular  regurgitation.     Vessels  The aortic root is normal size. Normal size ascending aorta. The inferior vena  cava is normal.     Pericardium  There is no pericardial effusion.     Rhythm  Sinus rhythm was noted.  ______________________________________________________________________________  MMode/2D Measurements & Calculations     IVSd: 1.1 cm  LVIDd: 5.6 cm  LVIDs: 4.9 cm  LVPWd: 0.87 cm  FS: 12.1 %  LV mass(C)d: 213.1 grams  LV mass(C)dI: 99.4 grams/m2  Ao root diam: 2.8 cm  LA dimension: 3.8 cm  asc Aorta Diam: 3.3 cm  LA/Ao: 1.4  Ao root diam index Ht(cm/m): 1.7  Ao root diam index BSA (cm/m2): 1.3  Asc Ao diam index BSA (cm/m2): 1.5  Asc Ao diam index Ht(cm/m): 2.0  LA Volume (BP): 59.7 ml     LA Volume Index (BP): 27.8 ml/m2  RV Base: 3.5 cm  RWT: 0.31  TAPSE: 2.7 cm     Doppler Measurements & Calculations  MV E max allan: 72.0 cm/sec  MV A max allan: 114.0 cm/sec  MV E/A: 0.63  MV dec time: 0.22 sec  Ao V2 max: 147.4 cm/sec  Ao max P.0 mmHg  LV V1 max PG: 3.7 mmHg  LV V1 max: 96.4 cm/sec  PA acc time: 0.09 sec  TR max allan: 218.2 cm/sec  TR max P.0 mmHg  AV Allan Ratio (DI): 0.65  E/E' av.2     Lateral E/e': 12.3  Medial E/e': 14.1  RV S Allan: 12.3 cm/sec     ______________________________________________________________________________  Report approved by: Dr Deloris Cyr 10/02/2023 01:02 PM

## 2023-10-03 PROCEDURE — 250N000011 HC RX IP 250 OP 636: Performed by: FAMILY MEDICINE

## 2023-10-03 PROCEDURE — 250N000013 HC RX MED GY IP 250 OP 250 PS 637: Performed by: PEDIATRICS

## 2023-10-03 PROCEDURE — 250N000013 HC RX MED GY IP 250 OP 250 PS 637: Performed by: NURSE PRACTITIONER

## 2023-10-03 PROCEDURE — 258N000003 HC RX IP 258 OP 636: Performed by: NURSE PRACTITIONER

## 2023-10-03 PROCEDURE — 250N000011 HC RX IP 250 OP 636: Mod: JZ | Performed by: INTERNAL MEDICINE

## 2023-10-03 PROCEDURE — 250N000013 HC RX MED GY IP 250 OP 250 PS 637: Performed by: FAMILY MEDICINE

## 2023-10-03 PROCEDURE — 99232 SBSQ HOSP IP/OBS MODERATE 35: CPT | Performed by: SPECIALIST

## 2023-10-03 PROCEDURE — 250N000011 HC RX IP 250 OP 636: Mod: JZ | Performed by: SURGERY

## 2023-10-03 PROCEDURE — 120N000001 HC R&B MED SURG/OB

## 2023-10-03 PROCEDURE — 250N000011 HC RX IP 250 OP 636: Mod: JZ | Performed by: NURSE PRACTITIONER

## 2023-10-03 RX ORDER — ENOXAPARIN SODIUM 100 MG/ML
40 INJECTION SUBCUTANEOUS ONCE
Status: COMPLETED | OUTPATIENT
Start: 2023-10-03 | End: 2023-10-03

## 2023-10-03 RX ORDER — SODIUM CHLORIDE 9 MG/ML
INJECTION, SOLUTION INTRAVENOUS CONTINUOUS
Status: DISCONTINUED | OUTPATIENT
Start: 2023-10-03 | End: 2023-10-03

## 2023-10-03 RX ORDER — HEPARIN SODIUM,PORCINE 10 UNIT/ML
5-10 VIAL (ML) INTRAVENOUS
Status: DISCONTINUED | OUTPATIENT
Start: 2023-10-03 | End: 2023-10-06 | Stop reason: HOSPADM

## 2023-10-03 RX ORDER — LISINOPRIL 10 MG/1
10 TABLET ORAL DAILY
Status: DISCONTINUED | OUTPATIENT
Start: 2023-10-04 | End: 2023-10-06 | Stop reason: HOSPADM

## 2023-10-03 RX ORDER — OXYCODONE HYDROCHLORIDE 5 MG/1
5 TABLET ORAL EVERY 4 HOURS PRN
Status: DISCONTINUED | OUTPATIENT
Start: 2023-10-03 | End: 2023-10-06 | Stop reason: HOSPADM

## 2023-10-03 RX ADMIN — ENOXAPARIN SODIUM 40 MG: 40 INJECTION SUBCUTANEOUS at 22:39

## 2023-10-03 RX ADMIN — FAMOTIDINE 20 MG: 10 INJECTION, SOLUTION INTRAVENOUS at 22:14

## 2023-10-03 RX ADMIN — Medication 5 ML: at 16:51

## 2023-10-03 RX ADMIN — PIPERACILLIN AND TAZOBACTAM 3.38 G: 3; .375 INJECTION, POWDER, FOR SOLUTION INTRAVENOUS at 10:45

## 2023-10-03 RX ADMIN — SODIUM CHLORIDE: 9 INJECTION, SOLUTION INTRAVENOUS at 07:14

## 2023-10-03 RX ADMIN — ONDANSETRON 4 MG: 4 TABLET, ORALLY DISINTEGRATING ORAL at 16:15

## 2023-10-03 RX ADMIN — PIPERACILLIN AND TAZOBACTAM 3.38 G: 3; .375 INJECTION, POWDER, FOR SOLUTION INTRAVENOUS at 16:12

## 2023-10-03 RX ADMIN — Medication 1 MG: at 22:13

## 2023-10-03 RX ADMIN — MULTIPLE VITAMINS W/ MINERALS TAB 1 TABLET: TAB at 08:31

## 2023-10-03 RX ADMIN — CARVEDILOL 3.12 MG: 3.12 TABLET, FILM COATED ORAL at 18:06

## 2023-10-03 RX ADMIN — Medication 5 ML: at 06:18

## 2023-10-03 RX ADMIN — FAMOTIDINE 20 MG: 10 INJECTION, SOLUTION INTRAVENOUS at 08:31

## 2023-10-03 RX ADMIN — Medication 5 ML: at 23:02

## 2023-10-03 RX ADMIN — PROCHLORPERAZINE EDISYLATE 10 MG: 5 INJECTION INTRAMUSCULAR; INTRAVENOUS at 08:12

## 2023-10-03 RX ADMIN — HYDROMORPHONE HYDROCHLORIDE 0.3 MG: 1 INJECTION, SOLUTION INTRAMUSCULAR; INTRAVENOUS; SUBCUTANEOUS at 09:37

## 2023-10-03 RX ADMIN — PIPERACILLIN AND TAZOBACTAM 3.38 G: 3; .375 INJECTION, POWDER, FOR SOLUTION INTRAVENOUS at 04:23

## 2023-10-03 RX ADMIN — CARVEDILOL 3.12 MG: 3.12 TABLET, FILM COATED ORAL at 08:31

## 2023-10-03 RX ADMIN — LISINOPRIL 5 MG: 2.5 TABLET ORAL at 08:31

## 2023-10-03 RX ADMIN — OXYCODONE HYDROCHLORIDE 5 MG: 5 TABLET ORAL at 18:55

## 2023-10-03 RX ADMIN — PAROXETINE 20 MG: 20 TABLET, FILM COATED ORAL at 08:31

## 2023-10-03 RX ADMIN — PIPERACILLIN AND TAZOBACTAM 3.38 G: 3; .375 INJECTION, POWDER, FOR SOLUTION INTRAVENOUS at 22:23

## 2023-10-03 ASSESSMENT — ACTIVITIES OF DAILY LIVING (ADL)
ADLS_ACUITY_SCORE: 18

## 2023-10-03 NOTE — PROGRESS NOTES
Nutrition Therapy Update: Brief Note    Writer reviewing chart to follow-up on PO intake and clear liquid status. Per IDT rounding this AM, pt is having increased abdominal pain and distention with clear liquids. IVF was stopped last evening but restarted this morning to support fluid status.     RD attempted to meet with pt for NFPE, nutrition hx PTA, determine supplement preference/tolerance. Appeared to be sound asleep, will re-attempt as able.     Will increase order for Ensure clear (any flavor) to TID with each meal to support nutritional status. Will continue to follow for diet advancement per surgery recs.     Joanie Foote RD, LD  Clinical Dietitian  Office: [phone]  Weekend pager: [phone]

## 2023-10-03 NOTE — PLAN OF CARE
Goal Outcome Evaluation:      Plan of Care Reviewed With: patient    Overall Patient Progress: no changeOverall Patient Progress: no change    Outcome Evaluation: Patient doing well. VSS. No pain this am, but increased cramping and discomfort with clear liquids for breakfast. Dilaudid x1, then slept after for most of day. Awake now, ambulating halls. Port heparin locked. Requested compazine prior to breakfast and zofran given prior to dinner. No complaints at this time.    Rob Metcalf RN

## 2023-10-03 NOTE — PROGRESS NOTES
Surgery HD#4    Subjective:  Patient is feeling better this morning.  Pain is improved.  Had some vomiting last night and is back on clears.  Reports having 5 bowel movements.      Objective:  Vitals:    10/02/23 2357 10/03/23 0320 10/03/23 0608 10/03/23 0729   BP: 124/52 (!) 150/67  (!) 152/84   BP Location: Right arm Right arm  Right arm   Patient Position:       Pulse: 60 57  87   Resp: 18 18  18   Temp: 98.5  F (36.9  C) 97.7  F (36.5  C)  98.1  F (36.7  C)   TempSrc: Oral Oral  Oral   SpO2: 96% 94%  96%   Weight:   107.8 kg (237 lb 9.6 oz)    Height:         Abdomen: Soft, nondistended, left lower quadrant tenderness with guarding.  Only slightly improved today.    No results found for this or any previous visit (from the past 24 hour(s)).      Assessment/plan:  This is 62-year-old lady with sigmoid diverticulitis and intramural abscess.  Improved on IV antibiotics.  Would continue IV antibiotics.  Leave diet on clears until abdominal exam improves further.  If no further improvement would consider CT later in the week at Hospital day 7.      Omari Chavez MD, FACS

## 2023-10-03 NOTE — PLAN OF CARE
Goal Outcome Evaluation:      Plan of Care Reviewed With: patient    Overall Patient Progress: improvingOverall Patient Progress: improving    Outcome Evaluation: Pt A&Ox4. Denied pain during the night. Did c/o mild cramping this morning. Had small, soft BM. Denies nausea. B/Ps elevated. Melatonin given for sleep. IV fluids restarted this AM at 50ml/hr. Port CDI. Up independently.

## 2023-10-03 NOTE — PROGRESS NOTES
Antimicrobial Stewardship Team Note    Antimicrobial Stewardship Program - A joint venture between [facility] and  Physicians to optimize antibiotic management.  NOT a formal consult - Restricted Antimicrobial Review     Patient: Mariluz Stoner  MRN: [id number]  Allergies: Naproxen    Brief Summary: Mariluz Stoner is a 62 year old female with PMHx significant for stage III metastatic breast cancer (2014, on Arimidex), cardiomyopathy (EF 41% in 2019), HTN, depression, and diverticulitis (4/2019), who was admitted on 9/29/2023 with abdominal pain.    History of Present Illness: Patient reported symptoms started 2 weeks prior to admission. On 9/19/2023, she was seen in the clinic due to bleeding hemorrhoids, diarrhea, and constipation that only slightly improved with Imodium. CT a/p showed acute, uncomplicated sigmoid diverticulitis. Patient reported she started taking a leftover antibiotic (likely cephalexin) from a previous infection. Without symptom improvement and worsening pain, she presented to the ED.    On presentation to the ED, she was afebrile (Tmax 98F), hemodynamically stable, and on room air. Initial labs were notable for elevated CRP (44.9) and normal WBC (6.3) and creatinine (0.58) for her baseline. 9/29 CT a/p acute sigmoid diverticulitis, 1.7 cm region of low density within the thickened sigmoid colon (possible developing intramural abscess). 9/28 UCx 10-50k mixed urogenital jillian. While in the ED, she was given one dose of Unasyn, then transitioned to Zosyn. She remains on Zosyn today.         Active Anti-infective Medications   (From admission, onward)                 Start     Stop    09/29/23 1600  piperacillin-tazobactam  3.375 g,   Intravenous,   EVERY 6 HOURS        Intra-Abdominal Infection       --                  Assessment: Conservative management of acute sigmoid diverticulitis w/ probable intramural abscess  Patient is currently on day 5 of empiric Zosyn. Patient has  remained afebrile and hemodynamically stable without leukocytosis. Additionally, CRP trended down (20.55). Patient has been improving, noting yesterday that her pain improved. Given her history of diverticulitis, she is more prone to forming diverticula. Recent constipation and straining could predispose her to diverticulitis. Patient has received 5 days of Pseudomonas coverage with Zosyn. Without risk factors for or history of ESBL or Pseudomonas (I.e., no previous history of isolating, community-dwelling patient with Pseudomonas aeruginosa being an uncommon colonizer of the gut), recommend deescalating to ceftriaxone plus metronidazole. Ceftriaxone provides good coverage of known aerobic GI colonizers. With a high risk for a polymicrobial infection, recommend adding anaerobic coverage with metronidazole. Recommend repeat CT a/p to evaluate abscess response to treatment. Duration of therapy will be dependent upon resolution of abscess. Pending repeat imaging and continued clinical improvement, anticipate being able to transition to a highly bioavailable, oral antibiotic regimen at discharge.       Recommendations:  Stop Zosyn, transition to ceftriaxone 2 g IV q24h plus metronidazole 500 PO q8h  Recommend repeat CT a/p to ensure abscess response to treatment.     Pharmacy took the following actions: Sticky note reminder created, Electronic note created.    Discussed with ID Staff: Buster Wagner MD; Devorah Andrade, PharmD, BCIDP  Ann Neri, 2024 PharmD Candidate  [phone]    Vital Signs/Clinical Features:  Vitals         10/01 0700  10/02 0659 10/02 0700  10/03 0659 10/03 0700  10/03 1223   Most Recent      Temp ( F) 97.7 -  99.1    97.7 -  99.1      98.1     98.1 (36.7) 10/03 0729    Pulse 52 -  66    57 -  73      87     87 10/03 0729    Resp 16 -  20    16 -  22      18     18 10/03 0729    /59 -  147/58    124/52 -  168/73      152/84     152/84 10/03 0729    SpO2 (%) 93 -  98    93 -  98      96      96 10/03 0729            Labs  Estimated Creatinine Clearance: 118.6 mL/min (based on SCr of 0.6 mg/dL).  Recent Labs   Lab Test 04/19/19  0000 11/14/19  0000 09/29/23  1152 09/30/23  0514 10/01/23  0613 10/02/23  0522   CR 0.52* 0.66 0.58 0.55 0.62 0.60       Recent Labs   Lab Test 01/10/19  0759 04/15/19  1706 09/29/23  1152 09/30/23  0514 10/01/23  0612 10/02/23  0522   WBC 5.4 6.2 6.3 5.9 4.5 4.9   ANEU 2.8 4.6  --   --   --   --    ALYM 1.9 0.8  --   --   --   --    ALFONSO 0.5 0.8  --   --   --   --    HGB 12.7 12.4 12.6 11.9 11.3* 11.3*   HCT 38.9 38.0 37.4 36.6 34.3* 34.3*   MCV 97 96 94 96 95 95    203 344 328 280 293       Recent Labs   Lab Test 01/29/18  0000 01/10/19  0759 04/15/19  1706 11/14/19  0000 09/29/23 1152   BILITOTAL  --  0.4 0.2  --  0.4   ALKPHOS  --  79 95  --  93   ALBUMIN  --  3.2* 3.4  --  3.7   AST 17 16 11 17 17   ALT 21 19 22 16 15       Recent Labs   Lab Test 01/10/19  0759 04/15/19  1706 09/29/23  1152 09/30/23  0514 10/01/23  0613 10/02/23  0522   LACT 1.6 1.2  --   --   --   --    CRP 23.1*  --   --   --   --   --    CRPI  --   --  44.90* 38.04* 19.74* 20.55*   SED  --   --  34*  --   --   --              Culture Results:  7-Day Micro Results       Procedure Component Value Units Date/Time    Urine Culture Aerobic Bacterial - lab collect [[id number]] Collected: 09/28/23 1031    Order Status: Completed Lab Status: Final result Updated: 09/29/23 1107    Specimen: Urine, NOS      Culture 10,000-50,000 CFU/mL Mixture of urogenital jillian            Recent Labs   Lab Test 03/18/20  1025 07/12/21  0744 09/19/23  1419   URINEPH 5.5 5.0 5.0   NITRITE Negative Negative Negative   LEUKEST Negative Trace* Trace*   WBCU 0 - 5 3 4                         Imaging: Echocardiogram Complete    Result Date: 10/2/2023  [id number] [facility] [id number]^KALINA^MEJIA^JAKY  [facility] [address] [place] [address]  Name:  YADY WEINER MRN: [id number] : 1960 Study Date: 10/02/2023 11:30 AM Age: 62 yrs Gender: Female Patient Location: [facility] Reason For Study: Bradycardia - Sinus, LBBB History: HTN,Breast Cancer Ordering Physician: MEJIA GILMAN Performed By: Terrie Pmientel  BSA: 2.1 m2 Height: 65 in Weight: 242 lb HR: 61 BP: 151/78 mmHg ______________________________________________________________________________ Procedure Complete Portable Echo Adult. Optison (NDC #[id number]) given intravenously. ______________________________________________________________________________ Interpretation Summary  The left ventricle is normal in size. Left ventricular systolic function is severely reduced. The visual ejection fraction is 25-30%. Septal motion is consistent with conduction abnormality. There is severe global hypokinesia of the left ventricle. The right ventricle is normal in size and function. Trace mitral tricuspid valve regurgitation. Normal inferior vena cava.  There is no comparison study available. ______________________________________________________________________________ Left Ventricle The left ventricle is normal in size. There is mild eccentric left ventricular hypertrophy. Left ventricular systolic function is severely reduced. The visual ejection fraction is 25-30%. Grade I or early diastolic dysfunction. Septal motion is consistent with conduction abnormality. There is severe global hypokinesia of the left ventricle.  Right Ventricle The right ventricle is normal in size and function.  Atria Normal left atrial size. Right atrial size is normal. There is no atrial shunt seen.  Mitral Valve The mitral valve leaflets appear normal. There is no evidence of stenosis, fluttering, or prolapse. There is trace mitral regurgitation. There is no mitral valve stenosis.  Tricuspid Valve Normal tricuspid valve. There is trace tricuspid regurgitation. The right ventricular systolic pressure is approximated at  19.0 mmHg plus the right atrial pressure. Right ventricle systolic pressure estimate normal.  Aortic Valve The aortic valve is trileaflet. The aortic valve is not well visualized. No aortic regurgitation is present. No aortic stenosis is present.  Pulmonic Valve The pulmonic valve is not well visualized. There is trace pulmonic valvular regurgitation.  Vessels The aortic root is normal size. Normal size ascending aorta. The inferior vena cava is normal.  Pericardium There is no pericardial effusion.  Rhythm Sinus rhythm was noted. ______________________________________________________________________________ MMode/2D Measurements & Calculations  IVSd: 1.1 cm LVIDd: 5.6 cm LVIDs: 4.9 cm LVPWd: 0.87 cm FS: 12.1 % LV mass(C)d: 213.1 grams LV mass(C)dI: 99.4 grams/m2 Ao root diam: 2.8 cm LA dimension: 3.8 cm asc Aorta Diam: 3.3 cm LA/Ao: 1.4 Ao root diam index Ht(cm/m): 1.7 Ao root diam index BSA (cm/m2): 1.3 Asc Ao diam index BSA (cm/m2): 1.5 Asc Ao diam index Ht(cm/m): 2.0 LA Volume (BP): 59.7 ml  LA Volume Index (BP): 27.8 ml/m2 RV Base: 3.5 cm RWT: 0.31 TAPSE: 2.7 cm  Doppler Measurements & Calculations MV E max allan: 72.0 cm/sec MV A max allan: 114.0 cm/sec MV E/A: 0.63 MV dec time: 0.22 sec Ao V2 max: 147.4 cm/sec Ao max P.0 mmHg LV V1 max PG: 3.7 mmHg LV V1 max: 96.4 cm/sec PA acc time: 0.09 sec TR max allan: 218.2 cm/sec TR max P.0 mmHg AV Allan Ratio (DI): 0.65 E/E' av.2  Lateral E/e': 12.3 Medial E/e': 14.1 RV S Allan: 12.3 cm/sec  ______________________________________________________________________________ Report approved by: Dr Deloris Cyr 10/02/2023 01:02 PM       CT ABDOMEN PELVIS W CONTRAST    Result Date: 2023  CT ABDOMEN/PELVIS WITH CONTRAST 2023 12:22 PM CLINICAL HISTORY: Left lower quadrant abdominal pain. TECHNIQUE: CT scan of the abdomen and pelvis was performed following injection of IV contrast. Multiplanar reformats were obtained. Dose reduction techniques were used.  CONTRAST: ISOVUE-370, 100 mL. COMPARISON: None. FINDINGS: LOWER CHEST: 0.3 cm indeterminate nodule in the right middle lobe (series 3 image 3) was not included on the previous exam. HEPATOBILIARY: Cholecystectomy. No hepatic masses. PANCREAS: Normal. SPLEEN: Normal. ADRENAL GLANDS: Normal. KIDNEYS/BLADDER: Nonobstructing 0.2 cm stone in the lower pole of the right kidney. Small right renal cyst would require no specific follow-up. Few tiny hypodensities in both kidneys are too small to characterize, but may also represent cysts. No hydronephrosis. BOWEL: Colonic diverticulosis. Focal bowel wall thickening with moderate surrounding inflammatory stranding in the proximal sigmoid colon, consistent with acute diverticulitis. Small area of low density within the thickened sigmoid colon measures 1.7 cm, and could represent a developing intramural abscess (series 3 image 174; series 4 image 43). No other associated fluid collections. No bowel obstruction. Unremarkable appendix. PELVIC ORGANS: Unremarkable. LYMPH NODES: No enlarged lymph nodes are identified in the abdomen or pelvis. VASCULATURE: Unremarkable. ADDITIONAL FINDINGS: None. MUSCULOSKELETAL: Unremarkable.     IMPRESSION: 1.  Acute sigmoid diverticulitis. 2.  A 1.7 cm region of low density within the thickened sigmoid colon could represent a developing intramural abscess. 3.  Indeterminate 0.3 cm right middle lobe pulmonary nodule. Please refer to follow-up guidelines below. Recommendations for one or multiple incidental lung nodules < 6mm :   Low risk patients: No routine follow-up.   High risk patients: Optional follow-up CT at 12 months; if unchanged, no further follow-up. *Low Risk: Minimal or absent history of smoking or other known risk factors. *Nonsolid (ground glass) or partly solid nodules may require longer follow-up to exclude indolent adenocarcinoma. *Recommendations based on Guidelines for the Management of Incidental Pulmonary Nodules Detected at  CT: From the Fleischner Society 2017, Radiology 2017. *Guidelines apply to incidental nodules in patients who are 35 years or older. *Guidelines do not apply to lung cancer screening, patients with immunosuppression, or patients with known primary cancer. DAYSI COHEN MD   SYSTEM ID:  [id number]

## 2023-10-04 LAB
ANION GAP SERPL CALCULATED.3IONS-SCNC: 8 MMOL/L (ref 7–15)
BASO+EOS+MONOS # BLD AUTO: ABNORMAL 10*3/UL
BASO+EOS+MONOS NFR BLD AUTO: ABNORMAL %
BASOPHILS # BLD AUTO: 0 10E3/UL (ref 0–0.2)
BASOPHILS NFR BLD AUTO: 0 %
BUN SERPL-MCNC: 5.3 MG/DL (ref 8–23)
CALCIUM SERPL-MCNC: 8.6 MG/DL (ref 8.8–10.2)
CHLORIDE SERPL-SCNC: 103 MMOL/L (ref 98–107)
CREAT SERPL-MCNC: 0.65 MG/DL (ref 0.51–0.95)
DEPRECATED HCO3 PLAS-SCNC: 27 MMOL/L (ref 22–29)
EGFRCR SERPLBLD CKD-EPI 2021: >90 ML/MIN/1.73M2
EOSINOPHIL # BLD AUTO: 0.2 10E3/UL (ref 0–0.7)
EOSINOPHIL NFR BLD AUTO: 3 %
ERYTHROCYTE [DISTWIDTH] IN BLOOD BY AUTOMATED COUNT: 12.8 % (ref 10–15)
GLUCOSE SERPL-MCNC: 95 MG/DL (ref 70–99)
HCT VFR BLD AUTO: 35.1 % (ref 35–47)
HGB BLD-MCNC: 11.6 G/DL (ref 11.7–15.7)
IMM GRANULOCYTES # BLD: 0 10E3/UL
IMM GRANULOCYTES NFR BLD: 0 %
LYMPHOCYTES # BLD AUTO: 1.1 10E3/UL (ref 0.8–5.3)
LYMPHOCYTES NFR BLD AUTO: 24 %
MCH RBC QN AUTO: 31.4 PG (ref 26.5–33)
MCHC RBC AUTO-ENTMCNC: 33 G/DL (ref 31.5–36.5)
MCV RBC AUTO: 95 FL (ref 78–100)
MONOCYTES # BLD AUTO: 0.5 10E3/UL (ref 0–1.3)
MONOCYTES NFR BLD AUTO: 11 %
NEUTROPHILS # BLD AUTO: 2.7 10E3/UL (ref 1.6–8.3)
NEUTROPHILS NFR BLD AUTO: 62 %
NRBC # BLD AUTO: 0 10E3/UL
NRBC BLD AUTO-RTO: 0 /100
PLATELET # BLD AUTO: 263 10E3/UL (ref 150–450)
POTASSIUM SERPL-SCNC: 3.7 MMOL/L (ref 3.4–5.3)
RBC # BLD AUTO: 3.7 10E6/UL (ref 3.8–5.2)
SODIUM SERPL-SCNC: 138 MMOL/L (ref 135–145)
WBC # BLD AUTO: 4.5 10E3/UL (ref 4–11)

## 2023-10-04 PROCEDURE — 250N000013 HC RX MED GY IP 250 OP 250 PS 637: Performed by: SPECIALIST

## 2023-10-04 PROCEDURE — 99232 SBSQ HOSP IP/OBS MODERATE 35: CPT | Performed by: SPECIALIST

## 2023-10-04 PROCEDURE — 250N000011 HC RX IP 250 OP 636: Mod: JZ | Performed by: INTERNAL MEDICINE

## 2023-10-04 PROCEDURE — 250N000013 HC RX MED GY IP 250 OP 250 PS 637: Performed by: FAMILY MEDICINE

## 2023-10-04 PROCEDURE — 80048 BASIC METABOLIC PNL TOTAL CA: CPT | Performed by: NURSE PRACTITIONER

## 2023-10-04 PROCEDURE — 120N000001 HC R&B MED SURG/OB

## 2023-10-04 PROCEDURE — 250N000011 HC RX IP 250 OP 636: Mod: JZ | Performed by: FAMILY MEDICINE

## 2023-10-04 PROCEDURE — 250N000011 HC RX IP 250 OP 636: Mod: JZ | Performed by: SURGERY

## 2023-10-04 PROCEDURE — 85025 COMPLETE CBC W/AUTO DIFF WBC: CPT | Performed by: NURSE PRACTITIONER

## 2023-10-04 PROCEDURE — 999N000123 HC STATISTIC OXYGEN O2DAILY TECH TIME

## 2023-10-04 PROCEDURE — 250N000013 HC RX MED GY IP 250 OP 250 PS 637: Performed by: PEDIATRICS

## 2023-10-04 PROCEDURE — 999N000156 HC STATISTIC RCP CONSULT EA 30 MIN

## 2023-10-04 PROCEDURE — 999N000157 HC STATISTIC RCP TIME EA 10 MIN

## 2023-10-04 PROCEDURE — 250N000013 HC RX MED GY IP 250 OP 250 PS 637: Performed by: NURSE PRACTITIONER

## 2023-10-04 RX ORDER — FAMOTIDINE 20 MG/1
20 TABLET, FILM COATED ORAL 2 TIMES DAILY
Status: DISCONTINUED | OUTPATIENT
Start: 2023-10-04 | End: 2023-10-06 | Stop reason: HOSPADM

## 2023-10-04 RX ADMIN — PIPERACILLIN AND TAZOBACTAM 3.38 G: 3; .375 INJECTION, POWDER, FOR SOLUTION INTRAVENOUS at 04:31

## 2023-10-04 RX ADMIN — CARVEDILOL 3.12 MG: 3.12 TABLET, FILM COATED ORAL at 17:43

## 2023-10-04 RX ADMIN — FAMOTIDINE 20 MG: 20 TABLET ORAL at 21:04

## 2023-10-04 RX ADMIN — PIPERACILLIN AND TAZOBACTAM 3.38 G: 3; .375 INJECTION, POWDER, FOR SOLUTION INTRAVENOUS at 09:56

## 2023-10-04 RX ADMIN — PIPERACILLIN AND TAZOBACTAM 3.38 G: 3; .375 INJECTION, POWDER, FOR SOLUTION INTRAVENOUS at 16:22

## 2023-10-04 RX ADMIN — CARVEDILOL 3.12 MG: 3.12 TABLET, FILM COATED ORAL at 07:36

## 2023-10-04 RX ADMIN — Medication 5 ML: at 05:46

## 2023-10-04 RX ADMIN — FAMOTIDINE 20 MG: 10 INJECTION, SOLUTION INTRAVENOUS at 08:58

## 2023-10-04 RX ADMIN — PROCHLORPERAZINE MALEATE 10 MG: 5 TABLET ORAL at 11:53

## 2023-10-04 RX ADMIN — MULTIPLE VITAMINS W/ MINERALS TAB 1 TABLET: TAB at 08:58

## 2023-10-04 RX ADMIN — PAROXETINE 20 MG: 20 TABLET, FILM COATED ORAL at 08:58

## 2023-10-04 RX ADMIN — OXYCODONE HYDROCHLORIDE 5 MG: 5 TABLET ORAL at 08:58

## 2023-10-04 RX ADMIN — Medication 5 ML: at 22:39

## 2023-10-04 RX ADMIN — ONDANSETRON 4 MG: 4 TABLET, ORALLY DISINTEGRATING ORAL at 07:36

## 2023-10-04 RX ADMIN — Medication 5 ML: at 17:02

## 2023-10-04 RX ADMIN — PIPERACILLIN AND TAZOBACTAM 3.38 G: 3; .375 INJECTION, POWDER, FOR SOLUTION INTRAVENOUS at 21:04

## 2023-10-04 RX ADMIN — LISINOPRIL 10 MG: 10 TABLET ORAL at 08:58

## 2023-10-04 RX ADMIN — Medication 5 ML: at 10:34

## 2023-10-04 RX ADMIN — ENOXAPARIN SODIUM 40 MG: 40 INJECTION SUBCUTANEOUS at 21:04

## 2023-10-04 ASSESSMENT — ACTIVITIES OF DAILY LIVING (ADL)
ADLS_ACUITY_SCORE: 18
ADLS_ACUITY_SCORE: 21
ADLS_ACUITY_SCORE: 18
ADLS_ACUITY_SCORE: 21
ADLS_ACUITY_SCORE: 18
ADLS_ACUITY_SCORE: 21

## 2023-10-04 NOTE — PROGRESS NOTES
Surgery HD#5    Subjective:  Patient is feeling better this morning.  Pain is improved.  Still having bowel movements.  Tolerated a full tray of clears last night this morning without symptoms.    Objective:  Vitals:    10/04/23 0435 10/04/23 0436 10/04/23 0654 10/04/23 0734   BP:    135/54   BP Location:    Right arm   Pulse:    65   Resp:       Temp:    98.1  F (36.7  C)   TempSrc:    Oral   SpO2: (!) 88% 94%  95%   Weight:   107.4 kg (236 lb 11.2 oz)    Height:         Abdomen: Soft, nondistended, left lower quadrant tenderness resolved.    Results for orders placed or performed during the hospital encounter of 09/29/23 (from the past 24 hour(s))   CBC with Platelets & Differential    Narrative    The following orders were created for panel order CBC with Platelets & Differential.  Procedure                               Abnormality         Status                     ---------                               -----------         ------                     CBC with platelets and d...[[id number]]  Abnormal            Final result                 Please view results for these tests on the individual orders.   Basic metabolic panel   Result Value Ref Range    Sodium 138 135 - 145 mmol/L    Potassium 3.7 3.4 - 5.3 mmol/L    Chloride 103 98 - 107 mmol/L    Carbon Dioxide (CO2) 27 22 - 29 mmol/L    Anion Gap 8 7 - 15 mmol/L    Urea Nitrogen 5.3 (L) 8.0 - 23.0 mg/dL    Creatinine 0.65 0.51 - 0.95 mg/dL    GFR Estimate >90 >60 mL/min/1.73m2    Calcium 8.6 (L) 8.8 - 10.2 mg/dL    Glucose 95 70 - 99 mg/dL   CBC with platelets and differential   Result Value Ref Range    WBC Count 4.5 4.0 - 11.0 10e3/uL    RBC Count 3.70 (L) 3.80 - 5.20 10e6/uL    Hemoglobin 11.6 (L) 11.7 - 15.7 g/dL    Hematocrit 35.1 35.0 - 47.0 %    MCV 95 78 - 100 fL    MCH 31.4 26.5 - 33.0 pg    MCHC 33.0 31.5 - 36.5 g/dL    RDW 12.8 10.0 - 15.0 %    Platelet Count 263 150 - 450 10e3/uL    % Neutrophils 62 %    % Lymphocytes 24 %    % Monocytes 11 %    Mids  % (Monos, Eos, Basos)      % Eosinophils 3 %    % Basophils 0 %    % Immature Granulocytes 0 %    NRBCs per 100 WBC 0 <1 /100    Absolute Neutrophils 2.7 1.6 - 8.3 10e3/uL    Absolute Lymphocytes 1.1 0.8 - 5.3 10e3/uL    Absolute Monocytes 0.5 0.0 - 1.3 10e3/uL    Mids Abs (Monos, Eos, Basos)      Absolute Eosinophils 0.2 0.0 - 0.7 10e3/uL    Absolute Basophils 0.0 0.0 - 0.2 10e3/uL    Absolute Immature Granulocytes 0.0 <=0.4 10e3/uL    Absolute NRBCs 0.0 10e3/uL         Assessment/plan:  This is 62-year-old lady with sigmoid diverticulitis and intramural abscess.  Improved on IV antibiotics.  Would continue IV antibiotics.  Abdominal exam is much improved today.  Would ADA T.  Continue IV antibiotics.  If no further improvement would consider CT later in the week at Hospital day 7.      Omari Chavez MD, FACS

## 2023-10-04 NOTE — PROGRESS NOTES
[facility]    Medicine Progress Note - Hospitalist Service    Date of Admission:  9/29/2023    Assessment & Plan   #Acute diverticulitis with probable intramural abscess.  CT scan 9/29/2023 was acute sigmoid diverticulitis.  1.7 cm regional low-density within the thickened sigmoid colon possibly representative of developing intramural abscess.  CRP trending downward 19.74 this a.m. WBC 4.5.  Patient is improving.  Tolerating full diet  Plan:  Continue Zosyn 3.375 g IV every 6 hours.Will transition to PO abx at dismissal   Continue to monitor CRP and WBC.  Continue diet as tolerates  If the patient develops fever, worsening abdominal pain, peritoneal signs, leukocytosis will repeat CT scan of the abdomen.     #Metastatic breast cancer with bone metastases-in remission.  Arimidex on hold at present.     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  2019 LVEF 41%.  Therapy was subsequently discontinued.  Weight is trending upward slightly. Not in fluid overload. LVEF today has decreased to 25-30% with grade 1 DD  Echo 10-2-23  The left ventricle is normal in size.  Left ventricular systolic function is severely reduced.  The visual ejection fraction is 25-30%.  Septal motion is consistent with conduction abnormality.  There is severe global hypokinesia of the left ventricle.  The right ventricle is normal in size and function.  Trace mitral tricuspid valve regurgitation.  Normal inferior vena cava.     Plan:  BNP 1089   She will need follow up with her Cardiology team as she has changes in most recent echo, new LBBB  Continue with Lisinopril  Metoprolol on hold due to bradycardia      #Episodes of bradycardia.  Patient also found to have new LBBB from previous EKG 2021. HR remains low 60s  Plan:  Metroprolol currently on hold due to episodic bradycardia.  Continue cardiac monitor     #Recurrent major depression.  Plan:  Continue home dose Paxil     #Essential hypertension  "blood pressure well controlled on lisinopril.  Plan:  Continue with lisinopril.  Metroprolol 25 mg daily held due to bradycardia.  Hold parameters for antihypertensives.    # Suspected sleep apnea. Pt reports snoring. Has mid-day sleepiness, AM fatigue, restless sleep. Nursing reports decreased O2 sats at night requiring O2 supplementation. Had episodes of bradycardia.   Plan:   Recommended to pt and son to discuss with PCP OP sleep study      #Incidental 0.3 cm right middle lobe pulmonary nodule.  Will need follow-up as outpatient.  Plan:  Patient will need follow-up CT scan to monitor right middle lobe nodule       Diet: Snacks/Supplements Adult: Ensure Clear; With Meals  Advance Diet as Tolerated: Full Liquid Diet    DVT Prophylaxis: Enoxaparin (Lovenox) SQ  Orozco Catheter: Not present  Lines: PRESENT      Port a Cath 02/05/14 Single Lumen Right Chest wall-Site Assessment: WDL      Cardiac Monitoring: ACTIVE order. Indication: Bradycardias (48 hours)  Code Status: Full Code      Clinically Significant Risk Factors                  # Hypertension: Noted on problem list  # Chronic heart failure with reduced ejection fraction: last echo with EF <40%       # Obesity: Estimated body mass index is 39.39 kg/m  as calculated from the following:    Height as of this encounter: 1.651 m (5' 5\").    Weight as of this encounter: 107.4 kg (236 lb 11.2 oz).             Disposition Plan         The patient's care was discussed with the Attending Physician, Dr. Boo, Bedside Nurse, Patient, and Patient's Family.    VERONIKA [facility]  Hospitalist Service  [facility]  Securely message with [employer] (more info)  Text page via [facility] Paging/Directory   ______________________________________________________________________    Interval History   Reviewed overnight notes.  No significant events.  Patient reportedly had nausea after eating and nominal discomfort.    Physical Exam   Vital Signs: " Temp: 98.4  F (36.9  C) Temp src: Oral BP: 135/68 Pulse: 67   Resp: 20 SpO2: 95 % O2 Device: None (Room air) Oxygen Delivery: 1 LPM  Weight: 236 lbs 11.2 oz    Constitutional: 62-year-old female in bed.  She is on room air.  She is fully awake and alert.  In no distress.  Hematologic / Lymphatic: No bleeding or bruising.  Respiratory: No increased work of breathing while at rest.  Lung sounds slightly diminished.  No wheezes, rales or rhonchi.  No retractions.  Cardiovascular: S1, S2.  Regular rate and rhythm.  No rub, gallop, thrill or murmur monitor shows sinus rhythm first-degree AV block and left bundle branch block.  Rate in the 60s..  GI: Abdomen is soft, nontender to palpation.  Bowel sounds hypoactive.  Skin: Warm dry.  No cyanosis, pallor or rash.  Musculoskeletal: no lower extremity pitting edema present  Neurologic: Alert and oriented x4.  No focal neurological deficits.  Neuropsychiatric: General: normal, calm, and normal eye contact  Level of consciousness: alert / normal  Affect: normal  Orientation: oriented to self, place, time and situation  Memory and insight: normal, memory for past and recent events intact, and thought process normal    Medical Decision Making       45 MINUTES SPENT BY ME on the date of service doing chart review, history, exam, documentation & further activities per the note.      Data     I have personally reviewed the following data over the past 24 hrs:    4.5  \   11.6 (L)   / 263     138 103 5.3 (L) /  95   3.7 27 0.65 \

## 2023-10-04 NOTE — PLAN OF CARE
Goal Outcome Evaluation:      Plan of Care Reviewed With: patient    Overall Patient Progress: improvingOverall Patient Progress: improving    Outcome Evaluation: Patient A&Ox4.  VSS. Pain after breakfast. No pain with full diet for lunch and tolerated mechanial soft diet for dinner. No pre-med for dinner as patient was requesting zofran and compazine prior to other meals. No new complaints.    Rob Metcalf RN

## 2023-10-04 NOTE — PLAN OF CARE
Goal Outcome Evaluation:           Overall Patient Progress: no changeOverall Patient Progress: no change    Outcome Evaluation: Patient alert and oriented, VSS other than requiring some O2 overnight while sleeping. IS provided. Per report, should be clears, needing anti nausea meds before meals. Independent in room. PRN melatonin x 1, otherwise no PRN's given. Port heparin locked. Patient reports she slept well. Tele 1st degree Av block with BBB and occasional PVC's.

## 2023-10-05 ENCOUNTER — APPOINTMENT (OUTPATIENT)
Dept: CT IMAGING | Facility: CLINIC | Age: 63
DRG: 392 | End: 2023-10-05
Attending: SPECIALIST
Payer: COMMERCIAL

## 2023-10-05 LAB
ANION GAP SERPL CALCULATED.3IONS-SCNC: 9 MMOL/L (ref 7–15)
BASO+EOS+MONOS # BLD AUTO: NORMAL 10*3/UL
BASO+EOS+MONOS NFR BLD AUTO: NORMAL %
BASOPHILS # BLD AUTO: 0 10E3/UL (ref 0–0.2)
BASOPHILS NFR BLD AUTO: 0 %
BUN SERPL-MCNC: 7.1 MG/DL (ref 8–23)
CALCIUM SERPL-MCNC: 9.1 MG/DL (ref 8.8–10.2)
CHLORIDE SERPL-SCNC: 103 MMOL/L (ref 98–107)
CREAT SERPL-MCNC: 0.66 MG/DL (ref 0.51–0.95)
DEPRECATED HCO3 PLAS-SCNC: 28 MMOL/L (ref 22–29)
EGFRCR SERPLBLD CKD-EPI 2021: >90 ML/MIN/1.73M2
EOSINOPHIL # BLD AUTO: 0.2 10E3/UL (ref 0–0.7)
EOSINOPHIL NFR BLD AUTO: 3 %
ERYTHROCYTE [DISTWIDTH] IN BLOOD BY AUTOMATED COUNT: 12.8 % (ref 10–15)
GLUCOSE SERPL-MCNC: 105 MG/DL (ref 70–99)
HCT VFR BLD AUTO: 36.6 % (ref 35–47)
HGB BLD-MCNC: 12.2 G/DL (ref 11.7–15.7)
IMM GRANULOCYTES # BLD: 0 10E3/UL
IMM GRANULOCYTES NFR BLD: 0 %
LYMPHOCYTES # BLD AUTO: 1.4 10E3/UL (ref 0.8–5.3)
LYMPHOCYTES NFR BLD AUTO: 24 %
MCH RBC QN AUTO: 31.4 PG (ref 26.5–33)
MCHC RBC AUTO-ENTMCNC: 33.3 G/DL (ref 31.5–36.5)
MCV RBC AUTO: 94 FL (ref 78–100)
MONOCYTES # BLD AUTO: 0.6 10E3/UL (ref 0–1.3)
MONOCYTES NFR BLD AUTO: 11 %
NEUTROPHILS # BLD AUTO: 3.6 10E3/UL (ref 1.6–8.3)
NEUTROPHILS NFR BLD AUTO: 62 %
NRBC # BLD AUTO: 0 10E3/UL
NRBC BLD AUTO-RTO: 0 /100
PLATELET # BLD AUTO: 303 10E3/UL (ref 150–450)
POTASSIUM SERPL-SCNC: 4.3 MMOL/L (ref 3.4–5.3)
RBC # BLD AUTO: 3.88 10E6/UL (ref 3.8–5.2)
SODIUM SERPL-SCNC: 140 MMOL/L (ref 135–145)
WBC # BLD AUTO: 5.7 10E3/UL (ref 4–11)

## 2023-10-05 PROCEDURE — 250N000013 HC RX MED GY IP 250 OP 250 PS 637: Performed by: SPECIALIST

## 2023-10-05 PROCEDURE — 250N000013 HC RX MED GY IP 250 OP 250 PS 637: Performed by: NURSE PRACTITIONER

## 2023-10-05 PROCEDURE — 80048 BASIC METABOLIC PNL TOTAL CA: CPT | Performed by: NURSE PRACTITIONER

## 2023-10-05 PROCEDURE — 250N000011 HC RX IP 250 OP 636: Mod: JZ | Performed by: INTERNAL MEDICINE

## 2023-10-05 PROCEDURE — 85025 COMPLETE CBC W/AUTO DIFF WBC: CPT | Performed by: NURSE PRACTITIONER

## 2023-10-05 PROCEDURE — 250N000011 HC RX IP 250 OP 636: Performed by: SPECIALIST

## 2023-10-05 PROCEDURE — 250N000013 HC RX MED GY IP 250 OP 250 PS 637: Performed by: PEDIATRICS

## 2023-10-05 PROCEDURE — 120N000001 HC R&B MED SURG/OB

## 2023-10-05 PROCEDURE — 74177 CT ABD & PELVIS W/CONTRAST: CPT

## 2023-10-05 PROCEDURE — 99232 SBSQ HOSP IP/OBS MODERATE 35: CPT | Performed by: SPECIALIST

## 2023-10-05 PROCEDURE — 250N000009 HC RX 250: Performed by: SPECIALIST

## 2023-10-05 PROCEDURE — 250N000011 HC RX IP 250 OP 636: Mod: JZ | Performed by: FAMILY MEDICINE

## 2023-10-05 PROCEDURE — 250N000013 HC RX MED GY IP 250 OP 250 PS 637: Performed by: FAMILY MEDICINE

## 2023-10-05 RX ORDER — IOPAMIDOL 755 MG/ML
500 INJECTION, SOLUTION INTRAVASCULAR ONCE
Status: COMPLETED | OUTPATIENT
Start: 2023-10-05 | End: 2023-10-05

## 2023-10-05 RX ADMIN — MULTIPLE VITAMINS W/ MINERALS TAB 1 TABLET: TAB at 09:08

## 2023-10-05 RX ADMIN — CARVEDILOL 3.12 MG: 3.12 TABLET, FILM COATED ORAL at 17:58

## 2023-10-05 RX ADMIN — Medication 1 MG: at 20:12

## 2023-10-05 RX ADMIN — SODIUM CHLORIDE 60 ML: 9 INJECTION, SOLUTION INTRAVENOUS at 11:25

## 2023-10-05 RX ADMIN — FAMOTIDINE 20 MG: 20 TABLET ORAL at 20:13

## 2023-10-05 RX ADMIN — Medication 5 ML: at 06:23

## 2023-10-05 RX ADMIN — PIPERACILLIN AND TAZOBACTAM 3.38 G: 3; .375 INJECTION, POWDER, FOR SOLUTION INTRAVENOUS at 09:08

## 2023-10-05 RX ADMIN — PIPERACILLIN AND TAZOBACTAM 3.38 G: 3; .375 INJECTION, POWDER, FOR SOLUTION INTRAVENOUS at 04:19

## 2023-10-05 RX ADMIN — OXYCODONE HYDROCHLORIDE 5 MG: 5 TABLET ORAL at 09:08

## 2023-10-05 RX ADMIN — Medication 5 ML: at 16:57

## 2023-10-05 RX ADMIN — PIPERACILLIN AND TAZOBACTAM 3.38 G: 3; .375 INJECTION, POWDER, FOR SOLUTION INTRAVENOUS at 16:15

## 2023-10-05 RX ADMIN — Medication 5 ML: at 09:44

## 2023-10-05 RX ADMIN — PAROXETINE 20 MG: 20 TABLET, FILM COATED ORAL at 09:09

## 2023-10-05 RX ADMIN — FAMOTIDINE 20 MG: 20 TABLET ORAL at 09:08

## 2023-10-05 RX ADMIN — IOPAMIDOL 100 ML: 755 INJECTION, SOLUTION INTRAVENOUS at 11:24

## 2023-10-05 RX ADMIN — PIPERACILLIN AND TAZOBACTAM 3.38 G: 3; .375 INJECTION, POWDER, FOR SOLUTION INTRAVENOUS at 22:14

## 2023-10-05 RX ADMIN — CARVEDILOL 3.12 MG: 3.12 TABLET, FILM COATED ORAL at 09:09

## 2023-10-05 RX ADMIN — Medication 1 MG: at 03:00

## 2023-10-05 RX ADMIN — ENOXAPARIN SODIUM 40 MG: 40 INJECTION SUBCUTANEOUS at 22:14

## 2023-10-05 RX ADMIN — LISINOPRIL 10 MG: 10 TABLET ORAL at 09:08

## 2023-10-05 ASSESSMENT — ACTIVITIES OF DAILY LIVING (ADL)
ADLS_ACUITY_SCORE: 21
ADLS_ACUITY_SCORE: 18
ADLS_ACUITY_SCORE: 21
ADLS_ACUITY_SCORE: 18
ADLS_ACUITY_SCORE: 21
ADLS_ACUITY_SCORE: 21

## 2023-10-05 NOTE — PLAN OF CARE
"Goal Outcome Evaluation:      Plan of Care Reviewed With: patient    Overall Patient Progress: improvingOverall Patient Progress: improving    Outcome Evaluation: Pt tolerated Soft doet for breakfast and snacks overnight without pain or nausea.  Regular diet ordered for breakfast.  VSS and Pt is AAOx4. possible D/C today depending on PO breakfast results on Regular diet    BP (!) 147/75 (BP Location: Right arm)   Pulse 64   Temp 98.5  F (36.9  C) (Oral)   Resp 20   Ht 1.651 m (5' 5\")   Wt 107.8 kg (237 lb 10.5 oz)   SpO2 95%   BMI 39.55 kg/m     "

## 2023-10-05 NOTE — PROGRESS NOTES
[facility]    Medicine Progress Note - Hospitalist Service    Date of Admission:  9/29/2023    Assessment & Plan   #Pt continues to have diverticulitis, no abscess or perforation  CT scan 9/29/2023 was acute sigmoid diverticulitis.  1.7 cm regional low-density within the thickened sigmoid colon possibly representative of developing intramural abscess. CT today-see below. CRP trending downward 19.74, WBC 5.7  Plan:  Continue Zosyn 3.375 g IV every 6 hours.Will transition to PO abx at dismissal   Continue to monitor  Continue diet as tolerates  Abd exam seems improved, less LLQ tenderness    CT A-P With contrast   BOWEL: Again seen are changes of sigmoid diverticulitis with marked  circumferential wall thickening of the diverticular segment of sigmoid  colon with surrounding fat stranding. Previously seen small intramural  phlegmon in the inflamed sigmoid colon has resolved. No perisigmoid  abscess or free air. Stable mild edema/enlargement of the adjacent  left ovary. No small bowel or colonic obstruction. Normal appendix.  Scattered diverticuli in the remainder of the colon.     #Metastatic breast cancer with bone metastases-in remission.  Arimidex on hold at present.     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  2019 LVEF 41%.  Therapy was subsequently discontinued.  Weight is trending upward slightly. Not in fluid overload. LVEF today has decreased to 25-30% with grade 1 DD  Echo 10-2-23  The left ventricle is normal in size.  Left ventricular systolic function is severely reduced.  The visual ejection fraction is 25-30%.  Septal motion is consistent with conduction abnormality.  There is severe global hypokinesia of the left ventricle.  The right ventricle is normal in size and function.  Trace mitral tricuspid valve regurgitation.  Normal inferior vena cava.     Plan:  BNP 1089   She will need follow up with her Cardiology team as she has changes in most recent  "echo, new LBBB  Continue with Lisinopril  Metoprolol on hold due to bradycardia      #Episodes of bradycardia.  Patient also found to have new LBBB from previous EKG 2021. HR remains low 60s  Plan:  Metroprolol currently on hold due to episodic bradycardia.  Continue cardiac monitor     #Recurrent major depression.  Plan:  Continue home dose Paxil     #Essential hypertension blood pressure well controlled on lisinopril.  Plan:  Continue with lisinopril.  Metroprolol 25 mg daily held due to bradycardia.  Hold parameters for antihypertensives.     # Suspected sleep apnea. Pt reports snoring. Has mid-day sleepiness, AM fatigue, restless sleep. Nursing reports decreased O2 sats at night requiring O2 supplementation. Had episodes of bradycardia.   Plan:   Recommended to pt and son to discuss with PCP OP sleep study      #Incidental 0.3 cm right middle lobe pulmonary nodule.  Will need follow-up as outpatient.  Plan:  Patient will need follow-up CT scan to monitor right middle lobe nodule    Pt making appointments for Cardiology follow up and sleep study        Diet: Snacks/Supplements Adult: Ensure Clear; With Meals  Advance Diet as Tolerated: Regular Diet Adult    DVT Prophylaxis: Enoxaparin (Lovenox) SQ  Orozco Catheter: Not present  Lines: PRESENT      Port a Cath 02/05/14 Single Lumen Right Chest wall-Site Assessment: WDL      Cardiac Monitoring: ACTIVE order. Indication: Bradycardias (48 hours)  Code Status: Full Code      Clinically Significant Risk Factors                  # Hypertension: Noted on problem list  # Chronic heart failure with reduced ejection fraction: last echo with EF <40%       # Obesity: Estimated body mass index is 39.55 kg/m  as calculated from the following:    Height as of this encounter: 1.651 m (5' 5\").    Weight as of this encounter: 107.8 kg (237 lb 10.5 oz).             Disposition Plan         The patient's care was discussed with the Attending Physician, Dr. Boo and " Patient.    VERONIKA [facility]  Hospitalist Service  [facility]  Securely message with [employer] (more info)  Text page via [employer] Paging/Directory   ______________________________________________________________________    Interval History   Reviewed overnight notes and Surgeon's note. No significant events    Physical Exam   Vital Signs: Temp: 98.4  F (36.9  C) Temp src: Oral BP: (!) 151/74 Pulse: 78   Resp: 20 SpO2: 96 % O2 Device: None (Room air)    Weight: 237 lbs 10.49 oz    Constitutional: 63 yo female in bed, on RA, not in acute distress  Eyes: Sclera anicteric, conjunctiva normal  Hematologic / Lymphatic: No bleeding or bruising  Respiratory: No increase in work of breathing at rest. Lungs CTA, no wheezing, rales or ronci   Cardiovascular: Normal apical impulse, regular rate and rhythm, normal S1 and S2, no S3 or S4, and no murmur noted  GI: Abd is soft and minimally tender in LLQ, faint BS. She has forming stools  Skin: W/D, No cyanosis, pallor or rash  Musculoskeletal: no lower extremity pitting edema present  Neurologic: A&Ox4, MARIN, No focal deficits  Neuropsychiatric: General: normal, calm, and normal eye contact  Level of consciousness: alert / normal  Affect: normal  Orientation: oriented to self, place, time and situation  Memory and insight: normal, memory for past and recent events intact, and thought process normal    Medical Decision Making       30 MINUTES SPENT BY ME on the date of service doing chart review, history, exam, documentation & further activities per the note.      Data     I have personally reviewed the following data over the past 24 hrs:    5.7  \   12.2   / 303     140 103 7.1 (L) /  105 (H)   4.3 28 0.66 \

## 2023-10-06 VITALS
WEIGHT: 234.6 LBS | HEART RATE: 66 BPM | OXYGEN SATURATION: 95 % | RESPIRATION RATE: 17 BRPM | BODY MASS INDEX: 39.09 KG/M2 | DIASTOLIC BLOOD PRESSURE: 69 MMHG | HEIGHT: 65 IN | SYSTOLIC BLOOD PRESSURE: 136 MMHG | TEMPERATURE: 98.4 F

## 2023-10-06 LAB
ANION GAP SERPL CALCULATED.3IONS-SCNC: 10 MMOL/L (ref 7–15)
BASO+EOS+MONOS # BLD AUTO: NORMAL 10*3/UL
BASO+EOS+MONOS NFR BLD AUTO: NORMAL %
BASOPHILS # BLD AUTO: 0 10E3/UL (ref 0–0.2)
BASOPHILS NFR BLD AUTO: 0 %
BUN SERPL-MCNC: 8.2 MG/DL (ref 8–23)
CALCIUM SERPL-MCNC: 8.9 MG/DL (ref 8.8–10.2)
CHLORIDE SERPL-SCNC: 101 MMOL/L (ref 98–107)
CREAT SERPL-MCNC: 0.62 MG/DL (ref 0.51–0.95)
DEPRECATED HCO3 PLAS-SCNC: 27 MMOL/L (ref 22–29)
EGFRCR SERPLBLD CKD-EPI 2021: >90 ML/MIN/1.73M2
EOSINOPHIL # BLD AUTO: 0.2 10E3/UL (ref 0–0.7)
EOSINOPHIL NFR BLD AUTO: 2 %
ERYTHROCYTE [DISTWIDTH] IN BLOOD BY AUTOMATED COUNT: 13 % (ref 10–15)
GLUCOSE SERPL-MCNC: 104 MG/DL (ref 70–99)
HCT VFR BLD AUTO: 36.4 % (ref 35–47)
HGB BLD-MCNC: 12.1 G/DL (ref 11.7–15.7)
IMM GRANULOCYTES # BLD: 0 10E3/UL
IMM GRANULOCYTES NFR BLD: 1 %
LYMPHOCYTES # BLD AUTO: 1.7 10E3/UL (ref 0.8–5.3)
LYMPHOCYTES NFR BLD AUTO: 25 %
MCH RBC QN AUTO: 31.3 PG (ref 26.5–33)
MCHC RBC AUTO-ENTMCNC: 33.2 G/DL (ref 31.5–36.5)
MCV RBC AUTO: 94 FL (ref 78–100)
MONOCYTES # BLD AUTO: 0.7 10E3/UL (ref 0–1.3)
MONOCYTES NFR BLD AUTO: 11 %
NEUTROPHILS # BLD AUTO: 4 10E3/UL (ref 1.6–8.3)
NEUTROPHILS NFR BLD AUTO: 61 %
NRBC # BLD AUTO: 0 10E3/UL
NRBC BLD AUTO-RTO: 0 /100
PLATELET # BLD AUTO: 293 10E3/UL (ref 150–450)
POTASSIUM SERPL-SCNC: 3.9 MMOL/L (ref 3.4–5.3)
RBC # BLD AUTO: 3.87 10E6/UL (ref 3.8–5.2)
SODIUM SERPL-SCNC: 138 MMOL/L (ref 135–145)
WBC # BLD AUTO: 6.6 10E3/UL (ref 4–11)

## 2023-10-06 PROCEDURE — G0008 ADMIN INFLUENZA VIRUS VAC: HCPCS | Performed by: FAMILY MEDICINE

## 2023-10-06 PROCEDURE — 90686 IIV4 VACC NO PRSV 0.5 ML IM: CPT | Performed by: FAMILY MEDICINE

## 2023-10-06 PROCEDURE — 250N000013 HC RX MED GY IP 250 OP 250 PS 637: Performed by: FAMILY MEDICINE

## 2023-10-06 PROCEDURE — 99239 HOSP IP/OBS DSCHRG MGMT >30: CPT | Performed by: NURSE PRACTITIONER

## 2023-10-06 PROCEDURE — 85025 COMPLETE CBC W/AUTO DIFF WBC: CPT | Performed by: NURSE PRACTITIONER

## 2023-10-06 PROCEDURE — 250N000013 HC RX MED GY IP 250 OP 250 PS 637: Performed by: NURSE PRACTITIONER

## 2023-10-06 PROCEDURE — 250N000011 HC RX IP 250 OP 636: Mod: JZ | Performed by: INTERNAL MEDICINE

## 2023-10-06 PROCEDURE — 250N000011 HC RX IP 250 OP 636: Performed by: FAMILY MEDICINE

## 2023-10-06 PROCEDURE — 250N000013 HC RX MED GY IP 250 OP 250 PS 637: Performed by: SPECIALIST

## 2023-10-06 PROCEDURE — 80048 BASIC METABOLIC PNL TOTAL CA: CPT | Performed by: NURSE PRACTITIONER

## 2023-10-06 PROCEDURE — 250N000013 HC RX MED GY IP 250 OP 250 PS 637: Performed by: PEDIATRICS

## 2023-10-06 RX ORDER — LISINOPRIL 10 MG/1
10 TABLET ORAL DAILY
Qty: 30 TABLET | Refills: 0 | Status: SHIPPED | OUTPATIENT
Start: 2023-10-07 | End: 2023-10-06

## 2023-10-06 RX ORDER — CARVEDILOL 3.12 MG/1
3.12 TABLET ORAL 2 TIMES DAILY WITH MEALS
Qty: 60 TABLET | Refills: 0 | Status: SHIPPED | OUTPATIENT
Start: 2023-10-06 | End: 2023-12-04

## 2023-10-06 RX ORDER — LISINOPRIL 10 MG/1
10 TABLET ORAL DAILY
Qty: 30 TABLET | Refills: 0 | Status: SHIPPED | OUTPATIENT
Start: 2023-10-07 | End: 2023-12-04

## 2023-10-06 RX ADMIN — CARVEDILOL 3.12 MG: 3.12 TABLET, FILM COATED ORAL at 07:56

## 2023-10-06 RX ADMIN — PIPERACILLIN AND TAZOBACTAM 3.38 G: 3; .375 INJECTION, POWDER, FOR SOLUTION INTRAVENOUS at 10:44

## 2023-10-06 RX ADMIN — INFLUENZA A VIRUS A/VICTORIA/4897/2022 IVR-238 (H1N1) ANTIGEN (FORMALDEHYDE INACTIVATED), INFLUENZA A VIRUS A/DARWIN/9/2021 SAN-010 (H3N2) ANTIGEN (FORMALDEHYDE INACTIVATED), INFLUENZA B VIRUS B/PHUKET/3073/2013 ANTIGEN (FORMALDEHYDE INACTIVATED), AND INFLUENZA B VIRUS B/MICHIGAN/01/2021 ANTIGEN (FORMALDEHYDE INACTIVATED) 0.5 ML: 15; 15; 15; 15 INJECTION, SUSPENSION INTRAMUSCULAR at 12:19

## 2023-10-06 RX ADMIN — Medication 5 ML: at 05:03

## 2023-10-06 RX ADMIN — Medication 5 ML: at 12:20

## 2023-10-06 RX ADMIN — PIPERACILLIN AND TAZOBACTAM 3.38 G: 3; .375 INJECTION, POWDER, FOR SOLUTION INTRAVENOUS at 04:19

## 2023-10-06 RX ADMIN — PAROXETINE 20 MG: 20 TABLET, FILM COATED ORAL at 08:06

## 2023-10-06 RX ADMIN — FAMOTIDINE 20 MG: 20 TABLET ORAL at 08:07

## 2023-10-06 RX ADMIN — MULTIPLE VITAMINS W/ MINERALS TAB 1 TABLET: TAB at 08:07

## 2023-10-06 RX ADMIN — LISINOPRIL 10 MG: 10 TABLET ORAL at 08:06

## 2023-10-06 ASSESSMENT — ACTIVITIES OF DAILY LIVING (ADL)
ADLS_ACUITY_SCORE: 18

## 2023-10-06 NOTE — DISCHARGE SUMMARY
[facility]  Hospitalist Discharge Summary      Date of Admission:  9/29/2023  Date of Discharge:  10/6/2023  1:00 PM  Discharging Provider: VERONIKA Love [facility]  Discharge Service: Hospitalist Service    Discharge Diagnoses   #Acute diverticulitis with intraluminal phlegmon.  Repeat CT scan shows continued diverticulitis without evidence of phlegmon.  Patient was status posttreatment with Zosyn and GI rest.  Plan:  Dismiss on Augmentin     #Metastatic breast cancer with bone metastases-in remission.    Plan:  Restart Arimidex    # Non-ischemic Cardiomyopathy secondary to chemotherapy.  2019 LVEF 41%.  Therapy was subsequently discontinued.  Weight is trending upward slightly. Not in fluid overload. LVEF today has decreased to 25-30% with grade 1 DD  Echo 10-2-23  The left ventricle is normal in size.  Left ventricular systolic function is severely reduced.  The visual ejection fraction is 25-30%.  Septal motion is consistent with conduction abnormality.  There is severe global hypokinesia of the left ventricle.  The right ventricle is normal in size and function.  Trace mitral tricuspid valve regurgitation.  Normal inferior vena cava.  Plan:  She will need follow up with her Cardiology team as she has changes in most recent echo, new LBBB  Continue with Lisinopril  Dismissed on Coreg 3.125 mg p.o. twice daily     #Episodes of bradycardia.  Patient also found to have new LBBB from previous EKG 2021. HR remains low 60s  Plan:  Metroprolol currently on hold due to episodic bradycardia.  Continue cardiac monitor     #Recurrent major depression.  Plan:  Continue home dose Paxil     #Essential hypertension blood pressure well controlled on lisinopril.  Plan:  Continue with lisinopril.  Metroprolol 25 mg daily held due to bradycardia.  Hold parameters for antihypertensives.     # Suspected sleep apnea. Pt reports snoring. Has mid-day sleepiness, AM fatigue, restless sleep. Nursing  "reports decreased O2 sats at night requiring O2 supplementation. Had episodes of bradycardia.   Plan:   Dismiss on Coreg 0.25 mg p.o. twice daily  Recommended to pt and son to discuss with PCP OP sleep study      #Incidental 0.3 cm right middle lobe pulmonary nodule.  Will need follow-up as outpatient.  Plan:  Recommend follow-up as outpatient    Clinically Significant Risk Factors     # Obesity: Estimated body mass index is 39.04 kg/m  as calculated from the following:    Height as of this encounter: 1.651 m (5' 5\").    Weight as of this encounter: 106.4 kg (234 lb 9.6 oz).       Follow-ups Needed After Discharge   Follow-up Appointments     Follow-up and recommended labs and tests       Follow up with primary care provider, Jean Dash, within 7   days for hospital follow- up.  No follow up labs or test are needed.    Keep Cardiology appointment        Recommend follow-up outpatient low-dose CT scan for incidental pulmonary nodule  Recommend outpatient cardiology consult regarding significant change in LVEF as well as outpatient sleep study    Unresulted Labs Ordered in the Past 30 Days of this Admission       No orders found from 8/30/2023 to 9/30/2023.            Discharge Disposition   Discharged to home  Condition at discharge: Stable    Hospital Course   Patient is a 62-year-old female who resented to the emergency department at [facility] 9/29/2023 with chief complaint of abdominal pain.  Specifically left lower quadrant abdominal pain.  She noted the pain was progressively worsening since middle of September.  She was seen 9/19/2023 by primary care provider.  She did have a CT that showed diverticulitis with significant inflammation in the mid sigmoid.  Patient was apparently taking an old antibiotic that she had at home.  She believes antibiotic was Keflex.  Continue to intensify.  Denied blood in the stool but did have loose stools.    Work-up in the emergency department: Chemistry " panel was within normal limits.  ESR was 34.  CRP was 44.90 WBC 6.3 hemoglobin 12.6.  CT scan showed acute sigmoid diverticulitis.  She also had a 1.7 cm region of low density within the thickened sigmoid colon representative of a developing intramural abscess.  She also had evidence of an intermediate 0.3 cm right middle lobe pulmonary nodule.    Patient was admitted to the hospitalist service.  Started on Zosyn 0.37 g IV every 6 hours.  She is made n.p.o. except for medications and ice chips, and pain meds and antiemetics.  Reconsult was ordered.    Patient seen in consultation by Dr. Chavez-Surgery conditions continue with IV antibiotics bowel rest.    Patient was found to be bradycardic, metoprolol was held.  EG was obtained.  She is noted to have bradycardia and a new LBBB.  She was on telemetry monitoring.  10/2/2023 echocardiogram showed LVEF 25 to 30%.  Septal wall motion consistent with conduction abnormality.  There is severe level hypokinesis the LV and grade 1 diastolic dysfunction.    Blood pressures increase slightly.  She started on Coreg 3.25 mg every 12 hours.  She her bradycardia not worsened.    Nursing noted that the patient snore and have hypoxia at night.  Discussed with the patient need to have outpatient sleep study.    Patient due to improve.  Her diet was advanced to clears which she initially did not tolerate and was made n.p.o. again.  She was then eventually started back on clears then advance to diet as tolerates.  She had a repeat CT scan of the abdomen pelvis showing continued diverticulitis, however, the intramural abscess had resolved.    On the day of dismissal, when I saw her, she was doing well.  She was not in pain.  She was ambulatory in the halls.  She did not require any oxygen.  Her vitals were stable.  CRP was markedly reduced.  WBC was normal.  Lungs are clear to auscultation.  Her cardiac monitor showed sinus rhythm.    Discharge plan: We will dismiss to home today.  She  will start on Augmentin 875/125 1 p.o. twice daily for 5 days.  She is encouraged to follow through with plan for cardiology consult as outpatient due to significant change in her echocardiogram.  She is is also encouraged to follow through with plan for outpatient sleep study.  She should follow-up with her oncology team and her primary care provider.    Consultants: Surgery    Procedures: None    Complications: None    Pending results: None    See ED provider notes, admission H&P, consult notes, lab and radiology reports.     Consultations This Hospital Stay   SURGERY GENERAL IP CONSULT  CARE MANAGEMENT / SOCIAL WORK IP CONSULT  CARE MANAGEMENT / SOCIAL WORK IP CONSULT    Code Status   Prior    Time Spent on this Encounter   I, VERONIKA Love CNP, personally saw the patient today and spent greater than 30 minutes discharging this patient.       VERONIKA Love CNP  [address] SURGICAL  [address] [place] [address]  Phone: [phone]  ______________________________________________________________________    Physical Exam   Vital Signs: Temp: 98.4  F (36.9  C) Temp src: Oral BP: 136/69 Pulse: 66   Resp: 17 SpO2: 95 % O2 Device: None (Room air)    Weight: 234 lbs 9.6 oz  Constitutional: 62-year-old female in in bed.  She is on room air.  Not in any distress.  Eyes: Sclera anicteric.  Conjunctiva normal.  Hematologic / Lymphatic: No bleeding or bruising.  Respiratory: No increased work of breathing while at rest.  Lungs are clear and equal bilaterally.  No wheezes, rales or rhonchi.  No retractions.  Cardiovascular: S1, S2.  Regular rate and rhythm.  No rub, gallop, thrill or murmur.  GI: Abdomen is soft, nontender to palpation.  Bowel sounds active.  Skin: Warm and dry.  No cyanosis pallor or rash.  Musculoskeletal: Trace of lower extremity edema.  Neurologic: Alert and oriented.  No focal neurological deficits.  Neuropsychiatric: General: normal, calm, and  normal eye contact  Level of consciousness: alert / normal  Affect: normal  Orientation: oriented to self, place, time and situation  Memory and insight: normal, memory for past and recent events intact, and thought process normal       Primary Care Physician   Jean Dash    Discharge Orders      Adult Cardiology Eval [facility] Referral      Primary Care - Care Coordination Referral      Reason for your hospital stay    Acute diverticulitis.     Follow-up and recommended labs and tests     Follow up with primary care provider, Jean Dash, within 7 days for hospital follow- up.  No follow up labs or test are needed.    Keep Cardiology appointment     Activity    Your activity upon discharge: activity as tolerated     Diet    Follow this diet upon discharge: Regular  Increase fiber in your diet to avoid constipation       Significant Results and Procedures   Most Recent 3 CBC's:  Recent Labs   Lab Test 10/06/23  0504 10/05/23  0619 10/04/23  0546   WBC 6.6 5.7 4.5   HGB 12.1 12.2 11.6*   MCV 94 94 95    303 263     Most Recent 3 BMP's:  Recent Labs   Lab Test 10/06/23  0504 10/05/23  0619 10/04/23  0546    140 138   POTASSIUM 3.9 4.3 3.7   CHLORIDE 101 103 103   CO2 27 28 27   BUN 8.2 7.1* 5.3*   CR 0.62 0.66 0.65   ANIONGAP 10 9 8   SINDY 8.9 9.1 8.6*   * 105* 95   ,   Results for orders placed or performed during the hospital encounter of 09/29/23   CT ABDOMEN PELVIS W CONTRAST    Narrative    CT ABDOMEN/PELVIS WITH CONTRAST September 29, 2023 12:22 PM    CLINICAL HISTORY: Left lower quadrant abdominal pain.    TECHNIQUE: CT scan of the abdomen and pelvis was performed following  injection of IV contrast. Multiplanar reformats were obtained. Dose  reduction techniques were used.  CONTRAST: ISOVUE-370, 100 mL.    COMPARISON: None.    FINDINGS:   LOWER CHEST: 0.3 cm indeterminate nodule in the right middle lobe  (series 3 image 3) was not included on the previous  exam.    HEPATOBILIARY: Cholecystectomy. No hepatic masses.    PANCREAS: Normal.    SPLEEN: Normal.    ADRENAL GLANDS: Normal.    KIDNEYS/BLADDER: Nonobstructing 0.2 cm stone in the lower pole of the  right kidney. Small right renal cyst would require no specific  follow-up. Few tiny hypodensities in both kidneys are too small to  characterize, but may also represent cysts. No hydronephrosis.    BOWEL: Colonic diverticulosis. Focal bowel wall thickening with  moderate surrounding inflammatory stranding in the proximal sigmoid  colon, consistent with acute diverticulitis. Small area of low density  within the thickened sigmoid colon measures 1.7 cm, and could  represent a developing intramural abscess (series 3 image 174; series  4 image 43). No other associated fluid collections. No bowel  obstruction. Unremarkable appendix.    PELVIC ORGANS: Unremarkable.    LYMPH NODES: No enlarged lymph nodes are identified in the abdomen or  pelvis.    VASCULATURE: Unremarkable.    ADDITIONAL FINDINGS: None.    MUSCULOSKELETAL: Unremarkable.      Impression    IMPRESSION:   1.  Acute sigmoid diverticulitis.  2.  A 1.7 cm region of low density within the thickened sigmoid colon  could represent a developing intramural abscess.  3.  Indeterminate 0.3 cm right middle lobe pulmonary nodule. Please  refer to follow-up guidelines below.    Recommendations for one or multiple incidental lung nodules < 6mm :    Low risk patients: No routine follow-up.    High risk patients: Optional follow-up CT at 12 months; if  unchanged, no further follow-up.    *Low Risk: Minimal or absent history of smoking or other known risk  factors.  *Nonsolid (ground glass) or partly solid nodules may require longer  follow-up to exclude indolent adenocarcinoma.  *Recommendations based on Guidelines for the Management of Incidental  Pulmonary Nodules Detected at CT: From the Fleischner Society 2017,  Radiology 2017.  *Guidelines apply to incidental nodules in  patients who are 35 years  or older.  *Guidelines do not apply to lung cancer screening, patients with  immunosuppression, or patients with known primary cancer.    DAYSI COHEN MD         SYSTEM ID:  [id number]   CT Abdomen pelvis w contrast*    Narrative    CT ABDOMEN PELVIS W CONTRAST 10/5/2023 11:35 AM    CLINICAL HISTORY: Patient with diverticulitis and intramural abscess.   Worsening abdominal exam.    TECHNIQUE: CT scan of the abdomen and pelvis was performed following  injection of IV contrast. Multiplanar reformats were obtained. Dose  reduction techniques were used.  CONTRAST: Isovue 370, 100mL    COMPARISON: 9/29/2023    FINDINGS:   LOWER CHEST: Normal.    HEPATOBILIARY: Hepatic steatosis. Cholecystectomy.    PANCREAS: Normal.    SPLEEN: Normal.    ADRENAL GLANDS: Normal.    KIDNEYS/BLADDER: Few subcentimeter cysts in the kidneys requiring no  specific follow. No calculi, hydronephrosis or perinephric stranding.    BOWEL: Again seen are changes of sigmoid diverticulitis with marked  circumferential wall thickening of the diverticular segment of sigmoid  colon with surrounding fat stranding. Previously seen small intramural  phlegmon in the inflamed sigmoid colon has resolved. No perisigmoid  abscess or free air. Stable mild edema/enlargement of the adjacent  left ovary. No small bowel or colonic obstruction. Normal appendix.  Scattered diverticuli in the remainder of the colon.    PELVIC ORGANS: No pelvic masses.    ADDITIONAL FINDINGS: Multiple small lymph nodes in the sigmoid  mesentery measuring up to 6 mm are stable, likely reactive. No  abdominal aortic aneurysm. No free fluid in the pelvis.    MUSCULOSKELETAL: Unremarkable.      Impression    IMPRESSION:   1.  Acute sigmoid diverticulitis with resolution of previously seen  small intramural phlegmon, and otherwise stable perisigmoid  inflammatory changes. No new abscess. No free air.  2.  Remainder unchanged.    SHAYNA SHI MD         SYSTEM ID:   [id number]   Echocardiogram Complete     Value    LVEF  25-30%    Narrative    [id number]  [id number]  [id number]  [id number]^KALINA^MEJIA^JAKY     [facility]  Echocardiography Laboratory  [address] [place] [address]     Name: YADY WEINER  MRN: [id number]  : 1960  Study Date: 10/02/2023 11:30 AM  Age: 62 yrs  Gender: Female  Patient Location: [facility]  Reason For Study: Bradycardia - Sinus, LBBB  History: HTN,Breast Cancer  Ordering Physician: MEJIA GILMAN  Performed By: Terrie Pimentel     BSA: 2.1 m2  Height: 65 in  Weight: 242 lb  HR: 61  BP: 151/78 mmHg  ______________________________________________________________________________  Procedure  Complete Portable Echo Adult. Optison (NDC #[id number]) given intravenously.  ______________________________________________________________________________  Interpretation Summary     The left ventricle is normal in size.  Left ventricular systolic function is severely reduced.  The visual ejection fraction is 25-30%.  Septal motion is consistent with conduction abnormality.  There is severe global hypokinesia of the left ventricle.  The right ventricle is normal in size and function.  Trace mitral tricuspid valve regurgitation.  Normal inferior vena cava.     There is no comparison study available.  ______________________________________________________________________________  Left Ventricle  The left ventricle is normal in size. There is mild eccentric left ventricular  hypertrophy. Left ventricular systolic function is severely reduced. The  visual ejection fraction is 25-30%. Grade I or early diastolic dysfunction.  Septal motion is consistent with conduction abnormality. There is severe  global hypokinesia of the left ventricle.     Right Ventricle  The right ventricle is normal in size and function.     Atria  Normal left atrial size. Right atrial size is normal. There is no atrial shunt  seen.     Mitral  Valve  The mitral valve leaflets appear normal. There is no evidence of stenosis,  fluttering, or prolapse. There is trace mitral regurgitation. There is no  mitral valve stenosis.     Tricuspid Valve  Normal tricuspid valve. There is trace tricuspid regurgitation. The right  ventricular systolic pressure is approximated at 19.0 mmHg plus the right  atrial pressure. Right ventricle systolic pressure estimate normal.     Aortic Valve  The aortic valve is trileaflet. The aortic valve is not well visualized. No  aortic regurgitation is present. No aortic stenosis is present.     Pulmonic Valve  The pulmonic valve is not well visualized. There is trace pulmonic valvular  regurgitation.     Vessels  The aortic root is normal size. Normal size ascending aorta. The inferior vena  cava is normal.     Pericardium  There is no pericardial effusion.     Rhythm  Sinus rhythm was noted.  ______________________________________________________________________________  MMode/2D Measurements & Calculations     IVSd: 1.1 cm  LVIDd: 5.6 cm  LVIDs: 4.9 cm  LVPWd: 0.87 cm  FS: 12.1 %  LV mass(C)d: 213.1 grams  LV mass(C)dI: 99.4 grams/m2  Ao root diam: 2.8 cm  LA dimension: 3.8 cm  asc Aorta Diam: 3.3 cm  LA/Ao: 1.4  Ao root diam index Ht(cm/m): 1.7  Ao root diam index BSA (cm/m2): 1.3  Asc Ao diam index BSA (cm/m2): 1.5  Asc Ao diam index Ht(cm/m): 2.0  LA Volume (BP): 59.7 ml     LA Volume Index (BP): 27.8 ml/m2  RV Base: 3.5 cm  RWT: 0.31  TAPSE: 2.7 cm     Doppler Measurements & Calculations  MV E max allan: 72.0 cm/sec  MV A max allan: 114.0 cm/sec  MV E/A: 0.63  MV dec time: 0.22 sec  Ao V2 max: 147.4 cm/sec  Ao max P.0 mmHg  LV V1 max PG: 3.7 mmHg  LV V1 max: 96.4 cm/sec  PA acc time: 0.09 sec  TR max allan: 218.2 cm/sec  TR max P.0 mmHg  AV Allan Ratio (DI): 0.65  E/E' av.2     Lateral E/e': 12.3  Medial E/e': 14.1  RV S Allan: 12.3 cm/sec      ______________________________________________________________________________  Report approved by: Dr Deloris Cyr 10/02/2023 01:02 PM             Discharge Medications   Discharge Medication List as of 10/6/2023 12:19 PM        START taking these medications    Details   amoxicillin-clavulanate (AUGMENTIN) 875-125 MG tablet Take 1 tablet by mouth 2 times daily, Disp-10 tablet, R-0, E-Prescribe      carvedilol (COREG) 3.125 MG tablet Take 1 tablet (3.125 mg) by mouth 2 times daily (with meals), Disp-60 tablet, R-0, E-Prescribe      melatonin 1 MG TABS tablet Take 3 tablets (3 mg) by mouth nightly as needed for sleep, No Print Out           CONTINUE these medications which have CHANGED    Details   lisinopril (ZESTRIL) 10 MG tablet Take 1 tablet (10 mg) by mouth daily, Disp-30 tablet, R-0, E-Prescribe           CONTINUE these medications which have NOT CHANGED    Details   acetaminophen (TYLENOL) 500 MG tablet Take 1,000 mg by mouth every 6 hours as needed for mild pain, Historical      albuterol (PROAIR HFA/PROVENTIL HFA/VENTOLIN HFA) 108 (90 Base) MCG/ACT inhaler Inhale 2 puffs into the lungs every 4 hours as needed for shortness of breath or wheezing, Historical      anastrozole (ARIMIDEX) 1 MG tablet Take 1 tablet (1 mg) by mouth daily, Disp-90 tablet, R-1, E-Prescribe      calcium carbonate 600 mg-vitamin D 400 units (CALTRATE) 600-400 MG-UNIT per tablet Take 1 tablet by mouth, Historical      Ergocalciferol 50 MCG (2000 UT) TABS Take 2,000 Units by mouth daily, Historical      medical cannabis (Patient's own supply) See Admin Instructions (The purpose of this order is to document that the patient reports taking medical cannabis.  This is not a prescription, and is not used to certify that the patient has a qualifying medical condition.), Historical      PARoxetine (PAXIL) 20 MG tablet TAKE 1 TABLET BY MOUTH ONCE DAILY .  APPOINTMENT  NEEDED  FOR  ADDITIONAL  REFILLS, Disp-90 tablet, R-1, E-Prescribe       Vitamin D3 (VITAMIN D-1000 MAX ST) 25 mcg (1000 units) tablet Take 25 mcg by mouth daily, Historical           STOP taking these medications       loperamide (IMODIUM) 2 MG capsule Comments:   Reason for Stopping:         metoprolol succinate ER (TOPROL-XL) 25 MG 24 hr tablet Comments:   Reason for Stopping:             Allergies   Allergies   Allergen Reactions    Naproxen      Other reaction(s): Hematologic  Vaginal bleeding

## 2023-10-06 NOTE — DISCHARGE INSTRUCTIONS
Follow-up with your primary care provider within 1 week or soon as possible.    Take the Augmentin until completed.    Recommend keeping your cardiology appointment.    Recommend keeping your appointment for sleep study as well.    Return to the emergency department at any time for shortness of breath, chest pain, lightheadedness, dizziness, weakness, abdominal pain, nausea, vomiting or diarrhea.  Return here if you develop any symptoms that are bothersome/worrisome that seem to get worse and not improve.

## 2023-10-06 NOTE — PLAN OF CARE
Goal Outcome Evaluation:      Plan of Care Reviewed With: patient    Overall Patient Progress: improvingOverall Patient Progress: improving    Outcome Evaluation: Patient A&Ox4. VSS. Pain after breakfast this am, complaints of severe cramping, relieved with oxycodone. Tolerated lunch and dinner without nausea or pain. Up ambulating halls independently. IV zosyn continued.  Plan to switch to oral antibiotics and possible dc in am.  No new concerns or complaints. Good intake and appetite.    Rob Metcalf RN

## 2023-10-06 NOTE — PLAN OF CARE
Goal Outcome Evaluation:      Plan of Care Reviewed With: patient    Overall Patient Progress: improvingOverall Patient Progress: improving    Outcome Evaluation: Feeling better. Denied pain except when she needed to have BM this morning. Up ad marck. VSS Was given information about diet changes with diverticulitis.    S-(situation): Patient discharged to home via wheelchair to door with son.    B-(background): Diverticulitis with abscess    A-(assessment): as above    R-(recommendations): Discharge instructions reviewed with patient. Listed belongings gathered and returned to patient. Will follow up with Dr. Dash 10-25-23.         Discharge Nursing Criteria:     Care Plan and Patient education resolved: Yes    New Medications- pt has been educated about purpose and side effects: Yes    Vaccines  Influenza status verified at discharge:  Yes    Intentionally Retained Items (examples: Drains, Wound packing, ureteral stents, matson, PICC line or IV)  Patient was sent home with port left in place.   Follow up appointment made for removal: No    MISC  Prescriptions if needed, hard copies sent with patient  Yes  Home medications returned to patient: NA  Medication Bin checked and emptied on discharge Yes  Patient reports post-discharge pain management plan is effective: Yes

## 2023-10-06 NOTE — PROGRESS NOTES
Care Management Discharge Note    Discharge Date: 10/06/2023     Discharge Disposition: Home    Discharge Services:  None    Discharge DME:  None    Discharge Transportation: family or friend will provide    Private pay costs discussed: Not applicable    Does the patient's insurance plan have a 3 day qualifying hospital stay waiver?  No    Patient/family educated on Medicare website which has current facility and service quality ratings: no    Education Provided on the Discharge Plan:  Yes  Persons Notified of Discharge Plans: Patient  Patient/Family in Agreement with the Plan: yes    Handoff Referral Completed: Yes    Additional Information:  Per IDT rounds., patient is medically ready for discharge today.    Patient will discharge to home and will arrange own transportation to home.     Patient was given resources by CM per patient's request on 10/2/23. No further needs at this time.       DAMON De Dios  Inpatient Care Coordinator   [facility] [phone]  [facility] [phone]

## 2023-10-06 NOTE — PLAN OF CARE
Goal Outcome Evaluation:    Plan of Care Reviewed With: patient    Overall Patient Progress: improving    Outcome Evaluation: Pt's VSS. Afebrile. Denies pain. Up ambulating in room and halls independently. Slept well, hoping to discharge home today.

## 2023-10-06 NOTE — PROGRESS NOTES
[facility]    Medicine Progress Note - Hospitalist Service    Date of Admission:  9/29/2023    Assessment & Plan   #Pt continues to have diverticulitis, no abscess or perforation  CT scan 9/29/2023 was acute sigmoid diverticulitis.  1.7 cm regional low-density within the thickened sigmoid colon possibly representative of developing intramural abscess. CT 10-5-23 shows residual diverticulitis, no phlegmon. CRP trending downward 19.74, WBC 6.6   Plan:  JOLIE Chavez. Ok to dismiss on Augmentin 875  Continue to monitor  Continue diet as tolerates  Abd exam seems improved, less LLQ tenderness     CT A-P With contrast   BOWEL: Again seen are changes of sigmoid diverticulitis with marked  circumferential wall thickening of the diverticular segment of sigmoid  colon with surrounding fat stranding. Previously seen small intramural  phlegmon in the inflamed sigmoid colon has resolved. No perisigmoid  abscess or free air. Stable mild edema/enlargement of the adjacent  left ovary. No small bowel or colonic obstruction. Normal appendix.  Scattered diverticuli in the remainder of the colon.     #Metastatic breast cancer with bone metastases-in remission.  Arimidex on hold at present.     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  2019 LVEF 41%.  Therapy was subsequently discontinued.  Weight is trending upward slightly. Not in fluid overload. LVEF today has decreased to 25-30% with grade 1 DD  Echo 10-2-23  The left ventricle is normal in size.  Left ventricular systolic function is severely reduced.  The visual ejection fraction is 25-30%.  Septal motion is consistent with conduction abnormality.  There is severe global hypokinesia of the left ventricle.  The right ventricle is normal in size and function.  Trace mitral tricuspid valve regurgitation.  Normal inferior vena cava.     Plan:  BNP 1089   She will need follow up with her Cardiology team as she has changes in most recent  "echo, new LBBB  Continue with Lisinopril  Metoprolol on hold due to bradycardia      #Episodes of bradycardia.  Patient also found to have new LBBB from previous EKG 2021. HR remains low 60s  Plan:  Metroprolol currently on hold due to episodic bradycardia.  Continue cardiac monitor     #Recurrent major depression.  Plan:  Continue home dose Paxil     #Essential hypertension blood pressure well controlled on lisinopril.  Plan:  Continue with lisinopril.  Metroprolol 25 mg daily held due to bradycardia.  Hold parameters for antihypertensives.     # Suspected sleep apnea. Pt reports snoring. Has mid-day sleepiness, AM fatigue, restless sleep. Nursing reports decreased O2 sats at night requiring O2 supplementation. Had episodes of bradycardia.   Plan:   Recommended to pt and son to discuss with PCP OP sleep study      #Incidental 0.3 cm right middle lobe pulmonary nodule.  Will need follow-up as outpatient.  Plan:  Patient will need follow-up CT scan to monitor right middle lobe nodule     Pt making appointments for Cardiology follow up and sleep study           Diet: Snacks/Supplements Adult: Ensure Clear; With Meals  Advance Diet as Tolerated: Regular Diet Adult    DVT Prophylaxis: Enoxaparin (Lovenox) SQ  Orozco Catheter: Not present  Lines: PRESENT      Port a Cath 02/05/14 Single Lumen Right Chest wall-Site Assessment: WDL      Cardiac Monitoring: None  Code Status: Full Code      Clinically Significant Risk Factors                  # Hypertension: Noted on problem list  # Chronic heart failure with reduced ejection fraction: last echo with EF <40%       # Obesity: Estimated body mass index is 39.04 kg/m  as calculated from the following:    Height as of this encounter: 1.651 m (5' 5\").    Weight as of this encounter: 106.4 kg (234 lb 9.6 oz).             Disposition Plan         The patient's care was discussed with the Attending Physician, Dr. Ceballos and Patient.    VERONIKA Love CNP  Hospitalist " [facility]  Securely message with [employer] (more info)  Text page via [employer] Paging/Directory   ______________________________________________________________________    Interval History   Viewed overnight notes.  No significant events.    Physical Exam   Vital Signs: Temp: 98.4  F (36.9  C) Temp src: Oral BP: 136/69 Pulse: 66   Resp: 17 SpO2: 95 % O2 Device: None (Room air)    Weight: 234 lbs 9.6 oz    Constitutional: 62-year-old female in in bed.  She is on room air.  Not in any distress.  Eyes: Sclera anicteric.  Conjunctiva normal.  Hematologic / Lymphatic: No bleeding or bruising.  Respiratory: No increased work of breathing while at rest.  Lungs are clear and equal bilaterally.  No wheezes, rales or rhonchi.  No retractions.  Cardiovascular: S1, S2.  Regular rate and rhythm.  No rub, gallop, thrill or murmur.  GI: Abdomen is soft, nontender to palpation.  Bowel sounds active.  Skin: Warm and dry.  No cyanosis pallor or rash.  Musculoskeletal: Trace of lower extremity edema.  Neurologic: Alert and oriented.  No focal neurological deficits.  Neuropsychiatric: General: normal, calm, and normal eye contact  Level of consciousness: alert / normal  Affect: normal  Orientation: oriented to self, place, time and situation  Memory and insight: normal, memory for past and recent events intact, and thought process normal    Medical Decision Making       45 MINUTES SPENT BY ME on the date of service doing chart review, history, exam, documentation & further activities per the note.      Data     I have personally reviewed the following data over the past 24 hrs:    6.6  \   12.1   / 293     138 101 8.2 /  104 (H)   3.9 27 0.62 \       Imaging results reviewed over the past 24 hrs:   No results found for this or any previous visit (from the past 24 hour(s)).

## 2023-10-09 ENCOUNTER — PATIENT OUTREACH (OUTPATIENT)
Dept: CARE COORDINATION | Facility: CLINIC | Age: 63
End: 2023-10-09
Payer: COMMERCIAL

## 2023-10-09 NOTE — PROGRESS NOTES
Clinic Care Coordination Contact  [facility]/Voicemail       Clinical Data: Care Coordinator Outreach  Outreach attempted x 1.  Unable to leave message.  Plan: Care Coordinator will try to reach patient again in 1-2 business days.    Lynda Jordan RN Care Coordination   [facility] [place]  Email: [email or web address]  Phone: [phone]

## 2023-10-09 NOTE — LETTER
[facility] CARE COORDINATION  [address]   [place] [address]    October 10, 2023    Mariluz Stoner  [address] [address] [place] [id number]      Dear Mariluz,        I am a  clinic care coordinator who works with Jean Dash DO with the [facility]. I have been trying to reach you recently to introduce Clinic Care Coordination. Below is a description of clinic care coordination and how I can further assist you.       The clinic care coordination team is made up of a registered nurse, , financial resource worker and community health worker who understand the health care system. The goal of clinic care coordination is to help you manage your health and improve access to the health care system. Our team works alongside your provider to assist you in determining your health and social needs. We can help you obtain health care and community resources, providing you with necessary information and education. We can work with you through any barriers and develop a care plan that helps coordinate and strengthen the communication between you and your care team.  Our services are voluntary and are offered without charge to you personally.    Please feel free to contact me with any questions or concerns regarding care coordination and what we can offer.      We are focused on providing you with the highest-quality healthcare experience possible.    Sincerely,     Lynda Jordan RN Care Coordination   [facility]-  [place], [place], [place]  Phone: [phone]

## 2023-10-10 NOTE — PROGRESS NOTES
Clinic Care Coordination Contact  [facility]/Voicemail       Clinical Data: Care Coordinator Outreach  Outreach attempted x 2.  Unable to leave message.  Plan: Care Coordinator will send care coordination introduction letter with care coordinator contact information and explanation of care coordination services via [employer]. Care Coordinator will do no further outreaches at this time.    Lynda Jordan, RN Care Coordination   [facility]  Email: [email or web address]  Phone: [phone]

## 2023-10-11 ENCOUNTER — OFFICE VISIT (OUTPATIENT)
Dept: SLEEP MEDICINE | Facility: CLINIC | Age: 63
End: 2023-10-11
Payer: COMMERCIAL

## 2023-10-11 VITALS
OXYGEN SATURATION: 94 % | HEART RATE: 78 BPM | BODY MASS INDEX: 39.62 KG/M2 | WEIGHT: 237.8 LBS | HEIGHT: 65 IN | DIASTOLIC BLOOD PRESSURE: 68 MMHG | SYSTOLIC BLOOD PRESSURE: 110 MMHG

## 2023-10-11 DIAGNOSIS — I42.7 CARDIOMYOPATHY SECONDARY TO DRUG (H): ICD-10-CM

## 2023-10-11 DIAGNOSIS — F33.0 MILD EPISODE OF RECURRENT MAJOR DEPRESSIVE DISORDER (H): ICD-10-CM

## 2023-10-11 DIAGNOSIS — E66.01 MORBID OBESITY (H): ICD-10-CM

## 2023-10-11 DIAGNOSIS — R29.818 SUSPECTED SLEEP APNEA: Primary | ICD-10-CM

## 2023-10-11 DIAGNOSIS — R00.1 SINUS BRADYCARDIA: ICD-10-CM

## 2023-10-11 DIAGNOSIS — I50.20 HEART FAILURE WITH REDUCED EJECTION FRACTION (H): ICD-10-CM

## 2023-10-11 PROCEDURE — 99203 OFFICE O/P NEW LOW 30 MIN: CPT | Performed by: PHYSICIAN ASSISTANT

## 2023-10-11 ASSESSMENT — SLEEP AND FATIGUE QUESTIONNAIRES
HOW LIKELY ARE YOU TO NOD OFF OR FALL ASLEEP WHILE SITTING QUIETLY AFTER LUNCH WITHOUT ALCOHOL: SLIGHT CHANCE OF DOZING
HOW LIKELY ARE YOU TO NOD OFF OR FALL ASLEEP IN A CAR, WHILE STOPPED FOR A FEW MINUTES IN TRAFFIC: WOULD NEVER DOZE
HOW LIKELY ARE YOU TO NOD OFF OR FALL ASLEEP WHILE SITTING AND TALKING TO SOMEONE: WOULD NEVER DOZE
HOW LIKELY ARE YOU TO NOD OFF OR FALL ASLEEP WHILE SITTING AND READING: HIGH CHANCE OF DOZING
HOW LIKELY ARE YOU TO NOD OFF OR FALL ASLEEP WHILE WATCHING TV: HIGH CHANCE OF DOZING
HOW LIKELY ARE YOU TO NOD OFF OR FALL ASLEEP WHILE LYING DOWN TO REST IN THE AFTERNOON WHEN CIRCUMSTANCES PERMIT: HIGH CHANCE OF DOZING
HOW LIKELY ARE YOU TO NOD OFF OR FALL ASLEEP WHILE SITTING INACTIVE IN A PUBLIC PLACE: MODERATE CHANCE OF DOZING
HOW LIKELY ARE YOU TO NOD OFF OR FALL ASLEEP WHEN YOU ARE A PASSENGER IN A CAR FOR AN HOUR WITHOUT A BREAK: SLIGHT CHANCE OF DOZING

## 2023-10-11 NOTE — PROGRESS NOTES
Does Mariluz have a CPAP/Bipap?  No       [place] Sleep Scale:  13  Stop Ban  BMI:39.57  Neck:42 cm     Outpatient Sleep Medicine Consultation:      Name: Mariluz Stoner MRN# [id number]   Age: 62 year old YOB: 1960     Date of Consultation: 2023  Consultation is requested by: No referring provider defined for this encounter. No ref. provider found  Primary care provider: Jean Dash       Reason for Sleep Consult:     Mariluz Stoner is sent by No ref. provider found for a sleep consultation regarding possible sleep apnea. During recent hospitalization it was noted she was bradycardic with sleep and had a worsening ejection fraction per echocardiogram (EF 25-30%). She has loud snoring and witnessed apneas.     Patient s Reason for visit  Mariluz Stoner main reason for visit: sleep apnea  Patient states problem(s) started: 2023  Mariluz Stoner's goals for this visit: better more restful sleep is a must for my heart           Assessment and Plan:     Summary Sleep Diagnoses:  Suspected sleep apnea in patient with cardiomyopathy secondary to chemo therapy for metastatic breast cancer.     Comorbid Diagnoses:  Cardiomyopathy  Depression  HTN  Bradycardia      Summary Recommendations:  Lab study for evaluation of sleep apnea, possible central apnea and [place] breathing pattern.   No orders of the defined types were placed in this encounter.        Summary Counseling:    Sleep Testing Reviewed  Obstructive Sleep Apnea Reviewed  Complications of Untreated Sleep Apnea Reviewed      Medical Decision-making:   Educational materials provided in instructions    Total time spent reviewing medical records, history and physical examination, review of previous testing and interpretation as well as documentation on this date:50 min    CC: No ref. provider found          History of Present Illness:     Past Sleep Evaluations:    SLEEP-WAKE SCHEDULE:     Work/School Days:  Patient goes to school/work: No   Usually gets into bed at  9 pm  Takes patient about 20minutes to fall asleep  Has trouble falling asleep 7 nights per week  Wakes up in the middle of the night 2 times.  Wakes up due to Snorting self awake;Use the bathroom;Nightmares  She has trouble falling back asleep 2 times a week.   It usually takes 10 minutes to get back to sleep  Patient is usually up at 7 am  Uses alarm: No    Weekends/Non-work Days/All Other Days:  Usually gets into bed at 9 pm   Takes patient about 20 minutes to fall asleep  Patient is usually up at 7 am  Uses alarm: No    Sleep Need  Patient gets  6 hours sleep on average   Patient thinks she needs about 8 hours sleep    Mariluz Stoner prefers to sleep in this position(s): Side   Patient states they do the following activities in bed: Use phone, computer, or tablet    Naps  Patient takes a purposeful nap   times a week and naps are usually 1 to 4 hours in duration  She feels better after a nap: Yes  She dozes off unintentionally 7 days per week  Patient has had a driving accident or near-miss due to sleepiness/drowsiness: No      SLEEP DISRUPTIONS:    Breathing/Snoring  Patient snores:Yes  Other people complain about her snoring: Yes  Patient has been told she stops breathing in her sleep:Yes  She has issues with the following: Getting up to urinate more than once    Movement:  Patient gets pain, discomfort, with an urge to move:  Yes  It happens when she is resting:  Yes  It happens more at night:  Yes  Patient has been told she kicks her legs at night:  Yes     Behaviors in Sleep:  Mariluz Stoner has experienced the following behaviors while sleeping: Recurring Nightmares;Teeth grinding;Night terrors (screaming,yelling or acting afraid but not recalling event)  She has experienced sudden muscle weakness during the day: Yes      Is there anything else you would like your sleep provider to know: i talk in my sleep        CAFFEINE AND OTHER  SUBSTANCES:    Patient consumes caffeinated beverages per day:  2  Last caffeine use is usually: 10 am  List of any prescribed or over the counter stimulants that patient takes:    List of any prescribed or over the counter sleep medication patient takes: now taking melatonin  List of previous sleep medications that patient has tried: medical marijuana  Patient drinks alcohol to help them sleep: No  Patient drinks alcohol near bedtime: No    Family History:  Patient has a family member been diagnosed with a sleep disorder: No            SCALES:    EPWORTH SLEEPINESS SCALE         10/11/2023     9:34 AM    [place] Sleepiness Scale ( [place]  7312-4722<br>ESS - [place]/English - Final version - 21 Nov 07 - [facility] [place].)   Sitting and reading High chance of dozing   Watching TV High chance of dozing   Sitting, inactive in a public place (e.g. a theatre or a meeting) Moderate chance of dozing   As a passenger in a car for an hour without a break Slight chance of dozing   Lying down to rest in the afternoon when circumstances permit High chance of dozing   Sitting and talking to someone Would never doze   Sitting quietly after a lunch without alcohol Slight chance of dozing   In a car, while stopped for a few minutes in traffic Would never doze   [place] Score (MC) 13   [place] Score (Sleep) 13         INSOMNIA SEVERITY INDEX (VERONICA)          10/11/2023     9:24 AM   Insomnia Severity Index (VERONICA)   Difficulty falling asleep 1   Difficulty staying asleep 3   Problems waking up too early 2   How SATISFIED/DISSATISFIED are you with your CURRENT sleep pattern? 4   How NOTICEABLE to others do you think your sleep problem is in terms of impairing the quality of your life? 4   How WORRIED/DISTRESSED are you about your current sleep problem? 4   To what extent do you consider your sleep problem to INTERFERE with your daily functioning (e.g. daytime fatigue, mood, ability to function at work/daily chores, concentration,  "memory, mood, etc.) CURRENTLY? 4   VERONICA Total Score 22       Guidelines for Scoring/Interpretation:  Total score categories:  0-7 = No clinically significant insomnia   8-14 = Subthreshold insomnia   15-21 = Clinical insomnia (moderate severity)  22-28 = Clinical insomnia (severe)  Used via courtesy of www.[employer].va.gov with permission from Steven Navas PhD., [facility]      STOP BANG         10/11/2023     9:42 AM   STOP BANG Questionnaire (  2008, the American Society of Anesthesiologists, Inc. [employer]   Neck Cir (cm) Clinic: 42 cm   B/P Clinic: 110/68   BMI Clinic: 39.57         GAD7         No data to display                  CAGE-AID         No data to display                CAGE-AID reprinted with permission from the [place] Medical Journal, NITA Escamilla. and SILVIA Britton, \"Conjoint screening questionnaires for alcohol and drug abuse\" [place] Medical Journal 94: 135-140, 1995.      PATIENT HEALTH QUESTIONNAIRE-9 (PHQ - 9)        6/13/2023     4:00 PM   PHQ-9 ([employer])   1.  Little interest or pleasure in doing things 1   2.  Feeling down, depressed, or hopeless 1   3.  Trouble falling or staying asleep, or sleeping too much 1   4.  Feeling tired or having little energy 2   5.  Poor appetite or overeating 1   6.  Feeling bad about yourself - or that you are a failure or have let yourself or your family down 1   7.  Trouble concentrating on things, such as reading the newspaper or watching television 1   8.  Moving or speaking so slowly that other people could have noticed. Or the opposite - being so fidgety or restless that you have been moving around a lot more than usual 1   9.  Thoughts that you would be better off dead, or of hurting yourself in some way 0   PHQ-9 Total Score 9   6.  Feeling bad about yourself 1   7.  Trouble concentrating 1   8.  Moving slowly or restless 1   9.  Suicidal or self-harm thoughts 0   1.  Little interest or pleasure in doing things " Several days   2.  Feeling down, depressed, or hopeless Several days   3.  Trouble falling or staying asleep, or sleeping too much Several days   4.  Feeling tired or having little energy More than half the days   5.  Poor appetite or overeating Several days   6.  Feeling bad about yourself Several days   7.  Trouble concentrating Several days   8.  Moving slowly or restless Several days   9.  Suicidal or self-harm thoughts Not at all   PHQ-9 via [facility] TOTAL SCORE-----> 9 (Mild depression)   Difficulty at work, home, or with people Somewhat difficult       Developed by Nereyda Fonseca, Rita Erazo, Maciej Sellers and colleagues, with an educational elsie from [employer] No permission required to reproduce, translate, display or distribute.        Allergies:    Allergies   Allergen Reactions    Naproxen      Other reaction(s): Hematologic  Vaginal bleeding       Medications:    Current Outpatient Medications   Medication Sig Dispense Refill    acetaminophen (TYLENOL) 500 MG tablet Take 1,000 mg by mouth every 6 hours as needed for mild pain      albuterol (PROAIR HFA/PROVENTIL HFA/VENTOLIN HFA) 108 (90 Base) MCG/ACT inhaler Inhale 2 puffs into the lungs every 4 hours as needed for shortness of breath or wheezing      anastrozole (ARIMIDEX) 1 MG tablet Take 1 tablet (1 mg) by mouth daily 90 tablet 1    calcium carbonate 600 mg-vitamin D 400 units (CALTRATE) 600-400 MG-UNIT per tablet Take 1 tablet by mouth      carvedilol (COREG) 3.125 MG tablet Take 1 tablet (3.125 mg) by mouth 2 times daily (with meals) 60 tablet 0    Ergocalciferol 50 MCG (2000 UT) TABS Take 2,000 Units by mouth daily      lisinopril (ZESTRIL) 10 MG tablet Take 1 tablet (10 mg) by mouth daily 30 tablet 0    medical cannabis (Patient's own supply) See Admin Instructions (The purpose of this order is to document that the patient reports taking medical cannabis.  This is not a prescription, and is not used to certify that the patient  has a qualifying medical condition.)      melatonin 1 MG TABS tablet Take 3 tablets (3 mg) by mouth nightly as needed for sleep      PARoxetine (PAXIL) 20 MG tablet TAKE 1 TABLET BY MOUTH ONCE DAILY .  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) 09/29/2023     Priority: Medium    Sinus bradycardia 09/29/2023     Priority: Medium    Secondary malignant neoplasm of bone (H) 06/13/2023     Priority: Medium    Pancolitis (H) 06/13/2023     Priority: Medium    Morbid obesity (H) 03/18/2020     Priority: Medium    Metastatic breast cancer 12/11/2019     Priority: Medium    Mild episode of recurrent major depressive disorder (H24) 12/11/2019     Priority: Medium    Essential hypertension 12/11/2019     Priority: Medium    Dermatitis 12/11/2019     Priority: Medium        Past Medical/Surgical History:  Past Medical History:   Diagnosis Date    Basal cell carcinoma     Breast cancer (H)     Obesity      Past Surgical History:   Procedure Laterality Date    BREAST SURGERY      left tissue expander, LEFT MASTECTOMY    CHOLECYSTECTOMY      GYN SURGERY      oophorectomy    REMOVE TISSUE EXPANDER BREAST  4/16/2014    Procedure: REMOVAL OF LEFT BREAST TISSUE EXPANDER, IRRIGATION AND DEBRIDEMENT OF LEFT BREAST;  Surgeon: Marlon Luz MD;  Location: [facility]       Social History:  Social History     Socioeconomic History    Marital status:      Spouse name: Not on file    Number of children: Not on file    Years of education: Not on file    Highest education level: Not on file  "  Occupational History    Not on file   Tobacco Use    Smoking status: Former     Types: Cigarettes     Quit date: 2000     Years since quittin.7    Smokeless tobacco: Never   Vaping Use    Vaping Use: Never used   Substance and Sexual Activity    Alcohol use: Yes     Comment: social drinking    Drug use: Yes     Types: Marijuana     Comment: medical marijuana syrup     Sexual activity: Not Currently     Partners: Female, Male   Other Topics Concern    Not on file   Social History Narrative    Not on file     Social Determinants of Health     Financial Resource Strain: Not on file   Food Insecurity: Not on file   Transportation Needs: Not on file   Physical Activity: Not on file   Stress: Not on file   Social Connections: Not on file   Interpersonal Safety: Not on file   Housing Stability: Not on file       Family History:  No family history on file.    Review of Systems:  A complete review of systems reviewed by me is negative with the exeption of what has been mentioned in the history of present illness.  In the last TWO WEEKS have you experienced any of the following symptoms?  Fevers: Yes  Night Sweats: No  Weight Gain: No  Pain at Night: Yes  Double Vision: No  Changes in Vision: No  Difficulty Breathing through Nose: Yes  Sore Throat in Morning: No  Dry Mouth in the Morning: No  Shortness of Breath Lying Flat: Yes  Shortness of Breath With Activity: Yes  Awakening with Shortness of Breath: No  Increased Cough: No  Heart Racing at Night: No  Swelling in Feet or Legs: No  Diarrhea at Night: No  Heartburn at Night: No  Urinating More than Once at Night: Yes  Losing Control of Urine at Night: No  Joint Pains at Night: Yes  Headaches in Morning: No  Weakness in Arms or Legs: Yes  Depressed Mood: Yes  Anxiety: Yes     Physical Examination:  Vitals: /68   Pulse 78   Ht 1.651 m (5' 5\")   Wt 107.9 kg (237 lb 12.8 oz)   SpO2 94%   BMI 39.57 kg/m    BMI= Body mass index is 39.57 kg/m .    Neck Cir " "(cm): 42 cm      GENERAL APPEARANCE: healthy, alert, and no distress  EYES: Eyes grossly normal to inspection, PERRL, and conjunctivae and sclerae normal  HENT: nose and mouth without ulcers or lesions and oropharynx crowded  NECK: no adenopathy, no asymmetry, masses, or scars, and trachea midline and normal to palpation  RESP: lungs clear to auscultation - no rales, rhonchi or wheezes  CV: regular rates and rhythm, normal S1 S2, no S3 or S4, and no murmur, click or rub  MS: extremities normal- no gross deformities noted  PSYCH: mentation appears normal and affect normal/bright  Mallampati Class: II.  Tonsillar Stage: 1  hidden by pillars.         Data: All pertinent previous laboratory data reviewed     Recent Labs   Lab Test 10/06/23  0504 10/05/23  0619    140   POTASSIUM 3.9 4.3   CHLORIDE 101 103   CO2 27 28   ANIONGAP 10 9   * 105*   BUN 8.2 7.1*   CR 0.62 0.66   SINDY 8.9 9.1       Recent Labs   Lab Test 10/06/23  0504   WBC 6.6   RBC 3.87   HGB 12.1   HCT 36.4   MCV 94   MCH 31.3   MCHC 33.2   RDW 13.0          Recent Labs   Lab Test 09/29/23  1152   PROTTOTAL 6.8   ALBUMIN 3.7   BILITOTAL 0.4   ALKPHOS 93   AST 17   ALT 15       No results found for: \"TSH\"    No results found for: \"UAMP\", \"UBARB\", \"BENZODIAZEUR\", \"UCANN\", \"UCOC\", \"OPIT\", \"UPCP\"    No results found for: \"IRONSAT\", \"[id number]\", \"EDUARD\"    No results found for: \"PH\", \"PHARTERIAL\", \"PO2\", \"WC2JQTWAFWL\", \"SAT\", \"PCO2\", \"HCO3\", \"BASEEXCESS\", \"CARA\", \"BEB\"    @LABRCNTIPR(phv:4,pco2v:4,po2v:4,hco3v:4,rachel:4,o2per:4)@    Echocardiology: No results found for this or any previous visit (from the past 4320 hour(s)).    Chest x-ray: No results found for this or any previous visit from the past 365 days.      Chest CT: No results found for this or any previous visit from the past 365 days.      PFT: Most Recent [facility] Pulmonary Function Testing    No results found for: \"20001\"  No results found for: \"20002\"  No results found for: \"20003\"  No " "results found for: \"20015\"  No results found for: \"20016\"  No results found for: \"20027\"  No results found for: \"20028\"  No results found for: \"20029\"  No results found for: \"20079\"  No results found for: \"20080\"  No results found for: \"20081\"  No results found for: \"20335\"  No results found for: \"20105\"  No results found for: \"20053\"  No results found for: \"20054\"  No results found for: \"20055\"      Hitesh Kennedy [facility] 10/11/2023           "

## 2023-10-11 NOTE — PATIENT INSTRUCTIONS
"   [facility]   Clinic Office Hours:  Monday-- Thursday   8:00 am -- 4:30 pm CST  Friday  8:00 am-3:30 pm [facility]  Clinic Numbers- [phone]  Sleep Lab Address   [address] [place] [id number]  Sleep Lab Phone: [phone]    Your sleep study has been scheduled for: 11/6/23           Sleep Lab Telephone: [phone]    Thank you for choosing [facility]   Please take your time to read through this information.  This information has been designed to help you understand what it takes to begin your journey to a better night's rest. The contents of this packet include:  Study Preparation Instructions  An outline of your Sleep Study's Procedures.    Our technical staff will accommodate you as much as they are able. However, if you have special needs during the night in which you require a Personal Care Attendant, please make arrangements to bring that person along with you and notify us ahead of time by calling [phone].        Upon arrival please park in the west side parking lot near the Emergency Department Entrance   Enter through the \"Emergency Department\" doors and check in at the Emergency Department desk. Let them know you are here for a sleep study.  Please bring your insurance card and photo ID to the study with you.  You will receive a confirmation call 48 hours prior to your study time. If you do not receive a confirmation call, please call [phone].  If you need to cancel or reschedule for any reason, please contact us 24 hours before your scheduled appointment at [phone].  Please make arrangements to follow-up with your specialist to review your sleep study.  We hope to make your night with us as comfortable and as pleasant as possible. If you have any questions after reading through this packet, please feel free to call us.        TO PREPARE FOR YOUR SLEEP STUDY, PLEASE FOLLOW THE FOLLOWING GUIDELINES:    Please have the " next day available for continued testing in the event this is necessary.    Please bathe and wash your hair the day of the test. Hair should be clean, dry, and free from excessive oils, gels or sprays.  Upon arrival, men should be clean shaven (no stubble). If you have a mustache, goatee, or beard, it is not necessary to shave it off.  Bring comfortable 2-piece sleepwear.  If you prefer, you can bring your own pillow, blanket, orthopedic device, etc.  Arise at your regular time on the morning of your study and DO NOT NAP during the day.  Avoid stimulants (coffee, tea, chocolate, soda pop, energy drinks, any other caffeine drinks) after noon on the day of the study.  Eat a normal evening meal before you arrive for your study.   9.  If you prefer a specific snack before bedtime, please bring it along. We have a refrigerator and a microwave available.  10. Continue to take all prescribed medications unless otherwise instructed by your provider.  11. Bring medications for a 24-hour period, as well as any medications prescribed specifically for your sleep study.  Do not take until the sleep staff advises you to take the medications.   12. If you are incontinent please bring your Adult pads, depends or diapers.  13. Feel free to bring your own toiletries (Our lab will have them if you forget).  14. Please DO NOT wear gel nail polish or gel covering, it is ideal not to have any polish on at all, this can cause discrepancies to the equipment that is utilized to perform your sleep study.  15. If you use a C-PAP Machine, please refrain from using it for 3 days ( 72 hours) prior to the sleep study.       1. Arrival      Please arrive at [facility] at 8:00 P.M. to complete any necessary paperwork.  2. Interview      After settling into your room, your technologist will explain what will happen during your study. Please feel free to ask any questions about your study. At this time, your  technologist will collect your medication list and sleep diary.  3. Patient Set-up      The entire process is non-invasive and painless. We will be measuring your head and placing electrodes on your head and face. This will allow us to monitor your sleep. In addition, we will place monitors and sensors on your body that will monitor your breathing, limb movements, snoring and heart rate. Your set-up will take about 45 minutes to 1 hour to complete.  Lights Out  When it is time for bed, your technologist will help you get comfortable. Comfort is essential for a good night's sleep. If you are uncomfortable in any way, please tell your technologist. Before turning out the lights, your technologist will check your equipment. These checks will help you to have the best study possible. After the lights are turned off, you are free to sleep in whatever position is most comfortable to you. Please note: at some point during the night, you will be asked to sleep on your back. If you are unable to sleep on your back, please let your technologist know. Also, even though you will be connected to wires, you will be able to use the restroom when needed.  **Remember: this is a medical test so phones and television will be turned off for this test.  5. Lights On  The technologist will allow you to sleep in until 6:00 - 7:00 A.M. If you have a specific wake-up time requirement, please notify the technologist so that he or she may accommodate your needs. All the water-soluble conduction gel, electrodes and monitoring devices are easily removed. Upon completion, you will want to shower before you start your day. We do supply soap, shampoo and towels, though, if you prefer to bring your own supplies, please feel free to do so. Please be aware that your technologist will not be able to disclose any information regarding the findings of your study. This information will come from your provider at your follow-up appt.  Please schedule  follow up appointment two weeks after your sleep study, if a follow up has not already been scheduled for your results.       CANCELLATION / RESCHEDULING    Our care team at [facility], strives to provide exceptional - high quality care to all of our customers.     We have set aside our technical experts time and a room especially for you to perform your sleep study.     Working together to serve your comprehensive sleep health needs is very important to us. However, we do understand that changing an appointment is sometimes necessary.    If you find that you are unable to make your scheduled appointment, please contact us directly at least 24-hours prior to your scheduled sleep study at [phone]. If you are greeted by our answering system, please spell your name, give your date of birth and leave a detailed message. If you are calling the day of your sleep study and it is after 4:30, please call [phone] and leave a detailed message.     If you cancel with less than a 24-hour notice or do not show up for your sleep study without prior notification, we will send a letter to your referring provider and we will contact you to assist in rescheduling your study.            Thank you for choosing [facility] for your sleep health needs.    PLEASE BE AWARE [facility] ARE NOT RESPONSIBLE FOR ANY ITEMS LOST OR LEFT DURING YOUR STAY.          MY TREATMENT INFORMATION FOR SLEEP APNEA-  Mariluz Stoner    DOCTOR : CHRISTINE Kyle-STEPHANIE    Am I having a sleep study at a sleep center?  --->Due to normal delays, you will be contacted within 2-4 weeks to schedule    Am I having a home sleep study?  --->Watch the video for the device you are using:    -/drop off device-   https://www.youOctaneNationube.com/watch?v=yGGFBdELGhk    -Disposable device sent out require phone/computer application-   https://www.youtube.com/watch?v=BCce_vbiwxE      Frequently  "asked questions:  1. What is Obstructive Sleep Apnea (JYOTSNA)? JYOTSNA is the most common type of sleep apnea. Apnea means, \"without breath.\"  Apnea is most often caused by narrowing or collapse of the upper airway as muscles relax during sleep.   Almost everyone has occasional apneas. Most people with sleep apnea have had brief interruptions at night frequently for many years.  The severity of sleep apnea is related to how frequent and severe the events are.   2. What are the consequences of JYOTSNA? Symptoms include: feeling sleepy during the day, snoring loudly, gasping or stopping of breathing, trouble sleeping, and occasionally morning headaches or heartburn at night.  Sleepiness can be serious and even increase the risk of falling asleep while driving. Other health consequences may include development of high blood pressure and other cardiovascular disease in persons who are susceptible. Untreated JYOTSNA  can contribute to heart disease, stroke and diabetes.   3. What are the treatment options? In most situations, sleep apnea is a lifelong disease that must be managed with daily therapy. Medications are not effective for sleep apnea and surgery is generally not considered until other therapies have been tried. Your treatment is your choice . Continuous Positive Airway (CPAP) works right away and is the therapy that is effective in nearly everyone. An oral device to hold your jaw forward is usually the next most reliable option. Other options include postioning devices (to keep you off your back), weight loss, and surgery including a tongue pacing device. There is more detail about some of these options below.  4. Are my sleep studies covered by insurance? Although we will request verification of coverage, we advise you also check in advance of the study to ensure there is coverage.    Important tips for those choosing CPAP and similar devices  For new devices, sign up for device MEHRAN to monitor your device for your followup " visits  We encourage you to utilize the myAir [employer] leon or website (myAir web (resmed.com) ) to monitor your therapy progress and share the data with your healthcare team when you discuss your sleep apnea.                                                    Know your equipment:  CPAP is continuous positive airway pressure that prevents obstructive sleep apnea by keeping the throat from collapsing while you are sleeping. In most cases, the device is  smart  and can slowly self-adjusts if your throat collapses and keeps a record every day of how well you are treated-this information is available to you and your care team.  BPAP is bilevel positive airway pressure that keeps your throat open and also assists each breath with a pressure boost to maintain adequate breathing.  Special kinds of BPAP are used in patients who have inadequate breathing from lung or heart disease. In most cases, the device is  smart  and can slowly self-adjusts to assist breathing. Like CPAP, the device keeps a record of how well you are treated.  Your mask is your connection to the device. You get to choose what feels most comfortable and the staff will help to make sure if fits. Here: are some examples of the different masks that are available:       Key points to remember on your journey with sleep apnea:  Sleep study.  PAP devices often need to be adjusted during a sleep study to show that they are effective and adjusted right.  Good tips to remember: Try wearing just the mask during a quiet time during the day so your body adapts to wearing it. A humidifier is recommended for comfort in most cases to prevent drying of your nose and throat. Allergy medication from your provider may help you if you are having nasal congestion.  Getting settled-in. It takes more than one night for most of us to get used to wearing a mask. Try wearing just the mask during a quiet time during the day so your body adapts to wearing it. A humidifier is  recommended for comfort in most cases. Our team will work with you carefully on the first day and will be in contact within 4 days and again at 2 and 4 weeks for advice and remote device adjustments. Your therapy is evaluated by the device each day.   Use it every night. The more you are able to sleep naturally for 7-8 hours, the more likely you will have good sleep and to prevent health risks or symptoms from sleep apnea. Even if you use it 4 hours it helps. Occasionally all of us are unable to use a medical therapy, in sleep apnea, it is not dangerous to miss one night.   Communicate. Call our skilled team on the number provided on the first day if your visit for problems that make it difficult to wear the device. Over 2 out of 3 patients can learn to wear the device long-term with help from our team. Remember to call our team or your sleep providers if you are unable to wear the device as we may have other solutions for those who cannot adapt to mask CPAP therapy. It is recommended that you sleep your sleep provider within the first 3 months and yearly after that if you are not having problems.   Use it for your health. We encourage use of CPAP masks during daytime quiet periods to allow your face and brain to adapt to the sensation of CPAP so that it will be a more natural sensation to awaken to at night or during naps. This can be very useful during the first few weeks or months of adapting to CPAP though it does not help medically to wear CPAP during wakefulness and  should not be used as a strategy just to meet guidelines.  Take care of your equipment. Make sure you clean your mask and tubing using directions every day and that your filter and mask are replaced as recommended or if they are not working.     BESIDES CPAP, WHAT OTHER THERAPIES ARE THERE?    Positioning Device  Positioning devices are generally used when sleep apnea is mild and only occurs on your back.This example shows a pillow that straps  around the waist. It may be appropriate for those whose sleep study shows milder sleep apnea that occurs primarily when lying flat on one's back. Preliminary studies have shown benefit but effectiveness at home may need to be verified by a home sleep test. These devices are generally not covered by medical insurance.  Examples of devices that maintain sleeping on the back to prevent snoring and mild sleep apnea.    Belt type body positioner  http://E-nterview.CodeEval/    Electronic reminder  http://nightshifttherapy.com/            Oral Appliance  What is oral appliance therapy?  An oral appliance device fits on your teeth at night like a retainer used after having braces. The device is made by a specialized dentist and requires several visits over 1-2 months before a manufactured device is made to fit your teeth and is adjusted to prevent your sleep apnea. Once an oral device is working properly, snoring should be improved. A home sleep test may be recommended at that time if to determine whether the sleep apnea is adequately treated.       Some things to remember:  -Oral devices are often, but not always, covered by your medical insurance. Be sure to check with your insurance provider.   -If you are referred for oral therapy, you will be given a list of specialized dentists to consider or you may choose to visit the Web site of the American Academy of Dental Sleep Medicine  -Oral devices are less likely to work if you have severe sleep apnea or are extremely overweight.     More detailed information  An oral appliance is a small acrylic device that fits over the upper and lower teeth  (similar to a retainer or a mouth guard). This device slightly moves jaw forward, which moves the base of the tongue forward, opens the airway, improves breathing for effective treat snoring and obstructive sleep apnea in perhaps 7 out of 10 people .  The best working devices are custom-made by a dental device  after a mold is  made of the teeth 1, 2, 3.  When is an oral appliance indicated?  Oral appliance therapy is recommended as a first-line treatment for patients with primary snoring, mild sleep apnea, and for patients with moderate sleep apnea who prefer appliance therapy to use of CPAP4, 5. Severity of sleep apnea is determined by sleep testing and is based on the number of respiratory events per hour of sleep.   How successful is oral appliance therapy?  The success rate of oral appliance therapy in patients with mild sleep apnea is 75-80% while in patients with moderate sleep apnea it is 50-70%. The chance of success in patients with severe sleep apnea is 40-50%. The research also shows that oral appliances have a beneficial effect on the cardiovascular health of JYOTSNA patients at the same magnitude as CPAP therapy7.  Oral appliances should be a second-line treatment in cases of severe sleep apnea, but if not completely successful then a combination therapy utilizing CPAP plus oral appliance therapy may be effective. Oral appliances tend to be effective in a broad range of patients although studies show that the patients who have the highest success are females, younger patients, those with milder disease, and less severe obesity. 3, 6.   Finding a dentist that practices dental sleep medicine  Specific training is available through the American Academy of Dental Sleep Medicine for dentists interested in working in the field of sleep. To find a dentist who is educated in the field of sleep and the use of oral appliances, near you, visit the Web site of the American Academy of Dental Sleep Medicine.    References  1. Aminta, et al. Objectively measured vs self-reported compliance during oral appliance therapy for sleep-disordered breathing. Chest 2013; 144(5): 2832-9438.  2. Matthew et al. Objective measurement of compliance during oral appliance therapy for sleep-disordered breathing. Thorax 2013; 68(1): 91-96.  3. Gisela  et al. Mandibular advancement devices in 620 men and women with JYOTSNA and snoring: tolerability and predictors of treatment success. Chest 2004; 125: 8182-0523.  4. Rey et al. Oral appliances for snoring and JYOTSNA: a review. Sleep 2006; 29: 244-262.  5. Darleen et al. Oral appliance treatment for JYOTSNA: an update. J Clin Sleep Med 2014; 10(2): 215-227.  6. Braden et al. Predictors of OSAH treatment outcome. J Dent Res 2007; 86: 9102-8316.      Weight Loss:    Weight loss is a long-term strategy that may improve sleep apnea in some patients.    Weight management is a personal decision and the decision should be based on your interest and the potential benefits.  If you are interested in exploring weight loss strategies, the following discussion covers the impact on weight loss on sleep apnea and the approaches that may be successful.    Being overweight does not necessarily mean you will have health consequences.  Those who have BMI over 35 or over 27 with existing medical conditions carries greater risk.   Weight loss decreases severity of sleep apnea in most people with obesity. For those with mild obesity who have developed snoring with weight gain, even 15-30 pound weight loss can improve and occasionally eliminate sleep apnea.  Structured and life-long dietary and health habits are necessary to lose weight and keep healthier weight levels.     Though there may be significant health benefits from weight loss, long-term weight loss is very difficult to achieve- studies show success with dietary management in less than 10% of people. In addition, substantial weight loss may require years of dietary control and may be difficult if patients have severe obesity. In these cases, surgical management may be considered.  Finally, older individuals who have tolerated obesity without health complications may be less likely to benefit from weight loss strategies.      [unfilled]    Surgery:    Surgery for obstructive  sleep apnea is considered generally only when other therapies fail to work. Surgery may be discussed with you if you are having a difficult time tolerating CPAP and or when there is an abnormal structure that requires surgical correction.  Nose and throat surgeries often enlarge the airway to prevent collapse.  Most of these surgeries create pain for 1-2 weeks and up to half of the most common surgeries are not effective throughout life.  You should carefully discuss the benefits and drawbacks to surgery with your sleep provider and surgeon to determine if it is the best solution for you.   More information  Surgery for JYOTSNA is directed at areas that are responsible for narrowing or complete obstruction of the airway during sleep.  There are a wide range of procedures available to enlarge and/or stabilize the airway to prevent blockage of breathing in the three major areas where it can occur: the palate, tongue, and nasal regions.  Successful surgical treatment depends on the accurate identification of the factors responsible for obstructive sleep apnea in each person.  A personalized approach is required because there is no single treatment that works well for everyone.  Because of anatomic variation, consultation with an examination by a sleep surgeon is a critical first step in determining what surgical options are best for each patient.  In some cases, examination during sedation may be recommended in order to guide the selection of procedures.  Patients will be counseled about risks and benefits as well as the typical recovery course after surgery. Surgery is typically not a cure for a person s JYOTSNA.  However, surgery will often significantly improve one s JYOTSNA severity (termed  success rate ).  Even in the absence of a cure, surgery will decrease the cardiovascular risk associated with OSA7; improve overall quality of life8 (sleepiness, functionality, sleep quality, etc).      Palate Procedures:  Patients with  JYOTSNA often have narrowing of their airway in the region of their tonsils and uvula.  The goals of palate procedures are to widen the airway in this region as well as to help the tissues resist collapse.  Modern palate procedure techniques focus on tissue conservation and soft tissue rearrangement, rather than tissue removal.  Often the uvula is preserved in this procedure. Residual sleep apnea is common in patient after pharyngoplasty with an average reduction in sleep apnea events of 33%2.      Tongue Procedures:  ExamWhile patients are awake, the muscles that surround the throat are active and keep this region open for breathing. These muscles relax during sleep, allowing the tongue and other structures to collapse and block breathing.  There are several different tongue procedures available.  Selection of a tongue base procedure depends on characteristics seen on physical exam.  Generally, procedures are aimed at removing bulky tissues in this area or preventing the back of the tongue from falling back during sleep.  Success rates for tongue surgery range from 50-62%3.    Hypoglossal Nerve Stimulation:  Hypoglossal nerve stimulation has recently received approval from the [place] Food and Drug Administration for the treatment of obstructive sleep apnea.  This is based on research showing that the system was safe and effective in treating sleep apnea6.  Results showed that the median AHI score decreased 68%, from 29.3 to 9.0. This therapy uses an implant system that senses breathing patterns and delivers mild stimulation to airway muscles, which keeps the airway open during sleep.  The system consists of three fully implanted components: a small generator (similar in size to a pacemaker), a breathing sensor, and a stimulation lead.  Using a small handheld remote, a patient turns the therapy on before bed and off upon awakening.    Candidates for this device must be greater than 18 years of age, have moderate  to severe JYOTSNA (AHI between 15-65), BMI less than 35, have tried CPAP/oral appliance for at least 8 weeks without success, and have appropriate upper airway anatomy (determined by a sleep endoscopy performed by Dr. Brien Thakkar).    Hypoglossal Nerve Stimulation Pathway:    The sleep surgeon s office will work with the patient through the insurance prior-authorization process (including communications and appeals).    Nasal Procedures:  Nasal obstruction can interfere with nasal breathing during the day and night.  Studies have shown that relief of nasal obstruction can improve the ability of some patients to tolerate positive airway pressure therapy for obstructive sleep apnea1.  Treatment options include medications such as nasal saline, topical corticosteroid and antihistamine sprays, and oral medications such as antihistamines or decongestants. Non-surgical treatments can include external nasal dilators for selected patients. If these are not successful by themselves, surgery can improve the nasal airway either alone or in combination with these other options.      Combination Procedures:  Combination of surgical procedures and other treatments may be recommended, particularly if patients have more than one area of narrowing or persistent positional disease.  The success rate of combination surgery ranges from 66-80%2,3.    References  Beatriz BROWN. The Role of the Nose in Snoring and Obstructive Sleep Apnoea: An Update.  Eur Arch Otorhinolaryngol. 2011; 268: 1365-73.   Clarisse SM; Rahel JA; Moody JR; Pallanch JF; Lisa MB; Jayda SG; Ochoa SANABRIA. Surgical modifications of the upper airway for obstructive sleep apnea in adults: a systematic review and meta-analysis. SLEEP 2010;33(10):3433-1092. Saud MARLEY. Hypopharyngeal surgery in obstructive sleep apnea: an evidence-based medicine review.  Arch Otolaryngol Head Neck Surg. 2006 Feb;132(2):206-13.  Rigo YH1, Dat Y, Aung MELONIE. The efficacy of anatomically based  multilevel surgery for obstructive sleep apnea. Otolaryngol Head Neck Surg. 2003 Oct;129(4):327-35.  Saud MARLEY, Goldberg A. Hypopharyngeal Surgery in Obstructive Sleep Apnea: An Evidence-Based Medicine Review. Arch Otolaryngol Head Neck Surg. 2006 Feb;132(2):206-13.  Jorge SNIDER et al. Upper-Airway Stimulation for Obstructive Sleep Apnea.  N Engl J Med. 2014 Jan 9;370(2):139-49.  Dago Y et al. Increased Incidence of Cardiovascular Disease in Middle-aged Men with Obstructive Sleep Apnea. Am J Respir Crit Care Med; 2002 166: 159-165  Goldberg EM et al. Studying Life Effects and Effectiveness of Palatopharyngoplasty (SLEEP) study: Subjective Outcomes of Isolated Uvulopalatopharyngoplasty. Otolaryngol Head Neck Surg. 2011; 144: 623-631.        WHAT IF I ONLY HAVE SNORING?    Mandibular advancement devices, lateral sleep positioning, long-term weight loss and treatment of nasal allergies have been shown to improve snoring.  Exercising tongue muscles with a game (https://[employer]/us/leon/soundly-reduce-snoring/tv3513422024) or stimulating the tongue during the day with a device (https://doi.org/10.3390/uob40160089) have improved snoring in some individuals.    Remember to Drive Safe... Drive Alive     Sleep health profoundly affects your health, mood, and your safety.  Thirty three percent of the population (one in three of us) is not getting enough sleep and many have a sleep disorder. Not getting enough sleep or having an untreated / undertreated sleep condition may make us sleepy without even knowing it. In fact, our driving could be dramatically impaired due to our sleep health. As your provider, here are some things I would like you to know about driving:     Here are some warning signs for impairment and dangerous drowsy driving:              -Having been awake more than 16 hours               -Looking tired               -Eyelid drooping              -Head nodding (it could be too late at this point)               -Driving for more than 30 minutes     Some things you could do to make the driving safer if you are experiencing some drowsiness:              -Stop driving and rest              -Call for transportation              -Make sure your sleep disorder is adequately treated     Some things that have been shown NOT to work when experiencing drowsiness while driving:              -Turning on the radio              -Opening windows              -Eating any  distracting  /  entertaining  foods (e.g., sunflower seeds, candy, or any other)              -Talking on the phone      Your decision may not only impact your life, but also the life of others. Please, remember to drive safe for yourself and all of us.

## 2023-10-16 ENCOUNTER — HOSPITAL ENCOUNTER (EMERGENCY)
Facility: CLINIC | Age: 63
Discharge: HOME | End: 2023-10-16
Attending: EMERGENCY MEDICINE | Admitting: EMERGENCY MEDICINE
Payer: COMMERCIAL

## 2023-10-16 ENCOUNTER — APPOINTMENT (OUTPATIENT)
Dept: CT IMAGING | Facility: CLINIC | Age: 63
End: 2023-10-16
Attending: EMERGENCY MEDICINE
Payer: COMMERCIAL

## 2023-10-16 VITALS
DIASTOLIC BLOOD PRESSURE: 77 MMHG | HEART RATE: 70 BPM | HEIGHT: 67 IN | RESPIRATION RATE: 16 BRPM | OXYGEN SATURATION: 99 % | TEMPERATURE: 97.8 F | WEIGHT: 235 LBS | SYSTOLIC BLOOD PRESSURE: 136 MMHG | BODY MASS INDEX: 36.88 KG/M2

## 2023-10-16 DIAGNOSIS — K57.92 DIVERTICULITIS: ICD-10-CM

## 2023-10-16 LAB
ALBUMIN UR-MCNC: NEGATIVE MG/DL
ANION GAP SERPL CALCULATED.3IONS-SCNC: 12 MMOL/L (ref 7–15)
APPEARANCE UR: CLEAR
BASO+EOS+MONOS # BLD AUTO: NORMAL 10*3/UL
BASO+EOS+MONOS NFR BLD AUTO: NORMAL %
BASOPHILS # BLD AUTO: 0 10E3/UL (ref 0–0.2)
BASOPHILS NFR BLD AUTO: 0 %
BILIRUB UR QL STRIP: NEGATIVE
BUN SERPL-MCNC: 12.1 MG/DL (ref 8–23)
CALCIUM SERPL-MCNC: 9.4 MG/DL (ref 8.8–10.2)
CHLORIDE SERPL-SCNC: 103 MMOL/L (ref 98–107)
CLUE CELLS: ABNORMAL
COLOR UR AUTO: YELLOW
CREAT SERPL-MCNC: 0.56 MG/DL (ref 0.51–0.95)
DEPRECATED HCO3 PLAS-SCNC: 24 MMOL/L (ref 22–29)
EGFRCR SERPLBLD CKD-EPI 2021: >90 ML/MIN/1.73M2
EOSINOPHIL # BLD AUTO: 0.2 10E3/UL (ref 0–0.7)
EOSINOPHIL NFR BLD AUTO: 2 %
ERYTHROCYTE [DISTWIDTH] IN BLOOD BY AUTOMATED COUNT: 13.2 % (ref 10–15)
GLUCOSE SERPL-MCNC: 106 MG/DL (ref 70–99)
GLUCOSE UR STRIP-MCNC: NEGATIVE MG/DL
HCT VFR BLD AUTO: 36.9 % (ref 35–47)
HGB BLD-MCNC: 12.3 G/DL (ref 11.7–15.7)
HGB UR QL STRIP: ABNORMAL
IMM GRANULOCYTES # BLD: 0 10E3/UL
IMM GRANULOCYTES NFR BLD: 0 %
KETONES UR STRIP-MCNC: NEGATIVE MG/DL
LEUKOCYTE ESTERASE UR QL STRIP: NEGATIVE
LYMPHOCYTES # BLD AUTO: 1.2 10E3/UL (ref 0.8–5.3)
LYMPHOCYTES NFR BLD AUTO: 16 %
MCH RBC QN AUTO: 31.5 PG (ref 26.5–33)
MCHC RBC AUTO-ENTMCNC: 33.3 G/DL (ref 31.5–36.5)
MCV RBC AUTO: 94 FL (ref 78–100)
MONOCYTES # BLD AUTO: 0.8 10E3/UL (ref 0–1.3)
MONOCYTES NFR BLD AUTO: 10 %
MUCOUS THREADS #/AREA URNS LPF: PRESENT /LPF
NEUTROPHILS # BLD AUTO: 5.5 10E3/UL (ref 1.6–8.3)
NEUTROPHILS NFR BLD AUTO: 72 %
NITRATE UR QL: NEGATIVE
NRBC # BLD AUTO: 0 10E3/UL
NRBC BLD AUTO-RTO: 0 /100
PH UR STRIP: 6 [PH] (ref 5–7)
PLATELET # BLD AUTO: 299 10E3/UL (ref 150–450)
POTASSIUM SERPL-SCNC: 4.1 MMOL/L (ref 3.4–5.3)
RBC # BLD AUTO: 3.91 10E6/UL (ref 3.8–5.2)
RBC URINE: 4 /HPF
SODIUM SERPL-SCNC: 139 MMOL/L (ref 135–145)
SP GR UR STRIP: 1.02 (ref 1–1.03)
SQUAMOUS EPITHELIAL: 4 /HPF
TRICHOMONAS, WET PREP: ABNORMAL
UROBILINOGEN UR STRIP-MCNC: 2 MG/DL
WBC # BLD AUTO: 7.7 10E3/UL (ref 4–11)
WBC URINE: 2 /HPF
WBC'S/HIGH POWER FIELD, WET PREP: ABNORMAL
YEAST, WET PREP: ABNORMAL

## 2023-10-16 PROCEDURE — 250N000011 HC RX IP 250 OP 636: Performed by: EMERGENCY MEDICINE

## 2023-10-16 PROCEDURE — 250N000009 HC RX 250: Performed by: EMERGENCY MEDICINE

## 2023-10-16 PROCEDURE — 99285 EMERGENCY DEPT VISIT HI MDM: CPT | Mod: 25 | Performed by: EMERGENCY MEDICINE

## 2023-10-16 PROCEDURE — 258N000003 HC RX IP 258 OP 636: Performed by: EMERGENCY MEDICINE

## 2023-10-16 PROCEDURE — 74177 CT ABD & PELVIS W/CONTRAST: CPT

## 2023-10-16 PROCEDURE — 99284 EMERGENCY DEPT VISIT MOD MDM: CPT | Performed by: EMERGENCY MEDICINE

## 2023-10-16 PROCEDURE — 85048 AUTOMATED LEUKOCYTE COUNT: CPT | Performed by: EMERGENCY MEDICINE

## 2023-10-16 PROCEDURE — 250N000011 HC RX IP 250 OP 636: Mod: JZ | Performed by: EMERGENCY MEDICINE

## 2023-10-16 PROCEDURE — 87210 SMEAR WET MOUNT SALINE/INK: CPT | Performed by: EMERGENCY MEDICINE

## 2023-10-16 PROCEDURE — 96361 HYDRATE IV INFUSION ADD-ON: CPT | Performed by: EMERGENCY MEDICINE

## 2023-10-16 PROCEDURE — 36591 DRAW BLOOD OFF VENOUS DEVICE: CPT | Performed by: EMERGENCY MEDICINE

## 2023-10-16 PROCEDURE — 82310 ASSAY OF CALCIUM: CPT | Performed by: EMERGENCY MEDICINE

## 2023-10-16 PROCEDURE — 96374 THER/PROPH/DIAG INJ IV PUSH: CPT | Mod: 59 | Performed by: EMERGENCY MEDICINE

## 2023-10-16 PROCEDURE — 81001 URINALYSIS AUTO W/SCOPE: CPT | Performed by: EMERGENCY MEDICINE

## 2023-10-16 RX ORDER — IOPAMIDOL 755 MG/ML
500 INJECTION, SOLUTION INTRAVASCULAR ONCE
Status: COMPLETED | OUTPATIENT
Start: 2023-10-16 | End: 2023-10-16

## 2023-10-16 RX ORDER — HEPARIN SODIUM (PORCINE) LOCK FLUSH IV SOLN 100 UNIT/ML 100 UNIT/ML
5-10 SOLUTION INTRAVENOUS
Status: DISCONTINUED | OUTPATIENT
Start: 2023-10-16 | End: 2023-10-16 | Stop reason: HOSPADM

## 2023-10-16 RX ORDER — HYDROMORPHONE HYDROCHLORIDE 1 MG/ML
0.5 INJECTION, SOLUTION INTRAMUSCULAR; INTRAVENOUS; SUBCUTANEOUS ONCE
Status: COMPLETED | OUTPATIENT
Start: 2023-10-16 | End: 2023-10-16

## 2023-10-16 RX ORDER — HEPARIN SODIUM,PORCINE 10 UNIT/ML
5-10 VIAL (ML) INTRAVENOUS EVERY 24 HOURS
Status: DISCONTINUED | OUTPATIENT
Start: 2023-10-16 | End: 2023-10-16 | Stop reason: HOSPADM

## 2023-10-16 RX ORDER — OXYCODONE HYDROCHLORIDE 5 MG/1
5 TABLET ORAL EVERY 6 HOURS PRN
Qty: 10 TABLET | Refills: 0 | Status: SHIPPED | OUTPATIENT
Start: 2023-10-16 | End: 2024-03-28

## 2023-10-16 RX ORDER — HEPARIN SODIUM,PORCINE 10 UNIT/ML
5-10 VIAL (ML) INTRAVENOUS
Status: DISCONTINUED | OUTPATIENT
Start: 2023-10-16 | End: 2023-10-16 | Stop reason: HOSPADM

## 2023-10-16 RX ORDER — FLUCONAZOLE 150 MG/1
150 TABLET ORAL ONCE
Qty: 1 TABLET | Refills: 0 | Status: SHIPPED | OUTPATIENT
Start: 2023-10-16 | End: 2023-10-16

## 2023-10-16 RX ORDER — SODIUM CHLORIDE 9 MG/ML
INJECTION, SOLUTION INTRAVENOUS CONTINUOUS
Status: DISCONTINUED | OUTPATIENT
Start: 2023-10-16 | End: 2023-10-16 | Stop reason: HOSPADM

## 2023-10-16 RX ADMIN — IOPAMIDOL 100 ML: 755 INJECTION, SOLUTION INTRAVENOUS at 11:20

## 2023-10-16 RX ADMIN — HEPARIN 5 ML: 100 SYRINGE at 12:14

## 2023-10-16 RX ADMIN — SODIUM CHLORIDE: 9 INJECTION, SOLUTION INTRAVENOUS at 09:41

## 2023-10-16 RX ADMIN — HYDROMORPHONE HYDROCHLORIDE 0.5 MG: 1 INJECTION, SOLUTION INTRAMUSCULAR; INTRAVENOUS; SUBCUTANEOUS at 09:45

## 2023-10-16 RX ADMIN — SODIUM CHLORIDE 60 ML: 9 INJECTION, SOLUTION INTRAVENOUS at 11:19

## 2023-10-16 ASSESSMENT — ACTIVITIES OF DAILY LIVING (ADL)
ADLS_ACUITY_SCORE: 35
ADLS_ACUITY_SCORE: 35

## 2023-10-16 NOTE — ED PROVIDER NOTES
History     chief complaint  HPI  Patient is a 62-year-old female with a history of recent diverticulitis with an intramural abscess managed nonoperatively discharged on 10/6 presenting with renewed left lower quadrant abdominal pain and tenderness and tenesmus.  She states in the last several days the left lower quadrant pain has returned.  It is constant.  No she is producing mucousy stool but is not having great bowel movements.  No urinary symptoms.  No rhinorrhea, sore throat, cough, chest pain, dyspnea.  No fevers.    Review of Systems:  All organ systems below were reviewed and are negative unless indicated in the HPI.    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) 09/29/2023     Priority: Medium    Sinus bradycardia 09/29/2023     Priority: Medium    Secondary malignant neoplasm of bone (H) 06/13/2023     Priority: Medium    Pancolitis (H) 06/13/2023     Priority: Medium    Morbid obesity (H) 03/18/2020     Priority: Medium    Metastatic breast cancer 12/11/2019     Priority: Medium    Mild episode of recurrent major depressive disorder (H24) 12/11/2019     Priority: Medium    Essential hypertension 12/11/2019     Priority: Medium    Dermatitis 12/11/2019     Priority: Medium        Past Medical History:    Past Medical History:   Diagnosis Date    Basal cell carcinoma     Breast cancer (H)     Obesity        Past Surgical History:    Past Surgical History:   Procedure Laterality Date    BREAST SURGERY      left tissue expander, LEFT MASTECTOMY     "CHOLECYSTECTOMY      GYN SURGERY      oophorectomy    REMOVE TISSUE EXPANDER BREAST  4/16/2014    Procedure: REMOVAL OF LEFT BREAST TISSUE EXPANDER, IRRIGATION AND DEBRIDEMENT OF LEFT BREAST;  Surgeon: Marlon Luz MD;  [facility]       Family History:    No family history on file.    Medications:    amoxicillin-clavulanate (AUGMENTIN) 875-125 MG tablet  fluconazole (DIFLUCAN) 150 MG tablet  oxyCODONE (ROXICODONE) 5 MG tablet  acetaminophen (TYLENOL) 500 MG tablet  albuterol (PROAIR HFA/PROVENTIL HFA/VENTOLIN HFA) 108 (90 Base) MCG/ACT inhaler  anastrozole (ARIMIDEX) 1 MG tablet  calcium carbonate 600 mg-vitamin D 400 units (CALTRATE) 600-400 MG-UNIT per tablet  carvedilol (COREG) 3.125 MG tablet  Ergocalciferol 50 MCG (2000 UT) TABS  lisinopril (ZESTRIL) 10 MG tablet  medical cannabis (Patient's own supply)  melatonin 1 MG TABS tablet  PARoxetine (PAXIL) 20 MG tablet  Vitamin D3 (VITAMIN D-1000 MAX ST) 25 mcg (1000 units) tablet          Physical Exam   BP: 129/58 (R) lower forearm, regular cuff)  Pulse: 74  Temp: 98.1  F (36.7  C)  Resp: 24  Height: 170.2 cm (5' 7\")  Weight: 106.6 kg (235 lb)  SpO2: 99 %    Gen: Vital signs reviewed  Eyes: Sclera white, pupils round  ENT: External ears and nares normal  Card: Regular rate and rhythm  Resp: No respiratory distress. Lungs clear to auscultation bilaterally  Abd: Soft, non-distended, tender to palpation in the left lower quadrant.  Extremities: Symmetric distal pulses.  Skin: No rashes  Neuro: Alert, speech normal.     ED Course        Procedures           Results for orders placed or performed during the hospital encounter of 10/16/23 (from the past 24 hour(s))   UA with Microscopic reflex to Culture    Specimen: Urine, Midstream   Result Value Ref Range    Color Urine Yellow Colorless, Straw, Light Yellow, Yellow    Appearance Urine Clear Clear    Glucose Urine Negative Negative mg/dL    Bilirubin Urine Negative Negative    Ketones Urine Negative " Negative mg/dL    Specific Gravity Urine 1.024 1.003 - 1.035    Blood Urine Small (A) Negative    pH Urine 6.0 5.0 - 7.0    Protein Albumin Urine Negative Negative mg/dL    Urobilinogen Urine 2.0 Normal, 2.0 mg/dL    Nitrite Urine Negative Negative    Leukocyte Esterase Urine Negative Negative    Mucus Urine Present (A) None Seen /LPF    RBC Urine 4 (H) <=2 /HPF    WBC Urine 2 <=5 /HPF    Squamous Epithelials Urine 4 (H) <=1 /HPF    Narrative    Urine Culture not indicated   Wet prep    Specimen: Vagina; Swab   Result Value Ref Range    Trichomonas Absent Absent    Yeast Absent Absent    Clue Cells Absent Absent    WBCs/high power field 2+ (A) None   CBC with platelets differential    Narrative    The following orders were created for panel order CBC with platelets differential.  Procedure                               Abnormality         Status                     ---------                               -----------         ------                     CBC with platelets and d...[[id number]]                      Final result                 Please view results for these tests on the individual orders.   Basic metabolic panel   Result Value Ref Range    Sodium 139 135 - 145 mmol/L    Potassium 4.1 3.4 - 5.3 mmol/L    Chloride 103 98 - 107 mmol/L    Carbon Dioxide (CO2) 24 22 - 29 mmol/L    Anion Gap 12 7 - 15 mmol/L    Urea Nitrogen 12.1 8.0 - 23.0 mg/dL    Creatinine 0.56 0.51 - 0.95 mg/dL    GFR Estimate >90 >60 mL/min/1.73m2    Calcium 9.4 8.8 - 10.2 mg/dL    Glucose 106 (H) 70 - 99 mg/dL   CBC with platelets and differential   Result Value Ref Range    WBC Count 7.7 4.0 - 11.0 10e3/uL    RBC Count 3.91 3.80 - 5.20 10e6/uL    Hemoglobin 12.3 11.7 - 15.7 g/dL    Hematocrit 36.9 35.0 - 47.0 %    MCV 94 78 - 100 fL    MCH 31.5 26.5 - 33.0 pg    MCHC 33.3 31.5 - 36.5 g/dL    RDW 13.2 10.0 - 15.0 %    Platelet Count 299 150 - 450 10e3/uL    % Neutrophils 72 %    % Lymphocytes 16 %    % Monocytes 10 %    Mids % (Monos,  Eos, Basos)      % Eosinophils 2 %    % Basophils 0 %    % Immature Granulocytes 0 %    NRBCs per 100 WBC 0 <1 /100    Absolute Neutrophils 5.5 1.6 - 8.3 10e3/uL    Absolute Lymphocytes 1.2 0.8 - 5.3 10e3/uL    Absolute Monocytes 0.8 0.0 - 1.3 10e3/uL    Mids Abs (Monos, Eos, Basos)      Absolute Eosinophils 0.2 0.0 - 0.7 10e3/uL    Absolute Basophils 0.0 0.0 - 0.2 10e3/uL    Absolute Immature Granulocytes 0.0 <=0.4 10e3/uL    Absolute NRBCs 0.0 10e3/uL   CT Abdomen Pelvis w Contrast    Narrative    CT ABDOMEN PELVIS WITH CONTRAST 10/16/2023 11:32 AM    CLINICAL HISTORY: Left lower quadrant pain.    TECHNIQUE: CT scan of the abdomen and pelvis was performed following  injection of IV contrast. Multiplanar reformats were obtained. Dose  reduction techniques were used.  CONTRAST: 100mL, Isovue-370  COMPARISON: CT of the abdomen and pelvis performed 10/5/2023.    FINDINGS:   LOWER CHEST: The visualized lung bases are clear. Postoperative  changes in the left breast are partially included on this exam.    HEPATOBILIARY: Cholecystectomy. No hepatic masses are seen.    PANCREAS: Normal.    SPLEEN: Normal.    ADRENAL GLANDS: Normal.    KIDNEYS/BLADDER: Small bilateral renal cysts would require no specific  follow-up. No hydronephrosis.    BOWEL: Colonic diverticulosis. Focal bowel wall thickening with  moderate surrounding inflammatory stranding in the sigmoid colon has  increased slightly since the previous exam, and is consistent with  acute diverticulitis. No associated fluid collection to suggest  diverticular abscess. No bowel obstruction. Unremarkable appendix.    PELVIC ORGANS: Unremarkable.    LYMPH NODES: No enlarged lymph nodes are identified in the abdomen or  pelvis.    VASCULATURE: Unremarkable.    ADDITIONAL FINDINGS: None.    MUSCULOSKELETAL: Unremarkable.      Impression    IMPRESSION:   Changes of acute sigmoid diverticulitis have increased slightly  compared to 10/5/2023. No associated abscess.    DAYSI  MD NOEL         SYSTEM ID:  [id number]       Medications   sodium chloride 0.9 % infusion ( Intravenous $New Bag 10/16/23 0941)   sodium chloride (PF) 0.9% PF flush 10-20 mL (has no administration in time range)   sodium chloride (PF) 0.9% PF flush 10-20 mL (20 mLs Intracatheter $Given 10/16/23 1214)   sodium chloride (PF) 0.9% PF flush 10-20 mL (has no administration in time range)   heparin lock flush 10 UNIT/ML injection 5-10 mL (has no administration in time range)   heparin lock flush 10 UNIT/ML injection 5-10 mL (has no administration in time range)   heparin 100 unit/mL injection 5-10 mL (5 mLs Intracatheter $Given 10/16/23 1214)   iopamidol (ISOVUE-370) solution 500 mL (100 mLs Intravenous $Given 10/16/23 1120)   sodium chloride 0.9 % bag 100mL for CT scan flush use (60 mLs Intravenous $Given 10/16/23 1119)   HYDROmorphone (PF) (DILAUDID) injection 0.5 mg (0.5 mg Intravenous $Given 10/16/23 0945)         Consultations:  None    Social Determinants of Health:  Manages ADLs    Assessments & Plan (with Medical Decision Making)       I have reviewed the nursing notes.    I have reviewed the findings, diagnosis, plan and need for follow up with the patient.      Medical Decision Making  On arrival, she is slightly hypertensive.  Differential for presentation includes diverticulitis, abscess, perforation.      I did review discharge summary from most recent hospitalization.    Urine does not appear infected.  No leukocytosis.  I did repeat a CT scan to evaluate for complications; this showed slightly worsening diverticulitis without abscess or perforation.  Given reassuring labs and vitals, will treat with oral antibiotics for this.  She does endorse some vaginal discomfort and itchiness that is starting.  I sent her some Diflucan in case this is the beginning of a yeast infection to be used after her next course of antibiotics.  Discharged home in stable condition with appropriate return precautions.    Final  diagnoses:   Diverticulitis         Dav Vázquez M.D.   [facility] Emergency Department     Dav Vázquez MD  10/16/23 5107

## 2023-10-16 NOTE — ED TRIAGE NOTES
Patient presents with concerns for increased LLQ abdominal pain and change in stools. She was recently hospitalized for diverticulitis and treated with antibiotics. Mikayla Wylie RN

## 2023-10-20 ENCOUNTER — MYC MEDICAL ADVICE (OUTPATIENT)
Dept: INTERNAL MEDICINE | Facility: CLINIC | Age: 63
End: 2023-10-20
Payer: COMMERCIAL

## 2023-10-23 ENCOUNTER — E-VISIT (OUTPATIENT)
Dept: FAMILY MEDICINE | Facility: CLINIC | Age: 63
End: 2023-10-23
Payer: COMMERCIAL

## 2023-10-23 DIAGNOSIS — R10.84 ABDOMINAL PAIN, GENERALIZED: Primary | ICD-10-CM

## 2023-10-23 PROCEDURE — 99421 OL DIG E/M SVC 5-10 MIN: CPT | Performed by: INTERNAL MEDICINE

## 2023-10-23 NOTE — PATIENT INSTRUCTIONS
Thank you for choosing us for your care. Based on your symptoms and length of illness, I do not think that you need a prescription at this time.  Please follow the care advise I've provided and use the over the counter medications to help relieve your symptoms.   View your full visit summary for details by clicking on the link below.     If you're not feeling better within 2-3 days, please respond to this message and we can consider if a prescription is needed.  You can schedule an appointment right here in [facility], or call [phone]  If the visit is for the same symptoms as your eVisit, we'll refund the cost of your eVisit if seen within seven days.

## 2023-10-23 NOTE — TELEPHONE ENCOUNTER
Patient calling to state she had submitted a [employer] message requesting something for her pain. Patient stating she received a message back to submit an E-Visit and questioned how she was to do this? Instruction given that she submits this through her [employer] under appointments. Instructed to answer All questions and put as much information as she can on her symptoms she is experiencing.  Liat Ramos LPN

## 2023-10-24 ENCOUNTER — THERAPY VISIT (OUTPATIENT)
Dept: SLEEP MEDICINE | Facility: CLINIC | Age: 63
End: 2023-10-24
Payer: COMMERCIAL

## 2023-10-24 DIAGNOSIS — R29.818 SUSPECTED SLEEP APNEA: ICD-10-CM

## 2023-10-24 DIAGNOSIS — I42.7 CARDIOMYOPATHY SECONDARY TO DRUG (H): ICD-10-CM

## 2023-10-24 DIAGNOSIS — F33.0 MILD EPISODE OF RECURRENT MAJOR DEPRESSIVE DISORDER (H): ICD-10-CM

## 2023-10-24 DIAGNOSIS — E66.01 MORBID OBESITY (H): ICD-10-CM

## 2023-10-24 DIAGNOSIS — I50.20 HEART FAILURE WITH REDUCED EJECTION FRACTION (H): ICD-10-CM

## 2023-10-24 DIAGNOSIS — R00.1 SINUS BRADYCARDIA: ICD-10-CM

## 2023-10-24 PROCEDURE — 95810 POLYSOM 6/> YRS 4/> PARAM: CPT | Performed by: OTOLARYNGOLOGY

## 2023-10-24 ASSESSMENT — SLEEP AND FATIGUE QUESTIONNAIRES
HOW LIKELY ARE YOU TO NOD OFF OR FALL ASLEEP IN A CAR, WHILE STOPPED FOR A FEW MINUTES IN TRAFFIC: WOULD NEVER DOZE
HOW LIKELY ARE YOU TO NOD OFF OR FALL ASLEEP WHILE SITTING AND READING: HIGH CHANCE OF DOZING
HOW LIKELY ARE YOU TO NOD OFF OR FALL ASLEEP WHILE SITTING AND TALKING TO SOMEONE: WOULD NEVER DOZE
HOW LIKELY ARE YOU TO NOD OFF OR FALL ASLEEP WHEN YOU ARE A PASSENGER IN A CAR FOR AN HOUR WITHOUT A BREAK: WOULD NEVER DOZE
HOW LIKELY ARE YOU TO NOD OFF OR FALL ASLEEP WHILE WATCHING TV: MODERATE CHANCE OF DOZING
HOW LIKELY ARE YOU TO NOD OFF OR FALL ASLEEP WHILE SITTING QUIETLY AFTER LUNCH WITHOUT ALCOHOL: WOULD NEVER DOZE
HOW LIKELY ARE YOU TO NOD OFF OR FALL ASLEEP WHILE LYING DOWN TO REST IN THE AFTERNOON WHEN CIRCUMSTANCES PERMIT: SLIGHT CHANCE OF DOZING
HOW LIKELY ARE YOU TO NOD OFF OR FALL ASLEEP WHILE SITTING INACTIVE IN A PUBLIC PLACE: SLIGHT CHANCE OF DOZING

## 2023-10-25 ENCOUNTER — OFFICE VISIT (OUTPATIENT)
Dept: INTERNAL MEDICINE | Facility: CLINIC | Age: 63
End: 2023-10-25
Payer: COMMERCIAL

## 2023-10-25 ENCOUNTER — HOSPITAL ENCOUNTER (OUTPATIENT)
Dept: GENERAL RADIOLOGY | Facility: CLINIC | Age: 63
Discharge: HOME | End: 2023-10-25
Attending: INTERNAL MEDICINE | Admitting: INTERNAL MEDICINE
Payer: COMMERCIAL

## 2023-10-25 VITALS
OXYGEN SATURATION: 97 % | DIASTOLIC BLOOD PRESSURE: 70 MMHG | TEMPERATURE: 97.6 F | WEIGHT: 237.25 LBS | RESPIRATION RATE: 22 BRPM | BODY MASS INDEX: 39.53 KG/M2 | HEIGHT: 65 IN | HEART RATE: 80 BPM | SYSTOLIC BLOOD PRESSURE: 124 MMHG

## 2023-10-25 DIAGNOSIS — K57.92 ACUTE DIVERTICULITIS: ICD-10-CM

## 2023-10-25 DIAGNOSIS — C50.919 PRIMARY MALIGNANT NEOPLASM OF BREAST WITH METASTASIS (H): ICD-10-CM

## 2023-10-25 DIAGNOSIS — E66.01 MORBID OBESITY (H): ICD-10-CM

## 2023-10-25 DIAGNOSIS — R52 INTRACTABLE PAIN: ICD-10-CM

## 2023-10-25 DIAGNOSIS — K57.92 ACUTE DIVERTICULITIS: Primary | ICD-10-CM

## 2023-10-25 DIAGNOSIS — I42.7 CARDIOMYOPATHY SECONDARY TO DRUG (H): ICD-10-CM

## 2023-10-25 PROCEDURE — 99214 OFFICE O/P EST MOD 30 MIN: CPT | Performed by: INTERNAL MEDICINE

## 2023-10-25 PROCEDURE — 74019 RADEX ABDOMEN 2 VIEWS: CPT

## 2023-10-25 RX ORDER — TRAMADOL HYDROCHLORIDE 50 MG/1
50 TABLET ORAL EVERY 6 HOURS PRN
Qty: 30 TABLET | Refills: 0 | Status: SHIPPED | OUTPATIENT
Start: 2023-10-25 | End: 2023-10-28

## 2023-10-25 ASSESSMENT — PAIN SCALES - GENERAL: PAINLEVEL: WORST PAIN (10)

## 2023-10-25 ASSESSMENT — PATIENT HEALTH QUESTIONNAIRE - PHQ9
SUM OF ALL RESPONSES TO PHQ QUESTIONS 1-9: 17
SUM OF ALL RESPONSES TO PHQ QUESTIONS 1-9: 17
10. IF YOU CHECKED OFF ANY PROBLEMS, HOW DIFFICULT HAVE THESE PROBLEMS MADE IT FOR YOU TO DO YOUR WORK, TAKE CARE OF THINGS AT HOME, OR GET ALONG WITH OTHER PEOPLE: EXTREMELY DIFFICULT

## 2023-10-25 NOTE — PROGRESS NOTES
"  Assessment & Plan     Acute diverticulitis  Patient continues to have severe pain.  She notes that she is worse now than when she was in the hospital.    As completed \"several\" courses of antibiotics.  Obtain upright x-ray to rule out free air.  Check CBC for spike in white count.  We will set up surgical reevaluation.  Short of tramadol for intractable pain.  This will not mask significant pain but hopefully give her some modest relief.  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  Follows with Cardiology           MED REC REQUIRED  Post Medication Reconciliation Status: discharge medications reconciled, continue medications without change                           FOLLOW UP   I have asked the patient to make an appointment for followup with:  1) Me  2) the patient's preferred provider  3) Any available provider  In 2 weeks    Jean Dash DO  [facility] Mcgraw is a 62 year old, presenting for the following health issues:  Hospital F/U (Diverticulitis with abscess  )      10/25/2023     9:23 AM   Additional Questions   Roomed by Maribel       [facility] Follow-up Visit:    Hospital/Nursing Home/ Rehab Facility: [facility]  Date of Admission: 9/29/23  Date of Discharge: 10/6/23  Reason(s) for Admission: Acute diverticulitis with probable intramural abscess    Was your hospitalization related to COVID-19? No   Problems taking medications regularly:  None  Medication changes since discharge: Metoprolol was changed to Carvedilol. Started antibiotics. Oxycodone prescribed, currently out.   Problems adhering to " "non-medication therapy:  None    Summary of hospitalization:  [facility] discharge summary reviewed  Diagnostic Tests/Treatments reviewed.  Follow up needed: none  Other Healthcare Providers Involved in Patient s Care:         None  Update since discharge: worsened.         Plan of care communicated with patient                   Review of Systems   CONSTITUTIONAL: NEGATIVE for fever, chills, change in weight  INTEGUMENTARY/SKIN: NEGATIVE for worrisome rashes, moles or lesions  EYES: NEGATIVE for vision changes or irritation  ENT/MOUTH: NEGATIVE for ear, mouth and throat problems  RESP: NEGATIVE for significant cough or SOB  CV: NEGATIVE for chest pain, palpitations or peripheral edema  GI: Positive for left lower abdominal pain.  Patient having soft mucus stools.  Has had small amounts of blood.  Recent hospital work-up reviewed in detail.  : NEGATIVE for frequency, dysuria, or hematuria  MUSCULOSKELETAL: NEGATIVE for significant arthralgias or myalgia  NEURO: NEGATIVE for weakness, dizziness or paresthesias  ENDOCRINE: NEGATIVE for temperature intolerance, skin/hair changes  HEME: NEGATIVE for bleeding problems  PSYCHIATRIC: Increased anxiety and depression secondary to chronic pain.  States that she cannot sleep, think or eat.      Objective    /70   Pulse 80   Temp 97.6  F (36.4  C) (Temporal)   Resp 22   Ht 1.65 m (5' 4.96\")   Wt 107.6 kg (237 lb 4 oz)   SpO2 97%   BMI 39.53 kg/m    Body mass index is 39.53 kg/m .  Physical Exam   GENERAL: Patient appears to be in excessive distress.  She is rolling back and forth in the chair.  She is holding her lower abdomen.  She intermittently is crying.  EYES: Eyes grossly normal to inspection, PERRL and conjunctivae and sclerae normal  HENT: ear canals and TM's normal, nose and mouth without ulcers or lesions  NECK: no adenopathy, no asymmetry, masses, or scars and thyroid normal to palpation  RESP: lungs clear to auscultation - no rales, " rhonchi or wheezes  CV: regular rate and rhythm, normal S1 S2, no S3 or S4, no murmur, click or rub, no peripheral edema and peripheral pulses strong  ABDOMEN: Abdomen is acutely tender.  Does not appear to be rebound on examination though this is difficult due to patient's pain level and anxiety.  MS: no gross musculoskeletal defects noted, no edema  SKIN: no suspicious lesions or rashes  NEURO: Normal strength and tone, mentation intact and speech normal  PSYCH: mentation appears normal, affect normal/bright    Lab work, radiographs performed in the hospitalization and since have been reviewed.                      I have carefully explained the diagnosis and treatment options to the patient.  The patient has displayed an understanding of the above, and all subsequent questions were answered.      DO CLYDE Ramos    Portions of this note were produced using [employer]  Although every attempt at real-time proof reading has been made, occasional grammar/syntax errors may have been missed.          Answers submitted by the patient for this visit:  Patient Health Questionnaire (Submitted on 10/25/2023)  If you checked off any problems, how difficult have these problems made it for you to do your work, take care of things at home, or get along with other people?: Extremely difficult  PHQ9 TOTAL SCORE: 17

## 2023-10-27 ENCOUNTER — OFFICE VISIT (OUTPATIENT)
Dept: SURGERY | Facility: CLINIC | Age: 63
End: 2023-10-27
Payer: COMMERCIAL

## 2023-10-27 ENCOUNTER — LAB (OUTPATIENT)
Dept: LAB | Facility: CLINIC | Age: 63
End: 2023-10-27
Payer: COMMERCIAL

## 2023-10-27 VITALS
BODY MASS INDEX: 39.49 KG/M2 | HEIGHT: 65 IN | SYSTOLIC BLOOD PRESSURE: 118 MMHG | DIASTOLIC BLOOD PRESSURE: 68 MMHG | WEIGHT: 237 LBS | TEMPERATURE: 97.5 F

## 2023-10-27 DIAGNOSIS — K57.92 ACUTE DIVERTICULITIS: ICD-10-CM

## 2023-10-27 DIAGNOSIS — E78.5 HYPERLIPIDEMIA LDL GOAL <130: ICD-10-CM

## 2023-10-27 DIAGNOSIS — Z11.4 SCREENING FOR HIV (HUMAN IMMUNODEFICIENCY VIRUS): Primary | ICD-10-CM

## 2023-10-27 DIAGNOSIS — Z11.59 NEED FOR HEPATITIS C SCREENING TEST: ICD-10-CM

## 2023-10-27 DIAGNOSIS — Z13.220 SCREENING FOR HYPERLIPIDEMIA: ICD-10-CM

## 2023-10-27 LAB
CHOLEST SERPL-MCNC: 206 MG/DL
ERYTHROCYTE [DISTWIDTH] IN BLOOD BY AUTOMATED COUNT: 13.2 % (ref 10–15)
HCT VFR BLD AUTO: 40.8 % (ref 35–47)
HCV AB SERPL QL IA: NONREACTIVE
HDLC SERPL-MCNC: 41 MG/DL
HGB BLD-MCNC: 13.2 G/DL (ref 11.7–15.7)
HIV 1+2 AB+HIV1 P24 AG SERPL QL IA: NONREACTIVE
LDLC SERPL CALC-MCNC: 131 MG/DL
MCH RBC QN AUTO: 30.6 PG (ref 26.5–33)
MCHC RBC AUTO-ENTMCNC: 32.4 G/DL (ref 31.5–36.5)
MCV RBC AUTO: 95 FL (ref 78–100)
NONHDLC SERPL-MCNC: 165 MG/DL
PLATELET # BLD AUTO: 377 10E3/UL (ref 150–450)
RBC # BLD AUTO: 4.31 10E6/UL (ref 3.8–5.2)
TRIGL SERPL-MCNC: 171 MG/DL
TSH SERPL DL<=0.005 MIU/L-ACNC: 1.08 UIU/ML (ref 0.3–4.2)
WBC # BLD AUTO: 7.4 10E3/UL (ref 4–11)

## 2023-10-27 PROCEDURE — 86803 HEPATITIS C AB TEST: CPT

## 2023-10-27 PROCEDURE — 36415 COLL VENOUS BLD VENIPUNCTURE: CPT

## 2023-10-27 PROCEDURE — 99244 OFF/OP CNSLTJ NEW/EST MOD 40: CPT | Performed by: SURGERY

## 2023-10-27 PROCEDURE — 84443 ASSAY THYROID STIM HORMONE: CPT

## 2023-10-27 PROCEDURE — 87389 HIV-1 AG W/HIV-1&-2 AB AG IA: CPT

## 2023-10-27 PROCEDURE — 80061 LIPID PANEL: CPT

## 2023-10-27 PROCEDURE — 85027 COMPLETE CBC AUTOMATED: CPT

## 2023-10-27 ASSESSMENT — PAIN SCALES - GENERAL: PAINLEVEL: MILD PAIN (3)

## 2023-10-27 NOTE — LETTER
10/27/2023         RE: Mariluz Stoner  [id number] [address] [place] [id number]        Dear Colleague,    Thank you for referring your patient, Mariluz Stoner, to the [facility]. Please see a copy of my visit note below.    Patient seen in consultation for sigmoid diverticulitis by Jean Dash    HPI:  Patient is a 62 year old female with recent hospitalization for acute sigmoid diverticulitis with intramural abscess.  Subsequent CT scans due to ongoing pain have shown improvement of abscess but lingering inflammatory processes.  She recently finished her third course of antibiotics due to the ongoing process.  Her primary concern is difficulties going to the bathroom.  She just started taking a softener 2 days ago and this has helped somewhat.  X-ray done a couple days ago showed significant fecal burden in the colon.  She denies fevers, chills or palpitations.  She is tolerating a diet.  She has had a colonoscopy in the past but its been several years.  She is quite uncomfortable appearing and seems to not be able to catch her breath.  I asked her if this is her normal and she said yes.    Review Of Systems    Skin: negative  Ears/Nose/Throat: negative  Respiratory: Short of breath  Cardiovascular: negative  Gastrointestinal: as above  Genitourinary: negative  Musculoskeletal: negative  Neurologic: negative  Hematologic/Lymphatic/Immunologic: negative  Endocrine: negative      Past Medical History:   Diagnosis Date     Basal cell carcinoma      Breast cancer (H)      Obesity        Past Surgical History:   Procedure Laterality Date     BREAST SURGERY      left tissue expander, LEFT MASTECTOMY     CHOLECYSTECTOMY       GYN SURGERY      oophorectomy     REMOVE TISSUE EXPANDER BREAST  4/16/2014    Procedure: REMOVAL OF LEFT BREAST TISSUE EXPANDER, IRRIGATION AND DEBRIDEMENT OF LEFT BREAST;  Surgeon: Marlon Luz MD;  Location: [facility]       No family history on  file.    Social History     Socioeconomic History     Marital status:      Spouse name: Not on file     Number of children: Not on file     Years of education: Not on file     Highest education level: Not on file   Occupational History     Not on file   Tobacco Use     Smoking status: Former     Types: Cigarettes     Quit date: 2000     Years since quittin.8     Smokeless tobacco: Never   Vaping Use     Vaping Use: Never used   Substance and Sexual Activity     Alcohol use: Yes     Comment: social drinking     Drug use: Yes     Types: Marijuana     Comment: medical marijuana syrup      Sexual activity: Not Currently     Partners: Female, Male   Other Topics Concern     Not on file   Social History Narrative     Not on file     Social Determinants of Health     Financial Resource Strain: Low Risk  (10/25/2023)    Financial Resource Strain      Within the past 12 months, have you or your family members you live with been unable to get utilities (heat, electricity) when it was really needed?: No   Food Insecurity: Low Risk  (10/25/2023)    Food Insecurity      Within the past 12 months, did you worry that your food would run out before you got money to buy more?: No      Within the past 12 months, did the food you bought just not last and you didn t have money to get more?: No   Transportation Needs: Low Risk  (10/25/2023)    Transportation Needs      Within the past 12 months, has lack of transportation kept you from medical appointments, getting your medicines, non-medical meetings or appointments, work, or from getting things that you need?: No   Physical Activity: Not on file   Stress: Not on file   Social Connections: Not on file   Interpersonal Safety: Low Risk  (10/25/2023)    Interpersonal Safety      Do you feel physically and emotionally safe where you currently live?: Yes      Within the past 12 months, have you been hit, slapped, kicked or otherwise physically hurt by someone?: No       Within the past 12 months, have you been humiliated or emotionally abused in other ways by your partner or ex-partner?: No   Housing Stability: Low Risk  (10/25/2023)    Housing Stability      Do you have housing? : Yes      Are you worried about losing your housing?: No       Current Outpatient Medications   Medication Sig Dispense Refill     acetaminophen (TYLENOL) 500 MG tablet Take 1,000 mg by mouth every 6 hours as needed for mild pain       albuterol (PROAIR HFA/PROVENTIL HFA/VENTOLIN HFA) 108 (90 Base) MCG/ACT inhaler Inhale 2 puffs into the lungs every 4 hours as needed for shortness of breath or wheezing       anastrozole (ARIMIDEX) 1 MG tablet Take 1 tablet (1 mg) by mouth daily 90 tablet 1     calcium carbonate 600 mg-vitamin D 400 units (CALTRATE) 600-400 MG-UNIT per tablet Take 1 tablet by mouth       carvedilol (COREG) 3.125 MG tablet Take 1 tablet (3.125 mg) by mouth 2 times daily (with meals) 60 tablet 0     Ergocalciferol 50 MCG (2000 UT) TABS Take 2,000 Units by mouth daily       lisinopril (ZESTRIL) 10 MG tablet Take 1 tablet (10 mg) by mouth daily 30 tablet 0     medical cannabis (Patient's own supply) See Admin Instructions (The purpose of this order is to document that the patient reports taking medical cannabis.  This is not a prescription, and is not used to certify that the patient has a qualifying medical condition.)       melatonin 1 MG TABS tablet Take 3 tablets (3 mg) by mouth nightly as needed for sleep       PARoxetine (PAXIL) 20 MG tablet TAKE 1 TABLET BY MOUTH ONCE DAILY .  APPOINTMENT  NEEDED  FOR  ADDITIONAL  REFILLS (Patient taking differently: Take 20 mg by mouth daily) 90 tablet 1     traMADol (ULTRAM) 50 MG tablet Take 1 tablet (50 mg) by mouth every 6 hours as needed for severe pain 30 tablet 0     Vitamin D3 (VITAMIN D-1000 MAX ST) 25 mcg (1000 units) tablet Take 25 mcg by mouth daily       oxyCODONE (ROXICODONE) 5 MG tablet Take 1 tablet (5 mg) by mouth every 6 hours as  "needed for pain (Patient not taking: Reported on 10/25/2023) 10 tablet 0       Medications and history reviewed    Physical exam:  Vitals: /68   Temp 97.5  F (36.4  C) (Temporal)   Ht 1.648 m (5' 4.9\")   Wt 107.5 kg (237 lb)   BMI 39.56 kg/m    BMI= Body mass index is 39.56 kg/m .    Constitutional: alert, mildly distressed  Head: normo-cephalic, atraumatic   Cardiovascular: RRR  Respiratory: equal chest rise, good respiratory effort, no audible wheeze  Gastrointestinal: Soft, mild diffuse tenderness with moderate tenderness in the left lower quadrant.  Nonacute abdominal exam.  : deferred  Musculoskeletal: moves all extremities   Skin: no suspicious lesions or rashes   Psychiatric: mentation appears normal, affect appropriate   Patient able to get up on table without difficulty.    Labs show:  No results found for this or any previous visit (from the past 24 hour(s)).    Imaging shows:  Recent Results (from the past 744 hour(s))   CT ABDOMEN PELVIS W CONTRAST    Narrative    CT ABDOMEN/PELVIS WITH CONTRAST September 29, 2023 12:22 PM    CLINICAL HISTORY: Left lower quadrant abdominal pain.    TECHNIQUE: CT scan of the abdomen and pelvis was performed following  injection of IV contrast. Multiplanar reformats were obtained. Dose  reduction techniques were used.  CONTRAST: ISOVUE-370, 100 mL.    COMPARISON: None.    FINDINGS:   LOWER CHEST: 0.3 cm indeterminate nodule in the right middle lobe  (series 3 image 3) was not included on the previous exam.    HEPATOBILIARY: Cholecystectomy. No hepatic masses.    PANCREAS: Normal.    SPLEEN: Normal.    ADRENAL GLANDS: Normal.    KIDNEYS/BLADDER: Nonobstructing 0.2 cm stone in the lower pole of the  right kidney. Small right renal cyst would require no specific  follow-up. Few tiny hypodensities in both kidneys are too small to  characterize, but may also represent cysts. No hydronephrosis.    BOWEL: Colonic diverticulosis. Focal bowel wall thickening " with  moderate surrounding inflammatory stranding in the proximal sigmoid  colon, consistent with acute diverticulitis. Small area of low density  within the thickened sigmoid colon measures 1.7 cm, and could  represent a developing intramural abscess (series 3 image 174; series  4 image 43). No other associated fluid collections. No bowel  obstruction. Unremarkable appendix.    PELVIC ORGANS: Unremarkable.    LYMPH NODES: No enlarged lymph nodes are identified in the abdomen or  pelvis.    VASCULATURE: Unremarkable.    ADDITIONAL FINDINGS: None.    MUSCULOSKELETAL: Unremarkable.      Impression    IMPRESSION:   1.  Acute sigmoid diverticulitis.  2.  A 1.7 cm region of low density within the thickened sigmoid colon  could represent a developing intramural abscess.  3.  Indeterminate 0.3 cm right middle lobe pulmonary nodule. Please  refer to follow-up guidelines below.    Recommendations for one or multiple incidental lung nodules < 6mm :    Low risk patients: No routine follow-up.    High risk patients: Optional follow-up CT at 12 months; if  unchanged, no further follow-up.    *Low Risk: Minimal or absent history of smoking or other known risk  factors.  *Nonsolid (ground glass) or partly solid nodules may require longer  follow-up to exclude indolent adenocarcinoma.  *Recommendations based on Guidelines for the Management of Incidental  Pulmonary Nodules Detected at CT: From the Fleischner Society 2017,  Radiology 2017.  *Guidelines apply to incidental nodules in patients who are 35 years  or older.  *Guidelines do not apply to lung cancer screening, patients with  immunosuppression, or patients with known primary cancer.    DAYSI COHEN MD         SYSTEM ID:  [id number]   Echocardiogram Complete   Result Value    LVEF  25-30%    Narrative    [id number]  [id number]  [id number]  [id number]^KALINA^MEJIA^JAKY     [facility]  Echocardiography Laboratory  [address] [place] [address]      Name: YAYD WEINER  MRN: [id number]  : 1960  Study Date: 10/02/2023 11:30 AM  Age: 62 yrs  Gender: Female  Patient Location: [facility]  Reason For Study: Bradycardia - Sinus, LBBB  History: HTN,Breast Cancer  Ordering Physician: MEJIA GILMAN  Performed By: Terrie Pimentel     BSA: 2.1 m2  Height: 65 in  Weight: 242 lb  HR: 61  BP: 151/78 mmHg  ______________________________________________________________________________  Procedure  Complete Portable Echo Adult. Optison (NDC #[id number]) given intravenously.  ______________________________________________________________________________  Interpretation Summary     The left ventricle is normal in size.  Left ventricular systolic function is severely reduced.  The visual ejection fraction is 25-30%.  Septal motion is consistent with conduction abnormality.  There is severe global hypokinesia of the left ventricle.  The right ventricle is normal in size and function.  Trace mitral tricuspid valve regurgitation.  Normal inferior vena cava.     There is no comparison study available.  ______________________________________________________________________________  Left Ventricle  The left ventricle is normal in size. There is mild eccentric left ventricular  hypertrophy. Left ventricular systolic function is severely reduced. The  visual ejection fraction is 25-30%. Grade I or early diastolic dysfunction.  Septal motion is consistent with conduction abnormality. There is severe  global hypokinesia of the left ventricle.     Right Ventricle  The right ventricle is normal in size and function.     Atria  Normal left atrial size. Right atrial size is normal. There is no atrial shunt  seen.     Mitral Valve  The mitral valve leaflets appear normal. There is no evidence of stenosis,  fluttering, or prolapse. There is trace mitral regurgitation. There is no  mitral valve stenosis.     Tricuspid Valve  Normal tricuspid valve. There is trace tricuspid  regurgitation. The right  ventricular systolic pressure is approximated at 19.0 mmHg plus the right  atrial pressure. Right ventricle systolic pressure estimate normal.     Aortic Valve  The aortic valve is trileaflet. The aortic valve is not well visualized. No  aortic regurgitation is present. No aortic stenosis is present.     Pulmonic Valve  The pulmonic valve is not well visualized. There is trace pulmonic valvular  regurgitation.     Vessels  The aortic root is normal size. Normal size ascending aorta. The inferior vena  cava is normal.     Pericardium  There is no pericardial effusion.     Rhythm  Sinus rhythm was noted.  ______________________________________________________________________________  MMode/2D Measurements & Calculations     IVSd: 1.1 cm  LVIDd: 5.6 cm  LVIDs: 4.9 cm  LVPWd: 0.87 cm  FS: 12.1 %  LV mass(C)d: 213.1 grams  LV mass(C)dI: 99.4 grams/m2  Ao root diam: 2.8 cm  LA dimension: 3.8 cm  asc Aorta Diam: 3.3 cm  LA/Ao: 1.4  Ao root diam index Ht(cm/m): 1.7  Ao root diam index BSA (cm/m2): 1.3  Asc Ao diam index BSA (cm/m2): 1.5  Asc Ao diam index Ht(cm/m): 2.0  LA Volume (BP): 59.7 ml     LA Volume Index (BP): 27.8 ml/m2  RV Base: 3.5 cm  RWT: 0.31  TAPSE: 2.7 cm     Doppler Measurements & Calculations  MV E max allan: 72.0 cm/sec  MV A max allan: 114.0 cm/sec  MV E/A: 0.63  MV dec time: 0.22 sec  Ao V2 max: 147.4 cm/sec  Ao max P.0 mmHg  LV V1 max PG: 3.7 mmHg  LV V1 max: 96.4 cm/sec  PA acc time: 0.09 sec  TR max allan: 218.2 cm/sec  TR max P.0 mmHg  AV Allan Ratio (DI): 0.65  E/E' av.2     Lateral E/e': 12.3  Medial E/e': 14.1  RV S Allan: 12.3 cm/sec     ______________________________________________________________________________  Report approved by: Dr Deloris Cyr 10/02/2023 01:02 PM         CT Abdomen pelvis w contrast*    Narrative    CT ABDOMEN PELVIS W CONTRAST 10/5/2023 11:35 AM    CLINICAL HISTORY: Patient with diverticulitis and intramural abscess.   Worsening  abdominal exam.    TECHNIQUE: CT scan of the abdomen and pelvis was performed following  injection of IV contrast. Multiplanar reformats were obtained. Dose  reduction techniques were used.  CONTRAST: Isovue 370, 100mL    COMPARISON: 9/29/2023    FINDINGS:   LOWER CHEST: Normal.    HEPATOBILIARY: Hepatic steatosis. Cholecystectomy.    PANCREAS: Normal.    SPLEEN: Normal.    ADRENAL GLANDS: Normal.    KIDNEYS/BLADDER: Few subcentimeter cysts in the kidneys requiring no  specific follow. No calculi, hydronephrosis or perinephric stranding.    BOWEL: Again seen are changes of sigmoid diverticulitis with marked  circumferential wall thickening of the diverticular segment of sigmoid  colon with surrounding fat stranding. Previously seen small intramural  phlegmon in the inflamed sigmoid colon has resolved. No perisigmoid  abscess or free air. Stable mild edema/enlargement of the adjacent  left ovary. No small bowel or colonic obstruction. Normal appendix.  Scattered diverticuli in the remainder of the colon.    PELVIC ORGANS: No pelvic masses.    ADDITIONAL FINDINGS: Multiple small lymph nodes in the sigmoid  mesentery measuring up to 6 mm are stable, likely reactive. No  abdominal aortic aneurysm. No free fluid in the pelvis.    MUSCULOSKELETAL: Unremarkable.      Impression    IMPRESSION:   1.  Acute sigmoid diverticulitis with resolution of previously seen  small intramural phlegmon, and otherwise stable perisigmoid  inflammatory changes. No new abscess. No free air.  2.  Remainder unchanged.    SHAYNA SHI MD         SYSTEM ID:  [id number]   CT Abdomen Pelvis w Contrast    Narrative    CT ABDOMEN PELVIS WITH CONTRAST 10/16/2023 11:32 AM    CLINICAL HISTORY: Left lower quadrant pain.    TECHNIQUE: CT scan of the abdomen and pelvis was performed following  injection of IV contrast. Multiplanar reformats were obtained. Dose  reduction techniques were used.  CONTRAST: 100mL, Isovue-370  COMPARISON: CT of the abdomen and  pelvis performed 10/5/2023.    FINDINGS:   LOWER CHEST: The visualized lung bases are clear. Postoperative  changes in the left breast are partially included on this exam.    HEPATOBILIARY: Cholecystectomy. No hepatic masses are seen.    PANCREAS: Normal.    SPLEEN: Normal.    ADRENAL GLANDS: Normal.    KIDNEYS/BLADDER: Small bilateral renal cysts would require no specific  follow-up. No hydronephrosis.    BOWEL: Colonic diverticulosis. Focal bowel wall thickening with  moderate surrounding inflammatory stranding in the sigmoid colon has  increased slightly since the previous exam, and is consistent with  acute diverticulitis. No associated fluid collection to suggest  diverticular abscess. No bowel obstruction. Unremarkable appendix.    PELVIC ORGANS: Unremarkable.    LYMPH NODES: No enlarged lymph nodes are identified in the abdomen or  pelvis.    VASCULATURE: Unremarkable.    ADDITIONAL FINDINGS: None.    MUSCULOSKELETAL: Unremarkable.      Impression    IMPRESSION:   Changes of acute sigmoid diverticulitis have increased slightly  compared to 10/5/2023. No associated abscess.    DAYSI COHEN MD         SYSTEM ID:  [id number]   XR Abdomen 2 Views    Narrative    XR ABDOMEN 2 VIEWS 10/25/2023 10:21 AM    HISTORY: Acute diverticulitis    COMPARISON: None.    FINDINGS: Nonobstructive bowel gas pattern. No air-fluid levels. No  gross free air. Large amount of formed stool in the colon. Surgical  clips in right upper quadrant. Surgical clips in the left axilla.    LURDES MONTIEL MD         SYSTEM ID:  [id number]        Assessment:     ICD-10-CM    1. Acute diverticulitis  K57.92 Adult General Surg Referral     Adult GI  Referral - Procedure Only        Plan: Ideally we would stay the course with nonsurgical management of her acute sigmoid diverticulitis.  I think at least part or may be even all of her issue at this point is difficulties going to the bathroom.  I recommend continuing the stool softeners  and adding MiraLAX 1 or 2 times a day until she starts having bowel movements that do not require straining.  She can then back off on either the MiraLAX or the softeners until she feels she is regular.  Ultimately, I recommend follow-up diagnostic colonoscopy in 6 to 8 weeks after resolution of symptoms.  We discussed reasons to return to the emergency department including worsening pain, fevers, chills or inability to have a bowel movement.  She understands and agrees to the plan.    45 minutes spent by me on the date of the encounter doing chart review, history and exam, documentation and further activities per the note    Andre Trejo, DO      Again, thank you for allowing me to participate in the care of your patient.        Sincerely,        Andre Trejo, DO

## 2023-10-31 ENCOUNTER — TELEPHONE (OUTPATIENT)
Dept: GASTROENTEROLOGY | Facility: CLINIC | Age: 63
End: 2023-10-31
Payer: COMMERCIAL

## 2023-10-31 ENCOUNTER — HOSPITAL ENCOUNTER (OUTPATIENT)
Facility: CLINIC | Age: 63
End: 2023-10-31
Attending: SURGERY | Admitting: SURGERY
Payer: COMMERCIAL

## 2023-10-31 NOTE — TELEPHONE ENCOUNTER
"Endoscopy Scheduling Screen    Have you had a positive Covid test in the last 14 days?  No    Are you active on MyChart?   Yes    What insurance is in the chart?  Other:  [facility]    Ordering/Referring Provider:   RUBI OREILLY        (If ordering provider performs procedure, schedule with ordering provider unless otherwise instructed. )    BMI: Estimated body mass index is 39.56 kg/m  as calculated from the following:    Height as of 10/27/23: 1.648 m (5' 4.9\").    Weight as of 10/27/23: 107.5 kg (237 lb).     Sedation Ordered  moderate sedation.   If patient BMI > 50 do not schedule in [facility].    If patient BMI > 45 do not schedule at [facility].    Are you taking methadone or Suboxone?  No    Are you taking any prescription medications for pain 3 or more times per week?   No    Do you have a history of malignant hyperthermia or adverse reaction to anesthesia?  No    (Females) Are you currently pregnant?   No     Have you been diagnosed or told you have pulmonary hypertension?   No    Do you have an LVAD?  No    Have you been told you have moderate to severe sleep apnea?  Yes (RN Review required for scheduling unless scheduling in Hospital.)    Have you been told you have COPD, asthma, or any other lung disease?  No    Do you have any heart conditions?  Yes     In the past 6 months, have you had any hospitalizations for heart related issues including cardiomyopathy, heart attack, or stent placement?  No    Do you have any implantable devices in your body (pacemaker, ICD)?  No    Do you take nitroglycerine?  No    Have you ever had an organ transplant?   No    Have you ever had or are you awaiting a heart or lung transplant?   No    Have you had a stroke or transient ischemic attack (TIA aka \"mini stroke\" in the last 6 months?   No    Have you been diagnosed with or been told you have cirrhosis of the liver?   No    Are you currently on dialysis?   No    Do you need assistance transferring?   No    BMI: Estimated " "body mass index is 39.56 kg/m  as calculated from the following:    Height as of 10/27/23: 1.648 m (5' 4.9\").    Weight as of 10/27/23: 107.5 kg (237 lb).     Is patients BMI > 40 and scheduling location [facility]?  No    Do you take an injectable medication for weight loss or diabetes (excluding insulin)?  No    Do you take the medication Naltrexone?  No    Do you take blood thinners?  No       Prep   Are you currently on dialysis or do you have chronic kidney disease?  No    Do you have a diagnosis of diabetes?  No    Do you have a diagnosis of cystic fibrosis (CF)?  No    On a regular basis do you go 3 -5 days between bowel movements?  No    BMI > 40?  No    Preferred Pharmacy:    [facility] [address] [place], [place] - [address]  [address]  [facility] [address]  Phone: [phone] Fax: [phone]      Final Scheduling Details   Colonoscopy prep sent?  Standard MiraLAX    Procedure scheduled  Colonoscopy    Surgeon:  DENILSON     Date of procedure:  01/04/2024     Pre-OP / PAC:   No - Not required for this site.    Location  [facility] - Patient preference.    Sedation   MAC/Deep Sedation  Per Location      Patient Reminders:   You will receive a call from a Nurse to review instructions and health history.  This assessment must be completed prior to your procedure.  Failure to complete the Nurse assessment may result in the procedure being cancelled.      On the day of your procedure, please designate an adult(s) who can drive you home stay with you for the next 24 hours. The medicines used in the exam will make you sleepy. You will not be able to drive.      You cannot take public transportation, ride share services, or non-medical taxi service without a responsible caregiver.  Medical transport services are allowed with the requirement that a responsible caregiver will receive you at your destination.  We require that drivers and caregivers are confirmed prior to your procedure.  "

## 2023-11-02 LAB — SLPCOMP: NORMAL

## 2023-11-06 ENCOUNTER — OFFICE VISIT (OUTPATIENT)
Dept: SLEEP MEDICINE | Facility: CLINIC | Age: 63
End: 2023-11-06
Payer: COMMERCIAL

## 2023-11-06 VITALS
SYSTOLIC BLOOD PRESSURE: 104 MMHG | HEIGHT: 67 IN | HEART RATE: 79 BPM | DIASTOLIC BLOOD PRESSURE: 70 MMHG | WEIGHT: 235 LBS | BODY MASS INDEX: 36.88 KG/M2 | OXYGEN SATURATION: 93 %

## 2023-11-06 DIAGNOSIS — R06.89 HYPOVENTILATION: ICD-10-CM

## 2023-11-06 DIAGNOSIS — R09.02 HYPOXEMIA: ICD-10-CM

## 2023-11-06 DIAGNOSIS — G47.33 OSA (OBSTRUCTIVE SLEEP APNEA): Primary | ICD-10-CM

## 2023-11-06 PROCEDURE — 99213 OFFICE O/P EST LOW 20 MIN: CPT | Performed by: PHYSICIAN ASSISTANT

## 2023-11-06 RX ORDER — TRAMADOL HYDROCHLORIDE 50 MG/1
50 TABLET ORAL EVERY 6 HOURS PRN
COMMUNITY
End: 2024-07-11

## 2023-11-06 ASSESSMENT — SLEEP AND FATIGUE QUESTIONNAIRES
HOW LIKELY ARE YOU TO NOD OFF OR FALL ASLEEP WHILE LYING DOWN TO REST IN THE AFTERNOON WHEN CIRCUMSTANCES PERMIT: HIGH CHANCE OF DOZING
HOW LIKELY ARE YOU TO NOD OFF OR FALL ASLEEP WHEN YOU ARE A PASSENGER IN A CAR FOR AN HOUR WITHOUT A BREAK: SLIGHT CHANCE OF DOZING
HOW LIKELY ARE YOU TO NOD OFF OR FALL ASLEEP WHILE WATCHING TV: HIGH CHANCE OF DOZING
HOW LIKELY ARE YOU TO NOD OFF OR FALL ASLEEP WHILE SITTING AND READING: HIGH CHANCE OF DOZING
HOW LIKELY ARE YOU TO NOD OFF OR FALL ASLEEP WHILE SITTING AND TALKING TO SOMEONE: WOULD NEVER DOZE
HOW LIKELY ARE YOU TO NOD OFF OR FALL ASLEEP WHILE SITTING QUIETLY AFTER LUNCH WITHOUT ALCOHOL: SLIGHT CHANCE OF DOZING
HOW LIKELY ARE YOU TO NOD OFF OR FALL ASLEEP IN A CAR, WHILE STOPPED FOR A FEW MINUTES IN TRAFFIC: WOULD NEVER DOZE
HOW LIKELY ARE YOU TO NOD OFF OR FALL ASLEEP WHILE SITTING INACTIVE IN A PUBLIC PLACE: MODERATE CHANCE OF DOZING

## 2023-11-06 NOTE — PROGRESS NOTES
Does Mariluz have a CPAP/Bipap?  No     Kennedy Sleep Scale:  13    Sleep Study Follow-Up Visit:    Date on this visit: 11/6/2023    Mariluz Stoner comes in today for follow-up of her sleep study done on 10/24/23 at the [facility] for excessive daytime sleepiness and possible sleep apnea.    Sleep latency 5 minutes with Melatinin.  REM achieved.   REM latency 110 minutes.  Sleep efficiency 89.2%. Total sleep time 466 minutes.    Sleep architecture:  Stage 1, 11.5% (5%), stage 2, 37.1% (45-55%), stage 3, 29.7% (15-20%), stage REM, 21.7% (20-25%).  AHI was 13.6, with significant desaturations. RDI 17.  REM RDI 20.2, consistent with moderate REM JYOTSNA.  Supine RDI 28.2, consistent with moderate SUPINE JYOTSNA.  Periodic Limb Movement Index 8.4/hour.         These findings were reviewed with patient.     Past medical/surgical history, family history, social history, medications and allergies were reviewed.      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) 09/29/2023     Priority: Medium    Sinus bradycardia 09/29/2023     Priority: Medium    Secondary malignant neoplasm of bone (H) 06/13/2023     Priority: Medium    Pancolitis (H) 06/13/2023     Priority: Medium    Morbid obesity (H) 03/18/2020     Priority: Medium    Metastatic breast cancer 12/11/2019     Priority: Medium    Mild episode of recurrent major depressive disorder (H24) 12/11/2019     Priority: Medium    Essential hypertension 12/11/2019     Priority: Medium    Dermatitis 12/11/2019     Priority: Medium        Impression/Plan:    Mild sleep apnea with significant desaturationsin patient with cardiomyopathy. Significant hypoxemia raises concern for hypoventilation. Titration study ordered with TCM for further  assessment.   She will follow up with me in about 4 week(s).     Twenty-five minutes spent with patient, all of which were spent face-to-face counseling, consulting, coordinating plan of care.          CC: Jean Dash

## 2023-11-30 ENCOUNTER — TELEPHONE (OUTPATIENT)
Dept: INTERNAL MEDICINE | Facility: CLINIC | Age: 63
End: 2023-11-30

## 2023-11-30 NOTE — TELEPHONE ENCOUNTER
Reason for Call:  Appointment Request    Patient requesting this type of appt:  post surgery     Requested provider: Jean Dash    Reason patient unable to be scheduled: Not within requested timeframe    When does patient want to be seen/preferred time: 3-7 days    Comments: DOS 11/13-14/23 Large instestine removal  has appt set for [place] location 12/7/23 at 2:40PM but needs something sooner with either Dr. Dash or any other provider at [facility]     Could we send this information to you in [facility] [employer] [place] or would you prefer to receive a phone call?:   Patient would prefer a phone call   Okay to leave a detailed message?: Yes at Cell number on file:    Telephone Information:   Mobile [phone]       Call taken on 11/30/2023 at 11:15 AM by Spring Childers

## 2023-12-04 DIAGNOSIS — I10 ESSENTIAL HYPERTENSION: ICD-10-CM

## 2023-12-04 RX ORDER — LISINOPRIL 10 MG/1
10 TABLET ORAL DAILY
Qty: 30 TABLET | Refills: 0 | Status: SHIPPED | OUTPATIENT
Start: 2023-12-04 | End: 2024-01-04

## 2023-12-04 RX ORDER — CARVEDILOL 3.12 MG/1
3.12 TABLET ORAL 2 TIMES DAILY WITH MEALS
Qty: 60 TABLET | Refills: 0 | Status: SHIPPED | OUTPATIENT
Start: 2023-12-04 | End: 2024-01-04

## 2023-12-04 NOTE — TELEPHONE ENCOUNTER
Requested Prescriptions   Pending Prescriptions Disp Refills    lisinopril (ZESTRIL) 10 MG tablet 30 tablet 0     Sig: Take 1 tablet (10 mg) by mouth daily       There is no refill protocol information for this order       carvedilol (COREG) 3.125 MG tablet 60 tablet 0     Sig: Take 1 tablet (3.125 mg) by mouth 2 times daily (with meals)       There is no refill protocol information for this order            Rosaura Patterson MA

## 2023-12-06 NOTE — TELEPHONE ENCOUNTER
Note patient is scheduled for follow up on 12/7 with Dr. De La Cruz at the [facility]. Liat Ramos LPN

## 2023-12-07 ENCOUNTER — OFFICE VISIT (OUTPATIENT)
Dept: FAMILY MEDICINE | Facility: CLINIC | Age: 63
End: 2023-12-07
Payer: COMMERCIAL

## 2023-12-07 VITALS
TEMPERATURE: 97.9 F | HEIGHT: 65 IN | SYSTOLIC BLOOD PRESSURE: 108 MMHG | OXYGEN SATURATION: 98 % | DIASTOLIC BLOOD PRESSURE: 64 MMHG | HEART RATE: 90 BPM | WEIGHT: 211.5 LBS | RESPIRATION RATE: 28 BRPM | BODY MASS INDEX: 35.24 KG/M2

## 2023-12-07 DIAGNOSIS — Z09 HOSPITAL DISCHARGE FOLLOW-UP: Primary | ICD-10-CM

## 2023-12-07 DIAGNOSIS — K57.20 DIVERTICULITIS OF LARGE INTESTINE WITH PERFORATION WITHOUT ABSCESS, UNSPECIFIED BLEEDING STATUS: ICD-10-CM

## 2023-12-07 DIAGNOSIS — G47.33 OSA (OBSTRUCTIVE SLEEP APNEA): ICD-10-CM

## 2023-12-07 DIAGNOSIS — Z93.2 ILEOSTOMY STATUS (H): ICD-10-CM

## 2023-12-07 DIAGNOSIS — I42.7 CARDIOMYOPATHY SECONDARY TO DRUG (H): ICD-10-CM

## 2023-12-07 DIAGNOSIS — Z48.1 DELAYED POSTOPERATIVE WOUND CLOSURE: ICD-10-CM

## 2023-12-07 LAB
ERYTHROCYTE [DISTWIDTH] IN BLOOD BY AUTOMATED COUNT: 14.5 % (ref 10–15)
ERYTHROCYTE [SEDIMENTATION RATE] IN BLOOD BY WESTERGREN METHOD: 42 MM/HR (ref 0–30)
HCT VFR BLD AUTO: 36.2 % (ref 35–47)
HGB BLD-MCNC: 11.6 G/DL (ref 11.7–15.7)
MCH RBC QN AUTO: 30.6 PG (ref 26.5–33)
MCHC RBC AUTO-ENTMCNC: 32 G/DL (ref 31.5–36.5)
MCV RBC AUTO: 96 FL (ref 78–100)
PLATELET # BLD AUTO: 455 10E3/UL (ref 150–450)
RBC # BLD AUTO: 3.79 10E6/UL (ref 3.8–5.2)
WBC # BLD AUTO: 7.6 10E3/UL (ref 4–11)

## 2023-12-07 PROCEDURE — 87070 CULTURE OTHR SPECIMN AEROBIC: CPT | Performed by: FAMILY MEDICINE

## 2023-12-07 PROCEDURE — 86140 C-REACTIVE PROTEIN: CPT | Performed by: FAMILY MEDICINE

## 2023-12-07 PROCEDURE — 99214 OFFICE O/P EST MOD 30 MIN: CPT | Performed by: FAMILY MEDICINE

## 2023-12-07 PROCEDURE — 85027 COMPLETE CBC AUTOMATED: CPT | Performed by: FAMILY MEDICINE

## 2023-12-07 PROCEDURE — 85652 RBC SED RATE AUTOMATED: CPT | Performed by: FAMILY MEDICINE

## 2023-12-07 PROCEDURE — 87205 SMEAR GRAM STAIN: CPT | Performed by: FAMILY MEDICINE

## 2023-12-07 PROCEDURE — 87077 CULTURE AEROBIC IDENTIFY: CPT | Performed by: FAMILY MEDICINE

## 2023-12-07 PROCEDURE — 80053 COMPREHEN METABOLIC PANEL: CPT | Performed by: FAMILY MEDICINE

## 2023-12-07 PROCEDURE — 36415 COLL VENOUS BLD VENIPUNCTURE: CPT | Performed by: FAMILY MEDICINE

## 2023-12-07 PROCEDURE — 87186 SC STD MICRODIL/AGAR DIL: CPT | Performed by: FAMILY MEDICINE

## 2023-12-07 RX ORDER — CEFDINIR 300 MG/1
300 CAPSULE ORAL 2 TIMES DAILY
Qty: 10 CAPSULE | Refills: 0 | Status: SHIPPED | OUTPATIENT
Start: 2023-12-07 | End: 2023-12-11

## 2023-12-07 RX ORDER — METRONIDAZOLE 500 MG/1
500 TABLET ORAL 3 TIMES DAILY
Qty: 15 TABLET | Refills: 0 | Status: SHIPPED | OUTPATIENT
Start: 2023-12-07 | End: 2023-12-11

## 2023-12-07 ASSESSMENT — PATIENT HEALTH QUESTIONNAIRE - PHQ9
SUM OF ALL RESPONSES TO PHQ QUESTIONS 1-9: 10
SUM OF ALL RESPONSES TO PHQ QUESTIONS 1-9: 10
10. IF YOU CHECKED OFF ANY PROBLEMS, HOW DIFFICULT HAVE THESE PROBLEMS MADE IT FOR YOU TO DO YOUR WORK, TAKE CARE OF THINGS AT HOME, OR GET ALONG WITH OTHER PEOPLE: VERY DIFFICULT

## 2023-12-07 ASSESSMENT — PAIN SCALES - GENERAL: PAINLEVEL: MODERATE PAIN (4)

## 2023-12-07 NOTE — PROGRESS NOTES
[facility] [place]  [address], [address]  [place] [address]  Phone: [phone]  Fax: [phone]  Primary Provider: Jaen Dash  Pre-op Performing Provider: MANDO BAEZ    {Provider  Link to PREOP SmartSet  Use this to apply standard patient instructions to AVS; includes medication directions, common orders, guidelines for anemia, warfarin, additional testing   :822774}  PREOPERATIVE EVALUATION:  Today's date: 12/7/2023    Mariluz is a 63 year old, presenting for the following:  Pre-Op Exam        12/7/2023     2:30 PM   Additional Questions   Roomed by Almita YE   Accompanied by None         12/7/2023     2:30 PM   Patient Reported Additional Medications   Patient reports taking the following new medications NA       Surgical Information:  Surgery/Procedure: Colonoscopy   Surgery Location: [facility]  Surgeon: Andre Trejo DO   Surgery Date: 1/4/23  Time of Surgery: 12:30  Where patient plans to recover: At home with family  Fax number for surgical facility: Note does not need to be faxed, will be available electronically in Epic.    {2021 Provider Charting Preference for Preop :862639}    Subjective       HPI related to upcoming procedure: ***    {Click here to pull in Questionnaire Data after Qnr completed :775401}  Health Care Directive:  Patient does not have a Health Care Directive or Living Will: {ADVANCE_DIRECTIVE_STATUS:798743}    Preoperative Review of :  {Mnpmpreport:258034}  {Review MNPMP for all patients per ICSI MNPMP Profile:883596}    {Chronic problem details (Optional) :056930}    Review of Systems  {ROS Preop Choices:256495}    Patient Active Problem List    Diagnosis Date Noted    Acute diverticulitis with probable intramural abscess 09/29/2023     Priority: Medium    History of wheezing due to allergy 09/29/2023     Priority: Medium    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) 09/29/2023      Priority: Medium    Sinus bradycardia 09/29/2023     Priority: Medium    Secondary malignant neoplasm of bone (H) 06/13/2023     Priority: Medium    Pancolitis (H) 06/13/2023     Priority: Medium    Morbid obesity (H) 03/18/2020     Priority: Medium    Metastatic breast cancer 12/11/2019     Priority: Medium    Mild episode of recurrent major depressive disorder (H24) 12/11/2019     Priority: Medium    Essential hypertension 12/11/2019     Priority: Medium    Dermatitis 12/11/2019     Priority: Medium      Past Medical History:   Diagnosis Date    Basal cell carcinoma     Breast cancer (H)     Obesity      Past Surgical History:   Procedure Laterality Date    BREAST SURGERY      left tissue expander, LEFT MASTECTOMY    CHOLECYSTECTOMY      GYN SURGERY      oophorectomy    REMOVE TISSUE EXPANDER BREAST  4/16/2014    Procedure: REMOVAL OF LEFT BREAST TISSUE EXPANDER, IRRIGATION AND DEBRIDEMENT OF LEFT BREAST;  Surgeon: Marlon Luz MD;  Location: [facility]     Current Outpatient Medications   Medication Sig Dispense Refill    acetaminophen (TYLENOL) 500 MG tablet Take 1,000 mg by mouth every 6 hours as needed for mild pain      albuterol (PROAIR HFA/PROVENTIL HFA/VENTOLIN HFA) 108 (90 Base) MCG/ACT inhaler Inhale 2 puffs into the lungs every 4 hours as needed for shortness of breath or wheezing      anastrozole (ARIMIDEX) 1 MG tablet Take 1 tablet (1 mg) by mouth daily 90 tablet 1    calcium carbonate 600 mg-vitamin D 400 units (CALTRATE) 600-400 MG-UNIT per tablet Take 1 tablet by mouth      carvedilol (COREG) 3.125 MG tablet Take 1 tablet (3.125 mg) by mouth 2 times daily (with meals) 60 tablet 0    Ergocalciferol 50 MCG (2000 UT) TABS Take 2,000 Units by mouth daily      lisinopril (ZESTRIL) 10 MG tablet Take 1 tablet (10 mg) by mouth daily 30 tablet 0    medical cannabis (Patient's own supply) See Admin Instructions (The purpose of this order is to document that the patient reports taking medical cannabis.   This is not a prescription, and is not used to certify that the patient has a qualifying medical condition.)      melatonin 1 MG TABS tablet Take 3 tablets (3 mg) by mouth nightly as needed for sleep      oxyCODONE (ROXICODONE) 5 MG tablet Take 1 tablet (5 mg) by mouth every 6 hours as needed for pain (Patient not taking: Reported on 10/25/2023) 10 tablet 0    PARoxetine (PAXIL) 20 MG tablet TAKE 1 TABLET BY MOUTH ONCE DAILY .  APPOINTMENT  NEEDED  FOR  ADDITIONAL  REFILLS (Patient taking differently: Take 20 mg by mouth daily) 90 tablet 1    traMADol (ULTRAM) 50 MG tablet Take 50 mg by mouth every 6 hours as needed for severe pain      Vitamin D3 (VITAMIN D-1000 MAX ST) 25 mcg (1000 units) tablet Take 25 mcg by mouth daily         Allergies   Allergen Reactions    Naproxen      Other reaction(s): Hematologic  Vaginal bleeding        Social History     Tobacco Use    Smoking status: Former     Types: Cigarettes     Quit date: 2000     Years since quittin.9    Smokeless tobacco: Never   Substance Use Topics    Alcohol use: Yes     Comment: social drinking     {FAMILY HISTORY (Optional):559478808}  History   Drug Use    Types: Marijuana     Comment: medical marijuana syrup          Objective     There were no vitals taken for this visit.    Physical Exam  {EXAM Preop Choices:151975}    Recent Labs   Lab Test 10/27/23  1013 10/16/23  0939 10/06/23  0504   HGB 13.2 12.3 12.1    299 293   NA  --  139 138   POTASSIUM  --  4.1 3.9   CR  --  0.56 0.62        Diagnostics:  {LABS:491709}   {EK}    Revised Cardiac Risk Index (RCRI):  The patient has the following serious cardiovascular risks for perioperative complications:  {PREOP REVISED CARDIAC RISK INDEX (RCRI) :285529}     RCRI Interpretation: {REVISED CARDIAC RISK INTERPRETATION :956136}         Signed Electronically by: Guy Brink MD  Copy of this evaluation report is provided to requesting physician.    {Provider Resources  [facility]  Revised Cardiac Risk Index :353103}

## 2023-12-07 NOTE — PROGRESS NOTES
Assessment & Plan   1. Hospital discharge follow-up  See plan below.  - Wound Aerobic Bacterial Culture Routine With Gram Stain  - CT Abdomen Pelvis w Contrast; Future  - cefdinir (OMNICEF) 300 MG capsule; Take 1 capsule (300 mg) by mouth 2 times daily for 5 days  Dispense: 10 capsule; Refill: 0  - metroNIDAZOLE (FLAGYL) 500 MG tablet; Take 1 tablet (500 mg) by mouth 3 times daily for 5 days  Dispense: 15 tablet; Refill: 0  - Adult Colorectal Surgery  Referral; Future  - Comprehensive metabolic panel (BMP + Alb, Alk Phos, ALT, AST, Total. Bili, TP); Future  - CBC with platelets; Future  - ESR: Erythrocyte sedimentation rate; Future  - CRP, inflammation; Future  - Comprehensive metabolic panel (BMP + Alb, Alk Phos, ALT, AST, Total. Bili, TP)  - CBC with platelets  - ESR: Erythrocyte sedimentation rate  - CRP, inflammation  - REMOVAL OF SUTURES  - Wound care and ostomy supplies  - Ostomy Care Referral; Future  - Home Care Referral    2. Diverticulitis of large intestine with perforation without abscess, unspecified bleeding status  Home care referral placed. Needs scheduled follow-up sooner with surgeon given delayed wound healing.  - Adult Colorectal Surgery  Referral; Future  - Home Care Referral    3. Ileostomy status (H)  Sutures removed today as it has been almost 3 weeks since her surgery (11/18/23). Signs of leakage. Ostomy referral, home care, and supplies ordered. Concern for infection given leakage and area of delayed wound healing. Unsure if fistula has formed vs dehiscence, vs abscess, vs seroma etc. Recommend CT for further evaluation.  - Adult Colorectal Surgery  Referral; Future  - REMOVAL OF SUTURES  - Wound care and ostomy supplies  - Ostomy Care Referral; Future  - Home Care Referral    4. Delayed postoperative wound closure  Fluid cultured, but surrounding area heavily contaminated. Treat with omnicef and flagyl (was on IV ceftriaxone and metronidazole in patient). Follow  culture results. Plan for CT as above. Refer back to her surgeon. Check CBC and inflammatory markers to trend for the future.  - Wound Aerobic Bacterial Culture Routine With Gram Stain  - CT Abdomen Pelvis w Contrast; Future  - cefdinir (OMNICEF) 300 MG capsule; Take 1 capsule (300 mg) by mouth 2 times daily for 5 days  Dispense: 10 capsule; Refill: 0  - metroNIDAZOLE (FLAGYL) 500 MG tablet; Take 1 tablet (500 mg) by mouth 3 times daily for 5 days  Dispense: 15 tablet; Refill: 0  - Adult Colorectal Surgery  Referral; Future  - ESR: Erythrocyte sedimentation rate; Future  - CRP, inflammation; Future  - CBC with platelets  - ESR: Erythrocyte sedimentation rate  - CRP, inflammation  - Home Care Referral    5. 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 Check electrolytes given new ostomy.  - Comprehensive metabolic panel (BMP + Alb, Alk Phos, ALT, AST, Total. Bili, TP); Future  - Comprehensive metabolic panel (BMP + Alb, Alk Phos, ALT, AST, Total. Bili, TP)    6. JYOTSNA (obstructive sleep apnea)  Referral given as recommended.  - Adult Sleep Eval & Management  Referral; Future    Patient will follow-up with me on Monday 12/11/23.      Guy Brink MD  [facility]    Disclaimer: This note consists of symbols derived from keyboarding, dictation and/or voice recognition software. As a result, there may be errors in the script that have gone undetected. Please consider this when interpreting information found in this chart.    Subjective   Mariluz is a 63 year old, presenting for the following health issues:  Surgical Followup        12/7/2023     2:30 PM   Additional Questions   Roomed by Almita G   Accompanied by           12/7/2023     2:30 PM   Patient Reported Additional Medications   Patient reports taking the following new medications NA     HPI     Hospital Follow-up Visit:    Hospital/Nursing Home/IP Rehab [facility]  Date of Admission: 11/9/23  Date of Discharge: 11/28/23  Reason(s) for Admission: Complicated diverticulitis    Was your hospitalization related to COVID-19? No   Problems taking medications regularly:  None  Medication changes since discharge: See AVS  Problems adhering to non-medication therapy:  None    Summary of hospitalization:  CareEverywhere information obtained and reviewed  Diagnostic Tests/Treatments reviewed.  Follow up needed: None recommended.  Other Healthcare Providers Involved in Patient s Care:         Homecare  Update since discharge: stable.    Plan of care communicated with patient     From discharge summary:  \"PENDING TEST RESULTS:  None.    PROCEDURES:  S/p subtotal colectomy with ileostomy on 11/18/2023.    HOSPITAL COURSE:  63-year-old female with significant history of metastatic breast cancer, hypertension, chemo-induced cardiomyopathy with HFrEF, history of recurrent diverticulitis came in on 11/9/2023 with abdominal pain and was found to have complicated diverticulitis for which she required subtotal colectomy with ileostomy that was done on 11/18/2023. Gradually recovered postoperatively, and was discharged eventually. She had no major issues during her hospitalization, recovery was slightly slow. She was on antibiotics and completed antibiotic therapy as of 11/22/2023. Her pain control was adequate. She was able to ambulate well. She had ileostomy teaching while she is in the hospital and was also discharged with home health cares. She was cleared by colorectal surgery for discharge as well.      Complicated recurrent sigmoid diverticulitis, sigmoid obstruction:  S/p subtotal colectomy with ileostomy on 11/18/2023: As mentioned above came in with abdominal pain and had complicated diverticulitis. She finished her antibiotic therapy while in hospital, initially did not improve with conservative management and had surgery as mentioned above. Slightly slow recovery. Overall did " "well, plan to discharge her most likely tomorrow.  Colorectal surgery following closely.  S/p subtotal colectomy with ileostomy on 11/18/2020 11/22/2023.  Completed antibiotics as of 11/22/2023 with Rocephin, Flagyl.  Pain control adequate.  Encourage ambulation and oral intake.  Continue to have soft diet and advance as tolerated.  Sinus tachycardia: Resolved now. Metoprolol continued at discharge.  History of chemo-induced cardiomyopathy:  HFrEF compensated: Recent echo was obtained on 11/6/2023 showing EF of 27% with severe global hypokinesis. No signs of fluid overload. Cardiology was contacted during his hospitalization and required no further interventions.  Continue Coreg as ordered without any changes.  Restarted lisinopril at discharge.  No signs of fluid overload, no other intervention needed now.  Metastatic breast cancer: Resume anastrozole after discharge.  Obstructive sleep apnea: Outpatient sleep study needed.  Depression: Continue paroxetine.  Insomnia: On melatonin.\"      Today:  Reports difficulty completing home tasks. Have not been able to find a home care provider that can come out to them.     Did have at least 1 spill of her ostomy bag while changing. They are almost out of ostomy supplies.       Review of Systems   Constitutional, HEENT, cardiovascular, pulmonary, GI, , musculoskeletal, neuro, skin, endocrine and psych systems are negative, except as otherwise noted.      Objective    /64 (BP Location: Right arm, Patient Position: Chair, Cuff Size: Adult Regular)   Pulse 90   Temp 97.9  F (36.6  C) (Temporal)   Resp 28   Ht 1.642 m (5' 4.65\")   Wt 95.9 kg (211 lb 8 oz)   SpO2 98%   BMI 35.58 kg/m    Body mass index is 35.58 kg/m .  Physical Exam   General: Appears well and in no acute distress.  Cardiovascular: Regular rate and rhythm, normal S1 and S2 without murmur. No extra heartsounds or friction rub.  Respiratory: Lungs clear to auscultation bilaterally. No wheezing or " crackles.  Musculoskeletal: No gross extremity deformities. No peripheral edema. Normal muscle bulk.  GI/Rectal: Ostomy pink, appears intact with correctly attached bag. Surrounding dried ostomy drainage near wound. Staples in place. Slight induration and tenderness on palpation around the umbilicus. Upon staple removal, there is ~1 cm of delayed wound healing tracking deep on light exploration with clear green fluid expelling from the opening. Fluid is cultured.              Labs: pending

## 2023-12-08 LAB
ALBUMIN SERPL BCG-MCNC: 3.8 G/DL (ref 3.5–5.2)
ALP SERPL-CCNC: 123 U/L (ref 40–150)
ALT SERPL W P-5'-P-CCNC: 22 U/L (ref 0–50)
ANION GAP SERPL CALCULATED.3IONS-SCNC: 12 MMOL/L (ref 7–15)
AST SERPL W P-5'-P-CCNC: 38 U/L (ref 0–45)
BILIRUB SERPL-MCNC: 0.3 MG/DL
BUN SERPL-MCNC: 11.2 MG/DL (ref 8–23)
CALCIUM SERPL-MCNC: 9.4 MG/DL (ref 8.8–10.2)
CHLORIDE SERPL-SCNC: 103 MMOL/L (ref 98–107)
CREAT SERPL-MCNC: 0.58 MG/DL (ref 0.51–0.95)
CRP SERPL-MCNC: 9.71 MG/L
DEPRECATED HCO3 PLAS-SCNC: 24 MMOL/L (ref 22–29)
EGFRCR SERPLBLD CKD-EPI 2021: >90 ML/MIN/1.73M2
GLUCOSE SERPL-MCNC: 90 MG/DL (ref 70–99)
POTASSIUM SERPL-SCNC: 4 MMOL/L (ref 3.4–5.3)
PROT SERPL-MCNC: 7 G/DL (ref 6.4–8.3)
SODIUM SERPL-SCNC: 139 MMOL/L (ref 135–145)

## 2023-12-08 NOTE — RESULT ENCOUNTER NOTE
Please inform of results if patient has not viewed in [facility] within 3 business days.    CRP - another inflammation marker is elevated, but improved from 2 months ago. Reassuring.    Your wound culture is growing multiple organisms which is not surprising as it can be contaminated by the normal skin bacteria as well as from around your ostomy site. I will review the final results when available.    CMP Results - Your blood sugar was 90. Your kidney function, electrolytes, and liver function were normal.    Please call the clinic with any questions you may have.     Have a great day,    Dr. De La Cruz

## 2023-12-11 ENCOUNTER — OFFICE VISIT (OUTPATIENT)
Dept: FAMILY MEDICINE | Facility: CLINIC | Age: 63
End: 2023-12-11
Payer: COMMERCIAL

## 2023-12-11 VITALS
BODY MASS INDEX: 35.16 KG/M2 | TEMPERATURE: 97.9 F | HEIGHT: 65 IN | SYSTOLIC BLOOD PRESSURE: 100 MMHG | DIASTOLIC BLOOD PRESSURE: 64 MMHG | WEIGHT: 211 LBS | RESPIRATION RATE: 20 BRPM | HEART RATE: 94 BPM | OXYGEN SATURATION: 100 %

## 2023-12-11 DIAGNOSIS — K57.20 DIVERTICULITIS OF LARGE INTESTINE WITH PERFORATION WITHOUT ABSCESS, UNSPECIFIED BLEEDING STATUS: ICD-10-CM

## 2023-12-11 DIAGNOSIS — Z48.1 DELAYED POSTOPERATIVE WOUND CLOSURE: Primary | ICD-10-CM

## 2023-12-11 DIAGNOSIS — Z93.2 ILEOSTOMY STATUS (H): ICD-10-CM

## 2023-12-11 LAB
BACTERIA WND CULT: ABNORMAL
GRAM STAIN RESULT: ABNORMAL

## 2023-12-11 PROCEDURE — 99213 OFFICE O/P EST LOW 20 MIN: CPT | Performed by: FAMILY MEDICINE

## 2023-12-11 RX ORDER — HEPARIN SODIUM (PORCINE) LOCK FLUSH IV SOLN 100 UNIT/ML 100 UNIT/ML
5 SOLUTION INTRAVENOUS
Status: CANCELLED | OUTPATIENT
Start: 2023-12-13

## 2023-12-11 RX ORDER — METRONIDAZOLE 500 MG/1
500 TABLET ORAL 3 TIMES DAILY
Qty: 15 TABLET | Refills: 0 | Status: SHIPPED | OUTPATIENT
Start: 2023-12-11 | End: 2024-03-28

## 2023-12-11 RX ORDER — CEFDINIR 300 MG/1
300 CAPSULE ORAL 2 TIMES DAILY
Qty: 10 CAPSULE | Refills: 0 | Status: SHIPPED | OUTPATIENT
Start: 2023-12-11 | End: 2024-03-28

## 2023-12-11 ASSESSMENT — PAIN SCALES - GENERAL: PAINLEVEL: MODERATE PAIN (5)

## 2023-12-11 NOTE — PROGRESS NOTES
Assessment & Plan   1. Delayed postoperative wound closure  Improving. No further drainage noted. Continue antibiotics for 1 full week. Refills given. Follow-up with surgery. Patient to schedule. CT scheduled for 12/13/23 to evaluate for deeper infection, fistula, etc.  - cefdinir (OMNICEF) 300 MG capsule; Take 1 capsule (300 mg) by mouth 2 times daily  Dispense: 10 capsule; Refill: 0  - metroNIDAZOLE (FLAGYL) 500 MG tablet; Take 1 tablet (500 mg) by mouth 3 times daily  Dispense: 15 tablet; Refill: 0    2. Diverticulitis of large intestine with perforation without abscess, unspecified bleeding status  Improving pain.    3. Ileostomy status (H)  Wound care from [facility] does not service her area. Has wound/ostomy consult on Thursday this week.     Guy Brink MD  [facility]    Disclaimer: This note consists of symbols derived from keyboarding, dictation and/or voice recognition software. As a result, there may be errors in the script that have gone undetected. Please consider this when interpreting information found in this chart.    Chrissy Mcgraw is a 63 year old, presenting for the following health issues:  WOUND CARE        12/11/2023    12:52 PM   Additional Questions   Roomed by Yk   Accompanied by self         12/11/2023    12:52 PM   Patient Reported Additional Medications   Patient reports taking the following new medications n/a     History of Present Illness       Reason for visit:  Wound care    She eats 0-1 servings of fruits and vegetables daily.She consumes 2 sweetened beverage(s) daily.She exercises with enough effort to increase her heart rate 10 to 19 minutes per day.  She exercises with enough effort to increase her heart rate 5 days per week.   She is taking medications regularly.     Review of Systems   Constitutional, HEENT, cardiovascular, pulmonary, GI, , musculoskeletal, neuro, skin, endocrine and psych systems are negative, except as  "otherwise noted.      Objective    /64   Pulse 94   Temp 97.9  F (36.6  C) (Temporal)   Resp 20   Ht 1.642 m (5' 4.65\")   Wt 95.7 kg (211 lb)   SpO2 100%   BMI 35.50 kg/m    Body mass index is 35.5 kg/m .  Physical Exam   General: Appears well and in no acute distress.  Cardiovascular: Regular rate and rhythm, normal S1 and S2 without murmur. No extra heartsounds or friction rub.  Respiratory: Lungs clear to auscultation bilaterally. No wheezing or crackles.  Musculoskeletal: No gross extremity deformities. No peripheral edema. Normal muscle bulk.  GI/Rectal: Ostomy in place. Small non draining opening of incision inferior to umbilicus.  Soft, non-tender abdomen. No hepatosplenomegaly. Normal active bowel sounds.    Labs: None    Reviewed wound culture from 12/7/23              "

## 2023-12-12 ENCOUNTER — HOSPITAL ENCOUNTER (OUTPATIENT)
Dept: WOUND CARE | Facility: CLINIC | Age: 63
Discharge: HOME | End: 2023-12-12
Attending: FAMILY MEDICINE | Admitting: FAMILY MEDICINE
Payer: COMMERCIAL

## 2023-12-12 DIAGNOSIS — Z93.2 ILEOSTOMY STATUS (H): ICD-10-CM

## 2023-12-12 DIAGNOSIS — Z09 HOSPITAL DISCHARGE FOLLOW-UP: ICD-10-CM

## 2023-12-12 PROCEDURE — G0463 HOSPITAL OUTPT CLINIC VISIT: HCPCS

## 2023-12-12 NOTE — PROGRESS NOTES
[facility] Wound and Ostomy Clinic [facility]    Start of Care in [facility] Wound Clinic: 12/12/2023   Referring Doctor: Guy Brink MD   Primary Care Provider: Guy Brink   Wound Location:  Abdominal   Type of Ostomy/Surgery Date/Surgeon:  Subtotal colectomy with ileostomy 11/18/23 completed by Perry St MD       Wound/Ostomy Clinic Visit:  Initial visit to wound and Ostomy Clinic    Reason for Visit:  Initial assessment of Ostomy and Wound        Subjective:  Patient arrives to independently on 12/12/23 and reports the following history below.        HPI/Pertinent information from chart review:   Patient reports she started noting her stomach to be bloated and rock hard this past spring when on vacation with her granddaughter, 2023.  Started to have abdominal pain in September of 2023.  Was admitted to [facility] from 9/29/23-10/6/23 for diverticulitis, was discharged on oral antibiotics. Presented back to [facility] ER on 10/16/23 for worsening abdominal pain.  Repeat CT scan was completed, she was noted to have slightly worsening diverticulitis, was given further oral antibiotics once again and sent home.    Patient had ongoing, worsening abdominal pain, antibiotics were not helpful so she presents to [facility] on 11/9/23,  she eventually underwent Subtotal colectomy with ileostomy by Perry St MD  due to recurrent sigmoid diverticulitis and sigmoid obstruction.  After discharge from [facility] she followed up with Primary, Dr. Brink on 12/7/23 who had concerns of infection, she was prescribed oral antibiotics and given orders for wound culture, CT scan, and Wound and ostomy referral.  Patient established care at [facility] wound clinic on 12/12/23.        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     OSTOMY EXAM    Pouch wear time: every 3 days     Current pouching system: [employer] 1 piece # 8331, cut to fit. Lock and roll Closure     Who changes the pouch? patient     Participants in cares today: no    Any limitations (dexterity, vision, hearing)? None noted or verbalized      Location: Right Lower Quadrant   Color/Moisture: Red, moist  Viable: Yes  Size: 30 cm X 35 cm  Shape: Round   MCJ: Mild separation from 12-3, open are is covered with yellow non viable tissue.  Os: Center and outward direction.   Protrusion: 2.5 cm  Peristomal skin: Hyper granular bud at 5 o clock.  Output: Liquid, loose stools  Abdominal profile/plane: sits on protruding abdomen, free of creases or folds.   Photos from today's initial assessment 23    WOUND EXAM Abdominal   Wound #1  Stage/tissue depth: Full thickness  0.5 cm L x 0.3 cm W x 0.7 cm D  Tunneling: none  Undermining: none  Wound bed type/amount: 90% red non granular, 10% slough; not fluctuant  Wound Edges: closed from 12-6 o clock  Periwound: Blanchable erythema, non warm.  Drainage: scant serous with cares  Odor: none  Pain: Denies   Photos from today's initial assessment 23   Photos from MD appointment on 23        Discussion/Education:  plan of care with  rationale;  Patient is able to perform cares/has been caring for wound    Assessment:  Ostomy appliance is in place on arrival, medial lower corner was lifting but not enough to cause leaking.  When removing the appliance noted equal swelling to ostomy ring from 12-6 o clock, from 6-12 had stool residue within ring.  Stoma is red, beefy, and moist. Protrudes nicely and sutures in place.   Minor separation at the MCJ site from 12-3, unable to determine full depth due to slough covering.  In addition, has a hypergranular bud at 5 o clock.  Janessa stoma skin is intact with very faint erythema.      Abdominal incision, scarred over, with small opening at the distal end.  Opening has slough tissue to the edges, and red non granular to the base.  Scant amount of drainage noted with cares.  Blanchable  Erythema to periwound, non warm.         Plan of Care:  Ostomy: Order supplies from [employer], Reference Number: [id number]. Ordered 2 boxes of 8331, 1 box of 8805, skin barrier protectant, adhesive remover, and stoma powder    Wound: Triad and primapore       Cares Performed:    Pouch change Yes    Topical care: Wound/surrounding skin cleansed with Normal saline and gauze. Patted dry. Apply Triad and secure with primapore (composite dressing). Patient will continue this care daily and as needed.       The following discharge instructions were reviewed with and sent home with the patient:  Per AVS    The following supplies were sent home with the patient:  Remains of open Triad, Primapore dressings, and few extra ostomy supplies    Return visit: 12/19/23    Verbal, written, & demonstrative education provided.  Face to face time: approximately 50 minutes  Procedure: 0 min    Marla Carreon RN OCN  [phone]

## 2023-12-12 NOTE — DISCHARGE INSTRUCTIONS
Mariluz, great to meet you.  Today we changed your ostomy appliance to [employer] #8331, this has the added filter.  We also decreased the size you cut your ostomy opening, you will now cut to the 32 mm line. I would like you to try the powder and Cavilon (skin protectant) spray to help with the irritation around the stoma.  I will set you up with the DME company Called [employer], this company will ship your ostomy supplies right to your house.  I recommend you call them Friday to assure your order is being shipped. [employer] number [phone]    For the wound  I want you to either clean in the shower or use soap and water to clean the wound  Pat dry  Apply Thick layer of Triad Paste (yellow tube, white cap) to wound  Cover with Primapore dressing (white adhesive dressing)  Complete Cares daily.    If you have ANY questions or concerns please call. I will see you back in clinic in 1 week, 12/19/23 @ 10 am    Marla Carreon RN CWN  [phone]        Subjective     Khalida Redding is a 71 year old female who presents to clinic today for the following health issues:    History of Present Illness        Diabetes:   She presents for follow up of diabetes.  She is not checking blood glucose. She has no concerns regarding her diabetes at this time.  She is having numbness in feet and weight gain. The patient has had a diabetic eye exam in the last 12 months. Eye exam performed on Appointment in January .    Diabetes Management Resources    She eats 0-1 servings of fruits and vegetables daily.She consumes 3 sweetened beverage(s) daily.  She is taking medications regularly.     Patient Active Problem List   Diagnosis     Type 2 diabetes mellitus with circulatory disorder, without long-term current use of insulin (H)     Hyperlipidemia LDL goal <100     Hypertension goal BP (blood pressure) < 140/90     History of CVA (cerebrovascular accident)     Convulsions, unspecified convulsion type (H)     Osteoarthritis     Tobacco abuse disorder     Pain in both lower legs     Dementia (H)     Supraventricular tachycardia (H)     Gait instability     Tubular adenoma of colon     Syncope, unspecified syncope type     Lumbar radiculopathy     Closed displaced fracture of left clavicle, unspecified part of clavicle, initial encounter     Injury of left clavicle, initial encounter     Past Surgical History:   Procedure Laterality Date     COLONOSCOPY N/A 11/28/2018    Procedure: Colonoscopy, Polypectomy by Biopsy;  Surgeon: Arcadio Mcmullen DO;  Location: [facility]     HIP SURGERY Left        Social History     Tobacco Use     Smoking status: Current Every Day Smoker     Packs/day: 0.50     Smokeless tobacco: Never Used   Substance Use Topics     Alcohol use: No     Frequency: Never     History reviewed. No pertinent family history.      Current Outpatient Medications   Medication Sig Dispense Refill     atorvastatin (LIPITOR) 20 MG tablet Take 1 tablet (20 mg) by  mouth daily No further refills without office visit. 90 tablet 1     blood glucose monitoring (NO BRAND SPECIFIED) test strip Accu-Chek Violette Plus Meter       clopidogrel (PLAVIX) 75 MG tablet Take 1 tablet (75 mg) by mouth daily 90 tablet 1     Cyanocobalamin (VITAMIN B 12 PO) Take 1,000 mcg by mouth daily       gabapentin (NEURONTIN) 300 MG capsule Take 1 capsule (300 mg) by mouth At Bedtime 90 capsule 1     meloxicam (MOBIC) 7.5 MG tablet Take 1 tablet (7.5 mg) by mouth daily 90 tablet 1     memantine (NAMENDA) 10 MG tablet Take 1 tablet (10 mg) by mouth daily 90 tablet 1     metFORMIN (GLUCOPHAGE-XR) 500 MG 24 hr tablet Take 2 tablets (1,000 mg) by mouth 2 times daily (with meals) 360 tablet 1     order for DME Equipment being ordered: glucometer and related supplies. 1 Units 0     quinapril (ACCUPRIL) 5 MG tablet Take 1 tablet (5 mg) by mouth daily 90 tablet 1     Allergies   Allergen Reactions     Amoxicillin      Ampicillin      Recent Labs   Lab Test 05/22/19  1233 05/17/19  1345 02/11/19  1550 02/03/19  1605 07/30/18  1845 07/16/18 02/19/18 07/16/17  1632 06/09/17   A1C  --  7.7* 8.0*  --   --  6.4* 5.9*  --   --  6.5*   LDL  --   --   --   --   --  89 88  --   --  145*   HDL  --   --   --   --   --  53 53  --   --  62   TRIG  --   --   --   --   --  148 146  --   --  120   ALT  --   --   --  14 16  --   --   --  16  --    CR 0.66  --   --  0.57 0.74 0.69 0.77   < > 0.62 0.62   GFRESTIMATED 89  --   --  >90 77 89 79   < > >90  Non  GFR Calc   93   GFRESTBLACK >90  --   --  >90 >90 103 91   < > >90   GFR Calc   107   POTASSIUM 3.3*  --   --  4.1 3.8 4.8 4.8   < > 4.1 5.0   TSH  --  2.50  --   --   --   --  3.72  --   --   --     < > = values in this interval not displayed.      BP Readings from Last 3 Encounters:   11/18/19 108/64   05/28/19 106/68   05/22/19 175/90    Wt Readings from Last 3 Encounters:   11/18/19 63.5 kg (140 lb 1.6 oz)   05/28/19 62.2 kg (137 lb 1.6 oz)  "  05/22/19 61.5 kg (135 lb 9.6 oz)                    Reviewed and updated as needed this visit by Provider         Review of Systems   ROS COMP: Constitutional, HEENT, cardiovascular, pulmonary, GI, , musculoskeletal, neuro, skin, endocrine and psych systems are negative, except as otherwise noted.      Objective    /64   Pulse 88   Temp 96.9  F (36.1  C) (Temporal)   Resp 16   Ht 1.651 m (5' 5\")   Wt 63.5 kg (140 lb 1.6 oz)   BMI 23.31 kg/m    Body mass index is 23.31 kg/m .  Physical Exam   GENERAL: healthy, alert and no distress  NECK: no adenopathy, no asymmetry, masses, or scars and trachea midline and normal to palpation  RESP: lungs clear to auscultation - no rales, rhonchi or wheezes  CV: regular rate and rhythm, normal S1 S2, no S3 or S4, no murmur, click or rub, no peripheral edema and peripheral pulses strong  ABDOMEN: soft, nontender, no hepatosplenomegaly, no masses and bowel sounds normal  MS: no gross musculoskeletal defects noted, no edema  NEURO: Normal strength and tone, sensory exam grossly normal, mentation intact and DTR's abnormal: less on the left.  PSYCH: mentation appears normal, affect normal/bright    Diagnostic Test Results:  Labs reviewed in Epic  No results found for this or any previous visit (from the past 24 hour(s)).      X-ray: essentially negative for concerning pathology to my independent review today with exception of decreased cortical thickening of the lumbar vertebral bodies.  I do wonder about some degenerative changes to the foramen of the L5-S1 level..  It will be overread by radiology.    Assessment & Plan     1. Hyperlipidemia LDL goal <100  Refill medications today, checking applicable labs recheck in 6 months.  - Albumin Random Urine Quantitative with Creat Ratio  - atorvastatin (LIPITOR) 20 MG tablet; Take 1 tablet (20 mg) by mouth daily No further refills without office visit.  Dispense: 90 tablet; Refill: 1  - LDL cholesterol direct    2. Convulsions, " unspecified convulsion type (H)  Stable at this point time no new concerns with respect to this refilled medications follow-up in 6 months  - Albumin Random Urine Quantitative with Creat Ratio  - clopidogrel (PLAVIX) 75 MG tablet; Take 1 tablet (75 mg) by mouth daily  Dispense: 90 tablet; Refill: 1    3. History of CVA (cerebrovascular accident)  See #2  - clopidogrel (PLAVIX) 75 MG tablet; Take 1 tablet (75 mg) by mouth daily  Dispense: 90 tablet; Refill: 1    4. Dementia without behavioral disturbance, unspecified dementia type (H)  No new concerns with respect to this she seems to be tolerating medications well follow-up in 6 months  - gabapentin (NEURONTIN) 300 MG capsule; Take 1 capsule (300 mg) by mouth At Bedtime  Dispense: 90 capsule; Refill: 1  - memantine (NAMENDA) 10 MG tablet; Take 1 tablet (10 mg) by mouth daily  Dispense: 90 tablet; Refill: 1    5. Injury of left clavicle, initial encounter  6. Closed displaced fracture of left clavicle, unspecified part of clavicle, initial encounter  Refilled medications follow-up in 6 months  - meloxicam (MOBIC) 7.5 MG tablet; Take 1 tablet (7.5 mg) by mouth daily  Dispense: 90 tablet; Refill: 1    7. Type 2 diabetes mellitus with diabetic peripheral angiopathy without gangrene, without long-term current use of insulin (H)  Rechecking labs follow-up in 6 months if within normal limits and in control of her diabetes.  - Albumin Random Urine Quantitative with Creat Ratio  - HEMOGLOBIN A1C  - *UA reflex to Microscopic and Culture ([facility] (except [place] and [place])  - metFORMIN (GLUCOPHAGE-XR) 500 MG 24 hr tablet; Take 2 tablets (1,000 mg) by mouth 2 times daily (with meals)  Dispense: 360 tablet; Refill: 1    8. Hypertension goal BP (blood pressure) < 140/90  Good control blood pressure today no new concerns follow-up in 6 months refilled medications today.  - Albumin Random Urine Quantitative with Creat Ratio  - HEMOGLOBIN A1C  - quinapril  (ACCUPRIL) 5 MG tablet; Take 1 tablet (5 mg) by mouth daily  Dispense: 90 tablet; Refill: 1    9. Lumbar radiculopathy  Reports this is a chronic problem.  No new concerns.  Advised physical therapy.  Evaluating imaging at this point in time to consider further intervention PRN.  - XR Lumbar Spine 2/3 Views; Future  - MR Lumbar Spine w/o Contrast; Future  - PHYSICAL THERAPY REFERRAL; Future    10. Screening for osteoporosis  - DEXA HIP/PELVIS/SPINE - Future; Future    11. Encounter for screening mammogram for breast cancer  - MA SCREENING DIGITAL BILAT - Future  (s+30); Future    12. Asymptomatic menopausal state   - DEXA HIP/PELVIS/SPINE - Future; Future     Tobacco Cessation:   reports that she has been smoking. She has been smoking about 0.50 packs per day. She has never used smokeless tobacco.  Tobacco Cessation Action Plan: Information offered: Patient not interested at this time    Work on weight loss  Regular exercise    Return in about 6 months (around 5/18/2020) for Medication Recheck, recheck of current condition.    Abdirahman Shrestha PA-C  [facility]

## 2023-12-13 ENCOUNTER — HOSPITAL ENCOUNTER (OUTPATIENT)
Dept: CT IMAGING | Facility: CLINIC | Age: 63
Discharge: HOME | End: 2023-12-13
Attending: FAMILY MEDICINE
Payer: COMMERCIAL

## 2023-12-13 ENCOUNTER — TELEPHONE (OUTPATIENT)
Dept: FAMILY MEDICINE | Facility: CLINIC | Age: 63
End: 2023-12-13

## 2023-12-13 ENCOUNTER — LAB (OUTPATIENT)
Dept: INFUSION THERAPY | Facility: CLINIC | Age: 63
End: 2023-12-13
Attending: FAMILY MEDICINE
Payer: COMMERCIAL

## 2023-12-13 DIAGNOSIS — K55.069 INFERIOR MESENTERIC VEIN THROMBOSIS (H): ICD-10-CM

## 2023-12-13 DIAGNOSIS — K57.92 ACUTE DIVERTICULITIS: Primary | ICD-10-CM

## 2023-12-13 DIAGNOSIS — Z09 HOSPITAL DISCHARGE FOLLOW-UP: ICD-10-CM

## 2023-12-13 DIAGNOSIS — I81 DEEP VEIN THROMBOSIS OF PORTAL VEIN: Primary | ICD-10-CM

## 2023-12-13 DIAGNOSIS — Z48.1 DELAYED POSTOPERATIVE WOUND CLOSURE: ICD-10-CM

## 2023-12-13 LAB — RADIOLOGIST FLAGS: ABNORMAL

## 2023-12-13 PROCEDURE — 74177 CT ABD & PELVIS W/CONTRAST: CPT

## 2023-12-13 PROCEDURE — 250N000011 HC RX IP 250 OP 636: Performed by: FAMILY MEDICINE

## 2023-12-13 PROCEDURE — 250N000009 HC RX 250: Performed by: FAMILY MEDICINE

## 2023-12-13 PROCEDURE — 250N000011 HC RX IP 250 OP 636: Mod: JZ | Performed by: FAMILY MEDICINE

## 2023-12-13 RX ORDER — HEPARIN SODIUM (PORCINE) LOCK FLUSH IV SOLN 100 UNIT/ML 100 UNIT/ML
5 SOLUTION INTRAVENOUS
Status: DISCONTINUED | OUTPATIENT
Start: 2023-12-13 | End: 2023-12-13 | Stop reason: HOSPADM

## 2023-12-13 RX ORDER — HEPARIN SODIUM (PORCINE) LOCK FLUSH IV SOLN 100 UNIT/ML 100 UNIT/ML
5 SOLUTION INTRAVENOUS
OUTPATIENT
Start: 2023-12-13

## 2023-12-13 RX ORDER — IOPAMIDOL 755 MG/ML
500 INJECTION, SOLUTION INTRAVASCULAR ONCE
Status: COMPLETED | OUTPATIENT
Start: 2023-12-13 | End: 2023-12-13

## 2023-12-13 RX ORDER — APIXABAN 5 MG (74)
KIT ORAL
Qty: 74 EACH | Refills: 0 | Status: SHIPPED | OUTPATIENT
Start: 2023-12-13 | End: 2024-01-12

## 2023-12-13 RX ADMIN — IOPAMIDOL 100 ML: 755 INJECTION, SOLUTION INTRAVENOUS at 10:03

## 2023-12-13 RX ADMIN — HEPARIN 5 ML: 100 SYRINGE at 10:17

## 2023-12-13 RX ADMIN — SODIUM CHLORIDE 60 ML: 9 INJECTION, SOLUTION INTRAVENOUS at 10:03

## 2023-12-13 NOTE — TELEPHONE ENCOUNTER
"----- Message from Luzmaria Ramos CMA sent at 12/11/2023  7:32 AM CST -----    ----- Message -----  From: Guy Brink MD  Sent: 12/8/2023   6:50 AM CST  To: [facility] Pool    \"Please inform of results if patient has not viewed in [employer] within 3 business days.    An inflammatory marker is slightly elevated, similar to 2 months ago.    Your cell counts are stable from discharge.    Your other labs are pending.    Please call the clinic with any questions you may have.     Have a great day,  Dr. De La Cruz\"    \"Hello,   Here are your lab results:     CRP - another inflammation marker is elevated, but improved from 2 months ago. Reassuring.     Your wound culture is growing multiple organisms which is not surprising as it can be contaminated by the normal skin bacteria as well as from around your ostomy site. I will review the final results when available.     CMP Results - Your blood sugar was 90. Your kidney function, electrolytes, and liver function were normal.     Please call the clinic with any questions you may have.     Have a great day,  Dr. De La Cruz\"    "

## 2023-12-13 NOTE — TELEPHONE ENCOUNTER
Estimated Creatinine Clearance: 112.8 mL/min (based on SCr of 0.58 mg/dL).    Reviewed CT scan with IMV and portal vein DVT.     No deeper infection. Complete course of antibiotics. See's surgeon tomorrow. Saw wound care yesterday.     Discussed starting eliquis and seeing heme/onc. Likely related to recent surgery. Referral placed. Starter pack and follow-up pack ordered. Discussed tylenol is ok, but should avoid aspirin, ibuprofen, naproxen etc while on eliquis.    Patient understands and agreeable with plan.    Sent manufacture coupon website for eliquis via [employer]. Told to contact me if any issues picking up this medication.    Guy Brink MD

## 2023-12-13 NOTE — TELEPHONE ENCOUNTER
"RN placed call to patient and advised of the below from provider.     Patient reports that the \"Wound tech\" that she saw for her wound and ostomy review yesterday advised that her wound looked good.     Patient advised she is still taking the antibiotic and would like to continue antibiotic and see how wound continues to heal. Patient sees \"Wound tech\" again next Tuesday.    Patient reports that she sees her surgeon tomorrow.     Patient reports that she completed her CT today- RN advised that results are pending provider review and provider or clinic will reach out once reviewed.     Patient would like to know what is recommended for follow up with Dr. De La Cruz. Appointments and/or labs and when those should happen if recommended.     KASIA Villavicencio, RN  [facility] ~ Registered Nurse  Clinic Triage ~ [place] [facility] [place]  December 13, 2023         "

## 2023-12-14 ENCOUNTER — TELEPHONE (OUTPATIENT)
Dept: FAMILY MEDICINE | Facility: CLINIC | Age: 63
End: 2023-12-14
Payer: COMMERCIAL

## 2023-12-14 NOTE — TELEPHONE ENCOUNTER
Patient Returning Call    Reason for call:  Surgeon Dr. Perry St MD is requesting provider to call his pager number regarding patient post-op results / treatment plan     M.D [phone]       Information relayed to patient:  writer can place in a call/note for provider     Patient has additional questions:  No    Could we send this information to you in [employer] or would you prefer to receive a phone call?:   Patient would like to be contacted via [employer]

## 2023-12-14 NOTE — TELEPHONE ENCOUNTER
I called surgeon's pager and he returned the page. Provider stated wound healing looks great today. They stopped her antibiotics. They will try to look at previous films to look if portal vein thrombosis etc was present prior.    Appreciate the feedback and update on patient's care plan.    Guy Brink MD

## 2023-12-19 ENCOUNTER — HOSPITAL ENCOUNTER (OUTPATIENT)
Dept: WOUND CARE | Facility: CLINIC | Age: 63
Discharge: HOME | End: 2023-12-19
Attending: FAMILY MEDICINE | Admitting: FAMILY MEDICINE
Payer: COMMERCIAL

## 2023-12-19 PROCEDURE — G0463 HOSPITAL OUTPT CLINIC VISIT: HCPCS

## 2023-12-19 NOTE — PROGRESS NOTES
[facility] Wound and Ostomy Clinic [facility]    Start of Care in [facility]: 12/12/2023   Referring Doctor: Guy Brink MD   Primary Care Provider: Guy Brink   Wound Location:  Abdominal   Type of Ostomy/Surgery Date/Surgeon:  Subtotal colectomy with ileostomy 11/18/23 completed by Perry St MD       Wound/Ostomy Clinic Visit:  1st return visit to wound and Ostomy Clinic    Reason for Visit:  re assessment of Ostomy and Wound        Subjective:  Patient arrives to independently on 12/19/23 and reports: Seen Surgeon last week, he was very impressed with how things are healing, discontinued the antibiotic  did not feel incision was infected. She will follow up with  Dr. St again in Jan. of 2024. Plans for reversal surgery in 6-12 months. Over the past few days has been having to change appliance daily due to leaking, reports that its been leaking the bottom medial Corner.   [facility] all the information and that supplies were being shipped. She has not received them yet, but could possibly be in her mail box. She admits she totally forgot to do wound care, its been left open to air since last [facility] nurse visit.        HPI/Pertinent information from chart review:   Patient reports she started noting her stomach to be bloated and rock hard this past spring when on vacation with her granddaughter, 2023.  Started to have abdominal pain in September of 2023.  Was admitted to [facility] from 9/29/23-10/6/23 for diverticulitis, was discharged on oral antibiotics. Presented back to [facility] ER on 10/16/23 for worsening abdominal pain.  Repeat CT scan was completed, she was noted to have slightly worsening diverticulitis, was given further oral antibiotics once again and sent home.  Patient had ongoing, worsening abdominal pain, antibiotics were not helpful so she presents to [facility] on  23,  she eventually underwent Subtotal colectomy with ileostomy by Perry St MD  due to recurrent sigmoid diverticulitis and sigmoid obstruction.  After discharge from [facility] she followed up with Primary, Dr. Brink on 23 who had concerns of infection to her surgical wound, she was prescribed oral antibiotics and given orders for wound culture, CT scan, and Wound and ostomy referral.  Patient established care at [facility] wound clinic on 23.      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     OSTOMY EXAM    Pouch wear time: every day    Current pouching system: [employer] 1 piece # [id number], cut to fit. Lock and roll Closure     Who changes the pouch? patient     Participants in cares today: no    Any limitations (dexterity, vision, hearing)? None noted or verbalized      Location: Right Lower Quadrant   Color/Moisture: Red, moist  Viable: Yes  Size: 30 cm X 35 cm  Shape: Round   MCJ: Intact with sutures  Os: Center and outward direction.   Protrusion: 2 cm  Peristomal skin:  bud at 5 o clock, mucinous bud VS hypergranular. Blanchable erythema to medial/distal aspect,  non warm.  Non raised.    Output: Liquid, loose stools  Abdominal profile/plane: sits on protruding abdomen, moderate crease at 9 o clock when sitting forward, small crease at 3 o clock when sitting forward   Photos from today's visit 12/19/23   Photos from initial assessment 12/12/23    Wound #1 Abdominal; Surgical Wound   Stage/tissue depth: Full thickness  0.2 cm L x 0.3 cm W x 0.7 cm D  Tunneling: at 12 o' clock 3.5 cm  Undermining: none  Wound bed type/amount: unable to visualize; not fluctuant  Wound Edges: open  Periwound: Blanchable erythema, non warm.  Drainage: none  Odor: none  Pain: Denies   Photos from today's visit 12/19/23   Photos from initial assessment 12/12/23   Photos from MD appointment on 12/7/23    Discussion/Education:  plan of care with rationale;  Patient is able to perform cares/has been caring for wound    Assessment:  Ostomy- continues to be red and moist, small separation from 12-3 has healed.  Continues to have bud at 5 o clock, question mucinous bud VS hypergranular.  Sutures remain intact at MCJ site. Has blanchable erythema to the inferior/medial peristomal aspect.  Erythema is non warm and patient denies pain with cares.  Patient reports her ostomy appliance has primary been leaking from inferior, medial side.  Prior to removing appliance no signs of leaking, and when assessed posterior side of appliance after removal barrier ring had extra swelling to the right lateral side, not the left/medial where patient had mention.  When WOC nurse had patient sit up/forward to assess creases/folds, patient was noted to have crease at 3 o clock and 9 o clock, 9 o clock crease is > the one at 3 o clock, which could be reasoning for the new issues with leaking.     Abdominal wound- Surgical site is 95% scarred over with 5% of it remaining open. Slough tissues to open wound is absent this week and is measuring  smaller.  Janessa wound erythema has resolved.       Plan of Care:  Will continue with  "similar ostomy cares, but added a 2nd barrier ring to help with abdominal creases at 9 o clock.  With patient admitting she had not done any wound care since last visit will continue with same plan of care to see if this will aid in healing wound and re evaluate at next visit.       Ostomy: Change Ostomy 2x/week and as needed  Supplies being used: 1 piece, flat roll closure, Tiffanie# [id number] and 1.5 barrier rings [place] #[id number].  Remove appliance  Clean stoma and skin around stoma with paper towel dampen with warm water  Dry thoroughly  Cut opening of new appliance a tad bigger than the 32 cm line  Apply barrier ring to ostomy appliance  Take 1/2 of a barrier ring and mold into a Thin, flat \"T turned on its side\" and place to right outer side of stoma ( at 9 o clock)  Apply ostomy appliance  Hold hand over appliance for 3-5 minutes    Wound: Triad and primapore       Cares Performed:    Pouch change Yes    Topical care: Wound/surrounding skin cleansed with Normal saline and gauze. Patted dry. Apply Triad and secure with primapore (composite dressing). Patient will continue this care daily and as needed.       The following discharge instructions were reviewed with and sent home with the patient:  Declined    The following supplies were sent home with the patient:  Remains of open Triad, Primapore dressings, and few extra ostomy supplies    Return visit: 1/2/24    Verbal, written, & demonstrative education provided.  Face to face time: approximately 50 minutes  Procedure: 0 min    Marla Carreon RN CWOCN  [phone]   "

## 2023-12-23 ENCOUNTER — APPOINTMENT (OUTPATIENT)
Dept: CT IMAGING | Facility: CLINIC | Age: 63
End: 2023-12-23
Attending: EMERGENCY MEDICINE
Payer: COMMERCIAL

## 2023-12-23 ENCOUNTER — HOSPITAL ENCOUNTER (EMERGENCY)
Facility: CLINIC | Age: 63
Discharge: HOME | End: 2023-12-23
Attending: EMERGENCY MEDICINE | Admitting: EMERGENCY MEDICINE
Payer: COMMERCIAL

## 2023-12-23 VITALS
OXYGEN SATURATION: 97 % | HEART RATE: 100 BPM | WEIGHT: 213.4 LBS | SYSTOLIC BLOOD PRESSURE: 103 MMHG | TEMPERATURE: 98.3 F | RESPIRATION RATE: 18 BRPM | BODY MASS INDEX: 35.9 KG/M2 | DIASTOLIC BLOOD PRESSURE: 55 MMHG

## 2023-12-23 DIAGNOSIS — R42 VERTIGO: ICD-10-CM

## 2023-12-23 LAB
ALBUMIN SERPL BCG-MCNC: 3.9 G/DL (ref 3.5–5.2)
ALP SERPL-CCNC: 109 U/L (ref 40–150)
ALT SERPL W P-5'-P-CCNC: 24 U/L (ref 0–50)
ANION GAP SERPL CALCULATED.3IONS-SCNC: 7 MMOL/L (ref 7–15)
ANION GAP SERPL CALCULATED.3IONS-SCNC: 8 MMOL/L (ref 7–15)
AST SERPL W P-5'-P-CCNC: 29 U/L (ref 0–45)
BASOPHILS # BLD AUTO: 0 10E3/UL (ref 0–0.2)
BASOPHILS NFR BLD AUTO: 0 %
BILIRUB SERPL-MCNC: 0.6 MG/DL
BUN SERPL-MCNC: 22.4 MG/DL (ref 8–23)
BUN SERPL-MCNC: 25 MG/DL (ref 8–23)
CALCIUM SERPL-MCNC: 8.5 MG/DL (ref 8.8–10.2)
CALCIUM SERPL-MCNC: 8.7 MG/DL (ref 8.8–10.2)
CHLORIDE SERPL-SCNC: 103 MMOL/L (ref 98–107)
CHLORIDE SERPL-SCNC: 105 MMOL/L (ref 98–107)
CREAT SERPL-MCNC: 0.73 MG/DL (ref 0.51–0.95)
CREAT SERPL-MCNC: 0.75 MG/DL (ref 0.51–0.95)
DEPRECATED HCO3 PLAS-SCNC: 20 MMOL/L (ref 22–29)
DEPRECATED HCO3 PLAS-SCNC: 21 MMOL/L (ref 22–29)
EGFRCR SERPLBLD CKD-EPI 2021: 89 ML/MIN/1.73M2
EGFRCR SERPLBLD CKD-EPI 2021: >90 ML/MIN/1.73M2
EOSINOPHIL # BLD AUTO: 0 10E3/UL (ref 0–0.7)
EOSINOPHIL NFR BLD AUTO: 0 %
ERYTHROCYTE [DISTWIDTH] IN BLOOD BY AUTOMATED COUNT: 14.6 % (ref 10–15)
GLUCOSE SERPL-MCNC: 110 MG/DL (ref 70–99)
GLUCOSE SERPL-MCNC: 135 MG/DL (ref 70–99)
HCT VFR BLD AUTO: 37.7 % (ref 35–47)
HGB BLD-MCNC: 12.2 G/DL (ref 11.7–15.7)
IMM GRANULOCYTES # BLD: 0.1 10E3/UL
IMM GRANULOCYTES NFR BLD: 0 %
LYMPHOCYTES # BLD AUTO: 0.9 10E3/UL (ref 0.8–5.3)
LYMPHOCYTES NFR BLD AUTO: 6 %
MCH RBC QN AUTO: 30.4 PG (ref 26.5–33)
MCHC RBC AUTO-ENTMCNC: 32.4 G/DL (ref 31.5–36.5)
MCV RBC AUTO: 94 FL (ref 78–100)
MONOCYTES # BLD AUTO: 0.6 10E3/UL (ref 0–1.3)
MONOCYTES NFR BLD AUTO: 4 %
NEUTROPHILS # BLD AUTO: 13.7 10E3/UL (ref 1.6–8.3)
NEUTROPHILS NFR BLD AUTO: 90 %
NRBC # BLD AUTO: 0 10E3/UL
NRBC BLD AUTO-RTO: 0 /100
PLATELET # BLD AUTO: 310 10E3/UL (ref 150–450)
POTASSIUM SERPL-SCNC: 4.7 MMOL/L (ref 3.4–5.3)
POTASSIUM SERPL-SCNC: 5.9 MMOL/L (ref 3.4–5.3)
PROT SERPL-MCNC: 6.9 G/DL (ref 6.4–8.3)
RBC # BLD AUTO: 4.01 10E6/UL (ref 3.8–5.2)
SODIUM SERPL-SCNC: 130 MMOL/L (ref 135–145)
SODIUM SERPL-SCNC: 134 MMOL/L (ref 135–145)
WBC # BLD AUTO: 15.2 10E3/UL (ref 4–11)

## 2023-12-23 PROCEDURE — 85025 COMPLETE CBC W/AUTO DIFF WBC: CPT | Performed by: EMERGENCY MEDICINE

## 2023-12-23 PROCEDURE — 99285 EMERGENCY DEPT VISIT HI MDM: CPT | Mod: 25 | Performed by: EMERGENCY MEDICINE

## 2023-12-23 PROCEDURE — 258N000003 HC RX IP 258 OP 636: Performed by: EMERGENCY MEDICINE

## 2023-12-23 PROCEDURE — 70450 CT HEAD/BRAIN W/O DYE: CPT

## 2023-12-23 PROCEDURE — 250N000013 HC RX MED GY IP 250 OP 250 PS 637: Performed by: EMERGENCY MEDICINE

## 2023-12-23 PROCEDURE — 250N000009 HC RX 250: Performed by: EMERGENCY MEDICINE

## 2023-12-23 PROCEDURE — 36415 COLL VENOUS BLD VENIPUNCTURE: CPT | Performed by: EMERGENCY MEDICINE

## 2023-12-23 PROCEDURE — 80053 COMPREHEN METABOLIC PANEL: CPT | Performed by: EMERGENCY MEDICINE

## 2023-12-23 PROCEDURE — 96374 THER/PROPH/DIAG INJ IV PUSH: CPT | Mod: 59 | Performed by: EMERGENCY MEDICINE

## 2023-12-23 PROCEDURE — 70496 CT ANGIOGRAPHY HEAD: CPT

## 2023-12-23 PROCEDURE — 70498 CT ANGIOGRAPHY NECK: CPT

## 2023-12-23 PROCEDURE — 250N000011 HC RX IP 250 OP 636: Performed by: EMERGENCY MEDICINE

## 2023-12-23 PROCEDURE — 96361 HYDRATE IV INFUSION ADD-ON: CPT | Performed by: EMERGENCY MEDICINE

## 2023-12-23 PROCEDURE — 99284 EMERGENCY DEPT VISIT MOD MDM: CPT | Performed by: EMERGENCY MEDICINE

## 2023-12-23 RX ORDER — SODIUM CHLORIDE 9 MG/ML
INJECTION, SOLUTION INTRAVENOUS CONTINUOUS
Status: DISCONTINUED | OUTPATIENT
Start: 2023-12-23 | End: 2023-12-23 | Stop reason: HOSPADM

## 2023-12-23 RX ORDER — MECLIZINE HYDROCHLORIDE 25 MG/1
25 TABLET ORAL ONCE
Status: COMPLETED | OUTPATIENT
Start: 2023-12-23 | End: 2023-12-23

## 2023-12-23 RX ORDER — ONDANSETRON 2 MG/ML
4 INJECTION INTRAMUSCULAR; INTRAVENOUS EVERY 30 MIN PRN
Status: DISCONTINUED | OUTPATIENT
Start: 2023-12-23 | End: 2023-12-23 | Stop reason: HOSPADM

## 2023-12-23 RX ORDER — IOPAMIDOL 755 MG/ML
500 INJECTION, SOLUTION INTRAVASCULAR ONCE
Status: COMPLETED | OUTPATIENT
Start: 2023-12-23 | End: 2023-12-23

## 2023-12-23 RX ADMIN — SODIUM CHLORIDE 1000 ML: 9 INJECTION, SOLUTION INTRAVENOUS at 16:06

## 2023-12-23 RX ADMIN — ONDANSETRON 4 MG: 2 INJECTION INTRAMUSCULAR; INTRAVENOUS at 15:00

## 2023-12-23 RX ADMIN — MECLIZINE HYDROCHLORIDE 25 MG: 25 TABLET ORAL at 17:51

## 2023-12-23 RX ADMIN — SODIUM CHLORIDE 97 ML: 9 INJECTION, SOLUTION INTRAVENOUS at 15:37

## 2023-12-23 RX ADMIN — SODIUM CHLORIDE: 9 INJECTION, SOLUTION INTRAVENOUS at 17:07

## 2023-12-23 RX ADMIN — IOPAMIDOL 68 ML: 755 INJECTION, SOLUTION INTRAVENOUS at 15:37

## 2023-12-23 ASSESSMENT — ACTIVITIES OF DAILY LIVING (ADL)
ADLS_ACUITY_SCORE: 35

## 2023-12-23 NOTE — ED PROVIDER NOTES
History     Chief Complaint   Patient presents with    Nausea     HPI  Chief complaint vertigo with recurrent vomiting    Mariluz Stoner is a 63 year old female significant past medical history for obesity, cardiomyopathy, metastatic breast cancer, depression, hypertension, complicated diverticulitis requiring emergent laparotomy with subtotal colectomy and end ileostomy-November 2023.  Reports abrupt onset of vertigo followed by repetitive vomiting.  Occurrence last evening around 6 PM while she was in the kitchen preparing a meal.  Has had persistent symptoms.  Headache rated 4/10.  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) 09/29/2023     Priority: Medium    Sinus bradycardia 09/29/2023     Priority: Medium    Secondary malignant neoplasm of bone (H) 06/13/2023     Priority: Medium    Pancolitis (H) 06/13/2023     Priority: Medium    Morbid obesity (H) 03/18/2020     Priority: Medium    Metastatic breast cancer 12/11/2019     Priority: Medium    Mild episode of recurrent major depressive disorder (H24) 12/11/2019     Priority: Medium    Essential hypertension 12/11/2019     Priority: Medium    Dermatitis 12/11/2019     Priority: Medium        Past Medical History:    Past Medical History:   Diagnosis Date    Basal cell carcinoma     Breast cancer (H)     Obesity        Past Surgical History:    Past Surgical History:   Procedure Laterality Date    BREAST SURGERY      left tissue expander, LEFT MASTECTOMY    CHOLECYSTECTOMY      GYN SURGERY      oophorectomy    REMOVE TISSUE EXPANDER BREAST  4/16/2014    Procedure: REMOVAL OF LEFT BREAST TISSUE  EXPANDER, IRRIGATION AND DEBRIDEMENT OF LEFT BREAST;  Surgeon: Marlon Luz MD;  Location: [facility]       Family History:    No family history on file.    Social History:  Marital Status:   [2]  Social History     Tobacco Use    Smoking status: Former     Types: Cigarettes     Quit date: 2000     Years since quittin.9    Smokeless tobacco: Never   Vaping Use    Vaping Use: Never used   Substance Use Topics    Alcohol use: Yes     Comment: social drinking    Drug use: Yes     Types: Marijuana     Comment: medical marijuana syrup         Medications:    acetaminophen (TYLENOL) 500 MG tablet  albuterol (PROAIR HFA/PROVENTIL HFA/VENTOLIN HFA) 108 (90 Base) MCG/ACT inhaler  anastrozole (ARIMIDEX) 1 MG tablet  apixaban ANTICOAGULANT (ELIQUIS) 5 MG tablet  Apixaban Starter Pack (ELIQUIS DVT/PE STARTER PACK) 5 MG TBPK  calcium carbonate 600 mg-vitamin D 400 units (CALTRATE) 600-400 MG-UNIT per tablet  carvedilol (COREG) 3.125 MG tablet  cefdinir (OMNICEF) 300 MG capsule  Ergocalciferol 50 MCG (2000 UT) TABS  lisinopril (ZESTRIL) 10 MG tablet  medical cannabis (Patient's own supply)  melatonin 1 MG TABS tablet  metroNIDAZOLE (FLAGYL) 500 MG tablet  oxyCODONE (ROXICODONE) 5 MG tablet  PARoxetine (PAXIL) 20 MG tablet  traMADol (ULTRAM) 50 MG tablet  Vitamin D3 (VITAMIN D-1000 MAX ST) 25 mcg (1000 units) tablet          Review of Systems   All other systems reviewed and are negative.      Physical Exam   BP: 117/65  Pulse: 103  Temp: 98.3  F (36.8  C)  Resp: 18  Weight: 96.8 kg (213 lb 6.4 oz)  SpO2: 96 %      Physical Exam  Vitals and nursing note reviewed.   Constitutional:       Appearance: She is ill-appearing.   HENT:      Right Ear: Tympanic membrane normal.      Left Ear: Tympanic membrane normal.      Nose: No congestion.      Mouth/Throat:      Mouth: Mucous membranes are moist.   Eyes:      Conjunctiva/sclera: Conjunctivae normal.      Pupils: Pupils are equal, round, and reactive to light.    Cardiovascular:      Rate and Rhythm: Tachycardia present.      Pulses: Normal pulses.      Heart sounds: No murmur heard.  Pulmonary:      Effort: Pulmonary effort is normal.      Breath sounds: Normal breath sounds.   Abdominal:      General: Abdomen is flat. Bowel sounds are normal. There is no distension.      Tenderness: There is no abdominal tenderness.      Comments: Ileostomy draining liquid brown stool   Musculoskeletal:      Cervical back: Normal range of motion.      Right lower leg: No edema.      Left lower leg: No edema.   Skin:     General: Skin is warm.      Capillary Refill: Capillary refill takes less than 2 seconds.   Neurological:      General: No focal deficit present.      Mental Status: She is alert and oriented to person, place, and time.      Comments: Patient is alert to person place and time  Normal mentation  Normal CN II-XII  No focal neurologic deficits  Nystagmus right horizontal with latency and fatigability     Psychiatric:         Mood and Affect: Mood normal.         ED Course              ED Course as of 12/23/23 1949   Sat Dec 23, 2023   1818 Potassium(!): 5.9     Procedures                  Results for orders placed or performed during the hospital encounter of 12/23/23 (from the past 24 hour(s))   CBC with platelets differential    Narrative    The following orders were created for panel order CBC with platelets differential.  Procedure                               Abnormality         Status                     ---------                               -----------         ------                     CBC with platelets and d...[[id number]]  Abnormal            Final result                 Please view results for these tests on the individual orders.   Comprehensive metabolic panel   Result Value Ref Range    Sodium 130 (L) 135 - 145 mmol/L    Potassium 5.9 (H) 3.4 - 5.3 mmol/L    Carbon Dioxide (CO2) 20 (L) 22 - 29 mmol/L    Anion Gap 7 7 - 15 mmol/L    Urea Nitrogen 25.0 (H) 8.0  - 23.0 mg/dL    Creatinine 0.75 0.51 - 0.95 mg/dL    GFR Estimate 89 >60 mL/min/1.73m2    Calcium 8.7 (L) 8.8 - 10.2 mg/dL    Chloride 103 98 - 107 mmol/L    Glucose 110 (H) 70 - 99 mg/dL    Alkaline Phosphatase 109 40 - 150 U/L    AST 29 0 - 45 U/L    ALT 24 0 - 50 U/L    Protein Total 6.9 6.4 - 8.3 g/dL    Albumin 3.9 3.5 - 5.2 g/dL    Bilirubin Total 0.6 <=1.2 mg/dL   CBC with platelets and differential   Result Value Ref Range    WBC Count 15.2 (H) 4.0 - 11.0 10e3/uL    RBC Count 4.01 3.80 - 5.20 10e6/uL    Hemoglobin 12.2 11.7 - 15.7 g/dL    Hematocrit 37.7 35.0 - 47.0 %    MCV 94 78 - 100 fL    MCH 30.4 26.5 - 33.0 pg    MCHC 32.4 31.5 - 36.5 g/dL    RDW 14.6 10.0 - 15.0 %    Platelet Count 310 150 - 450 10e3/uL    % Neutrophils 90 %    % Lymphocytes 6 %    % Monocytes 4 %    % Eosinophils 0 %    % Basophils 0 %    % Immature Granulocytes 0 %    NRBCs per 100 WBC 0 <1 /100    Absolute Neutrophils 13.7 (H) 1.6 - 8.3 10e3/uL    Absolute Lymphocytes 0.9 0.8 - 5.3 10e3/uL    Absolute Monocytes 0.6 0.0 - 1.3 10e3/uL    Absolute Eosinophils 0.0 0.0 - 0.7 10e3/uL    Absolute Basophils 0.0 0.0 - 0.2 10e3/uL    Absolute Immature Granulocytes 0.1 <=0.4 10e3/uL    Absolute NRBCs 0.0 10e3/uL   CT Head w/o Contrast    Narrative    EXAM: CT HEAD W/O CONTRAST  LOCATION: [facility]  DATE: 12/23/2023    INDICATION: Abrupt onset vertigo on long term anticoagulation with Eliquis  COMPARISON: None.  TECHNIQUE: Routine CT Head without IV contrast. Multiplanar reformats. Dose reduction techniques were used.    FINDINGS:  INTRACRANIAL CONTENTS: No intracranial hemorrhage. Mild age-related diffuse cerebral parenchymal volume loss. No midline shift. The basilar cisterns are patent. No cerebellar tonsillar ectopia. No CT evidence for an acute infarct.    VISUALIZED ORBITS/SINUSES/MASTOIDS: No intraorbital abnormality. Trace mucosal thickening of the right maxillary sinus. Mild nasoseptal bowing to the  left anteriorly. No middle ear or mastoid effusion.    BONES/SOFT TISSUES: No acute abnormality.      Impression    IMPRESSION:  1.  No intracranial hemorrhage, mass lesions, hydrocephalus or CT evidence for an acute infarct.  2.  Mild age-related changes.   CTA Head Neck with Contrast    Narrative    EXAM: CTA HEAD NECK W CONTRAST  LOCATION: [facility]  DATE: 12/23/2023    INDICATION: Abrupt onset vertigo and repetitive vomiting, headache started Eliquis 10 days ago  COMPARISON: None.  CONTRAST: Isovue 370, 68mL  TECHNIQUE: Head and neck CT angiogram with IV contrast. Axial helical CT images of the head and neck vessels obtained during the arterial phase of intravenous contrast administration. Axial 2D reconstructed images and multiplanar 3D MIP reconstructed   images of the head and neck vessels were performed by the technologist. Dose reduction techniques were used. All stenosis measurements made according to NASCET criteria unless otherwise specified.    FINDINGS:   HEAD CTA:  ANTERIOR CIRCULATION: No stenosis/occlusion, aneurysm, or high flow vascular malformation. Standard [place] anatomy.    POSTERIOR CIRCULATION: No stenosis/occlusion, aneurysm, or high flow vascular malformation. Balanced vertebral arteries supply a normal basilar artery.     DURAL VENOUS SINUSES: The major dural venous sinuses are not well opacified on this exam.    NECK CTA:  RIGHT CAROTID: No measurable stenosis or dissection.    LEFT CAROTID: No measurable stenosis or dissection.    VERTEBRAL ARTERIES: Mild to moderate narrowing of the left vertebral artery at the level of C4. Otherwise both vertebral arteries are patent throughout their course in the neck. Dominant right and smaller left vertebral arteries.    AORTIC ARCH: Common origin of the brachiocephalic artery and the left common carotid artery. The origins of the arch vessels are patent without hemodynamically significant  stenosis.    NONVASCULAR STRUCTURES: The lung apices are clear. Mild degenerative changes of the cervical spine.      Impression    IMPRESSION:   HEAD CTA:   1.  No high-grade stenoses or occlusion of the major intracranial arteries. No intracranial aneurysms.    NECK CTA:  1.  No hemodynamically significant internal carotid arteries bilaterally based on the NASCET criteria.  2.  Mild to moderate narrowing of the left vertebral artery at the level of C4. Otherwise both vertebral arteries are patent throughout their course in the neck.    Basic metabolic panel   Result Value Ref Range    Sodium 134 (L) 135 - 145 mmol/L    Potassium 4.7 3.4 - 5.3 mmol/L    Chloride 105 98 - 107 mmol/L    Carbon Dioxide (CO2) 21 (L) 22 - 29 mmol/L    Anion Gap 8 7 - 15 mmol/L    Urea Nitrogen 22.4 8.0 - 23.0 mg/dL    Creatinine 0.73 0.51 - 0.95 mg/dL    GFR Estimate >90 >60 mL/min/1.73m2    Calcium 8.5 (L) 8.8 - 10.2 mg/dL    Glucose 135 (H) 70 - 99 mg/dL       Medications   sodium chloride 0.9% BOLUS 1,000 mL (has no administration in time range)   ondansetron (ZOFRAN) injection 4 mg (has no administration in time range)   CT Saline Flush 100mL (has no administration in time range)   iopamidol (ISOVUE-370) solution 500 mL (has no administration in time range)       Assessments & Plan (with Medical Decision Making)  63 old female.  Complex past medical history with previous known history of metastatic breast cancer recent prolonged hospitalization for complicated diverticulitis requiring near total colectomy end ileostomy.  Now presenting with abrupt onset of vertigo and vomiting.  Onset last evening around 6 PM when she was cooking.  Symptoms persistent.  Worse with movement.  Headache 4/10 intensity.  10 days ago started Eliquis.  Examination noted patient to have BP of 119/104.  She was afebrile.  Pulse = 103 and a sinus rhythm.  O2 sats of 98%.  Cognition was at baseline.  No focal neurologic deficit.  She did display some  nystagmus right lateral gaze that had some latency with fatigability.  This could suggest either a right central etiology or a left ear issue such as BPPV.  We did not have MRI curability's and will not until next Tuesday.  We did complete a head CT, CTA of head and neck with no findings concerning for her vertigo.  She did respond favorably to meclizine and was up walking in the ED without any further complaint of vertigo, nausea, vomiting.  Most likely is represents a peripheral cause such as BPPV.  I did discuss the merits of transfer for MRI and patient deferred.  I did recommend that if her symptoms or not improving in the next week that she contact her primary doctor to discuss brain MRI.  Referral was placed to physical therapy for vestibular evaluation to see if she would benefit from a canalith repositioning maneuvers.  Discharged home on meclizine.       I have reviewed the nursing notes.    I have reviewed the findings, diagnosis, plan and need for follow up with the patient.        New Prescriptions    No medications on file       Final diagnoses:   Vertigo       12/23/2023   [facility] EMERGENCY DEPT       Rod Villa, DO  12/23/23 1951

## 2023-12-23 NOTE — ED TRIAGE NOTES
Patient had a colon resection about a  month ago. Last night she started with nausea and dizziness.      Triage Assessment (Adult)       Row Name 12/23/23 8780          Triage Assessment    Airway WDL WDL        Respiratory WDL    Respiratory WDL WDL        Skin Circulation/Temperature WDL    Skin Circulation/Temperature WDL WDL        Cardiac WDL    Cardiac WDL WDL        Peripheral/Neurovascular WDL    Peripheral Neurovascular WDL WDL        Cognitive/Neuro/Behavioral WDL    Cognitive/Neuro/Behavioral WDL WDL

## 2023-12-23 NOTE — ED NOTES
Bed: ED01  Expected date:   Expected time:   Means of arrival:   Comments:  [facility]  63 y female  Dizziness

## 2023-12-24 NOTE — DISCHARGE INSTRUCTIONS
Medical workup to help distinguish between peripheral and central vertigo was completed.  Brain imaging with CT and CT angiography did not show any complications from your blood thinner such as bleeding.  There was no narrowing in the cerebral blood vessels or the carotid or vertebral blood vessels that carry blood from the heart to the brain.  Without an MRI there is no way to completely exclude the possibility of a very small stroke that could cause vertigo with nausea and vomiting.  If you are dizziness/vertigo continues you could discuss with your primary doctor arranging as an outpatient for brain MRI.  Clinically your neurological examination was more suggestive for a left ear problem.  This is commonly referred to as benign paroxysmal positional vertigo (BPPV).  Recommend starting meclizine 25 mg 3 times daily.  Stay well-hydrated.  When you change positions you must change positions very slowly to help avoid 3 activation of your vertigo.  Through the therapy department there is a protocol that they can try to reset the inner ear so that you have less occurrence for vertigo.  This is known as canalith repositioning maneuvers.  A referral was placed to physical therapy.  If it is not needed he can cancel he probably should be contacting you next week.

## 2023-12-28 ENCOUNTER — TELEPHONE (OUTPATIENT)
Dept: GASTROENTEROLOGY | Facility: CLINIC | Age: 63
End: 2023-12-28
Payer: COMMERCIAL

## 2023-12-28 ENCOUNTER — OFFICE VISIT (OUTPATIENT)
Dept: HEMATOLOGY | Facility: CLINIC | Age: 63
End: 2023-12-28
Attending: FAMILY MEDICINE
Payer: COMMERCIAL

## 2023-12-28 VITALS
WEIGHT: 212.3 LBS | TEMPERATURE: 97.8 F | HEART RATE: 80 BPM | DIASTOLIC BLOOD PRESSURE: 78 MMHG | SYSTOLIC BLOOD PRESSURE: 112 MMHG | OXYGEN SATURATION: 99 % | HEIGHT: 65 IN | BODY MASS INDEX: 35.37 KG/M2

## 2023-12-28 DIAGNOSIS — I81 DEEP VEIN THROMBOSIS OF PORTAL VEIN: ICD-10-CM

## 2023-12-28 DIAGNOSIS — K55.069 INFERIOR MESENTERIC VEIN THROMBOSIS (H): ICD-10-CM

## 2023-12-28 PROCEDURE — G0463 HOSPITAL OUTPT CLINIC VISIT: HCPCS | Performed by: INTERNAL MEDICINE

## 2023-12-28 PROCEDURE — 99203 OFFICE O/P NEW LOW 30 MIN: CPT | Performed by: INTERNAL MEDICINE

## 2023-12-28 NOTE — TELEPHONE ENCOUNTER
----- Message from Bairon Chau RN sent at 12/28/2023  9:35 AM CST -----  Regarding: cancel  Please call pt to cancel procedure per Dr Trejo. With her recent history of a colectomy and being followed by surgery she does not need this colonoscopy done.

## 2023-12-28 NOTE — PROGRESS NOTES
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  She will complete a total of 3 months of anticoagulation for this and she does not need any further anticoagulation for secondary prophylaxis given this provoked abdominal thrombosis.    In clinic today she also reports that she was diagnosed with vertigo and is still having some persistent symptoms.  She is very concerned about the risk of falling so I encouraged her to hold further anticoagulation for several days until her vertigo resolves.  I explained that the risk of embolism or other serious thrombotic complications is very low.  The liver is protecting her from embolism to the heart lungs and brain.    Recommendations:  I counseled the patient about my assessment of causes and treatment for mesenteric vein thrombosis as noted above  Continue anticoagulation with Eliquis for a total of 3 months, ending in mid 2024  She will hold Eliquis for the next several days until her vertigo resolves.  Her primary concern today was about challenges she has been having with her ostomy bag.  This has been leaking and causing skin irritation.  We encouraged her to reach out to her wound care and ostomy care team for further assistance with care of the ostomy.    30 minutes spent by me on the date of the encounter doing chart review, review of test results, interpretation of tests, patient visit, and documentation     Abbe Robles MD   of Medicine, Division of Hematology, Oncology and Transplantation  [facility]  School     -------------------------------  History: Mariluz Stoner is a 63 year old woman with a history of metastatic breast cancer currently controlled with hormone therapy alone and managed elsewhere who recently underwent a colectomy procedure for refractory diverticulitis.  This was going on for at least 1 month and appeared to be refractory to medical therapy which ultimately resulted in colectomy.  She returned to her primary care doctor's office for follow-up after the surgery and a CT scan was performed.  This CT scan showed development of thrombosis within the inferior mesenteric vein.  She was started on Eliquis for treatment of this.  She reports no bleeding symptoms since starting it.  She reports no new symptoms of leg swelling or chest pain.  She is complaining of some vertigo symptoms today in clinic and was recently diagnosed with benign vertigo.  She is worried about the risk of falls especially while on a blood thinner.    Current Outpatient Medications   Medication    acetaminophen (TYLENOL) 500 MG tablet    albuterol (PROAIR HFA/PROVENTIL HFA/VENTOLIN HFA) 108 (90 Base) MCG/ACT inhaler    anastrozole (ARIMIDEX) 1 MG tablet    apixaban ANTICOAGULANT (ELIQUIS) 5 MG tablet    Apixaban Starter Pack (ELIQUIS DVT/PE STARTER PACK) 5 MG TBPK    calcium carbonate 600 mg-vitamin D 400 units (CALTRATE) 600-400 MG-UNIT per tablet    carvedilol (COREG) 3.125 MG tablet    cefdinir (OMNICEF) 300 MG capsule    Ergocalciferol 50 MCG (2000 UT) TABS    lisinopril (ZESTRIL) 10 MG tablet    medical cannabis (Patient's own supply)    melatonin 1 MG TABS tablet    metroNIDAZOLE (FLAGYL) 500 MG tablet    oxyCODONE (ROXICODONE) 5 MG tablet    PARoxetine (PAXIL) 20 MG tablet    traMADol (ULTRAM) 50 MG tablet    Vitamin D3 (VITAMIN D-1000 MAX ST) 25 mcg (1000 units) tablet     No current facility-administered medications for this visit.       Physical Exam:  Vitals: B/P: 112/78, T: 97.8, P: 80, R: Data Unavailable, Wt:  212 lbs 4.8 oz Body mass index is 35.33 kg/m .   Exam:   Gen: Appears well but fatigued, no distress  HEENT: no scleral icterus or hemorrhage, no wet purpura, no lymphadenopathy  Ext: no edema  Skin: no ecchymoses or hematomas.  She is struggling with skin inflammation around the site of her colostomy.  She is having difficulty with maintaining a good seal but I see no evidence of acute infection there.  Neuro: no focal deficits, affect and cognition are normal    Labs:  None    Imaging:  CT scan from December 13, 2023:  IMPRESSION:   1.  Interval development of a small amount of thrombus within the  inferior mesenteric vein extending into the splenic and main portal  vein. This is nonocclusive within the splenic vein and portal vein. A  small portion of the clot appears to have embolized into an  intrahepatic right portal vein where it appears predominantly  nonocclusive. While this may be partly due to an interval colectomy,  recommend correlation with any evidence of a hypercoagulable state or  septic thrombophlebitis.  2.  Interval subtotal colectomy with residual rectosigmoid stump and  ileostomy. No evidence of obstruction, abscess, or leak.  3.  Edema and inflammation along the recent midline abdominal wall  incision without evidence of significant fluid collection.  4.  Edema and/or inflammatory stranding within the omental adipose  tissues without discrete fluid collection.

## 2023-12-28 NOTE — TELEPHONE ENCOUNTER
Caller: Writer to patient.   Reason for Reschedule/Cancellation (please be detailed, any staff messages or encounters to note?): Not needed per Dr. Trejo.       Prior to reschedule please review:  Ordering Provider: Jean Dash, DO   Sedation per order: Moderate  Does patient have any ASC Exclusions, please identify?: Y - [id number]      Notes on Cancelled Procedure:  Procedure: Lower Endoscopy [Colonoscopy]   Date: 1/4/2024  Location: [facility]; [address] [place] [address] , [place] [address]  Surgeon: Neil      Rescheduled: No.

## 2024-01-02 ENCOUNTER — HOSPITAL ENCOUNTER (OUTPATIENT)
Dept: WOUND CARE | Facility: CLINIC | Age: 64
Discharge: HOME | End: 2024-01-02
Attending: FAMILY MEDICINE | Admitting: FAMILY MEDICINE
Payer: COMMERCIAL

## 2024-01-02 PROCEDURE — G0463 HOSPITAL OUTPT CLINIC VISIT: HCPCS

## 2024-01-02 NOTE — PROGRESS NOTES
"[facility]ound and Ostomy Clinic [facility]    Start of Care in [facility]ound Clinic: 12/12/2023   Referring Doctor: Guy Brink MD   Primary Care Provider: Guy Brink   Wound Location:  Abdominal   Type of Ostomy/Surgery Date/Surgeon:  Subtotal colectomy with ileostomy 11/18/23 completed by Perry St MD       Wound/Ostomy Clinic Visit:  2nd return visit to wound and Ostomy Clinic    Reason for Visit:  re assessment of Ostomy and Wound        Subjective:  Patient arrives to independently on 1/2/24 and reports:  Was seen in ER for vertigo and nausea, recommend she take dramamine.  Vertigo has improved some, but continues to struggle with dizzy spells and nausea. Ostomy very poor wear time 1-2 days, continues to have leaking issues to the left lower corner. Started to use 2\" tape which has been helpful. Started taking imodium every other day which caused stools to have some small formed out put.        HPI/Pertinent information from chart review:   Patient reports she started noting her stomach to be bloated and rock hard this past spring when on vacation with her granddaughter, 2023.  Started to have abdominal pain in September of 2023.  [facility] for diverticulitis, was discharged on oral antibiotics. Presented back to [facility] ER on 10/16/23 for worsening abdominal pain.  Repeat CT scan was completed, she was noted to have slightly worsening diverticulitis, was given further oral antibiotics once again and sent home.  Patient had ongoing, worsening abdominal pain, antibiotics were not helpful so she presents to [facility] on 11/9/23,  she eventually underwent Subtotal colectomy with ileostomy by Perry St MD  due to recurrent sigmoid diverticulitis and sigmoid obstruction.  After discharge from [facility] she followed up with Primary, Dr. Brink on 12/7/23 who had concerns of infection " to her surgical wound, she was prescribed oral antibiotics and given orders for wound culture, CT scan, and Wound and ostomy referral.  Patient established care at [facility] wound clinic on 23.      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     OSTOMY EXAM    Pouch wear time: every day    Current pouching system: [employer] 1 piece # 8331, cut to fit. Lock and roll Closure     Who changes the pouch? patient     Participants in cares today: no    Any limitations (dexterity, vision, hearing)? None noted or verbalized      Location: Right Lower Quadrant   Color/Moisture: Red, moist  Viable: Yes  Size: 28 cm X 33 cm  Shape: Round   MCJ: Intact, sutures have dissolved.  Os: Center and outward direction.   Protrusion: 2 cm  Peristomal skin: Denuded breakdown at 7-11 o clock and again from 1-4 o clock. At 5 o clock mucinous bud.  Output: Liquid, loose stools with smaller formed  Abdominal profile/plane: sits on protruding abdomen, moderate crease at 9 o clock and small crease at 3 o clock when sitting forward   Photos from today's visit 23   Photos from 23   Photos from initial assessment  12/12/23    Wound #1 Abdominal; Surgical Wound   Stage/tissue depth: Full thickness  0.3 cm L x 0.3 cm W x 0.2 cm D  Tunneling: at 12 o' clock 1.5 cm  Undermining: none  Wound bed type/amount: unable to visualize; not fluctuant  Wound Edges: open  Periwound: Blanchable erythema, non warm.  Drainage: none  Odor: none  Pain: Denies   Photo from today's visit 1/2/23   Photos from 12/19/23   Photos from initial assessment 12/12/23   Photos from MD appointment on 12/7/23    Discussion/Education:  plan of care with rationale;  Patient is able to perform cares/has been caring for wound    Assessment:  Stoma- Appliance in place with tape on arrival. Notable stool leaking at 3-4 o clock.  When removed Appliance noted erosion and swelling to barring ring at 3 and 4 o clock.  Didn't know erosion or swelling to barrier ring at 9 o clock.  Has some denuded break down to peristomal skin.  Sutures have dissolved and MCJ remains intact.     Abdominal wound- decreased tunnel length and depth of wound has decreased.  Remainder of incision remains scarred over. Periwound is intact and free of erythema.  Exposed suture tip to superior stacy wound, approximately 1 cm above. No signs of infection.        Plan of Care:  Ostomy- would like to try 1 piece convex appliance to help decrease leaking at the abdominal creases.  Patient will follow up later this week to assess wear time has improved with new appliance.       Ostomy: Change Ostomy 2x/week and as needed  Supplies being used: # 8547 One-Piece Drainable Ostomy Pouch - Soft Convex Flexten Barrier, Viewing Option, Lock 'n Roll  Microseal Closure, Tape, Filter 1 [place] #7868.    Remove appliance  Clean stoma and skin around stoma with paper towel dampen with warm water  Dry thoroughly  Cut opening of appliance a tad bigger than the 32 cm line  Apply barrier ring to ostomy appliance  Apply ostomy appliance  Hold hand over appliance for 3-5 minutes    Wound: No changes to plan of care,  wound is showing improvements with Triad      Cares Performed:    Pouch change Yes    Topical care: Wound/surrounding skin cleansed with Normal saline and gauze. Patted dry. Apply Triad and secure with primapore (composite dressing). Patient will continue this care daily and as needed.       The following discharge instructions were reviewed with and sent home with the patient:  Declined    The following supplies were sent home with the patient:  few extra ostomy supplies #[id number]    Return visit: 1/5/24    Verbal, written, & demonstrative education provided.  Face to face time: approximately 40 minutes  Procedure: 0 min    Marla Carreon RN [facility]  [phone]

## 2024-01-04 DIAGNOSIS — I10 ESSENTIAL HYPERTENSION: ICD-10-CM

## 2024-01-04 RX ORDER — LISINOPRIL 10 MG/1
10 TABLET ORAL DAILY
Qty: 30 TABLET | Refills: 0 | Status: SHIPPED | OUTPATIENT
Start: 2024-01-04 | End: 2024-01-19

## 2024-01-04 RX ORDER — CARVEDILOL 3.12 MG/1
3.12 TABLET ORAL 2 TIMES DAILY WITH MEALS
Qty: 60 TABLET | Refills: 0 | Status: SHIPPED | OUTPATIENT
Start: 2024-01-04 | End: 2024-01-25

## 2024-01-05 ENCOUNTER — HOSPITAL ENCOUNTER (OUTPATIENT)
Dept: WOUND CARE | Facility: CLINIC | Age: 64
Discharge: HOME | End: 2024-01-05
Attending: FAMILY MEDICINE | Admitting: FAMILY MEDICINE
Payer: COMMERCIAL

## 2024-01-05 PROCEDURE — G0463 HOSPITAL OUTPT CLINIC VISIT: HCPCS

## 2024-01-05 NOTE — PROGRESS NOTES
[facility] Wound and Ostomy Clinic [facility]    Start of Care in [facility] Wound Clinic: 12/12/2023   Referring Doctor: Guy Brink MD   Primary Care Provider: Guy Brink   Wound Location:  Abdominal   Type of Ostomy/Surgery Date/Surgeon:  Subtotal colectomy with ileostomy 11/18/23 completed by Perry St MD       Wound/Ostomy Clinic Visit:  3rd return visit to wound and Ostomy Clinic    Reason for Visit:  re assessment of Ostomy and Wound        Subjective:  Patient arrives to independently on 1/5/24 and reports:  Appliance held up until last evening, but then it started to leak around 7-8 o clock. Patient very happy that the appliance stayed in place was able to sleep better and was not so anxious about it. Increased immoidum to 2x/day, which has helped stool have some form. Dizziness has improved, but met with Hematologist and had to start back on Eliquis which she is very worried will make the dizziness return.  She is seeing Therapy next week to have crystals in ears evaluated.        HPI/Pertinent information from chart review:   Patient reports she started noting her stomach to be bloated and rock hard this past spring when on vacation with her granddaughter, 2023.  Started to have abdominal pain in September of 2023.  Was admitted to [facility] from 9/29/23-10/6/23 for diverticulitis, was discharged on oral antibiotics. Presented back to [facility] ER on 10/16/23 for worsening abdominal pain.  Repeat CT scan was completed, she was noted to have slightly worsening diverticulitis, was given further oral antibiotics once again and sent home.  Patient had ongoing, worsening abdominal pain, antibiotics were not helpful so she presents to [facility] on 11/9/23,  she eventually underwent Subtotal colectomy with ileostomy by Perry St MD  due to recurrent sigmoid diverticulitis and sigmoid obstruction.  After discharge from [facility]  she followed up with Primary, Dr. Brink on 23 who had concerns of infection to her surgical wound, she was prescribed oral antibiotics and given orders for wound culture, CT scan, and Wound and ostomy referral.  Patient established care at [facility] wound clinic on 23.      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     OSTOMY EXAM    Pouch wear time: 2.5 days    Current pouching system: [employer] 1 piece # 8578, cut to fit. Lock and roll Closure     Who changes the pouch? patient     Participants in cares today: Not changed today    Any limitations (dexterity, vision, hearing)?     Not assessed today 24, patient changed appliance last evening prior to going to bed. Assessment below is from 24     Location: Right Lower Quadrant   Color/Moisture: Red, moist  Viable: Yes  Size: 28 cm X 33 cm  Shape: Round   MCJ: Intact, sutures have dissolved.  Os: Center and outward direction.   Protrusion: 2 cm  Peristomal skin: Denuded breakdown at 7-11 o clock and again from 1-4 o clock. At 5 o clock mucinous bud.  Output: Liquid, loose stools with smaller formed  Abdominal profile/plane: sits  on protruding abdomen, moderate crease at 9 o clock and small crease at 3 o clock when sitting forward   Photos from 1/2/23   Photos from 12/19/23   Photos from initial assessment 12/12/23    Wound #1 Abdominal; Surgical Wound   Stage/tissue depth: Full thickness  0.3 cm L x 0.2 cm W x 0.2 cm D  Tunneling: at 12 o' clock 1.5 cm  Undermining: none  Wound bed type/amount: 100% red non granular; not fluctuant  Wound Edges: open  Periwound: scarred incision inferior and superior   Drainage: none  Odor: none  Pain: Denies  No photos taken today, WOC error 1/5/24   Photo from 1/2/23   Photos from 12/19/23   Photos from initial assessment 12/12/23   Photos from MD appointment on 12/7/23    Discussion/Education:  plan of care with rationale;  Patient is able to perform cares/has been caring for wound    Assessment:  Stoma- not assessed today, patient changed appliance last evening.  Patient felt the wear time had improved and stacy stomal was not broken down. Patient request to leave appliance on until next change.     Abdominal wound- Very similar as previous visit, slight decrease in width.  No drainage, suture remains visible. No signs of infection.     Plan of Care:  Ostomy- continue with trying 1 piece convex appliance will follow up next Friday for re assessment of wear time. Patient is not to order any more supplies, until correct appliance is identified.     Ostomy: Change Ostomy 2x/week and as needed  Supplies being used: # 8517 One-Piece Drainable Ostomy Pouch - Soft Convex Flexten Barrier, Viewing Option, Lock 'n Roll  Microseal Closure, Tape, Filter 1 [place] #1865.    Remove appliance  Clean stoma and skin around stoma with paper towel dampen with warm water  Dry thoroughly  Cut opening of appliance a tad bigger than the 32 cm line  Apply barrier ring to ostomy appliance  Apply ostomy appliance  Hold hand over appliance for 3-5 minutes    Wound: No changes to plan of care.      Cares Performed:    Pouch change  No    Topical care: Wound/surrounding skin cleansed with Normal saline and gauze. Patted dry. Apply Triad and secure with primapore (composite dressing). Patient will continue this care daily and as needed.       The following discharge instructions were reviewed with and sent home with the patient:  Declined    The following supplies were sent home with the patient:  few extra ostomy supplies #9091    Return visit: 1/12/24    Verbal, written, & demonstrative education provided.  Face to face time: approximately 20 minutes  Procedure: 0 min    Marla Carreon RN [facility]  [phone]

## 2024-01-06 NOTE — PROGRESS NOTES
Note patient arrived to appointment very dizzy and nauseas.  She need to lay down in exam chair as sitting caused the spell to be worse. Vitals at beginning of visit were as followed : 81/55-b/p  84-P 96.9-T and 100% RA-O.  Provided water and nurse did all cares at time of visit.  Patients dizzy spell continues for the length of the visit, did eventual have emesis x 1, approximately 300 ml of clear fluid. After Emesis patient reports feeling better and the dizzy spell had nearly resolved. Vitals at end of visit as followed : 114/73-B/p, 75-P, 97.1-T and 99% on RA-o. Educated patient that  should drive home, have blan diet (toast, crackers, broth), and push fluids for the next 24-48 hours. Patient agreed to plan of care.

## 2024-01-11 ENCOUNTER — THERAPY VISIT (OUTPATIENT)
Dept: PHYSICAL THERAPY | Facility: CLINIC | Age: 64
End: 2024-01-11
Attending: EMERGENCY MEDICINE
Payer: COMMERCIAL

## 2024-01-11 DIAGNOSIS — R42 VERTIGO: ICD-10-CM

## 2024-01-11 DIAGNOSIS — H81.10 BPPV (BENIGN PAROXYSMAL POSITIONAL VERTIGO): Primary | ICD-10-CM

## 2024-01-11 PROCEDURE — 95992 CANALITH REPOSITIONING PROC: CPT | Mod: GP | Performed by: PHYSICAL THERAPIST

## 2024-01-11 PROCEDURE — 97530 THERAPEUTIC ACTIVITIES: CPT | Mod: GP,59 | Performed by: PHYSICAL THERAPIST

## 2024-01-11 PROCEDURE — 97161 PT EVAL LOW COMPLEX 20 MIN: CPT | Mod: GP | Performed by: PHYSICAL THERAPIST

## 2024-01-11 NOTE — PROGRESS NOTES
PHYSICAL THERAPY EVALUATION  Type of Visit: Evaluation    See electronic medical record for Abuse and Falls Screening details.    Subjective       Presenting condition or subjective complaint: vertigo.  Gabriela went to ED 12/23/23 due to 2 kinds of vertigo--a spinning--the room spinnign around her and then changed to  board ype graphic of a tornado type spinning.  Then every time she laid down she felt it again. Now that is has been a couple weeks, she gets it when she first lays down and when she gets up, worse peraza ly down-every time.  If sits up slowly it doesn't always happen.  She thought it was reaction to blood thinner (as it was noted it could be.)   She had some intestine removed with colostomy bag and had MRI--saw some lood clots and was put on blood thinner.  General surgeon vs primary disagree on taking the thinners, but oncologist says to take them too for 3 months.  Also notes looking at cell phone for a while (10 min) makes eyes tired and she starts to get dizzy.  Colostomy is actually ileostomy bag.   Date of onset: 12/23/23    Relevant medical history: Cancer; Depression; Overweight; Radiation treatment   Dates & types of surgery: Nov 2023 bowel removal    Prior diagnostic imaging/testing results:       Prior therapy history for the same diagnosis, illness or injury: No      Prior Level of Function  Transfers: Independent  Ambulation: Independent  ADL: Independent  IADL: Driving, Finances, Housekeeping, Laundry, Meal preparation    Living Environment  Social support: With a significant other or spouse   Type of home: House   Stairs to enter the home: Yes 3     Ramp: No   Stairs inside the home: No       Help at home: None  Equipment owned:       Employment: No    Hobbies/Interests: hobby farm, [employer]    Patient goals for therapy: lay down and get up without room spinning    Pain assessment: Pain denied     Objective      Cognitive Status Examination  Orientation: Oriented to person, place and  time   Level of Consciousness: Alert  Follows Commands and Answers Questions: 100% of the time  Personal Safety and Judgement: Intact  Memory: Intact    OBSERVATION: comes in alone, unaided  INTEGUMENTARY: Intact  POSTURE: WFL  PALPATION:   RANGE OF MOTION: LE ROM WNL  LE ROM WFL  UE ROM WNL  UE ROM WFL  STRENGTH: LE Strength WNL  LE Strength WFL  UE Strength WNL  UE Strength WFL    BED MOBILITY: WNL    TRANSFERS: WNL    WHEELCHAIR MOBILITY: NA    GAIT:   Level of [place]: WFL  Assistive Device(s): None  Gait Deviations: WFL  Gait Distance:   Stairs:     BALANCE: WFL        SENSATION: UE Sensation WNL, LE Sensation WNL    REFLEXES:   COORDINATION:   MUSCLE TONE: WFL       VESTIBULAR EVALUATION  ADDITIONAL HISTORY:  Description of symptoms:    Dizzy attacks:   Start:     Last attack:     Frequency of occurrences:     Length of attack:    Difficulty hearing:    Noise in ears?      Alleviates symptoms:    Worsens symptoms:    Activities that bring on symptoms:         Pertinent visual history:   Pertinent history of current vestibular problem:   DHI: Total Score: 24    Cervicogenic Screen    Neck ROM Normal   Vertebral Artery Test    Alar Ligament Test Normal   Transverse Ligament Test Normal   Distraction    Neck Torsion Test (head still, body rotating)    Neck Torsion Test (head and body rotating)         Oculomotor Screen    Ocular ROM Normal   Smooth Pursuit Normal   Saccades Normal   VOR Normal   VOR Cancellation Normal   Head Impulse Test Normal   Convergence Testing Normal        Infrared Goggle Exam Vestibular Suppressant in Last 24 Hours? No  Exam Completed With: Infrared goggles   Spontaneous Nystagmus Negative   Gaze Evoked Nystagmus Negative   Head Shake Horizontal Nystagmus    Positional Testing    Supine Head-Hanging Test Negative    Left Right   Catrina-Hallpike Upbeating L torsional Negative   Sidelying Test     [facility] Roll [facility]     [facility] Forward Roll Test     [place] and Lean Test -  Sitting Erect     [place] and Lean Test - Seated, Head Bent 60 Degrees Forward    [place] and Lean Test - Seated, Head Bent Backwards       BPPV Canal(s): L Posterior  BPPV Type: Canalithasis    Dynamic Visual Acuity (DVA)    Static Acuity (LogMar)    Horizontal Head Movement at 1 Hz (LogMar)    Horizontal Head Movement at 2 Hz (LogMar)           Assessment & Plan   CLINICAL IMPRESSIONS  Medical Diagnosis: vertigo    Treatment Diagnosis: vertigo (L posterior canal BPPV)   Impression/Assessment: Patient is a 63 year old female with vertigo complaints.  The following significant findings have been identified: Dizziness. These impairments interfere with their ability to perform self care tasks, recreational activities, and household chores as compared to previous level of function.     Clinical Decision Making (Complexity):  Clinical Presentation: Stable/Uncomplicated  Clinical Presentation Rationale: based on medical and personal factors listed in PT evaluation  Clinical Decision Making (Complexity): Low complexity    PLAN OF CARE  Treatment Interventions:  Interventions: Therapeutic Activity, Canalith Repositioning    Long Term Goals     PT Goal 1  Goal Identifier: vertigo resolved  Goal Description: Mariluz will report vertigo is controlled to be able to ly down in bed at night without spinning dizziness.  Rationale: to maximize safety and independence with self cares  Target Date: 02/09/24      Frequency of Treatment: 2 times  Duration of Treatment: 4 weeks    Recommended Referrals to Other Professionals:   Education Assessment:   Learner/Method: Patient;Listening;Demonstration;Pictures/Video;No Barriers to Learning  Education Comments: plan, treatment    Risks and benefits of evaluation/treatment have been explained.   Patient/Family/caregiver agrees with Plan of Care.     Evaluation Time:     PT Eval, Low Complexity Minutes (94397): 35       Signing Clinician: Randi Rodriguez PT      [facility]                                                                                    OUTPATIENT PHYSICAL THERAPY      PLAN OF TREATMENT FOR OUTPATIENT REHABILITATION   Patient's Last Name, First Name, Mariluz Salazar YOB: 1960   Provider's Name   [facility]   Medical Record No.  [id number]     Onset Date: 12/23/23  Start of Care Date: 01/11/24     Medical Diagnosis:  vertigo      PT Treatment Diagnosis:  vertigo (L posterior canal BPPV) Plan of Treatment  Frequency/Duration: 2 times/ 4 weeks    Certification date from 01/11/24 to 02/09/24         See note for plan of treatment details and functional goals     Randi Rodriguez PT                         I CERTIFY THE NEED FOR THESE SERVICES FURNISHED UNDER        THIS PLAN OF TREATMENT AND WHILE UNDER MY CARE     (Physician attestation of this document indicates review and certification of the therapy plan).              Referring Provider:  Rod Villa DO.  Primary provider: Guy Brink MD     Initial Assessment  See Epic Evaluation- Start of Care Date: 01/11/24

## 2024-01-12 ENCOUNTER — HOSPITAL ENCOUNTER (OUTPATIENT)
Dept: WOUND CARE | Facility: CLINIC | Age: 64
Discharge: HOME | End: 2024-01-12
Attending: FAMILY MEDICINE | Admitting: FAMILY MEDICINE
Payer: COMMERCIAL

## 2024-01-12 PROCEDURE — G0463 HOSPITAL OUTPT CLINIC VISIT: HCPCS

## 2024-01-12 NOTE — PROGRESS NOTES
[facility]    Start of Care in [facility]: 12/12/2023   Referring Doctor: Guy Brink MD   Primary Care Provider: Guy Brink   Wound Location:  Abdominal   Type of Ostomy/Surgery Date/Surgeon:  Subtotal colectomy with ileostomy 11/18/23 completed by Perry St MD       Wound/Ostomy Clinic Visit:  5th return visit to wound and Ostomy Clinic    Reason for Visit:  re assessment of Ostomy and Wound        Subjective:  Patient arrives to independently on 1/12/24 and reports:  She's very happy current appliance she has on stayed in place for 4 days with no leaks.  States she added tape to the edges because the tape edges were lifting, but not leaking.  Appliance from last Friday stayed on until Sunday, patient changed appliance due to leakage. Her new appliance on Sunday only stayed on for few hours, then had to change, but the 2nd appliance she placed Sunday is the one she's currently wearing.  Stoma Output has gone from water to semi formed, with use of antidiarrheal 2x/day. Wound has healed, but tip of suture remains.   Plans to go to [place] for 5 days, leaves 1/27/24.  Only Received one shipment from [employer].         HPI/Pertinent information from chart review:   Patient reports she started noting her stomach to be bloated and rock hard this past spring when on vacation with her granddaughter, 2023.  Started to have abdominal pain in September of 2023.  Was admitted to [facility] from 9/29/23-10/6/23 for diverticulitis, was discharged on oral antibiotics. Presented back to [facility] ER on 10/16/23 for worsening abdominal pain.  Repeat CT scan was completed, she was noted to have slightly worsening diverticulitis, was given further oral antibiotics once again and sent home.  Patient had ongoing, worsening abdominal pain, antibiotics were not helpful so she presents to [facility] on 11/9/23,  she  eventually underwent Subtotal colectomy with ileostomy by Perry St MD  due to recurrent sigmoid diverticulitis and sigmoid obstruction.  After discharge from [facility] she followed up with Primary, Dr. Brink on 23 who had concerns of infection to her surgical wound, she was prescribed oral antibiotics and given orders for wound culture, CT scan, and Wound and ostomy referral.  Patient established care at [facility] wound clinic on 23.      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     OSTOMY EXAM    Pouch wear time: 2-4 days    Current pouching system: Tiffanie 1 piece # 8578, cut to fit. Lock and roll Closure with #8805 ring     Who changes the pouch? patient     Participants in cares today: Observed WOC nurse     Any limitations (dexterity, vision, hearing)?     Location: Right Lower Quadrant   Color/Moisture: Red, moist  Viable: Yes  Size: 25 cm X 35 cm  Shape: slight oval  MCJ: Intact  Os: Center and outward direction.   Protrusion: 2 cm  Peristomal skin: Denuded breakdown at 1-9 o' clock . At 5 o clock mucinous bud.  Output: Liquid to semi mush  Abdominal  profile/plane: sits on protruding abdomen, moderate crease at 9 o clock and small crease at 3 o clock when sitting/leaning forward  Photos from today's visit 24   Photos from 23   Photos from 23   Photos from initial assessment 23    Wound #1 Abdominal; Surgical Wound   Stage/tissue depth: Full thickness  0 cm L x 0 cm W x 0 cm D  New opening superior wound open where suture tip is located: 0.1 cm L x 0.4 cm W x 0.4 cm D   Tunnelin.5 cm at noon  Undermining: none  Wound bed type/amount: 100% red non granular; not fluctuant  Wound Edges: open  Periwound: scarred incision inferior and superior   Drainage: none  Odor: none  Pain: Denies       Photo from 23   Photos from 23   Photos from initial assessment 23   Photos from MD appointment on 23    Discussion/Education:  plan of care with rationale;  Patient is able to perform cares/has been caring for wound    Assessment:  Ostomy in place on arrival, has tape to all edges. No signs of leaking, but once started lifting appliance noted small leakage at 9 o clock.  Barrier ring had increase swelling/erosion from 8-9 as well.  No signs of leaking or erosion at the 3-o clock as previously noted.  Has some peristomal denudement, but not any worse the pervious visit.      Abdominal wound- Inferior wound was previously being treated, but today that is closed.  Unfortunately when palpating the tip of suture that remains, scar tissue opened causing small sanguineous drainage.      Plan of Care: Patient is leaving for [place] in a few weeks, offered visit next week but patient declined and said she would like to return to outpatient clinic after getting back from vacation.  Patient didn't want to return the 1st week of February due too many other prior arrangements.  Patient is incredibly happy with the wear time she is getting from the new ostomy appliance and eager to try the new barrier strip VS tape. Educated patient to call if she  had any other questions or concerns in the meantime.   Abdominal Wound- inferior has healed, but new wound superior opened, educated patient this area will not fully close until the surgeon removes the tip of suture. She sees the surgeon next week, she plans for him assess at that time.  Until then will cover with clean dressing and change when changing ostomy.     [facility] nurse called [employer] to help get supplies ordered, per [employer] representative supplies should be shipped within 1-2 days. Provided patient with [employer] number in case she needed to call regarding supplies.     Ostomy: Change Ostomy 2x/week and as needed  Supplies being used: # [id number] One-Piece Drainable Ostomy Pouch - Soft Convex Flexten Barrier, Viewing Option, Lock 'n Roll  Microseal Closure, Tape, Filter 1 [place] #[id number] and securing skin barrier edges with : [employer]oplast Elastic barrier #[id number]      Wound: Cover with Composite dressing (primapore used today), change when changing ostomy appliance. Follow up with surgeon to remove remaining stitch.       Cares Performed:    Pouch change No    Topical care: as above in plan      The following discharge instructions were reviewed with and sent home with the patient:  Declined    The following supplies were sent home with the patient:  few extra ostomy supplies #8578, Ostomy rings #8805 and few extra Elastic barrier strips.     Return visit: 2/9/24    Verbal, written, & demonstrative education provided.  Face to face time: approximately 50 minutes  Procedure: 0 min    Marla Carreon RN CWOCN  [phone]

## 2024-01-19 DIAGNOSIS — I10 ESSENTIAL HYPERTENSION: ICD-10-CM

## 2024-01-19 RX ORDER — CARVEDILOL 3.12 MG/1
3.12 TABLET ORAL 2 TIMES DAILY WITH MEALS
Qty: 60 TABLET | Refills: 0 | OUTPATIENT
Start: 2024-01-19

## 2024-01-19 RX ORDER — LISINOPRIL 10 MG/1
10 TABLET ORAL DAILY
Qty: 90 TABLET | Refills: 1 | Status: SHIPPED | OUTPATIENT
Start: 2024-01-19 | End: 2024-02-01

## 2024-01-25 DIAGNOSIS — I10 ESSENTIAL HYPERTENSION: ICD-10-CM

## 2024-01-25 RX ORDER — CARVEDILOL 3.12 MG/1
3.12 TABLET ORAL 2 TIMES DAILY WITH MEALS
Qty: 60 TABLET | Refills: 0 | Status: SHIPPED | OUTPATIENT
Start: 2024-01-25 | End: 2024-02-01

## 2024-02-01 ENCOUNTER — OFFICE VISIT (OUTPATIENT)
Dept: CARDIOLOGY | Facility: CLINIC | Age: 64
End: 2024-02-01
Attending: NURSE PRACTITIONER
Payer: COMMERCIAL

## 2024-02-01 VITALS
WEIGHT: 213 LBS | OXYGEN SATURATION: 97 % | HEIGHT: 65 IN | HEART RATE: 108 BPM | DIASTOLIC BLOOD PRESSURE: 62 MMHG | BODY MASS INDEX: 35.49 KG/M2 | SYSTOLIC BLOOD PRESSURE: 92 MMHG | RESPIRATION RATE: 20 BRPM

## 2024-02-01 DIAGNOSIS — I42.7 CARDIOMYOPATHY SECONDARY TO DRUG (H): ICD-10-CM

## 2024-02-01 PROCEDURE — 99204 OFFICE O/P NEW MOD 45 MIN: CPT | Performed by: INTERNAL MEDICINE

## 2024-02-01 RX ORDER — METOPROLOL SUCCINATE 50 MG/1
50 TABLET, EXTENDED RELEASE ORAL AT BEDTIME
Qty: 90 TABLET | Refills: 11 | Status: SHIPPED | OUTPATIENT
Start: 2024-02-01

## 2024-02-01 RX ORDER — SPIRONOLACTONE 50 MG/1
50 TABLET, FILM COATED ORAL DAILY
Qty: 90 TABLET | Refills: 11 | Status: SHIPPED | OUTPATIENT
Start: 2024-02-01 | End: 2024-03-28

## 2024-02-01 RX ORDER — SACUBITRIL AND VALSARTAN 24; 26 MG/1; MG/1
1 TABLET, FILM COATED ORAL 2 TIMES DAILY
Qty: 90 TABLET | Refills: 11 | Status: SHIPPED | OUTPATIENT
Start: 2024-02-01 | End: 2024-03-28

## 2024-02-01 ASSESSMENT — PAIN SCALES - GENERAL: PAINLEVEL: NO PAIN (0)

## 2024-02-01 NOTE — PROGRESS NOTES
HISTORY OF PRESENT ILLNESS:  Mariluz Stoner a 63 year old woman with metastatic breast cancer, diverticulitis) status post emergent laparotomy/subtotal colectomy/ileostomy), benign positional vertigo, hypertension and obesity was evaluated in consultation at the request of her primary care providers for chronic systolic heart failure/myopathy    The patient carries a diagnosis of drug related cardiomyopathy as a consequence of therapy for breast cancer since .  Her previous ejection fractions had been in the 40 to 45% range, but at the time of follow-up with her oncologist in 2023 a repeat echocardiogram was ordered for 2023.  His echocardiogram demonstrated marked decline in left ventricular systolic performance with ejection fraction estimated 25 to 30%.  Plans had been made for cardiology follow-up, but the patient then developed progressive symptomatic acute diverticulitis that failed to respond to medical therapy, and required subtotal colectomy with ileostomy in 2023.  She lost over 45 pounds during the course of this illness.  She developed benign positional vertigo in 2023 which responded to Epley maneuvers.  The patient has been on lisinopril and metoprolol, there is been no formal cardiac evaluation since prior to admit of her diverticulitis    The patient just returned from a brief trip to [place] to attend the  of one of her husbands friends.  She denies orthopnea or PND but confirms fatigue with activity.  She does not recall having undergone coronary angiography evaluation of her cardiomyopathy.  She denies chest arm neck jaw or back discomfort dyslipidemia, previous MI, family to premature coronary disease, diabetes mellitus or valvular heart disease.    PAST MEDICAL HISTORY  1) Metastatic Breast Cancer : DX 1013 left breast biopsy.  LN involved. Sp left mastectomy and LN dissection. Treated with radiation therapy and Herceptin followed by tamoxifen.  Bone metastases biopsy confirmed in thoracolumbar spine. Treated with stereotactic [id number]/Hercpetin/ perjeta, stopped due to development of cardiomyopathy. ( EF 56%-> 45% ) placed on Arimidex, No recurrence since then    2) Chemotherapy related cardiomyopathy: initial EF 56% prior to treatment. Fell from 45% to 27% on most recent TTE 2023. On lisinopril and carvedilol.    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 Currently on short course ( 3 months) apixaban                                                                                                                                Orders this Visit:  No orders of the defined types were placed in this encounter.    No orders of the defined types were placed in this encounter.    There are no discontinued medications.    Encounter Diagnosis   Name Primary?    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)        CURRENT MEDICATIONS:  Current Outpatient Medications   Medication Sig Dispense Refill    acetaminophen (TYLENOL) 500 MG tablet Take 1,000 mg by mouth every 6 hours as needed for mild pain      albuterol (PROAIR HFA/PROVENTIL HFA/VENTOLIN HFA) 108 (90 Base) MCG/ACT inhaler Inhale 2 puffs into the lungs every 4 hours as needed for shortness of breath or wheezing      anastrozole (ARIMIDEX) 1 MG tablet Take 1 tablet (1 mg) by mouth daily 90 tablet 1    apixaban ANTICOAGULANT (ELIQUIS) 5 MG tablet Take 1 tablet (5 mg) by mouth 2 times daily 120 tablet 0    calcium carbonate 600 mg-vitamin D 400 units (CALTRATE) 600-400 MG-UNIT per tablet Take 1 tablet by mouth      carvedilol (COREG) 3.125 MG tablet Take 1 tablet (3.125 mg) by mouth 2 times daily (with meals) 60 tablet 0    Ergocalciferol 50 MCG (2000 UT) TABS Take 2,000 Units by mouth daily      lisinopril (ZESTRIL) 10 MG tablet Take 1 tablet (10 mg) by mouth daily 90  "tablet 1    medical cannabis (Patient's own supply) See Admin Instructions (The purpose of this order is to document that the patient reports taking medical cannabis.  This is not a prescription, and is not used to certify that the patient has a qualifying medical condition.)      melatonin 1 MG TABS tablet Take 3 tablets (3 mg) by mouth nightly as needed for sleep      PARoxetine (PAXIL) 20 MG tablet TAKE 1 TABLET BY MOUTH ONCE DAILY .  APPOINTMENT  NEEDED  FOR  ADDITIONAL  REFILLS 90 tablet 1    Vitamin D3 (VITAMIN D-1000 MAX ST) 25 mcg (1000 units) tablet Take 25 mcg by mouth daily      cefdinir (OMNICEF) 300 MG capsule Take 1 capsule (300 mg) by mouth 2 times daily (Patient not taking: Reported on 2/1/2024) 10 capsule 0    metroNIDAZOLE (FLAGYL) 500 MG tablet Take 1 tablet (500 mg) by mouth 3 times daily (Patient not taking: Reported on 2/1/2024) 15 tablet 0    oxyCODONE (ROXICODONE) 5 MG tablet Take 1 tablet (5 mg) by mouth every 6 hours as needed for pain (Patient not taking: Reported on 2/1/2024) 10 tablet 0    traMADol (ULTRAM) 50 MG tablet Take 50 mg by mouth every 6 hours as needed for severe pain (Patient not taking: Reported on 2/1/2024)         ALLERGIES     Allergies   Allergen Reactions    Naproxen      Other reaction(s): Hematologic  Vaginal bleeding       PAST MEDICAL, SURGICAL, FAMILY, SOCIAL HISTORY:  History was reviewed and updated as needed, see medical record.    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     Review of Systems:  A 12-point review of systems was completed, see medical record for detailed review of systems information.    Physical Exam:  Vitals: BP 92/62 (BP Location: Right arm, Patient Position: Sitting, Cuff Size: Adult Large)   Pulse 108   Resp 20   Ht 1.651 m (5' 5\")   Wt 96.6 kg " (213 lb)   SpO2 97%   BMI 35.45 kg/m      Constitutional: No apparent distress    HEENT: pupils equal round reactive to light, thyroid normal size, JVP is normal    Chest: Clear to percussion and auscultation    Cardiac: Normal S1, normal S2 no S3, no murmur or click    Abdomen: OBese. Ostomy present. Cannot palpate internal organs.     Extremitiies: no edema.    Neurological:  Cranial nerves II through XII are intact, strength equal and symmetrical, displays normal insight and judgment        ASSESSMENT:   The patient carries an established diagnosis of chemotherapy related dilated cardiomyopathy, however there is been no formal evaluation for coronary artery disease.  Cardiac MRI with stress would be reasonable to exclude potentially reversible causes of deteriorating left ventricular systolic performance..  Should there be evidence of myocardial ischemia, we would consider coronary angiography.  If no significant ischemia is identified, we will continue with medical therapy potentially BiV ICD pacemaker placement the ejection fraction not improved satisfactorily.     In the meantime, as blood pressure permits, we will plan to titrate up guideline directed medical therapy for heart failure with reduced ejection fraction per ACC guidelines .  We will switch the patient from carvedilol to metoprolol XL which allow more convenient dosing and may cause less hypotension.  We will hold lisinopril for 3 days with plans to start valsartan/sacubitril ( entresto) and begin spironolactone 50 mg daily.  We will plan to add an SGL 2 transfer inhibitor at some point in the future and should her ejection fraction being less than 35%, on serial imaging, consider referral to EP service for BiV ICD.    RECOMMENDATIONS:   1) stop lisinopril for 3 days   2) begin entresto 1 bid  3) spironolactone 50 mg daily  4) stop carvedilol  5) begin metoprolol xl 50 mg daily   6) cardiac MRI with stress   7) follow up with [facility] nurses for  "titration of medications /BMP/ in one month  8) consider empagliflozin or dapagliflozin in future  9) may be a candidate for BIVAD ICD if LVEF fails to improve        Recent Lab Results:    ECG sinus with LBBB   TTE I personally reviewed the TTE  LVEF 25 to 30% diffuse hypokinesis. NO valve disease abnormal septal motion due to LBBB  LIPID RESULTS:  Lab Results   Component Value Date    CHOL 206 (H) 10/27/2023    CHOL 219 (A) 01/29/2018    HDL 41 (L) 10/27/2023    HDL 45 01/29/2018     (H) 10/27/2023     01/29/2018    TRIG 171 (H) 10/27/2023    TRIG 225 01/29/2018       LIVER ENZYME RESULTS:  Lab Results   Component Value Date    AST 29 12/23/2023    AST 17 11/14/2019    ALT 24 12/23/2023    ALT 16 11/14/2019       CBC RESULTS:  Lab Results   Component Value Date    WBC 15.2 (H) 12/23/2023    WBC 6.2 04/15/2019    RBC 4.01 12/23/2023    RBC 3.98 04/15/2019    HGB 12.2 12/23/2023    HGB 12.4 04/15/2019    HCT 37.7 12/23/2023    HCT 38.0 04/15/2019    MCV 94 12/23/2023    MCV 96 04/15/2019    MCH 30.4 12/23/2023    MCH 31.2 04/15/2019    MCHC 32.4 12/23/2023    MCHC 32.6 04/15/2019    RDW 14.6 12/23/2023    RDW 13.2 04/15/2019     12/23/2023     04/15/2019       BMP RESULTS:  Lab Results   Component Value Date     (L) 12/23/2023     04/15/2019    POTASSIUM 4.7 12/23/2023    POTASSIUM 4.0 11/14/2019    CHLORIDE 105 12/23/2023    CHLORIDE 106 04/15/2019    CO2 21 (L) 12/23/2023    CO2 26 04/15/2019    ANIONGAP 8 12/23/2023    ANIONGAP 8 04/15/2019     (H) 12/23/2023    GLC 85 10/01/2023     11/14/2019    BUN 22.4 12/23/2023    BUN 13 04/15/2019    CR 0.73 12/23/2023    CR 0.66 11/14/2019    GFRESTIMATED >90 12/23/2023    GFRESTIMATED >60 11/14/2019    GFRESTBLACK >60 04/19/2019    SINDY 8.5 (L) 12/23/2023    SINDY 8.5 04/15/2019        A1C RESULTS:  Lab Results   Component Value Date    A1C 5.3 01/29/2018       INR RESULTS:  No results found for: \"INR\"    We greatly " appreciate the opportunity to be involved in the care of your patient, Mariluz IVON Mcnealpie.    Sincerely,  Khoa Vazquez MD      CC  Taco Delacruz APRN CNP  [address] [place] [address]

## 2024-02-01 NOTE — LETTER
2/1/2024    Guy Brink MD  [address] [facility] [place] [address]    RE: Mariluz Stoner       Dear Colleague,     I had the pleasure of seeing Mariluz Stoner in the [facility].  HISTORY OF PRESENT ILLNESS:  Mariluz Stoner a 63 year old female with   Stable on imaging has spine mets  Had chemo and radiation  recovering      3 kids 2 gk  in [place]  awaiting kidney transplant on dialysis    Has goats and chickens had to let goats go. 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 Less vertigo  surgery to take down ostomy. 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         Orders this Visit:  No orders of the defined types were placed in this encounter.    No orders of the defined types were placed in this encounter.    There are no discontinued medications.    Encounter Diagnosis   Name Primary?    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)        CURRENT MEDICATIONS:  Current Outpatient Medications   Medication Sig Dispense Refill    acetaminophen (TYLENOL) 500 MG tablet Take 1,000 mg by mouth every 6 hours as needed for mild pain      albuterol (PROAIR HFA/PROVENTIL HFA/VENTOLIN HFA) 108 (90 Base) MCG/ACT inhaler Inhale 2 puffs into the lungs every 4 hours as needed for shortness of breath or wheezing      anastrozole (ARIMIDEX) 1 MG tablet Take 1 tablet (1 mg) by mouth daily 90 tablet 1    apixaban ANTICOAGULANT (ELIQUIS) 5 MG tablet Take 1 tablet (5 mg) by mouth 2 times daily 120 tablet 0    calcium carbonate 600 mg-vitamin D 400 units (CALTRATE) 600-400 MG-UNIT per tablet Take 1 tablet by mouth      carvedilol (COREG) 3.125 MG tablet Take 1 tablet (3.125 mg) by mouth 2 times daily (with meals) 60 tablet 0    Ergocalciferol 50 MCG (2000 UT) TABS Take 2,000 Units by mouth daily      lisinopril (ZESTRIL) 10 MG tablet Take 1 tablet (10 mg) by mouth daily  "90 tablet 1    medical cannabis (Patient's own supply) See Admin Instructions (The purpose of this order is to document that the patient reports taking medical cannabis.  This is not a prescription, and is not used to certify that the patient has a qualifying medical condition.)      melatonin 1 MG TABS tablet Take 3 tablets (3 mg) by mouth nightly as needed for sleep      PARoxetine (PAXIL) 20 MG tablet TAKE 1 TABLET BY MOUTH ONCE DAILY .  APPOINTMENT  NEEDED  FOR  ADDITIONAL  REFILLS 90 tablet 1    Vitamin D3 (VITAMIN D-1000 MAX ST) 25 mcg (1000 units) tablet Take 25 mcg by mouth daily      cefdinir (OMNICEF) 300 MG capsule Take 1 capsule (300 mg) by mouth 2 times daily (Patient not taking: Reported on 2/1/2024) 10 capsule 0    metroNIDAZOLE (FLAGYL) 500 MG tablet Take 1 tablet (500 mg) by mouth 3 times daily (Patient not taking: Reported on 2/1/2024) 15 tablet 0    oxyCODONE (ROXICODONE) 5 MG tablet Take 1 tablet (5 mg) by mouth every 6 hours as needed for pain (Patient not taking: Reported on 2/1/2024) 10 tablet 0    traMADol (ULTRAM) 50 MG tablet Take 50 mg by mouth every 6 hours as needed for severe pain (Patient not taking: Reported on 2/1/2024)         ALLERGIES     Allergies   Allergen Reactions    Naproxen      Other reaction(s): Hematologic  Vaginal bleeding       PAST MEDICAL, SURGICAL, FAMILY, SOCIAL HISTORY:  History was reviewed and updated as needed, see medical record.        Review of Systems:  A 12-point review of systems was completed, see medical record for detailed review of systems information.    Physical Exam:  Vitals: BP 92/62 (BP Location: Right arm, Patient Position: Sitting, Cuff Size: Adult Large)   Pulse 108   Resp 20   Ht 1.651 m (5' 5\")   Wt 96.6 kg (213 lb)   SpO2 97%   BMI 35.45 kg/m      Constitutional: No apparent distress    HEENT: pupils equal round reactive to light, thyroid normal size, JVP is normal    Chest: Clear to percussion and auscultation    Cardiac: Normal S1, " normal S2 no S3, no murmur or click    Abdomen: Scaphoid.  Liver percusses to 6 cm spleen is not palpable aorta is not tender or enlarged    Extremitiies: no edema.    Neurological:  Cranial nerves II through XII are intact, strength equal and symmetrical, displays normal insight and judgment        ASSESSMENT: Mariluz Stoner is a 63 year old female with          We reviewed the benefits of a regular exercise program, a Mediterranean-style weight loss diet, and contacting us for any changes in between now and the time of her next visit.     RECOMMENDATIONS:   1) stop lisinopril for 3 days   2) begin entresto 1 bid  3) spironolactone 50 mg daily  4) stop carvedilol  5) begin metoprolol xl 50 mg daily   6) cardiac MRI with stress   7) follow up with [facility] nurses for titration of medications /BMP/ in one month  8) consider empagliflozin or dapagliflozin in future  9) may be a candidate for BIVAD ICD if LVEF fails to improve        Recent Lab Results:  LIPID RESULTS:  Lab Results   Component Value Date    CHOL 206 (H) 10/27/2023    CHOL 219 (A) 01/29/2018    HDL 41 (L) 10/27/2023    HDL 45 01/29/2018     (H) 10/27/2023     01/29/2018    TRIG 171 (H) 10/27/2023    TRIG 225 01/29/2018       LIVER ENZYME RESULTS:  Lab Results   Component Value Date    AST 29 12/23/2023    AST 17 11/14/2019    ALT 24 12/23/2023    ALT 16 11/14/2019       CBC RESULTS:  Lab Results   Component Value Date    WBC 15.2 (H) 12/23/2023    WBC 6.2 04/15/2019    RBC 4.01 12/23/2023    RBC 3.98 04/15/2019    HGB 12.2 12/23/2023    HGB 12.4 04/15/2019    HCT 37.7 12/23/2023    HCT 38.0 04/15/2019    MCV 94 12/23/2023    MCV 96 04/15/2019    MCH 30.4 12/23/2023    MCH 31.2 04/15/2019    MCHC 32.4 12/23/2023    MCHC 32.6 04/15/2019    RDW 14.6 12/23/2023    RDW 13.2 04/15/2019     12/23/2023     04/15/2019       BMP RESULTS:  Lab Results   Component Value Date     (L) 12/23/2023     04/15/2019    POTASSIUM 4.7  "12/23/2023    POTASSIUM 4.0 11/14/2019    CHLORIDE 105 12/23/2023    CHLORIDE 106 04/15/2019    CO2 21 (L) 12/23/2023    CO2 26 04/15/2019    ANIONGAP 8 12/23/2023    ANIONGAP 8 04/15/2019     (H) 12/23/2023    GLC 85 10/01/2023     11/14/2019    BUN 22.4 12/23/2023    BUN 13 04/15/2019    CR 0.73 12/23/2023    CR 0.66 11/14/2019    GFRESTIMATED >90 12/23/2023    GFRESTIMATED >60 11/14/2019    GFRESTBLACK >60 04/19/2019    SINDY 8.5 (L) 12/23/2023    SINDY 8.5 04/15/2019        A1C RESULTS:  Lab Results   Component Value Date    A1C 5.3 01/29/2018       INR RESULTS:  No results found for: \"INR\"    We greatly appreciate the opportunity to be involved in the care of your patient, Mariluz IVON Georgiana.    Sincerely,  Khoa Vazquez MD      CC  VERONIKA Blanc CNP  [address] [place] [address]                                                                       Thank you for allowing me to participate in the care of your patient.      Sincerely,     Khoa Vazquez MD     [facility]  cc:   VERONIKA Blanc CNP  [address] [place] [address]      "

## 2024-02-07 ENCOUNTER — THERAPY VISIT (OUTPATIENT)
Dept: PHYSICAL THERAPY | Facility: CLINIC | Age: 64
End: 2024-02-07
Attending: EMERGENCY MEDICINE
Payer: COMMERCIAL

## 2024-02-07 DIAGNOSIS — R42 VERTIGO: Primary | ICD-10-CM

## 2024-02-07 PROCEDURE — 95992 CANALITH REPOSITIONING PROC: CPT | Mod: GP | Performed by: PHYSICAL THERAPIST

## 2024-02-07 PROCEDURE — 97112 NEUROMUSCULAR REEDUCATION: CPT | Mod: GP,59 | Performed by: PHYSICAL THERAPIST

## 2024-02-09 ENCOUNTER — HOSPITAL ENCOUNTER (OUTPATIENT)
Dept: WOUND CARE | Facility: CLINIC | Age: 64
Discharge: HOME | End: 2024-02-09
Attending: FAMILY MEDICINE | Admitting: FAMILY MEDICINE
Payer: COMMERCIAL

## 2024-02-09 PROCEDURE — G0463 HOSPITAL OUTPT CLINIC VISIT: HCPCS

## 2024-02-09 NOTE — PROGRESS NOTES
[facility]    Start of Care in [facility]: 12/12/2023   Referring Doctor: Guy Brink MD   Primary Care Provider: Guy Brink   Wound Location:  Abdominal   Type of Ostomy/Surgery Date/Surgeon:  Subtotal colectomy with ileostomy 11/18/23 completed by Perry St MD       Wound/Ostomy Clinic Visit:  6th return visit to wound and Ostomy Clinic    Reason for Visit:  re assessment of Ostomy and Wound        Subjective:  Patient arrives to [facility] on 2/9/24 and reports: Returns home from vacation, states she's very happy with new appliance, has been only needing to change 2x/week. No issues with leakage.  Has Minor skin irritation from skin barrier and elastic barrier strips.  Her Vertigo has returned so started therapy again.  Prior to leaving for [place] seen Surgeon Dr. Martinez St, had suture to incision removed. Will follow up in another 6 months to discuss reversal options.       HPI/Pertinent information from chart review:   Patient reports she started noting her stomach to be bloated and rock hard this past spring when on vacation with her granddaughter, 2023.  Started to have abdominal pain in September of 2023.  Was admitted to [facility] from 9/29/23-10/6/23 for diverticulitis, was discharged on oral antibiotics. Presented back to [facility] ER on 10/16/23 for worsening abdominal pain.  Repeat CT scan was completed, she was noted to have slightly worsening diverticulitis, was given further oral antibiotics once again and sent home.  Patient had ongoing, worsening abdominal pain, antibiotics were not helpful so she presents to [facility] on 11/9/23,  she eventually underwent Subtotal colectomy with ileostomy by Perry St MD  due to recurrent sigmoid diverticulitis and sigmoid obstruction.  After discharge from [facility] she followed up with Primary, Dr. Brink on  23 who had concerns of infection to her surgical wound, she was prescribed oral antibiotics and given orders for wound culture, CT scan, and Wound and ostomy referral.  Patient established care at [facility] wound clinic on 23.      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     OSTOMY EXAM    Pouch wear time: Changing 2x/ week the days vary    Current pouching system: [employer]ots 1 piece # [id number], cut to fit. Lock and roll Closure with #[id number] ring     Who changes the pouch? patient     Participants in cares today: NA, pouch not changed today.     Any limitations (dexterity, vision, hearing)?     Not assessed today, see assessment for further details. 24, last assessed 24  Location: Right Lower Quadrant   Color/Moisture: Red, moist  Viable: Yes  Size: 25 cm X 35 cm  Shape: slight oval  MCJ: Intact  Os: Center and outward direction.   Protrusion: 2 cm  Peristomal skin: Denuded breakdown at 1-9 o' clock . At 5 o clock mucinous bud.  Output: Liquid to semi mush  Abdominal profile/plane: sits on protruding abdomen, moderate crease at 9 o clock and small crease at 3 o clock when  sitting/leaning forward  Photos 24   Photos from 23   Photos from 23   Photos from initial assessment 23    Not assessed today, see assessment for further details. 24, last assessed 24  Wound #1 Abdominal; Surgical Wound   Stage/tissue depth: Full thickness  0 cm L x 0 cm W x 0 cm D  New opening superior wound open where suture tip is located: 0.1 cm L x 0.4 cm W x 0.4 cm D   Tunnelin.5 cm at noon  Undermining: none  Wound bed type/amount: 100% red non granular; not fluctuant  Wound Edges: open  Periwound: scarred incision inferior and superior   Drainage: none  Odor: none  Pain: Denies   Photos from 24   Photo from 23   Photos from 23   Photos from initial assessment 23   Photos from MD appointment on 23    Discussion/Education:  plan of care with rationale;  Patient is able to perform cares/has been caring for wound    Assessment:  Arrives to clinic with ostomy appliance in place, patient reports placing new appliance on last night. She feels WOC nurse doesn't need tor remove pouch and assess stoma.  Stoma visualized through pouch, red/moist stoma and continues to protrude well.  No evidence of leaking.      Abdominal Incision- unable to assess, patient has placed Elastic barrier strip over scar.  Patient reports incision has scarred over.        Plan of Care:   Patient continue with changing ostomy 2x/week, will return to clinic in about 1 month. Educated patient to try and not change appliance as WOC nurse would like to complete full assessment and appliance change.  Patient was educated to call and schedule sooner if having complications with ostomy.        Ostomy: Change Ostomy 2x/week and as needed  Supplies being used: # [id number] One-Piece Drainable Ostomy Pouch - Soft Convex Flexten Barrier, Viewing Option, Lock 'n Roll  Microseal Closure, Tape, Filter 1 Tiffanie #[id number] and securing skin barrier edges with : Coloplast Elastic barrier  #[id number]      Wound: Since patient reports the incision to be scarred over, okay to leave open to air.       Cares Performed:    Pouch changed: No    Topical care: as above in plan      The following discharge instructions were reviewed with and sent home with the patient:  Declined    The following supplies were sent home with the patient:  none    Return visit: 3/8/24 To wound and ostomy clinic @ 10 AM    Verbal, written, & demonstrative education provided.  Face to face time: approximately 20 minutes  Procedure: 0 min    Marla Carreon RN [facility]  [phone]

## 2024-02-15 NOTE — PROGRESS NOTES
DISCHARGE  Reason for Discharge: Patient has met all goals.    Equipment Issued: none    Discharge Plan: Patient to continue home program.    Referring Provider:  Rod Villa     24 [id number]   Appointment Info   Signing clinician's name / credentials ericka calderon PT   Visits Used 2   Medical Diagnosis vertigo   PT Tx Diagnosis vertigo (L posterior canal BPPV)   Quick Adds Certification   Progress Note/Certification   Start of Care Date 24   Onset of illness/injury or Date of Surgery 23   Therapy Frequency 2 times   Predicted Duration 4 weeks   Certification date from 24   Certification date to 24   Progress Note Completed Date 24   PT Goal 1   Goal Identifier vertigo resolved   Goal Description Mariluz will report vertigo is controlled to be able to ly down in bed at night without spinning dizziness.   Rationale to maximize safety and independence with self cares   Target Date 24   Subjective Report   Subjective Report patient reports that she was 95% better until she went to  in [facility] and that was too much for her   Objective Measure 1   Objective Measure DHI   Details  at [facility] currently    Treatment Interventions (PT)   Interventions Neuromuscular Re-education;Standard Canalith Repositioning   Neuromuscular Re-education   Neuromuscular re-ed of mvmt, balance, coord, kinesthetic sense, posture, proprioception minutes (75241) 34   Neuro Re-ed 1 - Details instruction in after care Rx after epley , instruction in gaze stabilization exercises 1 head still and move eyes center and left/right , up / down , 2 eyes still and move head left/right and up/down 3-5 x ,   Skilled Intervention instruction in HEP and exercises   Patient Response/Progress good understanding and tolerance   Standard Canalith Repositioning   Standard canalith repositioning procedure minutes (02815) 15   Canalith Repositioning - Details Epley manuver for L posterior canallithiasis  , dizzy and nystagmus in first position x 15 seconds and last position x 6 seconds   Skilled Intervention determining needs, applying techniques   Patient Response/Progress felt better after Rx   Education   Learner/Method Patient;Listening;Demonstration;Pictures/Video;No Barriers to Learning   Plan   Home program instruction in after care Rx after epley , instruction in gaze stabilization exercises 1 head still and move eyes center and left/right , up / down , 2 eyes still and move head left/right and up/down 3-5 x ,   Plan for next session recheck BPPV L post canalithiasis   Total Session Time   Timed Code Treatment Minutes 23   Total Treatment Time (sum of timed and untimed services) 38

## 2024-02-18 ENCOUNTER — HEALTH MAINTENANCE LETTER (OUTPATIENT)
Age: 64
End: 2024-02-18

## 2024-03-04 ENCOUNTER — LAB (OUTPATIENT)
Dept: LAB | Facility: CLINIC | Age: 64
End: 2024-03-04
Payer: COMMERCIAL

## 2024-03-04 DIAGNOSIS — I42.7 CARDIOMYOPATHY SECONDARY TO DRUG (H): ICD-10-CM

## 2024-03-04 LAB
ANION GAP SERPL CALCULATED.3IONS-SCNC: 12 MMOL/L (ref 7–15)
BUN SERPL-MCNC: 18.3 MG/DL (ref 8–23)
CALCIUM SERPL-MCNC: 9.9 MG/DL (ref 8.8–10.2)
CHLORIDE SERPL-SCNC: 101 MMOL/L (ref 98–107)
CREAT SERPL-MCNC: 0.98 MG/DL (ref 0.51–0.95)
DEPRECATED HCO3 PLAS-SCNC: 25 MMOL/L (ref 22–29)
EGFRCR SERPLBLD CKD-EPI 2021: 65 ML/MIN/1.73M2
GLUCOSE SERPL-MCNC: 108 MG/DL (ref 70–99)
POTASSIUM SERPL-SCNC: 4.1 MMOL/L (ref 3.4–5.3)
SODIUM SERPL-SCNC: 138 MMOL/L (ref 135–145)

## 2024-03-04 PROCEDURE — 80048 BASIC METABOLIC PNL TOTAL CA: CPT

## 2024-03-04 PROCEDURE — 36415 COLL VENOUS BLD VENIPUNCTURE: CPT

## 2024-03-12 ENCOUNTER — HOSPITAL ENCOUNTER (OUTPATIENT)
Dept: WOUND CARE | Facility: CLINIC | Age: 64
Discharge: HOME | End: 2024-03-12
Attending: FAMILY MEDICINE | Admitting: FAMILY MEDICINE
Payer: COMMERCIAL

## 2024-03-12 PROCEDURE — G0463 HOSPITAL OUTPT CLINIC VISIT: HCPCS

## 2024-03-12 NOTE — PROGRESS NOTES
[facility] Wound and Ostomy Clinic [facility]    Start of Care in [facility]ound [facility]: 12/12/2023   Referring Doctor: Guy Brink MD   Primary Care Provider: Guy Brink   Wound Location:  Abdominal   Type of Ostomy/Surgery Date/Surgeon:  Subtotal colectomy with ileostomy 11/18/23 completed by Perry St MD       Wound/Ostomy Clinic Visit:  7th return visit to wound and Ostomy Clinic    Reason for Visit:  re assessment of Ostomy and Wound        Subjective:  Patient arrives to independently on 3/12/24 and reports: Changing and wear time for ostomy has been going well. Only 1 episode of leaking, which was a few days ago. 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  She tried a few days without the elastic barrier strips, it went well, had no issues with leaking.  Continues to Journal her food/drink, she is noting some foods/drinks do upset her stomach, but is able to relive cramping/upset stomach with few tums.  Says her Surgeon's team will call her in June to get her scheduled for re connective surgery.       HPI/Pertinent information from chart review:   Patient reports she started noting her stomach to be bloated and rock hard this past spring when on vacation with her granddaughter, 2023.  Started to have abdominal pain in September of 2023.  Was admitted to [facility] from 9/29/23-10/6/23 for diverticulitis, was discharged on oral antibiotics. Presented back to [facility] ER on 10/16/23 for worsening abdominal pain.  Repeat CT scan was completed, she was noted to have slightly worsening diverticulitis, was given further oral antibiotics once again and sent home.  Patient had ongoing, worsening abdominal pain, antibiotics were not helpful so she presents to [facility] on 11/9/23,  she eventually underwent Subtotal colectomy with ileostomy by Perry St MD  due to recurrent sigmoid  diverticulitis and sigmoid obstruction.  After discharge from [facility] she followed up with Primary, Dr. Brink on 23 who had concerns of infection to her surgical wound, she was prescribed oral antibiotics and given orders for wound culture, CT scan, and Wound and ostomy referral.  Patient established care at [facility] wound clinic on 23.      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     OSTOMY EXAM    Pouch wear time: Changing 2x/ week the days vary    Current pouching system: [place] 1 piece # [id number], cut to fit. Lock and roll Closure with #[id number] ring     Who changes the pouch? patient     Participants in cares today: observation    Any limitations (dexterity, vision, hearing)? None    Location: Right Lower Quadrant   Color/Moisture: Red, moist  Viable: Yes  Size: 25 cm X 30 cm  Shape: Round to slight oval  MCJ: Intact  Os: Center and outward direction.   Protrusion: 2 cm  Peristomal skin: Denuded breakdown at 5-6 o' clock around the mucosa bud.  Output: Liquid to semi mush  Abdominal profile/plane: sits on protruding abdomen, moderate crease at 9 o clock and small  crease at 3 o clock when sitting/leaning forward   Photos from today's visit 3/12/24  Photos 1/12/24   Photos from 1/2/23   Photos from 12/19/23   Photos from initial assessment 12/12/23    Not assessed today, see assessment for further details. 2/9/24, last assessed 1/12/24  Wound #1 Abdominal; Surgical Wound   Stage/tissue depth: Full thickness  0 cm L x 0 cm W x 0 cm D  Tunneling: none  Undermining: none  Wound bed type/amount: 100% scarred over; not fluctuant  Wound Edges: NA, closed  Periwound:intact and free of break down  Drainage: none  Odor: none  Pain: Denies   Photos from today's visit 3/12/24   Photos from 1/12/24   Photo from 1/2/23   Photos from 12/19/23   Photos from initial assessment 12/12/23   Photos from MD appointment on 12/7/23    Discussion/Education:  plan of care with rationale;  Patient is able to perform cares/has been caring for wound    Assessment:  Arrives to clinic with Ostomy appliance in place from 3 days ago, no signs of leaking from the anterior side. Removed appliance and assessed the posterior aspect of appliance and no evidence of leaking or stool build up.  Stoma and Mucosa bud viable and moist.  Peristomal erythema has improved significantly since visit in January. Very minor irritation surrounding the mucosa bud.     Abdominal Incision- Scarred over, no concerns.     Plan of Care:   Patient continue with changing ostomy 2x/week, No need to return to wound and Ostomy clinic unless having issues with leakage or skin breakdown. Patient plans to have reversal surgery in June of 2024.       Ostomy: Change Ostomy 2x/week and as needed  Supplies being used: # 1971 One-Piece Drainable Ostomy Pouch - Soft Convex Flexten Barrier, Viewing Option, Lock 'n Roll Closure, Tape, Filter, 1 [place] #[id number] and securing skin barrier edges with : Coloplast Elastic barrier #[id number]      Wound: healed, No treatment needed      Cares Performed:    Pouch changed: Yes    Topical care: as above in  plan      The following discharge instructions were reviewed with and sent home with the patient:  Declined    The following supplies were sent home with the patient:  none    Return visit: As needed    Verbal, written, & demonstrative education provided.  Face to face time: approximately 30 minutes  Procedure: 0 min    Marla Carreon RN CWOCN  [phone]

## 2024-03-28 ENCOUNTER — OFFICE VISIT (OUTPATIENT)
Dept: CARDIOLOGY | Facility: CLINIC | Age: 64
End: 2024-03-28
Payer: COMMERCIAL

## 2024-03-28 VITALS
SYSTOLIC BLOOD PRESSURE: 112 MMHG | DIASTOLIC BLOOD PRESSURE: 72 MMHG | WEIGHT: 217.5 LBS | HEIGHT: 65 IN | BODY MASS INDEX: 36.24 KG/M2 | RESPIRATION RATE: 18 BRPM | HEART RATE: 78 BPM | OXYGEN SATURATION: 97 %

## 2024-03-28 DIAGNOSIS — R42 LIGHTHEADEDNESS: ICD-10-CM

## 2024-03-28 DIAGNOSIS — R42 VERTIGO: ICD-10-CM

## 2024-03-28 DIAGNOSIS — I42.7 CARDIOMYOPATHY SECONDARY TO DRUG (H): Primary | ICD-10-CM

## 2024-03-28 PROCEDURE — 99214 OFFICE O/P EST MOD 30 MIN: CPT | Performed by: NURSE PRACTITIONER

## 2024-03-28 PROCEDURE — G2211 COMPLEX E/M VISIT ADD ON: HCPCS | Performed by: NURSE PRACTITIONER

## 2024-03-28 RX ORDER — SPIRONOLACTONE 50 MG/1
TABLET, FILM COATED ORAL
Status: SHIPPED
Start: 2024-03-28 | End: 2024-06-24

## 2024-03-28 ASSESSMENT — PAIN SCALES - GENERAL: PAINLEVEL: NO PAIN (0)

## 2024-03-28 NOTE — PATIENT INSTRUCTIONS
Call my nurse with any questions or concerns:  [phone]  *If you have concerns after hours, please call [phone], option 2 to speak with on call Cardiologist.    1. Medication changes from today:    Decrease spironolactone 1/2 tablet once daily  Will do a limited echo and see how the pump of your heart looks    I will see you in 3 months. Call with any concerns

## 2024-03-28 NOTE — PROGRESS NOTES
Cardiology Clinic Progress Note  Mariluz Stoner MRN# [id number]   YOB: 1960 Age: 63 year old       HPI:    Mariluz Stoner is a delightful 63 year old patient with past medical history significant for:    Metastatic breast cancer with left mastectomy and lymph node dissection.  Treated with radiation, Herceptin followed by tamoxifen. On Arimidex  Diverticulitis with emergent laparotomy/subtotal colectomy/ileostomy  Benign positional vertigo  Hypertension  Obesity  Cardiomyopathy EF initially 55% prior to treatment 8/2019 (Care Everywhere).  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  Short course of apixaban (3 months)    It is my pleasure meeting Mariluz at our [place] location.  She was seen in consultation by Dr. Vazquez in February.  Medication changes were made at that time for optimization of her CHF.  MRI was ordered, but not yet completed.    Patient had multiple medications changed at her last visit.  Carvedilol was switched back to metoprolol.  Spironolactone was initiated at 50 mg daily.  Entresto was ordered to replace lisinopril, but the patient could not afford the medication I did not started.    She is not feeling well.  Feels like she has been dehydrated lately.  Wondering if it is from the spironolactone.  Blood pressure is stable 112/72.  She is at increased out of her colostomy bag.  I laid her down during her visit.  She was slightly diaphoretic.  Having some lightheadedness.  Denied chest pain, shortness of breath, orthopnea, PND, syncope, lower extremity edema.  She ate breakfast this morning, but did not eat lunch.  Just picked her  up from the hospital who is waiting out in the car and needs to go to a rehab facility.  She feels very overwhelmed at this time.    Diagnotic studies:  Echocardiogram 8/2019: EF 55 to 60%.  (Care Everywhere  Echocardiogram 12/2019: EF 40 to 45%.  No significant valvular  insufficiency (Care Everywhere  Echocardiogram 10/2023: EF 25 to 30% trace of valvular insufficiency.  RV function normal.  Grade 1 diastolic dysfunction noted.  Severe global hypokinesis noted of the left ventricle.        Latest Reference Range & Units 03/04/24 10:17   Sodium 135 - 145 mmol/L 138   Potassium 3.4 - 5.3 mmol/L 4.1   Chloride 98 - 107 mmol/L 101   Carbon Dioxide (CO2) 22 - 29 mmol/L 25   Urea Nitrogen 8.0 - 23.0 mg/dL 18.3   Creatinine 0.51 - 0.95 mg/dL 0.98 (H)   GFR Estimate >60 mL/min/1.73m2 65   Calcium 8.8 - 10.2 mg/dL 9.9   Anion Gap 7 - 15 mmol/L 12   Glucose 70 - 99 mg/dL 108 (H)       Plan:   1. 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  Not convinced she needs the medication and states she does not want to try to get medication through the pharmaceutical company at this time.  Severe lightheadedness most likely secondary to spironolactone.  I will decrease to 25 mg daily.    Reviewed her echocardiograms.  She has not yet done her cardiac MRI.  This will be done at [facility] in May.  I have ordered a limited echo to be done at [place].  I will call her with the results.  Patient is in disbelief that the pump of her heart is still reduced.    I will see her in 3 months and we can discuss optimization of medications if necessary at that time.  Her cardiac MRI will be completed.    I did discuss restarting lisinopril, but with the patient not feeling well I believe it is best to defer this.      2. emergent subtotal colectomy ileostomy  Hoping for reversal in the summer.    3.  Hypertension stable    4.  Incidental finding of mesenteric thrombosis.  Apixaban for 3 months treatment almost completed.    5.  History of vertigo with episode of dizziness and nausea in the clinic   After drinking some Sprite and eating 2 cookies she started feeling better by the end of the visit.  Her dizziness had  resolved.    Thank you for including me in the care of this patient.  30 minutes was completed today greater than 50% was related to counseling.   She will contact us with any questions or concerns.  I will reach out after I get the limited echo results.    Khadijah Alexander, NP, APRN CNP    Today's clinic visit entailed:  Review of the result(s) of each unique test - echo and labs  Prescription drug management  30 minutes spent by me on the date of the encounter doing chart review, review of test results, patient visit, and documentation   Provider  Link to [facility] Help Grid     The level of medical decision making during this visit was of moderate complexity.      No orders of the defined types were placed in this encounter.    No orders of the defined types were placed in this encounter.    There are no discontinued medications.      Encounter Diagnosis   Name Primary?    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)        CURRENT MEDICATIONS:  Current Outpatient Medications   Medication Sig Dispense Refill    acetaminophen (TYLENOL) 500 MG tablet Take 1,000 mg by mouth every 6 hours as needed for mild pain      albuterol (PROAIR HFA/PROVENTIL HFA/VENTOLIN HFA) 108 (90 Base) MCG/ACT inhaler Inhale 2 puffs into the lungs every 4 hours as needed for shortness of breath or wheezing      anastrozole (ARIMIDEX) 1 MG tablet Take 1 tablet (1 mg) by mouth daily 90 tablet 1    apixaban ANTICOAGULANT (ELIQUIS) 5 MG tablet Take 1 tablet (5 mg) by mouth 2 times daily 120 tablet 0    calcium carbonate 600 mg-vitamin D 400 units (CALTRATE) 600-400 MG-UNIT per tablet Take 1 tablet by mouth      Ergocalciferol 50 MCG (2000 UT) TABS Take 2,000 Units by mouth daily      medical cannabis (Patient's own supply) See Admin Instructions (The purpose of this order is to document that the patient reports taking medical cannabis.  This is not a prescription, and is not used to certify that the patient has a qualifying medical condition.)      melatonin  1 MG TABS tablet Take 3 tablets (3 mg) by mouth nightly as needed for sleep      metoprolol succinate ER (TOPROL XL) 50 MG 24 hr tablet Take 1 tablet (50 mg) by mouth at bedtime 90 tablet 11    PARoxetine (PAXIL) 20 MG tablet TAKE 1 TABLET BY MOUTH ONCE DAILY .  APPOINTMENT  NEEDED  FOR  ADDITIONAL  REFILLS 90 tablet 1    spironolactone (ALDACTONE) 50 MG tablet Take 1 tablet (50 mg) by mouth daily 90 tablet 11    Vitamin D3 (VITAMIN D-1000 MAX ST) 25 mcg (1000 units) tablet Take 25 mcg by mouth daily      cefdinir (OMNICEF) 300 MG capsule Take 1 capsule (300 mg) by mouth 2 times daily (Patient not taking: Reported on 2/1/2024) 10 capsule 0    metroNIDAZOLE (FLAGYL) 500 MG tablet Take 1 tablet (500 mg) by mouth 3 times daily (Patient not taking: Reported on 2/1/2024) 15 tablet 0    oxyCODONE (ROXICODONE) 5 MG tablet Take 1 tablet (5 mg) by mouth every 6 hours as needed for pain (Patient not taking: Reported on 2/1/2024) 10 tablet 0    sacubitril-valsartan (ENTRESTO) 24-26 MG per tablet Take 1 tablet by mouth 2 times daily (Patient not taking: Reported on 3/28/2024) 90 tablet 11    traMADol (ULTRAM) 50 MG tablet Take 50 mg by mouth every 6 hours as needed for severe pain (Patient not taking: Reported on 2/1/2024)         ALLERGIES     Allergies   Allergen Reactions    Naproxen      Other reaction(s): Hematologic  Vaginal bleeding       PAST MEDICAL HISTORY:  Past Medical History:   Diagnosis Date    Basal cell carcinoma     Breast cancer (H)     Obesity        PAST SURGICAL HISTORY:  Past Surgical History:   Procedure Laterality Date    BREAST SURGERY      left tissue expander, LEFT MASTECTOMY    CHOLECYSTECTOMY      GYN SURGERY      oophorectomy    REMOVE TISSUE EXPANDER BREAST  4/16/2014    Procedure: REMOVAL OF LEFT BREAST TISSUE EXPANDER, IRRIGATION AND DEBRIDEMENT OF LEFT BREAST;  Surgeon: Marlon Luz MD;  Location: [facility]       FAMILY HISTORY:  No family history on file.    SOCIAL HISTORY:  Social  History     Socioeconomic History    Marital status:      Spouse name: None    Number of children: None    Years of education: None    Highest education level: None   Tobacco Use    Smoking status: Former     Types: Cigarettes     Quit date: 2000     Years since quittin.2    Smokeless tobacco: Never   Vaping Use    Vaping Use: Never used   Substance and Sexual Activity    Alcohol use: Yes     Comment: social drinking    Drug use: Yes     Types: Marijuana     Comment: medical marijuana syrup     Sexual activity: Not Currently     Partners: Female, Male     Social Determinants of Health     Financial Resource Strain: Low Risk  (2023)    Financial Resource Strain     Within the past 12 months, have you or your family members you live with been unable to get utilities (heat, electricity) when it was really needed?: No   Food Insecurity: Low Risk  (2023)    Food Insecurity     Within the past 12 months, did you worry that your food would run out before you got money to buy more?: No     Within the past 12 months, did the food you bought just not last and you didn t have money to get more?: No   Transportation Needs: Low Risk  (2023)    Transportation Needs     Within the past 12 months, has lack of transportation kept you from medical appointments, getting your medicines, non-medical meetings or appointments, work, or from getting things that you need?: No   Interpersonal Safety: Low Risk  (10/25/2023)    Interpersonal Safety     Do you feel physically and emotionally safe where you currently live?: Yes     Within the past 12 months, have you been hit, slapped, kicked or otherwise physically hurt by someone?: No     Within the past 12 months, have you been humiliated or emotionally abused in other ways by your partner or ex-partner?: No   Housing Stability: Low Risk  (2023)    Housing Stability     Do you have housing? : Yes     Are you worried about losing your housing?: No  "      Review of Systems:  Skin:        Eyes:       ENT:       Respiratory:  Negative shortness of breath;cough;wheezing  Cardiovascular:  Negative;palpitations;chest pain;edema;lightheadedness;dizziness;syncope or near-syncope    Gastroenterology:      Genitourinary:       Musculoskeletal:       Neurologic:  Positive for numbness or tingling of feet  Psychiatric:       Heme/Lymph/Imm:  Positive for allergies  Endocrine:         Physical Exam:    Vitals: /72 (BP Location: Right arm, Patient Position: Sitting, Cuff Size: Adult Large)   Pulse 78   Resp 18   Ht 1.651 m (5' 5\")   Wt 98.7 kg (217 lb 8 oz)   SpO2 97%   BMI 36.19 kg/m    Constitutional: well nourished and in no apparent distress.  Eyes: Pupils equal, round. Sclerae anicteric.   HEENT: Normocephalic, atraumatic.   Neck: Supple. No JVD   Respiratory: Breathing non-labored. Lungs clear to auscultation bilaterally. No crackles, wheezes, rhonchi, or rales.  Cardiovascular:  Regular rate and rhythm, normal S1 and S2. No murmur, rub, or gallop.  Skin: Warm, dry. No rashes, cyanosis, or xanthelasma.  Extremities: No edema.  Neurologic: No gross motor deficits. Alert, awake, and oriented to person, place and time.  Psychiatric: Initially pale and slightly diaphoretic.  Coloring improved after having a small can of soda and 2 cookies.    Recent Lab Results:  LIPID RESULTS:  Lab Results   Component Value Date    CHOL 206 (H) 10/27/2023    CHOL 219 (A) 01/29/2018    HDL 41 (L) 10/27/2023    HDL 45 01/29/2018     (H) 10/27/2023     01/29/2018    TRIG 171 (H) 10/27/2023    TRIG 225 01/29/2018       LIVER ENZYME RESULTS:  Lab Results   Component Value Date    AST 29 12/23/2023    AST 17 11/14/2019    ALT 24 12/23/2023    ALT 16 11/14/2019       CBC RESULTS:  Lab Results   Component Value Date    WBC 15.2 (H) 12/23/2023    WBC 6.2 04/15/2019    RBC 4.01 12/23/2023    RBC 3.98 04/15/2019    HGB 12.2 12/23/2023    HGB 12.4 04/15/2019    HCT 37.7 " "12/23/2023    HCT 38.0 04/15/2019    MCV 94 12/23/2023    MCV 96 04/15/2019    MCH 30.4 12/23/2023    MCH 31.2 04/15/2019    MCHC 32.4 12/23/2023    MCHC 32.6 04/15/2019    RDW 14.6 12/23/2023    RDW 13.2 04/15/2019     12/23/2023     04/15/2019       BMP RESULTS:  Lab Results   Component Value Date     03/04/2024     04/15/2019    POTASSIUM 4.1 03/04/2024    POTASSIUM 4.0 11/14/2019    CHLORIDE 101 03/04/2024    CHLORIDE 106 04/15/2019    CO2 25 03/04/2024    CO2 26 04/15/2019    ANIONGAP 12 03/04/2024    ANIONGAP 8 04/15/2019     (H) 03/04/2024    GLC 85 10/01/2023     11/14/2019    BUN 18.3 03/04/2024    BUN 13 04/15/2019    CR 0.98 (H) 03/04/2024    CR 0.66 11/14/2019    GFRESTIMATED 65 03/04/2024    GFRESTIMATED >60 11/14/2019    GFRESTBLACK >60 04/19/2019    SINDY 9.9 03/04/2024    SINDY 8.5 04/15/2019        A1C RESULTS:  Lab Results   Component Value Date    A1C 5.3 01/29/2018       INR RESULTS:  No results found for: \"INR\"        CC  Khadijah Alexander APRN CNP  [id number]  [place] [id number]                "

## 2024-03-28 NOTE — LETTER
3/28/2024    Guy Brink MD  [id number] [facility] [id number]    RE: Mariluz Stoner       Dear Colleague,     I had the pleasure of seeing [facility] in the [facility].  Cardiology Clinic Progress Note  Mariluz Stoner MRN# [id number]   YOB: 1960 Age: 63 year old       HPI:    Mariluz Stoner is a delightful 63 year old patient with past medical history significant for:    Metastatic breast cancer with left mastectomy and lymph node dissection.  Treated with radiation, Herceptin followed by tamoxifen. On Arimidex  Diverticulitis with emergent laparotomy/subtotal colectomy/ileostomy  Benign positional vertigo  Hypertension  Obesity  Cardiomyopathy EF initially 55% prior to treatment 8/2019 (Care Everywhere).  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  Short course of apixaban (3 months)    It is my pleasure meeting Mariluz at our [place] location.  She was seen in consultation by Dr. Vazquez in February.  Medication changes were made at that time for optimization of her CHF.  MRI was ordered, but not yet completed.    Patient had multiple medications changed at her last visit.  Carvedilol was switched back to metoprolol.  Spironolactone was initiated at 50 mg daily.  Entresto was ordered to replace lisinopril, but the patient could not afford the medication I did not started.    She is not feeling well.  Feels like she has been dehydrated lately.  Wondering if it is from the spironolactone.  Blood pressure is stable 112/72.  She is at increased out of her colostomy bag.  I laid her down during her visit.  She was slightly diaphoretic.  Having some lightheadedness.  Denied chest pain, shortness of breath, orthopnea, PND, syncope, lower extremity edema.  She ate breakfast this morning, but did not eat lunch.  Just picked her  up from the hospital who is waiting out in the car and needs to go  to a rehab facility.  She feels very overwhelmed at this time.    Diagnotic studies:  Echocardiogram 8/2019: EF 55 to 60%.  (Care Everywhere  Echocardiogram 12/2019: EF 40 to 45%.  No significant valvular insufficiency (Care Everywhere  Echocardiogram 10/2023: EF 25 to 30% trace of valvular insufficiency.  RV function normal.  Grade 1 diastolic dysfunction noted.  Severe global hypokinesis noted of the left ventricle.        Latest Reference Range & Units 03/04/24 10:17   Sodium 135 - 145 mmol/L 138   Potassium 3.4 - 5.3 mmol/L 4.1   Chloride 98 - 107 mmol/L 101   Carbon Dioxide (CO2) 22 - 29 mmol/L 25   Urea Nitrogen 8.0 - 23.0 mg/dL 18.3   Creatinine 0.51 - 0.95 mg/dL 0.98 (H)   GFR Estimate >60 mL/min/1.73m2 65   Calcium 8.8 - 10.2 mg/dL 9.9   Anion Gap 7 - 15 mmol/L 12   Glucose 70 - 99 mg/dL 108 (H)       Plan:   1. 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  Not convinced she needs the medication and states she does not want to try to get medication through the pharmaceutical company at this time.  Severe lightheadedness most likely secondary to spironolactone.  I will decrease to 25 mg daily.    Reviewed her echocardiograms.  She has not yet done her cardiac MRI.  This will be done at [facility] in May.  I have ordered a limited echo to be done at [facility].  I will call her with the results.  Patient is in disbelief that the pump of her heart is still reduced.    I will see her in 3 months and we can discuss optimization of medications if necessary at that time.  Her cardiac MRI will be completed.    I did discuss restarting lisinopril, but with the patient not feeling well I believe it is best to defer this.      2. emergent subtotal colectomy ileostomy  Hoping for reversal in the summer.    3.  Hypertension stable    4.  Incidental finding of mesenteric thrombosis.  Apixaban for 3 months treatment almost  completed.    5.  History of vertigo with episode of dizziness and nausea in the clinic   After drinking some Sprite and eating 2 cookies she started feeling better by the end of the visit.  Her dizziness had resolved.    Thank you for including me in the care of this patient.  30 minutes was completed today greater than 50% was related to counseling.   She will contact us with any questions or concerns.  I will reach out after I get the limited echo results.    Khadijah Alexander, NP, APRN CNP    Today's clinic visit entailed:  Review of the result(s) of each unique test - echo and labs  Prescription drug management  30 minutes spent by me on the date of the encounter doing chart review, review of test results, patient visit, and documentation   Provider  Link to [facility] Help Grid     The level of medical decision making during this visit was of moderate complexity.      No orders of the defined types were placed in this encounter.    No orders of the defined types were placed in this encounter.    There are no discontinued medications.      Encounter Diagnosis   Name Primary?    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)        CURRENT MEDICATIONS:  Current Outpatient Medications   Medication Sig Dispense Refill    acetaminophen (TYLENOL) 500 MG tablet Take 1,000 mg by mouth every 6 hours as needed for mild pain      albuterol (PROAIR HFA/PROVENTIL HFA/VENTOLIN HFA) 108 (90 Base) MCG/ACT inhaler Inhale 2 puffs into the lungs every 4 hours as needed for shortness of breath or wheezing      anastrozole (ARIMIDEX) 1 MG tablet Take 1 tablet (1 mg) by mouth daily 90 tablet 1    apixaban ANTICOAGULANT (ELIQUIS) 5 MG tablet Take 1 tablet (5 mg) by mouth 2 times daily 120 tablet 0    calcium carbonate 600 mg-vitamin D 400 units (CALTRATE) 600-400 MG-UNIT per tablet Take 1 tablet by mouth      Ergocalciferol 50 MCG (2000 UT) TABS Take 2,000 Units by mouth daily      medical cannabis (Patient's own supply) See Admin Instructions (The  purpose of this order is to document that the patient reports taking medical cannabis.  This is not a prescription, and is not used to certify that the patient has a qualifying medical condition.)      melatonin 1 MG TABS tablet Take 3 tablets (3 mg) by mouth nightly as needed for sleep      metoprolol succinate ER (TOPROL XL) 50 MG 24 hr tablet Take 1 tablet (50 mg) by mouth at bedtime 90 tablet 11    PARoxetine (PAXIL) 20 MG tablet TAKE 1 TABLET BY MOUTH ONCE DAILY .  APPOINTMENT  NEEDED  FOR  ADDITIONAL  REFILLS 90 tablet 1    spironolactone (ALDACTONE) 50 MG tablet Take 1 tablet (50 mg) by mouth daily 90 tablet 11    Vitamin D3 (VITAMIN D-1000 MAX ST) 25 mcg (1000 units) tablet Take 25 mcg by mouth daily      cefdinir (OMNICEF) 300 MG capsule Take 1 capsule (300 mg) by mouth 2 times daily (Patient not taking: Reported on 2/1/2024) 10 capsule 0    metroNIDAZOLE (FLAGYL) 500 MG tablet Take 1 tablet (500 mg) by mouth 3 times daily (Patient not taking: Reported on 2/1/2024) 15 tablet 0    oxyCODONE (ROXICODONE) 5 MG tablet Take 1 tablet (5 mg) by mouth every 6 hours as needed for pain (Patient not taking: Reported on 2/1/2024) 10 tablet 0    sacubitril-valsartan (ENTRESTO) 24-26 MG per tablet Take 1 tablet by mouth 2 times daily (Patient not taking: Reported on 3/28/2024) 90 tablet 11    traMADol (ULTRAM) 50 MG tablet Take 50 mg by mouth every 6 hours as needed for severe pain (Patient not taking: Reported on 2/1/2024)         ALLERGIES     Allergies   Allergen Reactions    Naproxen      Other reaction(s): Hematologic  Vaginal bleeding       PAST MEDICAL HISTORY:  Past Medical History:   Diagnosis Date    Basal cell carcinoma     Breast cancer (H)     Obesity        PAST SURGICAL HISTORY:  Past Surgical History:   Procedure Laterality Date    BREAST SURGERY      left tissue expander, LEFT MASTECTOMY    CHOLECYSTECTOMY      GYN SURGERY      oophorectomy    REMOVE TISSUE EXPANDER BREAST  4/16/2014    Procedure:  REMOVAL OF LEFT BREAST TISSUE EXPANDER, IRRIGATION AND DEBRIDEMENT OF LEFT BREAST;  Surgeon: Marlon Luz MD;  [facility]: [facility]       FAMILY HISTORY:  No family history on file.    SOCIAL HISTORY:  Social History     Socioeconomic History    Marital status:      Spouse name: None    Number of children: None    Years of education: None    Highest education level: None   Tobacco Use    Smoking status: Former     Types: Cigarettes     Quit date: 2000     Years since quittin.2    Smokeless tobacco: Never   Vaping Use    Vaping Use: Never used   Substance and Sexual Activity    Alcohol use: Yes     Comment: social drinking    Drug use: Yes     Types: Marijuana     Comment: medical marijuana syrup     Sexual activity: Not Currently     Partners: Female, Male     Social Determinants of Health     Financial Resource Strain: Low Risk  (2023)    Financial Resource Strain     Within the past 12 months, have you or your family members you live with been unable to get utilities (heat, electricity) when it was really needed?: No   Food Insecurity: Low Risk  (2023)    Food Insecurity     Within the past 12 months, did you worry that your food would run out before you got money to buy more?: No     Within the past 12 months, did the food you bought just not last and you didn t have money to get more?: No   Transportation Needs: Low Risk  (2023)    Transportation Needs     Within the past 12 months, has lack of transportation kept you from medical appointments, getting your medicines, non-medical meetings or appointments, work, or from getting things that you need?: No   Interpersonal Safety: Low Risk  (10/25/2023)    Interpersonal Safety     Do you feel physically and emotionally safe where you currently live?: Yes     Within the past 12 months, have you been hit, slapped, kicked or otherwise physically hurt by someone?: No     Within the past 12 months, have you been humiliated or  "emotionally abused in other ways by your partner or ex-partner?: No   Housing Stability: Low Risk  (11/30/2023)    Housing Stability     Do you have housing? : Yes     Are you worried about losing your housing?: No       Review of Systems:  Skin:        Eyes:       ENT:       Respiratory:  Negative shortness of breath;cough;wheezing  Cardiovascular:  Negative;palpitations;chest pain;edema;lightheadedness;dizziness;syncope or near-syncope    Gastroenterology:      Genitourinary:       Musculoskeletal:       Neurologic:  Positive for numbness or tingling of feet  Psychiatric:       Heme/Lymph/Imm:  Positive for allergies  Endocrine:         Physical Exam:    Vitals: /72 (BP Location: Right arm, Patient Position: Sitting, Cuff Size: Adult Large)   Pulse 78   Resp 18   Ht 1.651 m (5' 5\")   Wt 98.7 kg (217 lb 8 oz)   SpO2 97%   BMI 36.19 kg/m    Constitutional: well nourished and in no apparent distress.  Eyes: Pupils equal, round. Sclerae anicteric.   HEENT: Normocephalic, atraumatic.   Neck: Supple. No JVD   Respiratory: Breathing non-labored. Lungs clear to auscultation bilaterally. No crackles, wheezes, rhonchi, or rales.  Cardiovascular:  Regular rate and rhythm, normal S1 and S2. No murmur, rub, or gallop.  Skin: Warm, dry. No rashes, cyanosis, or xanthelasma.  Extremities: No edema.  Neurologic: No gross motor deficits. Alert, awake, and oriented to person, place and time.  Psychiatric: Initially pale and slightly diaphoretic.  Coloring improved after having a small can of soda and 2 cookies.    Recent Lab Results:  LIPID RESULTS:  Lab Results   Component Value Date    CHOL 206 (H) 10/27/2023    CHOL 219 (A) 01/29/2018    HDL 41 (L) 10/27/2023    HDL 45 01/29/2018     (H) 10/27/2023     01/29/2018    TRIG 171 (H) 10/27/2023    TRIG 225 01/29/2018       LIVER ENZYME RESULTS:  Lab Results   Component Value Date    AST 29 12/23/2023    AST 17 11/14/2019    ALT 24 12/23/2023    ALT 16 " "11/14/2019       CBC RESULTS:  Lab Results   Component Value Date    WBC 15.2 (H) 12/23/2023    WBC 6.2 04/15/2019    RBC 4.01 12/23/2023    RBC 3.98 04/15/2019    HGB 12.2 12/23/2023    HGB 12.4 04/15/2019    HCT 37.7 12/23/2023    HCT 38.0 04/15/2019    MCV 94 12/23/2023    MCV 96 04/15/2019    MCH 30.4 12/23/2023    MCH 31.2 04/15/2019    MCHC 32.4 12/23/2023    MCHC 32.6 04/15/2019    RDW 14.6 12/23/2023    RDW 13.2 04/15/2019     12/23/2023     04/15/2019       BMP RESULTS:  Lab Results   Component Value Date     03/04/2024     04/15/2019    POTASSIUM 4.1 03/04/2024    POTASSIUM 4.0 11/14/2019    CHLORIDE 101 03/04/2024    CHLORIDE 106 04/15/2019    CO2 25 03/04/2024    CO2 26 04/15/2019    ANIONGAP 12 03/04/2024    ANIONGAP 8 04/15/2019     (H) 03/04/2024    GLC 85 10/01/2023     11/14/2019    BUN 18.3 03/04/2024    BUN 13 04/15/2019    CR 0.98 (H) 03/04/2024    CR 0.66 11/14/2019    GFRESTIMATED 65 03/04/2024    GFRESTIMATED >60 11/14/2019    GFRESTBLACK >60 04/19/2019    SINDY 9.9 03/04/2024    SINDY 8.5 04/15/2019        A1C RESULTS:  Lab Results   Component Value Date    A1C 5.3 01/29/2018       INR RESULTS:  No results found for: \"INR\"        CC  Khadijah Alexander, APRN CNP  [address]  [place] [id number]      Thank you for allowing me to participate in the care of your patient.      Sincerely,     Khadijah Alexander, NP, APRN CNP     [facility]  "

## 2024-04-30 ENCOUNTER — HOSPITAL ENCOUNTER (EMERGENCY)
Facility: CLINIC | Age: 64
Discharge: HOME | End: 2024-04-30
Attending: PHYSICIAN ASSISTANT | Admitting: PHYSICIAN ASSISTANT
Payer: COMMERCIAL

## 2024-04-30 ENCOUNTER — APPOINTMENT (OUTPATIENT)
Dept: ULTRASOUND IMAGING | Facility: CLINIC | Age: 64
End: 2024-04-30
Attending: PHYSICIAN ASSISTANT
Payer: COMMERCIAL

## 2024-04-30 VITALS
HEART RATE: 68 BPM | SYSTOLIC BLOOD PRESSURE: 118 MMHG | OXYGEN SATURATION: 100 % | DIASTOLIC BLOOD PRESSURE: 78 MMHG | BODY MASS INDEX: 37.32 KG/M2 | RESPIRATION RATE: 20 BRPM | WEIGHT: 224 LBS | TEMPERATURE: 98.7 F | HEIGHT: 65 IN

## 2024-04-30 DIAGNOSIS — I89.0 LYMPHEDEMA: ICD-10-CM

## 2024-04-30 DIAGNOSIS — M70.62 TROCHANTERIC BURSITIS OF LEFT HIP: ICD-10-CM

## 2024-04-30 DIAGNOSIS — L03.114 CELLULITIS OF LEFT UPPER EXTREMITY: ICD-10-CM

## 2024-04-30 LAB
ANION GAP SERPL CALCULATED.3IONS-SCNC: 10 MMOL/L (ref 7–15)
BASOPHILS # BLD AUTO: 0 10E3/UL (ref 0–0.2)
BASOPHILS NFR BLD AUTO: 0 %
BUN SERPL-MCNC: 15.4 MG/DL (ref 8–23)
CALCIUM SERPL-MCNC: 9.2 MG/DL (ref 8.8–10.2)
CHLORIDE SERPL-SCNC: 101 MMOL/L (ref 98–107)
CREAT SERPL-MCNC: 0.77 MG/DL (ref 0.51–0.95)
CRP SERPL-MCNC: 210.89 MG/L
DEPRECATED HCO3 PLAS-SCNC: 25 MMOL/L (ref 22–29)
EGFRCR SERPLBLD CKD-EPI 2021: 86 ML/MIN/1.73M2
EOSINOPHIL # BLD AUTO: 0.1 10E3/UL (ref 0–0.7)
EOSINOPHIL NFR BLD AUTO: 1 %
ERYTHROCYTE [DISTWIDTH] IN BLOOD BY AUTOMATED COUNT: 14.3 % (ref 10–15)
GLUCOSE SERPL-MCNC: 170 MG/DL (ref 70–99)
HCT VFR BLD AUTO: 35 % (ref 35–47)
HGB BLD-MCNC: 11.5 G/DL (ref 11.7–15.7)
IMM GRANULOCYTES # BLD: 0.1 10E3/UL
IMM GRANULOCYTES NFR BLD: 1 %
LACTATE SERPL-SCNC: 1.2 MMOL/L (ref 0.7–2)
LYMPHOCYTES # BLD AUTO: 1.4 10E3/UL (ref 0.8–5.3)
LYMPHOCYTES NFR BLD AUTO: 15 %
MCH RBC QN AUTO: 31.9 PG (ref 26.5–33)
MCHC RBC AUTO-ENTMCNC: 32.9 G/DL (ref 31.5–36.5)
MCV RBC AUTO: 97 FL (ref 78–100)
MONOCYTES # BLD AUTO: 0.6 10E3/UL (ref 0–1.3)
MONOCYTES NFR BLD AUTO: 6 %
NEUTROPHILS # BLD AUTO: 7.5 10E3/UL (ref 1.6–8.3)
NEUTROPHILS NFR BLD AUTO: 78 %
NRBC # BLD AUTO: 0 10E3/UL
NRBC BLD AUTO-RTO: 0 /100
PLATELET # BLD AUTO: 191 10E3/UL (ref 150–450)
POTASSIUM SERPL-SCNC: 3.8 MMOL/L (ref 3.4–5.3)
RBC # BLD AUTO: 3.61 10E6/UL (ref 3.8–5.2)
SODIUM SERPL-SCNC: 136 MMOL/L (ref 135–145)
WBC # BLD AUTO: 9.7 10E3/UL (ref 4–11)

## 2024-04-30 PROCEDURE — 86140 C-REACTIVE PROTEIN: CPT | Performed by: PHYSICIAN ASSISTANT

## 2024-04-30 PROCEDURE — 93971 EXTREMITY STUDY: CPT | Mod: LT

## 2024-04-30 PROCEDURE — 83605 ASSAY OF LACTIC ACID: CPT | Performed by: PHYSICIAN ASSISTANT

## 2024-04-30 PROCEDURE — 36415 COLL VENOUS BLD VENIPUNCTURE: CPT | Performed by: PHYSICIAN ASSISTANT

## 2024-04-30 PROCEDURE — 85025 COMPLETE CBC W/AUTO DIFF WBC: CPT | Performed by: PHYSICIAN ASSISTANT

## 2024-04-30 PROCEDURE — 99285 EMERGENCY DEPT VISIT HI MDM: CPT | Mod: 25 | Performed by: PHYSICIAN ASSISTANT

## 2024-04-30 PROCEDURE — 99284 EMERGENCY DEPT VISIT MOD MDM: CPT | Performed by: PHYSICIAN ASSISTANT

## 2024-04-30 PROCEDURE — 96365 THER/PROPH/DIAG IV INF INIT: CPT | Performed by: PHYSICIAN ASSISTANT

## 2024-04-30 PROCEDURE — 80048 BASIC METABOLIC PNL TOTAL CA: CPT | Performed by: PHYSICIAN ASSISTANT

## 2024-04-30 PROCEDURE — 250N000011 HC RX IP 250 OP 636: Performed by: PHYSICIAN ASSISTANT

## 2024-04-30 RX ORDER — CEPHALEXIN 500 MG/1
500 CAPSULE ORAL 4 TIMES DAILY
Qty: 40 CAPSULE | Refills: 0 | Status: SHIPPED | OUTPATIENT
Start: 2024-04-30 | End: 2024-05-10

## 2024-04-30 RX ORDER — PIPERACILLIN SODIUM, TAZOBACTAM SODIUM 4; .5 G/20ML; G/20ML
4.5 INJECTION, POWDER, LYOPHILIZED, FOR SOLUTION INTRAVENOUS EVERY 6 HOURS
Status: DISCONTINUED | OUTPATIENT
Start: 2024-04-30 | End: 2024-04-30 | Stop reason: HOSPADM

## 2024-04-30 RX ORDER — HEPARIN SODIUM (PORCINE) LOCK FLUSH IV SOLN 100 UNIT/ML 100 UNIT/ML
500 SOLUTION INTRAVENOUS EVERY 8 HOURS
Status: DISCONTINUED | OUTPATIENT
Start: 2024-04-30 | End: 2024-04-30 | Stop reason: HOSPADM

## 2024-04-30 RX ADMIN — PIPERACILLIN AND TAZOBACTAM 4.5 G: 4; .5 INJECTION, POWDER, FOR SOLUTION INTRAVENOUS at 13:12

## 2024-04-30 RX ADMIN — HEPARIN 500 UNITS: 100 SYRINGE at 14:36

## 2024-04-30 ASSESSMENT — COLUMBIA-SUICIDE SEVERITY RATING SCALE - C-SSRS
2. HAVE YOU ACTUALLY HAD ANY THOUGHTS OF KILLING YOURSELF IN THE PAST MONTH?: NO
1. IN THE PAST MONTH, HAVE YOU WISHED YOU WERE DEAD OR WISHED YOU COULD GO TO SLEEP AND NOT WAKE UP?: NO
6. HAVE YOU EVER DONE ANYTHING, STARTED TO DO ANYTHING, OR PREPARED TO DO ANYTHING TO END YOUR LIFE?: NO

## 2024-04-30 ASSESSMENT — ACTIVITIES OF DAILY LIVING (ADL)
ADLS_ACUITY_SCORE: 37
ADLS_ACUITY_SCORE: 35

## 2024-04-30 NOTE — ED PROVIDER NOTES
History     Chief Complaint   Patient presents with    arm swelling     HPI  Mariluz Stoner is a 63 year old female with a history of metastatic left-sided breast cancer and left-sided axillary lymph node dissection who presents for evaluation of increasing swelling and redness of her left upper extremity that started 2 days ago.  Initially had an elevated temperature up to 100.5, but has not been febrile since.  Occasional chills.  Nausea but no current vomiting.  1 episode of vomiting yesterday.  She does report pain in the left upper extremity which she rates 5-6 on a scale of 10 and described as dull and aching.  Has not take anything for this.  Reports that she has a recurrent history of left upper extremity cellulitis.  Does not recall the last time that which she was treated.  She does not recall a history of MRSA.  She denies any skin lesions leading up to this.  No access point that she is aware of.  No bug bites.  Denies skin rashes elsewhere on her body.  Also describes some left lateral hip discomfort which is worse with movement.  Started yesterday.  No recent fall or injury.  No clicking or locking of her hip.  Denies any numbness or tingling of the left lower extremity.  No lower extremity edema.  No significant back pain.          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) 09/29/2023     Priority: Medium    Sinus bradycardia 09/29/2023     Priority: Medium    Secondary malignant neoplasm of bone (H) 06/13/2023     Priority: Medium    Pancolitis (H) 06/13/2023     Priority: Medium    Morbid obesity (H) 03/18/2020      Priority: Medium    Metastatic breast cancer 2019     Priority: Medium    Mild episode of recurrent major depressive disorder (H24) 2019     Priority: Medium    Essential hypertension 2019     Priority: Medium    Dermatitis 2019     Priority: Medium        Past Medical History:    Past Medical History:   Diagnosis Date    Basal cell carcinoma     Breast cancer (H)     Obesity        Past Surgical History:    Past Surgical History:   Procedure Laterality Date    BREAST SURGERY      left tissue expander, LEFT MASTECTOMY    CHOLECYSTECTOMY      GYN SURGERY      oophorectomy    REMOVE TISSUE EXPANDER BREAST  2014    Procedure: REMOVAL OF LEFT BREAST TISSUE EXPANDER, IRRIGATION AND DEBRIDEMENT OF LEFT BREAST;  Surgeon: Marlon Luz MD;  Location: [facility]       Family History:    No family history on file.    Social History:  Marital Status:   [2]  Social History     Tobacco Use    Smoking status: Former     Current packs/day: 0.00     Types: Cigarettes     Quit date: 2000     Years since quittin.3    Smokeless tobacco: Never   Vaping Use    Vaping status: Never Used   Substance Use Topics    Alcohol use: Yes     Comment: social drinking    Drug use: Yes     Types: Marijuana     Comment: medical marijuana syrup         Medications:    cephALEXin (KEFLEX) 500 MG capsule  acetaminophen (TYLENOL) 500 MG tablet  albuterol (PROAIR HFA/PROVENTIL HFA/VENTOLIN HFA) 108 (90 Base) MCG/ACT inhaler  anastrozole (ARIMIDEX) 1 MG tablet  apixaban ANTICOAGULANT (ELIQUIS) 5 MG tablet  calcium carbonate 600 mg-vitamin D 400 units (CALTRATE) 600-400 MG-UNIT per tablet  Ergocalciferol 50 MCG (2000 UT) TABS  medical cannabis (Patient's own supply)  melatonin 1 MG TABS tablet  metoprolol succinate ER (TOPROL XL) 50 MG 24 hr tablet  PARoxetine (PAXIL) 20 MG tablet  spironolactone (ALDACTONE) 50 MG tablet  traMADol (ULTRAM) 50 MG tablet  Vitamin D3 (VITAMIN D-1000 MAX ST) 25 mcg (1000 units)  "tablet          Review of Systems   All other systems reviewed and are negative.      Physical Exam   BP: 121/80  Pulse: 73  Temp: 98.7  F (37.1  C)  Resp: 20  Height: 165.1 cm (5' 5\")  Weight: 101.6 kg (224 lb)  SpO2: 100 %      Physical Exam  Vitals and nursing note reviewed.   Constitutional:       General: She is not in acute distress.     Appearance: She is not diaphoretic.   HENT:      Head: Normocephalic and atraumatic.      Right Ear: External ear normal.      Left Ear: External ear normal.      Nose: Nose normal.      Mouth/Throat:      Pharynx: No oropharyngeal exudate.   Eyes:      General: No scleral icterus.        Right eye: No discharge.         Left eye: No discharge.      Conjunctiva/sclera: Conjunctivae normal.      Pupils: Pupils are equal, round, and reactive to light.   Neck:      Thyroid: No thyromegaly.   Cardiovascular:      Rate and Rhythm: Normal rate and regular rhythm.      Heart sounds: Normal heart sounds. No murmur heard.  Pulmonary:      Effort: Pulmonary effort is normal. No respiratory distress.      Breath sounds: Normal breath sounds. No wheezing or rales.   Chest:      Chest wall: No tenderness.   Abdominal:      General: Bowel sounds are normal. There is no distension.      Palpations: Abdomen is soft. There is no mass.      Tenderness: There is no abdominal tenderness. There is no guarding or rebound.   Musculoskeletal:         General: No deformity. Normal range of motion.      Cervical back: Normal range of motion and neck supple.      Comments: Lymphedema noted of the left upper extremity.  Generalized erythema starting at the shoulder down to the wrist.  No induration or fluctuance.  No identifiable abscess.  I do not identify an access point.  Distal pulses 2+.  Sensation intact to light touch.  Normal range of motion of the shoulder, elbow, and wrist.  Left lateral hip is very tender to palpation over the trochanteric bursa.  This reproduces her pain.  She has good range " of motion of the hip with the extreme of internal and external rotation.  Normal flexion as well.  SLR negative.  Lower extremity sensation intact.  Negative Homans' sign.  No palpable cord in the calf.  No lower extremity edema.   Lymphadenopathy:      Cervical: No cervical adenopathy.   Skin:     General: Skin is warm and dry.      Capillary Refill: Capillary refill takes less than 2 seconds.      Findings: No erythema or rash.   Neurological:      Mental Status: She is alert and oriented to person, place, and time.      Cranial Nerves: No cranial nerve deficit.   Psychiatric:         Behavior: Behavior normal.         Thought Content: Thought content normal.               ED Course        Procedures              Critical Care time:  none               Results for orders placed or performed during the hospital encounter of 04/30/24 (from the past 24 hour(s))   CBC with platelets differential    Narrative    The following orders were created for panel order CBC with platelets differential.  Procedure                               Abnormality         Status                     ---------                               -----------         ------                     CBC with platelets and d...[[id number]]  Abnormal            Final result                 Please view results for these tests on the individual orders.   Lactic acid whole blood w/ reflex x1 in 3 Hr when >2   Result Value Ref Range    Lactic Acid, Initial 1.2 0.7 - 2.0 mmol/L   Basic metabolic panel   Result Value Ref Range    Sodium 136 135 - 145 mmol/L    Potassium 3.8 3.4 - 5.3 mmol/L    Chloride 101 98 - 107 mmol/L    Carbon Dioxide (CO2) 25 22 - 29 mmol/L    Anion Gap 10 7 - 15 mmol/L    Urea Nitrogen 15.4 8.0 - 23.0 mg/dL    Creatinine 0.77 0.51 - 0.95 mg/dL    GFR Estimate 86 >60 mL/min/1.73m2    Calcium 9.2 8.8 - 10.2 mg/dL    Glucose 170 (H) 70 - 99 mg/dL   CRP inflammation   Result Value Ref Range    CRP Inflammation 210.89 (H) <5.00 mg/L   CBC with  platelets and differential   Result Value Ref Range    WBC Count 9.7 4.0 - 11.0 10e3/uL    RBC Count 3.61 (L) 3.80 - 5.20 10e6/uL    Hemoglobin 11.5 (L) 11.7 - 15.7 g/dL    Hematocrit 35.0 35.0 - 47.0 %    MCV 97 78 - 100 fL    MCH 31.9 26.5 - 33.0 pg    MCHC 32.9 31.5 - 36.5 g/dL    RDW 14.3 10.0 - 15.0 %    Platelet Count 191 150 - 450 10e3/uL    % Neutrophils 78 %    % Lymphocytes 15 %    % Monocytes 6 %    % Eosinophils 1 %    % Basophils 0 %    % Immature Granulocytes 1 %    NRBCs per 100 WBC 0 <1 /100    Absolute Neutrophils 7.5 1.6 - 8.3 10e3/uL    Absolute Lymphocytes 1.4 0.8 - 5.3 10e3/uL    Absolute Monocytes 0.6 0.0 - 1.3 10e3/uL    Absolute Eosinophils 0.1 0.0 - 0.7 10e3/uL    Absolute Basophils 0.0 0.0 - 0.2 10e3/uL    Absolute Immature Granulocytes 0.1 <=0.4 10e3/uL    Absolute NRBCs 0.0 10e3/uL   US Upper Extremity Venous Duplex Left    Narrative    US UPPER EXTREMITY VENOUS DUPLEX LEFT  4/30/2024 1:47 PM     HISTORY: left UE swelling/pain/redness    COMPARISON: None.    FINDINGS: Color Doppler and spectral waveform analysis performed.  There is no evidence for DVT in the left upper extremity.      Impression    IMPRESSION: No evidence of deep venous thrombosis.      IVON QUINTERO MD         SYSTEM ID:  [id number]       Medications - No data to display      Assessments & Plan (with Medical Decision Making)     Cellulitis of left upper extremity  Lymphedema  Trochanteric bursitis of left hip     63 year old female with a history of metastatic breast cancer on the left side with axillary lymph node dissection history who presents for evaluation of left upper extremity increased swelling, redness, discomfort, and report of elevated temperature 2 days ago.  Nausea but no active vomiting.  1 episode of vomiting yesterday.  Also started with some left hip pain recently.  No prior history of MRSA.  Reports recurrent episodes of left upper extremity cellulitis to ever since her lymph node dissection  procedure.  Blood pressure 121/80, temperature 98.7, pulse 73, respiration 20, oxygen saturation 100% on room air.  Patient is in no acute distress.  Lymphedema noted of the left upper extremity with generalized erythema down to the wrist.  No induration or fluctuance.  Increased warmth to palpation.  No identifiable abscess.  Pulses and sensation intact.  Left hip with good range of motion including the extremes of flexion, internal/external rotation.  Very tender to palpation with reproducible tenderness over the lateral hip suggestive of trochanteric bursitis.  Lumbar exam otherwise normal.  IV was established.  Laboratory levels display normal white blood cell count of 9700.  Hemoglobin stable for her at 11.5.  Basic metabolic panel normal other than an elevated nonfasting glucose of 170.  CRP elevation at 210.  Lactic acid level normal at 1.2.  Ultrasound of the left upper extremity without DVT.  Patient received a dose of IV Zosyn here in the emergency department.  She does not have any history of MRSA.  Has had success with Keflex in the past per her report, and I think that is an appropriate medication currently.  She will start this as an outpatient.  Elevation, compression, and warm compresses reviewed.  She has a compression sleeve and will put this on right when she gets home.  Strict ED return instruction reviewed with the patient in detail.  She was in agreement with this plan and was suitable for discharge.  Discussed the inflammatory disorder of trochanteric bursitis.  Encouraged her to ice the area for 20 minutes every couple hours.  Okay to use anti-inflammatory treatment.  Stretches demonstrated and discussed.  Follow-up with primary care in 4-5 days for follow-up of both of these conditions.  Could always consider steroid injection if not improving.     I have reviewed the nursing notes.    I have reviewed the findings, diagnosis, plan and need for follow up with the patient.           Medical  Decision Making  The patient's presentation was of moderate complexity (an acute illness with systemic symptoms).    The patient's evaluation involved:  ordering and/or review of 3+ test(s) in this encounter (see separate area of note for details)    The patient's management necessitated moderate risk (prescription drug management including medications given in the ED).        Discharge Medication List as of 4/30/2024  2:27 PM        START taking these medications    Details   cephALEXin (KEFLEX) 500 MG capsule Take 1 capsule (500 mg) by mouth 4 times daily for 10 days, Disp-40 capsule, R-0, E-Prescribe             Final diagnoses:   Cellulitis of left upper extremity   Lymphedema   Trochanteric bursitis of left hip       Disclaimer: This note consists of symbols derived from keyboarding, dictation and/or voice recognition software. As a result, there may be errors in the script that have gone undetected. Please consider this when interpreting information found in this chart.      4/30/2024   [facility] EMERGENCY DEPT       Hiren Robles PA-C  04/30/24 0332

## 2024-04-30 NOTE — DISCHARGE INSTRUCTIONS
It was a pleasure working with you today!  I hope your condition improves rapidly!     Please take your first pill antibiotic dose at 8 PM this evening.  Please elevate your arm is much as possible.  Wear your lymphedema sleeve at all times for compression.  Please use a heating pad on your arm, mostly of the upper middle part of your arm, for 20 minutes every 1 hour for the next few days.  Return if your swelling, pain, or fever worsens despite this treatment.    Based on your exam, you do have bursitis of the trochanteric bursa on the outside of your hip.  Please perform the stretch we demonstrated 10 repetitions every 3 hours.  Ice the outside of your hip for 20 minutes every 2 hours.  If you tolerate anti-inflammatories, it is okay to use ibuprofen 600 mg every 6 hours as needed for inflammation and pain.  Please schedule a follow-up appointment with your primary care provider in 1 week to recheck your conditions.

## 2024-04-30 NOTE — ED TRIAGE NOTES
Pt experiencing left arm swelling and redness for the past two days. She started experiencing left hip pain yesterday. Low grade fevers and chills and vomiting two days ago.      Triage Assessment (Adult)       Row Name 04/30/24 1211          Triage Assessment    Airway WDL WDL        Respiratory WDL    Respiratory WDL WDL        Skin Circulation/Temperature WDL    Skin Circulation/Temperature WDL WDL        Cardiac WDL    Cardiac WDL WDL        Peripheral/Neurovascular WDL    Peripheral Neurovascular WDL WDL        Cognitive/Neuro/Behavioral WDL    Cognitive/Neuro/Behavioral WDL WDL                     
1

## 2024-05-02 ENCOUNTER — HOSPITAL ENCOUNTER (OUTPATIENT)
Dept: CARDIOLOGY | Facility: CLINIC | Age: 64
Discharge: HOME | End: 2024-05-02
Attending: NURSE PRACTITIONER | Admitting: NURSE PRACTITIONER
Payer: COMMERCIAL

## 2024-05-02 DIAGNOSIS — I42.7 CARDIOMYOPATHY SECONDARY TO DRUG (H): ICD-10-CM

## 2024-05-02 LAB — LVEF ECHO: NORMAL

## 2024-05-02 PROCEDURE — 93321 DOPPLER ECHO F-UP/LMTD STD: CPT | Mod: 26 | Performed by: INTERNAL MEDICINE

## 2024-05-02 PROCEDURE — 255N000002 HC RX 255 OP 636: Performed by: NURSE PRACTITIONER

## 2024-05-02 PROCEDURE — 93308 TTE F-UP OR LMTD: CPT | Mod: 26 | Performed by: INTERNAL MEDICINE

## 2024-05-02 PROCEDURE — 93325 DOPPLER ECHO COLOR FLOW MAPG: CPT | Mod: 26 | Performed by: INTERNAL MEDICINE

## 2024-05-02 RX ADMIN — PERFLUTREN 3 ML: 6.52 INJECTION, SUSPENSION INTRAVENOUS at 11:45

## 2024-05-16 ENCOUNTER — TRANSFERRED RECORDS (OUTPATIENT)
Dept: MULTI SPECIALTY CLINIC | Facility: CLINIC | Age: 64
End: 2024-05-16

## 2024-05-22 ENCOUNTER — DOCUMENTATION ONLY (OUTPATIENT)
Dept: OTHER | Facility: CLINIC | Age: 64
End: 2024-05-22
Payer: COMMERCIAL

## 2024-06-24 ENCOUNTER — TELEPHONE (OUTPATIENT)
Dept: CARDIOLOGY | Facility: CLINIC | Age: 64
End: 2024-06-24
Payer: COMMERCIAL

## 2024-06-24 DIAGNOSIS — I42.7 CARDIOMYOPATHY SECONDARY TO DRUG (H): ICD-10-CM

## 2024-06-24 RX ORDER — SPIRONOLACTONE 50 MG/1
TABLET, FILM COATED ORAL
Qty: 45 TABLET | Refills: 11 | Status: ON HOLD | OUTPATIENT
Start: 2024-06-24 | End: 2024-09-26

## 2024-06-24 NOTE — TELEPHONE ENCOUNTER
Medication refilled. Patient has follow up visit scheduled with Khadijah Alexander NP on 7/11/24.

## 2024-06-24 NOTE — TELEPHONE ENCOUNTER
[facility] Call Center    Phone Message    May a detailed message be left on voicemail: yes     Reason for Call: Medication Refill Request    Has the patient contacted the pharmacy for the refill? Yes   Name of medication being requested: Spironolactone 25mg  Provider who prescribed the medication: Khadijah Alexander  Pharmacy: [facility]  Date medication is needed: 06/24/2024       Action Taken: Other: Cardio    Travel Screening: Not Applicable     Date of Service:

## 2024-07-05 ENCOUNTER — HOSPITAL ENCOUNTER (OUTPATIENT)
Dept: CARDIOLOGY | Facility: CLINIC | Age: 64
Discharge: HOME | End: 2024-07-05
Attending: INTERNAL MEDICINE | Admitting: INTERNAL MEDICINE
Payer: COMMERCIAL

## 2024-07-05 VITALS — HEART RATE: 82 BPM | DIASTOLIC BLOOD PRESSURE: 84 MMHG | SYSTOLIC BLOOD PRESSURE: 140 MMHG

## 2024-07-05 DIAGNOSIS — I42.7 CARDIOMYOPATHY SECONDARY TO DRUG (H): ICD-10-CM

## 2024-07-05 PROCEDURE — A9585 GADOBUTROL INJECTION: HCPCS | Performed by: INTERNAL MEDICINE

## 2024-07-05 PROCEDURE — 255N000002 HC RX 255 OP 636: Performed by: INTERNAL MEDICINE

## 2024-07-05 PROCEDURE — 93016 CV STRESS TEST SUPVJ ONLY: CPT | Performed by: INTERNAL MEDICINE

## 2024-07-05 PROCEDURE — 250N000011 HC RX IP 250 OP 636: Performed by: INTERNAL MEDICINE

## 2024-07-05 PROCEDURE — 75563 CARD MRI W/STRESS IMG & DYE: CPT

## 2024-07-05 PROCEDURE — 93018 CV STRESS TEST I&R ONLY: CPT | Performed by: INTERNAL MEDICINE

## 2024-07-05 PROCEDURE — 75563 CARD MRI W/STRESS IMG & DYE: CPT | Mod: 26 | Performed by: INTERNAL MEDICINE

## 2024-07-05 RX ORDER — ALBUTEROL SULFATE 90 UG/1
2 AEROSOL, METERED RESPIRATORY (INHALATION) EVERY 5 MIN PRN
Status: DISCONTINUED | OUTPATIENT
Start: 2024-07-05 | End: 2024-07-06 | Stop reason: HOSPADM

## 2024-07-05 RX ORDER — LORAZEPAM 0.5 MG/1
0.5 TABLET ORAL EVERY 10 MIN PRN
Status: DISCONTINUED | OUTPATIENT
Start: 2024-07-05 | End: 2024-07-06 | Stop reason: HOSPADM

## 2024-07-05 RX ORDER — DIAZEPAM 5 MG
5 TABLET ORAL EVERY 30 MIN PRN
Status: DISCONTINUED | OUTPATIENT
Start: 2024-07-05 | End: 2024-07-06 | Stop reason: HOSPADM

## 2024-07-05 RX ORDER — NITROGLYCERIN 0.4 MG/1
0.4 TABLET SUBLINGUAL EVERY 5 MIN PRN
Status: ACTIVE | OUTPATIENT
Start: 2024-07-05 | End: 2024-07-05

## 2024-07-05 RX ORDER — LIDOCAINE 40 MG/G
CREAM TOPICAL
Status: DISCONTINUED | OUTPATIENT
Start: 2024-07-05 | End: 2024-07-06 | Stop reason: HOSPADM

## 2024-07-05 RX ORDER — REGADENOSON 0.08 MG/ML
0.4 INJECTION, SOLUTION INTRAVENOUS ONCE
Status: COMPLETED | OUTPATIENT
Start: 2024-07-05 | End: 2024-07-05

## 2024-07-05 RX ORDER — CAFFEINE 200 MG
200 TABLET ORAL
Status: SHIPPED | OUTPATIENT
Start: 2024-07-05 | End: 2024-07-05

## 2024-07-05 RX ORDER — AMINOPHYLLINE 25 MG/ML
50-100 INJECTION, SOLUTION INTRAVENOUS
Status: COMPLETED | OUTPATIENT
Start: 2024-07-05 | End: 2024-07-05

## 2024-07-05 RX ORDER — SODIUM CHLORIDE 9 MG/ML
INJECTION, SOLUTION INTRAVENOUS CONTINUOUS
Status: ACTIVE | OUTPATIENT
Start: 2024-07-05 | End: 2024-07-05

## 2024-07-05 RX ORDER — CAFFEINE CITRATE 20 MG/ML
60 SOLUTION INTRAVENOUS
Status: ACTIVE | OUTPATIENT
Start: 2024-07-05 | End: 2024-07-05

## 2024-07-05 RX ORDER — GADOBUTROL 604.72 MG/ML
28 INJECTION INTRAVENOUS ONCE
Status: COMPLETED | OUTPATIENT
Start: 2024-07-05 | End: 2024-07-05

## 2024-07-05 RX ADMIN — AMINOPHYLLINE 50 MG: 25 INJECTION, SOLUTION INTRAVENOUS at 14:59

## 2024-07-05 RX ADMIN — GADOBUTROL 26 ML: 604.72 INJECTION INTRAVENOUS at 14:47

## 2024-07-05 RX ADMIN — REGADENOSON 0.4 MG: 0.08 INJECTION, SOLUTION INTRAVENOUS at 14:06

## 2024-07-11 ENCOUNTER — OFFICE VISIT (OUTPATIENT)
Dept: CARDIOLOGY | Facility: CLINIC | Age: 64
End: 2024-07-11
Attending: NURSE PRACTITIONER
Payer: COMMERCIAL

## 2024-07-11 VITALS
BODY MASS INDEX: 38.9 KG/M2 | HEIGHT: 65 IN | HEART RATE: 79 BPM | WEIGHT: 233.5 LBS | RESPIRATION RATE: 20 BRPM | DIASTOLIC BLOOD PRESSURE: 56 MMHG | OXYGEN SATURATION: 97 % | SYSTOLIC BLOOD PRESSURE: 124 MMHG

## 2024-07-11 DIAGNOSIS — I10 ESSENTIAL HYPERTENSION: ICD-10-CM

## 2024-07-11 DIAGNOSIS — R42 LIGHTHEADEDNESS: ICD-10-CM

## 2024-07-11 DIAGNOSIS — I42.7 CARDIOMYOPATHY SECONDARY TO DRUG (H): Primary | ICD-10-CM

## 2024-07-11 PROCEDURE — 99214 OFFICE O/P EST MOD 30 MIN: CPT | Performed by: NURSE PRACTITIONER

## 2024-07-11 PROCEDURE — G2211 COMPLEX E/M VISIT ADD ON: HCPCS | Performed by: NURSE PRACTITIONER

## 2024-07-11 RX ORDER — LISINOPRIL 5 MG/1
5 TABLET ORAL DAILY
Qty: 30 TABLET | Refills: 11 | Status: ON HOLD | OUTPATIENT
Start: 2024-07-11 | End: 2024-09-26

## 2024-07-11 ASSESSMENT — PAIN SCALES - GENERAL: PAINLEVEL: NO PAIN (0)

## 2024-07-11 NOTE — PATIENT INSTRUCTIONS
Call my nurse with any questions or concerns:  [phone]  *If you have concerns after hours, please call [phone], option 2 to speak with on call Cardiologist.    Restart lisinopril 5 mg daily  Could check insurance to see if Entresto in covered  Follow up in 4 months    Repeat echo in the spring and Dr.Manoles SCHMITZ to use melatonin

## 2024-07-11 NOTE — PROGRESS NOTES
Cardiology Clinic Progress Note  Mariluz Stoner MRN# [id number]   YOB: 1960 Age: 63 year old       HPI:    Mariluz Stoner is a delightful 63 year old patient with past medical history significant for:    Metastatic breast cancer with left mastectomy and lymph node dissection.  Treated with radiation, Herceptin followed by tamoxifen. On Arimidex  Diverticulitis with emergent laparotomy/subtotal colectomy/ileostomy  Benign positional vertigo  Hypertension  Obesity  Cardiomyopathy EF initially 55% prior to treatment 8/2019 (Care Everywhere).  Last echo EF 25 to 30%. 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  Short course of apixaban (3 months)    It is my pleasure meeting Mariluz at our [place] location.  She was seen in consultation by Dr. Vazquez in February.  She did review her cardiac MRI prior to her visit.  She is very pleased with how well she has been feeling.  States that her energy level has been good and she feels more positive since she went on a short trip to [place] in [place] with her son.    Denies chest pain, orthopnea, PND, syncope, lower extremity edema.  Is hopeful that she can have her colostomy reversed.  Scheduled to meet with oncology next month.      Cardiac MRI showed an ejection fraction of 37.5%.  Normal RV function at 57%. Regadenoson induced stress perfusion is negative for ischemia.  Delayed hyperenhancement with no scar, fibrosis, or evidence of infiltrative disease    Diagnotic studies:  cMRI 7/2024: EF 37.5%.  Normal RV function at 57%.  Regadenoson induced stress perfusion is negative for ischemia.  Delayed hyperenhancement with no scar, fibrosis, or evidence of infiltrative disease.  Echocardiogram 8/2019: EF 55 to 60%.  (Care Everywhere)  Echocardiogram 12/2019: EF 40 to 45%.  No significant valvular insufficiency (Care Everywhere  Echocardiogram 10/2023: EF 25 to 30% trace of  valvular insufficiency.  RV function normal.  Grade 1 diastolic dysfunction noted.  Severe global hypokinesis noted of the left ventricle.        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  Patient is very adamant about not being on unnecessary medications.  Explained why she needed to be on Eliquis, but patient is still not convinced she needed the medicine.    She will see if insurance will cover Entresto, but we also discussed this in March.  In the interim I will place her back on lisinopril 5 mg daily.    We tried to increase her spironolactone to 50 mg daily but she had significant lightheadedness and dizziness.  She is doing well on the 25 mg daily.    2. history of emergent subtotal colectomy ileostomy  Hoping for reversal this summer.    From a cardiac standpoint she is stable:  She is euvolemic on exam, EF improving with medication.  This is a moderate risk procedure.   No active angina, congestive heart failure symptoms, arrhythmias, or severe valvular disease.   cMRI completed last week showed regadenoson induced stress perfusion was negative for ischemia   Patient can perform more than 4 METS without chest pain or severe SOB   No further cardiac work up is necessary prior to procedure.     3.  Hypertension stable    4.  History of incidental finding of mesenteric thrombosis treated with apixaban for 3 months.  Medication was discontinued.    5.  Obesity with BMI of  38  Encourage patient to start a walking program.    I had like to see her back in 4 months.  I am hopeful that we can continue to optimize her medications.  Repeat echocardiogram can be done in the spring and a follow-up with Dr. Vazquez at that time.  If there are questions or concerns in the interim please feel free to contact us.    Khadijah Alexander NP, APRN CNP      Today's clinic visit entailed:  Review of the result(s) of each unique test - cMRI   Ordering of each unique test  Prescription drug management  30  minutes spent by me on the date of the encounter doing chart review, review of test results, patient visit, and documentation   Provider  Link to MDM Help Grid     The level of medical decision making during this visit was of moderate complexity.      Orders Placed This Encounter   Procedures    Basic metabolic panel    Follow-Up with Cardiology MEHRAN     Orders Placed This Encounter   Medications    lisinopril (ZESTRIL) 5 MG tablet     Sig: Take 1 tablet (5 mg) by mouth daily     Dispense:  30 tablet     Refill:  11     Medications Discontinued During This Encounter   Medication Reason    apixaban ANTICOAGULANT (ELIQUIS) 5 MG tablet Therapy completed (No AVS)    traMADol (ULTRAM) 50 MG tablet          Encounter Diagnosis   Name Primary?    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)        CURRENT MEDICATIONS:  Current Outpatient Medications   Medication Sig Dispense Refill    acetaminophen (TYLENOL) 500 MG tablet Take 1,000 mg by mouth every 6 hours as needed for mild pain      albuterol (PROAIR HFA/PROVENTIL HFA/VENTOLIN HFA) 108 (90 Base) MCG/ACT inhaler Inhale 2 puffs into the lungs every 4 hours as needed for shortness of breath or wheezing      anastrozole (ARIMIDEX) 1 MG tablet Take 1 tablet (1 mg) by mouth daily 90 tablet 1    calcium carbonate 600 mg-vitamin D 400 units (CALTRATE) 600-400 MG-UNIT per tablet Take 1 tablet by mouth      Ergocalciferol 50 MCG (2000 UT) TABS Take 2,000 Units by mouth daily       lisinopril (ZESTRIL) 5 MG tablet Take 1 tablet (5 mg) by mouth daily 30 tablet 11    medical cannabis (Patient's own supply) See Admin Instructions (The purpose of this order is to document that the patient reports taking medical cannabis.  This is not a prescription, and is not used to certify that the patient has a qualifying medical condition.)      melatonin 1 MG TABS tablet Take 3 tablets (3 mg) by mouth nightly as needed for sleep      metoprolol succinate ER (TOPROL XL) 50 MG 24 hr tablet Take 1 tablet (50 mg) by mouth at bedtime 90 tablet 11    PARoxetine (PAXIL) 20 MG tablet TAKE 1 TABLET BY MOUTH ONCE DAILY .  APPOINTMENT  NEEDED  FOR  ADDITIONAL  REFILLS 90 tablet 1    spironolactone (ALDACTONE) 50 MG tablet 1/2 tablet daily 45 tablet 11    Vitamin D3 (VITAMIN D-1000 MAX ST) 25 mcg (1000 units) tablet Take 25 mcg by mouth daily         ALLERGIES     Allergies   Allergen Reactions    Naproxen      Other reaction(s): Hematologic  Vaginal bleeding       PAST MEDICAL HISTORY:  Past Medical History:   Diagnosis Date    Basal cell carcinoma     Breast cancer (H)     Obesity        PAST SURGICAL HISTORY:  Past Surgical History:   Procedure Laterality Date    BREAST SURGERY      left tissue expander, LEFT MASTECTOMY    CHOLECYSTECTOMY      GYN SURGERY      oophorectomy    IR CHEST PORT PLACEMENT > 5 YRS OF AGE  1/18/2019    IR CHEST PORT PLACEMENT > 5 YRS OF AGE  4/16/2013    REMOVE TISSUE EXPANDER BREAST  4/16/2014    Procedure: REMOVAL OF LEFT BREAST TISSUE EXPANDER, IRRIGATION AND DEBRIDEMENT OF LEFT BREAST;  Surgeon: Marlon Luz MD;  Location: [facility]       FAMILY HISTORY:  No family history on file.    SOCIAL HISTORY:  Social History     Socioeconomic History    Marital status:      Spouse name: None    Number of children: None    Years of education: None    Highest education level: None   Tobacco Use    Smoking status: Former     Current packs/day: 0.00     Types: Cigarettes     Quit  date: 2000     Years since quittin.5    Smokeless tobacco: Never   Vaping Use    Vaping status: Never Used   Substance and Sexual Activity    Alcohol use: Yes     Comment: social drinking    Drug use: Yes     Types: Marijuana     Comment: medical marijuana syrup     Sexual activity: Not Currently     Partners: Female, Male     Social Determinants of Health     Financial Resource Strain: Low Risk  (2023)    Financial Resource Strain     Within the past 12 months, have you or your family members you live with been unable to get utilities (heat, electricity) when it was really needed?: No   Food Insecurity: Low Risk  (2023)    Food Insecurity     Within the past 12 months, did you worry that your food would run out before you got money to buy more?: No     Within the past 12 months, did the food you bought just not last and you didn t have money to get more?: No   Transportation Needs: Low Risk  (2023)    Transportation Needs     Within the past 12 months, has lack of transportation kept you from medical appointments, getting your medicines, non-medical meetings or appointments, work, or from getting things that you need?: No   Interpersonal Safety: Low Risk  (10/25/2023)    Interpersonal Safety     Do you feel physically and emotionally safe where you currently live?: Yes     Within the past 12 months, have you been hit, slapped, kicked or otherwise physically hurt by someone?: No     Within the past 12 months, have you been humiliated or emotionally abused in other ways by your partner or ex-partner?: No   Housing Stability: Low Risk  (2023)    Housing Stability     Do you have housing? : Yes     Are you worried about losing your housing?: No       Review of Systems:  Skin:        Eyes:       ENT:       Respiratory:  Positive for dyspnea on exertion  Cardiovascular:  Negative;palpitations;chest pain;edema;lightheadedness;dizziness;syncope or near-syncope    Gastroenterology:     "  Genitourinary:       Musculoskeletal:       Neurologic:  Positive for numbness or tingling of feet  Psychiatric:       Heme/Lymph/Imm:  Positive for allergies  Endocrine:         Physical Exam:    Vitals: /56 (BP Location: Right arm, Patient Position: Sitting, Cuff Size: Adult Large)   Pulse 79   Resp 20   Ht 1.651 m (5' 5\")   Wt 105.9 kg (233 lb 8 oz)   SpO2 97%   BMI 38.86 kg/m    Constitutional: Well nourished and in no apparent distress.  Eyes: Pupils equal, round. Sclerae anicteric.   HEENT: Normocephalic, atraumatic.   Neck: Supple. No JVD   Respiratory: Breathing non-labored. Lungs clear to auscultation bilaterally.   Cardiovascular:  Regular rate and rhythm, normal S1 and S2. No murmur  Skin: Warm, dry. No rashes, cyanosis, or xanthelasma.  Extremities: No edema.  Neurologic: No gross motor deficits. Alert, awake, and oriented to person, place and time.  Psychiatric: Affect appropriate.        Recent Lab Results:  LIPID RESULTS:  Lab Results   Component Value Date    CHOL 206 (H) 10/27/2023    CHOL 219 (A) 01/29/2018    HDL 41 (L) 10/27/2023    HDL 45 01/29/2018     (H) 10/27/2023     01/29/2018    TRIG 171 (H) 10/27/2023    TRIG 225 01/29/2018       LIVER ENZYME RESULTS:  Lab Results   Component Value Date    AST 29 12/23/2023    AST 17 11/14/2019    ALT 24 12/23/2023    ALT 16 11/14/2019       CBC RESULTS:  Lab Results   Component Value Date    WBC 9.7 04/30/2024    WBC 6.2 04/15/2019    RBC 3.61 (L) 04/30/2024    RBC 3.98 04/15/2019    HGB 11.5 (L) 04/30/2024    HGB 12.4 04/15/2019    HCT 35.0 04/30/2024    HCT 38.0 04/15/2019    MCV 97 04/30/2024    MCV 96 04/15/2019    MCH 31.9 04/30/2024    MCH 31.2 04/15/2019    MCHC 32.9 04/30/2024    MCHC 32.6 04/15/2019    RDW 14.3 04/30/2024    RDW 13.2 04/15/2019     04/30/2024     04/15/2019       BMP RESULTS:  Lab Results   Component Value Date     04/30/2024     04/15/2019    POTASSIUM 3.8 04/30/2024    " "POTASSIUM 4.0 11/14/2019    CHLORIDE 101 04/30/2024    CHLORIDE 106 04/15/2019    CO2 25 04/30/2024    CO2 26 04/15/2019    ANIONGAP 10 04/30/2024    ANIONGAP 8 04/15/2019     (H) 04/30/2024    GLC 85 10/01/2023     11/14/2019    BUN 15.4 04/30/2024    BUN 13 04/15/2019    CR 0.77 04/30/2024    CR 0.66 11/14/2019    GFRESTIMATED 86 04/30/2024    GFRESTIMATED >60 11/14/2019    GFRESTBLACK >60 04/19/2019    SINDY 9.2 04/30/2024    SIDNY 8.5 04/15/2019        A1C RESULTS:  Lab Results   Component Value Date    A1C 5.3 01/29/2018       INR RESULTS:  No results found for: \"INR\"        VENKATA Alexander APRN CNP  [id number]  [place]  [place] [id number]                "

## 2024-07-11 NOTE — LETTER
7/11/2024    Guy Brink MD  [address] [facility] [address]    RE: Mariluz Stoner       Dear Colleague,     I had the pleasure of seeing Mariluz Stoner in the [facility].  Cardiology Clinic Progress Note  Mariluz Stoner MRN# [id number]   YOB: 1960 Age: 63 year old       HPI:    Mariluz Stoner is a delightful 63 year old patient with past medical history significant for:    Metastatic breast cancer with left mastectomy and lymph node dissection.  Treated with radiation, Herceptin followed by tamoxifen. On Arimidex  Diverticulitis with emergent laparotomy/subtotal colectomy/ileostomy  Benign positional vertigo  Hypertension  Obesity  Cardiomyopathy EF initially 55% prior to treatment 8/2019 (Care Everywhere).  Last echo EF 25 to 30%. 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  Short course of apixaban (3 months)    It is my pleasure meeting Mariluz at our [place] location.  She was seen in consultation by Dr. Vazquez in February.  She did review her cardiac MRI prior to her visit.  She is very pleased with how well she has been feeling.  States that her energy level has been good and she feels more positive since she went on a short trip to [place] in [place] with her son.    Denies chest pain, orthopnea, PND, syncope, lower extremity edema.  Is hopeful that she can have her colostomy reversed.  Scheduled to meet with oncology next month.      Cardiac MRI showed an ejection fraction of 37.5%.  Normal RV function at 57%. Regadenoson induced stress perfusion is negative for ischemia.  Delayed hyperenhancement with no scar, fibrosis, or evidence of infiltrative disease    Diagnotic studies:  cMRI 7/2024: EF 37.5%.  Normal RV function at 57%.  Regadenoson induced stress perfusion is negative for ischemia.  Delayed hyperenhancement with no scar, fibrosis, or evidence of infiltrative  disease.  Echocardiogram 8/2019: EF 55 to 60%.  (Care Everywhere)  Echocardiogram 12/2019: EF 40 to 45%.  No significant valvular insufficiency (Care Everywhere  Echocardiogram 10/2023: EF 25 to 30% trace of valvular insufficiency.  RV function normal.  Grade 1 diastolic dysfunction noted.  Severe global hypokinesis noted of the left ventricle.        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  Patient is very adamant about not being on unnecessary medications.  Explained why she needed to be on Eliquis, but patient is still not convinced she needed the medicine.    She will see if insurance will cover Entresto, but we also discussed this in March.  In the interim I will place her back on lisinopril 5 mg daily.    We tried to increase her spironolactone to 50 mg daily but she had significant lightheadedness and dizziness.  She is doing well on the 25 mg daily.    2. history of emergent subtotal colectomy ileostomy  Hoping for reversal this summer.    From a cardiac standpoint she is stable:  She is euvolemic on exam, EF improving with medication.  This is a moderate risk procedure.   No active angina, congestive heart failure symptoms, arrhythmias, or severe valvular disease.   cMRI completed last week showed regadenoson induced stress perfusion was negative for ischemia   Patient can perform more than 4 METS without chest pain or severe SOB   No further cardiac work up is necessary prior  to procedure.     3.  Hypertension stable    4.  History of incidental finding of mesenteric thrombosis treated with apixaban for 3 months.  Medication was discontinued.    5.  Obesity with BMI of 38  Encourage patient to start a walking program.    I had like to see her back in 4 months.  I am hopeful that we can continue to optimize her medications.  Repeat echocardiogram can be done in the spring and a follow-up with Dr. Vazquez at that time.  If there are questions or concerns in the interim please feel free to contact us.    Khadijah Alexander NP, APRN CNP      Today's clinic visit entailed:  Review of the result(s) of each unique test - cMRI   Ordering of each unique test  Prescription drug management  30  minutes spent by me on the date of the encounter doing chart review, review of test results, patient visit, and documentation   Provider  Link to [facility] Help Grid     The level of medical decision making during this visit was of moderate complexity.      Orders Placed This Encounter   Procedures    Basic metabolic panel    Follow-Up with Cardiology MEHRAN     Orders Placed This Encounter   Medications    lisinopril (ZESTRIL) 5 MG tablet     Sig: Take 1 tablet (5 mg) by mouth daily     Dispense:  30 tablet     Refill:  11     Medications Discontinued During This Encounter   Medication Reason    apixaban ANTICOAGULANT (ELIQUIS) 5 MG tablet Therapy completed (No AVS)    traMADol (ULTRAM) 50 MG tablet          Encounter Diagnosis   Name Primary?    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)        CURRENT MEDICATIONS:  Current Outpatient Medications   Medication Sig Dispense Refill    acetaminophen (TYLENOL) 500 MG tablet Take 1,000 mg by mouth every 6 hours as needed for mild pain      albuterol (PROAIR HFA/PROVENTIL HFA/VENTOLIN HFA) 108 (90 Base) MCG/ACT inhaler Inhale 2 puffs into the lungs every 4 hours as needed for shortness of breath or wheezing      anastrozole (ARIMIDEX) 1 MG tablet Take 1 tablet (1 mg) by mouth  daily 90 tablet 1    calcium carbonate 600 mg-vitamin D 400 units (CALTRATE) 600-400 MG-UNIT per tablet Take 1 tablet by mouth      Ergocalciferol 50 MCG (2000 UT) TABS Take 2,000 Units by mouth daily      lisinopril (ZESTRIL) 5 MG tablet Take 1 tablet (5 mg) by mouth daily 30 tablet 11    medical cannabis (Patient's own supply) See Admin Instructions (The purpose of this order is to document that the patient reports taking medical cannabis.  This is not a prescription, and is not used to certify that the patient has a qualifying medical condition.)      melatonin 1 MG TABS tablet Take 3 tablets (3 mg) by mouth nightly as needed for sleep      metoprolol succinate ER (TOPROL XL) 50 MG 24 hr tablet Take 1 tablet (50 mg) by mouth at bedtime 90 tablet 11    PARoxetine (PAXIL) 20 MG tablet TAKE 1 TABLET BY MOUTH ONCE DAILY .  APPOINTMENT  NEEDED  FOR  ADDITIONAL  REFILLS 90 tablet 1    spironolactone (ALDACTONE) 50 MG tablet 1/2 tablet daily 45 tablet 11    Vitamin D3 (VITAMIN D-1000 MAX ST) 25 mcg (1000 units) tablet Take 25 mcg by mouth daily         ALLERGIES     Allergies   Allergen Reactions    Naproxen      Other reaction(s): Hematologic  Vaginal bleeding       PAST MEDICAL HISTORY:  Past Medical History:   Diagnosis Date    Basal cell carcinoma     Breast cancer (H)     Obesity        PAST SURGICAL HISTORY:  Past Surgical History:   Procedure Laterality Date    BREAST SURGERY      left tissue expander, LEFT MASTECTOMY    CHOLECYSTECTOMY      GYN SURGERY      oophorectomy    IR CHEST PORT PLACEMENT > 5 YRS OF AGE  1/18/2019    IR CHEST PORT PLACEMENT > 5 YRS OF AGE  4/16/2013    REMOVE TISSUE EXPANDER BREAST  4/16/2014    Procedure: REMOVAL OF LEFT BREAST TISSUE EXPANDER, IRRIGATION AND DEBRIDEMENT OF LEFT BREAST;  Surgeon: Marlon Luz MD;  Location: [facility]       FAMILY HISTORY:  No family history on file.    SOCIAL HISTORY:  Social History     Socioeconomic History    Marital status:      Spouse  name: None    Number of children: None    Years of education: None    Highest education level: None   Tobacco Use    Smoking status: Former     Current packs/day: 0.00     Types: Cigarettes     Quit date: 2000     Years since quittin.5    Smokeless tobacco: Never   Vaping Use    Vaping status: Never Used   Substance and Sexual Activity    Alcohol use: Yes     Comment: social drinking    Drug use: Yes     Types: Marijuana     Comment: medical marijuana syrup     Sexual activity: Not Currently     Partners: Female, Male     Social Determinants of Health     Financial Resource Strain: Low Risk  (2023)    Financial Resource Strain     Within the past 12 months, have you or your family members you live with been unable to get utilities (heat, electricity) when it was really needed?: No   Food Insecurity: Low Risk  (2023)    Food Insecurity     Within the past 12 months, did you worry that your food would run out before you got money to buy more?: No     Within the past 12 months, did the food you bought just not last and you didn t have money to get more?: No   Transportation Needs: Low Risk  (2023)    Transportation Needs     Within the past 12 months, has lack of transportation kept you from medical appointments, getting your medicines, non-medical meetings or appointments, work, or from getting things that you need?: No   Interpersonal Safety: Low Risk  (10/25/2023)    Interpersonal Safety     Do you feel physically and emotionally safe where you currently live?: Yes     Within the past 12 months, have you been hit, slapped, kicked or otherwise physically hurt by someone?: No     Within the past 12 months, have you been humiliated or emotionally abused in other ways by your partner or ex-partner?: No   Housing Stability: Low Risk  (2023)    Housing Stability     Do you have housing? : Yes     Are you worried about losing your housing?: No       Review of Systems:  Skin:        Eyes:     "   ENT:       Respiratory:  Positive for dyspnea on exertion  Cardiovascular:  Negative;palpitations;chest pain;edema;lightheadedness;dizziness;syncope or near-syncope    Gastroenterology:      Genitourinary:       Musculoskeletal:       Neurologic:  Positive for numbness or tingling of feet  Psychiatric:       Heme/Lymph/Imm:  Positive for allergies  Endocrine:         Physical Exam:    Vitals: /56 (BP Location: Right arm, Patient Position: Sitting, Cuff Size: Adult Large)   Pulse 79   Resp 20   Ht 1.651 m (5' 5\")   Wt 105.9 kg (233 lb 8 oz)   SpO2 97%   BMI 38.86 kg/m    Constitutional: Well nourished and in no apparent distress.  Eyes: Pupils equal, round. Sclerae anicteric.   HEENT: Normocephalic, atraumatic.   Neck: Supple. No JVD   Respiratory: Breathing non-labored. Lungs clear to auscultation bilaterally.   Cardiovascular:  Regular rate and rhythm, normal S1 and S2. No murmur  Skin: Warm, dry. No rashes, cyanosis, or xanthelasma.  Extremities: No edema.  Neurologic: No gross motor deficits. Alert, awake, and oriented to person, place and time.  Psychiatric: Affect appropriate.        Recent Lab Results:  LIPID RESULTS:  Lab Results   Component Value Date    CHOL 206 (H) 10/27/2023    CHOL 219 (A) 01/29/2018    HDL 41 (L) 10/27/2023    HDL 45 01/29/2018     (H) 10/27/2023     01/29/2018    TRIG 171 (H) 10/27/2023    TRIG 225 01/29/2018       LIVER ENZYME RESULTS:  Lab Results   Component Value Date    AST 29 12/23/2023    AST 17 11/14/2019    ALT 24 12/23/2023    ALT 16 11/14/2019       CBC RESULTS:  Lab Results   Component Value Date    WBC 9.7 04/30/2024    WBC 6.2 04/15/2019    RBC 3.61 (L) 04/30/2024    RBC 3.98 04/15/2019    HGB 11.5 (L) 04/30/2024    HGB 12.4 04/15/2019    HCT 35.0 04/30/2024    HCT 38.0 04/15/2019    MCV 97 04/30/2024    MCV 96 04/15/2019    MCH 31.9 04/30/2024    MCH 31.2 04/15/2019    MCHC 32.9 04/30/2024    MCHC 32.6 04/15/2019    RDW 14.3 04/30/2024    RDW " "13.2 04/15/2019     04/30/2024     04/15/2019       BMP RESULTS:  Lab Results   Component Value Date     04/30/2024     04/15/2019    POTASSIUM 3.8 04/30/2024    POTASSIUM 4.0 11/14/2019    CHLORIDE 101 04/30/2024    CHLORIDE 106 04/15/2019    CO2 25 04/30/2024    CO2 26 04/15/2019    ANIONGAP 10 04/30/2024    ANIONGAP 8 04/15/2019     (H) 04/30/2024    GLC 85 10/01/2023     11/14/2019    BUN 15.4 04/30/2024    BUN 13 04/15/2019    CR 0.77 04/30/2024    CR 0.66 11/14/2019    GFRESTIMATED 86 04/30/2024    GFRESTIMATED >60 11/14/2019    GFRESTBLACK >60 04/19/2019    SINDY 9.2 04/30/2024    SINDY 8.5 04/15/2019        A1C RESULTS:  Lab Results   Component Value Date    A1C 5.3 01/29/2018     INR RESULTS:  No results found for: \"INR\"    CC  Khadijah Alexander APRN CNP  [address]  [place] [id number]                  Thank you for allowing me to participate in the care of your patient.      Sincerely,     Khadijah Alexander, NP, APRN CNP     [facility]  "

## 2024-08-03 ENCOUNTER — HOSPITAL ENCOUNTER (EMERGENCY)
Facility: CLINIC | Age: 64
End: 2024-08-03
Payer: COMMERCIAL

## 2024-08-05 ENCOUNTER — HOSPITAL ENCOUNTER (OUTPATIENT)
Dept: WOUND CARE | Facility: CLINIC | Age: 64
Discharge: HOME | End: 2024-08-05
Attending: FAMILY MEDICINE | Admitting: FAMILY MEDICINE
Payer: COMMERCIAL

## 2024-08-05 PROCEDURE — G0463 HOSPITAL OUTPT CLINIC VISIT: HCPCS

## 2024-08-05 NOTE — PROGRESS NOTES
[facility] Wound and Ostomy Clinic [facility]    Start of Care in [facility]: 12/12/2023   Referring Doctor: Guy Brink MD   Primary Care Provider: Guy Brink   Wound Location:  Abdominal   Type of Ostomy/Surgery Date/Surgeon:  Subtotal colectomy with ileostomy 11/18/23 completed by Perry St MD       Wound/Ostomy Clinic Visit:  Return visit to wound and Ostomy Clinic    Reason for Visit:  re assessment of Ostomy and Wound        Subjective:  Patient arrives to independently on 8/5/24 and reports: Patient reports using last ostomy appliance on 7/31/24, but ordered supplies and they were to be delivered either Thursday or Friday. Unfortunately the supplies did not arrive before the weekend so she proceed to [facility] ER where she was seen by a nurse and given flat, monika appliance in addition to compression tape to help hold appliance in place.  Patient reports applying the flat appliances as her other appliance was leaking, but the adhesive on the new appliance was poor and fell off within 24 hours.  Still has not received supplies so she comes to wound and ostomy clinic for addition supplies until her shipment is made to her house.   Prior to this weekend, she as getting great wear time and having no issues.  Has called Dr. St to talk about reversal surgery.     HPI/Pertinent information from chart review:   Patient reports she started noting her stomach to be bloated and rock hard this past spring when on vacation with her granddaughter, 2023.  Started to have abdominal pain in September of 2023.  Was admitted to [facility] from 9/29/23-10/6/23 for diverticulitis, was discharged on oral antibiotics. Presented back to [facility] ER on 10/16/23 for worsening abdominal pain.  Repeat CT scan was completed, she was noted to have slightly worsening diverticulitis, was given further oral antibiotics once again and sent home.  Patient had  ongoing, worsening abdominal pain, antibiotics were not helpful so she presents to [facility] on 23,  she eventually underwent Subtotal colectomy with ileostomy by Perry St MD  due to recurrent sigmoid diverticulitis and sigmoid obstruction.  After discharge from [facility] she followed up with Primary, Dr. Brink on 23 who had concerns of infection to her surgical wound, she was prescribed oral antibiotics and given orders for wound culture, CT scan, and Wound and ostomy referral.  Patient established care at [facility] wound clinic on 23.      Past Medical History:  Patient Active Problem List   Diagnosis    Metastatic breast cancer    Mild episode of recurrent major depressive disorder (H24)    Essential hypertension    Dermatitis    Morbid obesity (H)    Secondary malignant neoplasm of bone (H)    Pancolitis (H)    Acute diverticulitis with probable intramural abscess    History of wheezing due to allergy    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)    Sinus bradycardia    Vertigo                     Tobacco Use:     Tobacco Use      Smoking status: Former        Packs/day: 0.00        Types: Cigarettes        Quit date: 2000        Years since quittin.6      Smokeless tobacco: Never       Diabetic: NA  HgbA1C:   Hemoglobin A1C   Date Value Ref Range Status   2018 5.3 <6.4 % Final     Checks Blood Glucose?:  NA Average Readings: NA      Personal/social history:  Lives with , Retired, no home care services.     OSTOMY EXAM    Pouch wear time: Changing 2x/ week the days vary    Current pouching system: [place] 1 piece # [id number], cut to fit. Lock and roll Closure with #[id number] ring     Who changes the pouch? patient     Participants in cares today: observation    Any limitations (dexterity, vision, hearing)? None    Location: Right Lower Quadrant   Color/Moisture: Red, moist  Viable: Yes  Size: 25 cm X 30 cm  Shape: Round  MCJ: Intact  Os: Center  and outward direction.   Protrusion: 4 cm  Peristomal skin: Denuded breakdown at 3-6 o' clock approximately 3-4 cm away from stoma.    Output: Liquid to semi mush  Abdominal profile/plane: sits on protruding abdomen, moderate crease at 9 o clock and small crease at 3 o clock when sitting/leaning forward   Photos from today's visit 8/5/24   Photos from today's visit 3/12/24  Photos 1/12/24   Photos from 1/2/23   Photos from 12/19/23   Photos from initial assessment 12/12/23    Not assessed today, see assessment for further details. 2/9/24, last assessed 1/12/24  Wound #1 Abdominal; Surgical Wound   Stage/tissue depth: Full thickness  0 cm L x 0 cm W x 0 cm D  Tunneling: none  Undermining: none  Wound bed type/amount: 100% scarred over; not fluctuant  Wound Edges: NA, closed  Periwound:intact and free of break down  Drainage: none  Odor: none  Pain: Denies  No photo today 8/7/24   Photos from 3/12/24   Photos from 1/12/24   Photo from 1/2/23   Photos from 12/19/23   Photos from initial assessment 12/12/23   Photos from MD appointment on 12/7/23    Discussion/Education:  plan of care with rationale;  Patient is able to perform cares/has been caring for wound    Assessment:  Ostomy appliance is being held in place by compression tape as this was her last appliance and was leaking.  When removing appliance and tape, noted stomach and 10 cm halo around stoma to be moist/damp with stool.  With the ongoing issues of leaking over the past 4-5 days has caused peristomal breakdown, denuded tissue greater from 3-6 o clock.   Reports burning sensation with cares.  Otherwise, stoma is red, moist and viable with intact MCJ site and good protrusion.       Abdominal Incision- Scarred over, no concerns.     Plan of Care:   Patient was provided 3 ostomy appliances (Hol# [id number]), educated patient if she does not receive her supplies she should call wound and ostomy clinic for further supplies.  She will follow up with Dr. St on  possible reversal surgery.  No need to return to wound and ostomy clinic unless complications arise.      Ostomy: Change Ostomy 2x/week and as needed  Supplies being used: # [id number] One-Piece Drainable Ostomy Pouch - Soft Convex Flexten Barrier, Viewing Option, Lock 'n Roll Closure, Tape, Filter, 1 [place] #[id number] and securing skin barrier edges with : Coloplast Elastic barrier #[id number]      Wound: healed, No treatment needed      Cares Performed:    Pouch changed: Yes    Topical care: as above in plan      The following discharge instructions were reviewed with and sent home with the patient:  Declined    The following supplies were sent home with the patient:  none    Return visit: As needed    Verbal, written, & demonstrative education provided.  Face to face time: approximately 30 minutes  Procedure: 0 min    Marla Carreon RN [facility]  [phone]

## 2024-09-21 ENCOUNTER — APPOINTMENT (OUTPATIENT)
Dept: CT IMAGING | Facility: CLINIC | Age: 64
DRG: 683 | End: 2024-09-21
Attending: STUDENT IN AN ORGANIZED HEALTH CARE EDUCATION/TRAINING PROGRAM
Payer: COMMERCIAL

## 2024-09-21 ENCOUNTER — HOSPITAL ENCOUNTER (INPATIENT)
Facility: CLINIC | Age: 64
LOS: 5 days | Discharge: HOME | DRG: 683 | End: 2024-09-26
Attending: STUDENT IN AN ORGANIZED HEALTH CARE EDUCATION/TRAINING PROGRAM | Admitting: FAMILY MEDICINE
Payer: COMMERCIAL

## 2024-09-21 DIAGNOSIS — R11.2 NAUSEA AND VOMITING, UNSPECIFIED VOMITING TYPE: ICD-10-CM

## 2024-09-21 DIAGNOSIS — Z93.2 ILEOSTOMY PRESENT (H): ICD-10-CM

## 2024-09-21 DIAGNOSIS — I42.7 CARDIOMYOPATHY SECONDARY TO DRUG (H): ICD-10-CM

## 2024-09-21 DIAGNOSIS — N17.9 AKI (ACUTE KIDNEY INJURY) (H): Primary | ICD-10-CM

## 2024-09-21 DIAGNOSIS — R77.8 ELEVATED TOTAL PROTEIN: ICD-10-CM

## 2024-09-21 DIAGNOSIS — N17.9 ACUTE RENAL FAILURE, UNSPECIFIED ACUTE RENAL FAILURE TYPE (H): ICD-10-CM

## 2024-09-21 DIAGNOSIS — R79.89 ELEVATED TROPONIN: ICD-10-CM

## 2024-09-21 DIAGNOSIS — K43.5 PARASTOMAL HERNIA WITHOUT OBSTRUCTION OR GANGRENE: ICD-10-CM

## 2024-09-21 DIAGNOSIS — E87.29 METABOLIC ACIDOSIS, INCREASED ANION GAP: ICD-10-CM

## 2024-09-21 PROBLEM — I10 ESSENTIAL HYPERTENSION: Status: ACTIVE | Noted: 2019-12-11

## 2024-09-21 PROBLEM — R10.12 ABDOMINAL PAIN, LEFT UPPER QUADRANT: Status: ACTIVE | Noted: 2024-09-21

## 2024-09-21 PROBLEM — F33.0 MILD EPISODE OF RECURRENT MAJOR DEPRESSIVE DISORDER (H): Status: ACTIVE | Noted: 2019-12-11

## 2024-09-21 PROBLEM — N20.0 CALCIUM KIDNEY STONE: Status: ACTIVE | Noted: 2024-09-21

## 2024-09-21 LAB
ALBUMIN SERPL BCG-MCNC: 4.4 G/DL (ref 3.5–5.2)
ALBUMIN UR-MCNC: NEGATIVE MG/DL
ALP SERPL-CCNC: 100 U/L (ref 40–150)
ALT SERPL W P-5'-P-CCNC: 24 U/L (ref 0–50)
ANION GAP SERPL CALCULATED.3IONS-SCNC: 16 MMOL/L (ref 7–15)
ANION GAP SERPL CALCULATED.3IONS-SCNC: 16 MMOL/L (ref 7–15)
APPEARANCE UR: ABNORMAL
AST SERPL W P-5'-P-CCNC: 20 U/L (ref 0–45)
BASE EXCESS BLDV CALC-SCNC: -10.1 MMOL/L (ref -3–3)
BASOPHILS # BLD AUTO: 0 10E3/UL (ref 0–0.2)
BASOPHILS NFR BLD AUTO: 0 %
BILIRUB SERPL-MCNC: 0.3 MG/DL
BILIRUB UR QL STRIP: NEGATIVE
BUN SERPL-MCNC: 63.6 MG/DL (ref 8–23)
BUN SERPL-MCNC: 66.4 MG/DL (ref 8–23)
CALCIUM SERPL-MCNC: 8.6 MG/DL (ref 8.8–10.4)
CALCIUM SERPL-MCNC: 9.8 MG/DL (ref 8.8–10.4)
CAOX CRY #/AREA URNS HPF: ABNORMAL /HPF
CHLORIDE SERPL-SCNC: 101 MMOL/L (ref 98–107)
CHLORIDE SERPL-SCNC: 103 MMOL/L (ref 98–107)
COLOR UR AUTO: YELLOW
CREAT SERPL-MCNC: 2.55 MG/DL (ref 0.51–0.95)
CREAT SERPL-MCNC: 3.01 MG/DL (ref 0.51–0.95)
CREAT UR-MCNC: 2.2 MG/DL
CRP SERPL-MCNC: 4.57 MG/L
EGFRCR SERPLBLD CKD-EPI 2021: 17 ML/MIN/1.73M2
EGFRCR SERPLBLD CKD-EPI 2021: 20 ML/MIN/1.73M2
EOSINOPHIL # BLD AUTO: 0.4 10E3/UL (ref 0–0.7)
EOSINOPHIL NFR BLD AUTO: 3 %
ERYTHROCYTE [DISTWIDTH] IN BLOOD BY AUTOMATED COUNT: 13.7 % (ref 10–15)
FRACT EXCRET NA UR+SERPL-RTO: NORMAL %
GLUCOSE SERPL-MCNC: 142 MG/DL (ref 70–99)
GLUCOSE SERPL-MCNC: 99 MG/DL (ref 70–99)
GLUCOSE UR STRIP-MCNC: NEGATIVE MG/DL
HCO3 BLDV-SCNC: 18 MMOL/L (ref 21–28)
HCO3 SERPL-SCNC: 12 MMOL/L (ref 22–29)
HCO3 SERPL-SCNC: 14 MMOL/L (ref 22–29)
HCT VFR BLD AUTO: 35.2 % (ref 35–47)
HGB BLD-MCNC: 12.1 G/DL (ref 11.7–15.7)
HGB UR QL STRIP: NEGATIVE
IMM GRANULOCYTES # BLD: 0.1 10E3/UL
IMM GRANULOCYTES NFR BLD: 1 %
KETONES UR STRIP-MCNC: NEGATIVE MG/DL
LACTATE SERPL-SCNC: 1.1 MMOL/L (ref 0.7–2)
LEUKOCYTE ESTERASE UR QL STRIP: ABNORMAL
LIPASE SERPL-CCNC: 116 U/L (ref 13–60)
LYMPHOCYTES # BLD AUTO: 1.3 10E3/UL (ref 0.8–5.3)
LYMPHOCYTES NFR BLD AUTO: 13 %
MAGNESIUM SERPL-MCNC: 2.3 MG/DL (ref 1.7–2.3)
MCH RBC QN AUTO: 32.2 PG (ref 26.5–33)
MCHC RBC AUTO-ENTMCNC: 34.4 G/DL (ref 31.5–36.5)
MCV RBC AUTO: 94 FL (ref 78–100)
MONOCYTES # BLD AUTO: 0.8 10E3/UL (ref 0–1.3)
MONOCYTES NFR BLD AUTO: 8 %
MUCOUS THREADS #/AREA URNS LPF: PRESENT /LPF
NEUTROPHILS # BLD AUTO: 7.6 10E3/UL (ref 1.6–8.3)
NEUTROPHILS NFR BLD AUTO: 75 %
NITRATE UR QL: NEGATIVE
NRBC # BLD AUTO: 0 10E3/UL
NRBC BLD AUTO-RTO: 0 /100
O2/TOTAL GAS SETTING VFR VENT: 28 %
OXYHGB MFR BLDV: 53 % (ref 70–75)
PCO2 BLDV: 45 MM HG (ref 40–50)
PH BLDV: 7.2 [PH] (ref 7.32–7.43)
PH UR STRIP: 5 [PH] (ref 5–7)
PHOSPHATE SERPL-MCNC: 5.1 MG/DL (ref 2.5–4.5)
PLATELET # BLD AUTO: 260 10E3/UL (ref 150–450)
PO2 BLDV: 32 MM HG (ref 25–47)
POTASSIUM SERPL-SCNC: 5.2 MMOL/L (ref 3.4–5.3)
POTASSIUM SERPL-SCNC: 5.3 MMOL/L (ref 3.4–5.3)
PROT SERPL-MCNC: 7.8 G/DL (ref 6.4–8.3)
RBC # BLD AUTO: 3.76 10E6/UL (ref 3.8–5.2)
RBC URINE: 0 /HPF
SAO2 % BLDV: 53.6 % (ref 70–75)
SARS-COV-2 RNA RESP QL NAA+PROBE: NEGATIVE
SODIUM SERPL-SCNC: 129 MMOL/L (ref 135–145)
SODIUM SERPL-SCNC: 133 MMOL/L (ref 135–145)
SODIUM UR-SCNC: <20 MMOL/L
SP GR UR STRIP: 1.03 (ref 1–1.03)
TROPONIN T SERPL HS-MCNC: 27 NG/L
TROPONIN T SERPL HS-MCNC: 32 NG/L
UROBILINOGEN UR STRIP-MCNC: NORMAL MG/DL
WBC # BLD AUTO: 10.2 10E3/UL (ref 4–11)
WBC URINE: 12 /HPF

## 2024-09-21 PROCEDURE — 96360 HYDRATION IV INFUSION INIT: CPT | Mod: 59 | Performed by: STUDENT IN AN ORGANIZED HEALTH CARE EDUCATION/TRAINING PROGRAM

## 2024-09-21 PROCEDURE — 120N000001 HC R&B MED SURG/OB

## 2024-09-21 PROCEDURE — 83605 ASSAY OF LACTIC ACID: CPT | Performed by: STUDENT IN AN ORGANIZED HEALTH CARE EDUCATION/TRAINING PROGRAM

## 2024-09-21 PROCEDURE — 83690 ASSAY OF LIPASE: CPT | Performed by: STUDENT IN AN ORGANIZED HEALTH CARE EDUCATION/TRAINING PROGRAM

## 2024-09-21 PROCEDURE — 74177 CT ABD & PELVIS W/CONTRAST: CPT

## 2024-09-21 PROCEDURE — 86140 C-REACTIVE PROTEIN: CPT | Performed by: STUDENT IN AN ORGANIZED HEALTH CARE EDUCATION/TRAINING PROGRAM

## 2024-09-21 PROCEDURE — 258N000003 HC RX IP 258 OP 636: Performed by: STUDENT IN AN ORGANIZED HEALTH CARE EDUCATION/TRAINING PROGRAM

## 2024-09-21 PROCEDURE — 93005 ELECTROCARDIOGRAM TRACING: CPT | Performed by: STUDENT IN AN ORGANIZED HEALTH CARE EDUCATION/TRAINING PROGRAM

## 2024-09-21 PROCEDURE — 250N000013 HC RX MED GY IP 250 OP 250 PS 637: Performed by: STUDENT IN AN ORGANIZED HEALTH CARE EDUCATION/TRAINING PROGRAM

## 2024-09-21 PROCEDURE — 87086 URINE CULTURE/COLONY COUNT: CPT | Performed by: STUDENT IN AN ORGANIZED HEALTH CARE EDUCATION/TRAINING PROGRAM

## 2024-09-21 PROCEDURE — 250N000011 HC RX IP 250 OP 636: Performed by: STUDENT IN AN ORGANIZED HEALTH CARE EDUCATION/TRAINING PROGRAM

## 2024-09-21 PROCEDURE — 99223 1ST HOSP IP/OBS HIGH 75: CPT | Performed by: FAMILY MEDICINE

## 2024-09-21 PROCEDURE — 82040 ASSAY OF SERUM ALBUMIN: CPT | Performed by: STUDENT IN AN ORGANIZED HEALTH CARE EDUCATION/TRAINING PROGRAM

## 2024-09-21 PROCEDURE — 36415 COLL VENOUS BLD VENIPUNCTURE: CPT | Performed by: STUDENT IN AN ORGANIZED HEALTH CARE EDUCATION/TRAINING PROGRAM

## 2024-09-21 PROCEDURE — 83735 ASSAY OF MAGNESIUM: CPT | Performed by: FAMILY MEDICINE

## 2024-09-21 PROCEDURE — 84484 ASSAY OF TROPONIN QUANT: CPT | Performed by: STUDENT IN AN ORGANIZED HEALTH CARE EDUCATION/TRAINING PROGRAM

## 2024-09-21 PROCEDURE — 87635 SARS-COV-2 COVID-19 AMP PRB: CPT | Performed by: STUDENT IN AN ORGANIZED HEALTH CARE EDUCATION/TRAINING PROGRAM

## 2024-09-21 PROCEDURE — 99285 EMERGENCY DEPT VISIT HI MDM: CPT | Mod: 25 | Performed by: STUDENT IN AN ORGANIZED HEALTH CARE EDUCATION/TRAINING PROGRAM

## 2024-09-21 PROCEDURE — 85004 AUTOMATED DIFF WBC COUNT: CPT | Performed by: STUDENT IN AN ORGANIZED HEALTH CARE EDUCATION/TRAINING PROGRAM

## 2024-09-21 PROCEDURE — 82805 BLOOD GASES W/O2 SATURATION: CPT | Performed by: STUDENT IN AN ORGANIZED HEALTH CARE EDUCATION/TRAINING PROGRAM

## 2024-09-21 PROCEDURE — 250N000013 HC RX MED GY IP 250 OP 250 PS 637: Performed by: FAMILY MEDICINE

## 2024-09-21 PROCEDURE — 81001 URINALYSIS AUTO W/SCOPE: CPT | Performed by: STUDENT IN AN ORGANIZED HEALTH CARE EDUCATION/TRAINING PROGRAM

## 2024-09-21 PROCEDURE — 84300 ASSAY OF URINE SODIUM: CPT | Performed by: STUDENT IN AN ORGANIZED HEALTH CARE EDUCATION/TRAINING PROGRAM

## 2024-09-21 PROCEDURE — 99285 EMERGENCY DEPT VISIT HI MDM: CPT | Performed by: STUDENT IN AN ORGANIZED HEALTH CARE EDUCATION/TRAINING PROGRAM

## 2024-09-21 PROCEDURE — 93010 ELECTROCARDIOGRAM REPORT: CPT | Performed by: STUDENT IN AN ORGANIZED HEALTH CARE EDUCATION/TRAINING PROGRAM

## 2024-09-21 PROCEDURE — 250N000009 HC RX 250: Performed by: STUDENT IN AN ORGANIZED HEALTH CARE EDUCATION/TRAINING PROGRAM

## 2024-09-21 PROCEDURE — 96361 HYDRATE IV INFUSION ADD-ON: CPT | Performed by: STUDENT IN AN ORGANIZED HEALTH CARE EDUCATION/TRAINING PROGRAM

## 2024-09-21 PROCEDURE — 258N000003 HC RX IP 258 OP 636: Performed by: FAMILY MEDICINE

## 2024-09-21 PROCEDURE — 36415 COLL VENOUS BLD VENIPUNCTURE: CPT | Performed by: FAMILY MEDICINE

## 2024-09-21 PROCEDURE — 84100 ASSAY OF PHOSPHORUS: CPT | Performed by: FAMILY MEDICINE

## 2024-09-21 RX ORDER — ANASTROZOLE 1 MG/1
1 TABLET ORAL AT BEDTIME
Status: DISCONTINUED | OUTPATIENT
Start: 2024-09-21 | End: 2024-09-26 | Stop reason: HOSPADM

## 2024-09-21 RX ORDER — SPIRONOLACTONE 25 MG/1
25 TABLET ORAL AT BEDTIME
Status: DISCONTINUED | OUTPATIENT
Start: 2024-09-21 | End: 2024-09-25

## 2024-09-21 RX ORDER — PROCHLORPERAZINE 25 MG
25 SUPPOSITORY, RECTAL RECTAL EVERY 12 HOURS PRN
Status: DISCONTINUED | OUTPATIENT
Start: 2024-09-21 | End: 2024-09-26 | Stop reason: HOSPADM

## 2024-09-21 RX ORDER — PAROXETINE 10 MG/1
10 TABLET, FILM COATED ORAL AT BEDTIME
Status: DISCONTINUED | OUTPATIENT
Start: 2024-09-21 | End: 2024-09-26 | Stop reason: HOSPADM

## 2024-09-21 RX ORDER — HYDROMORPHONE HYDROCHLORIDE 1 MG/ML
0.5 INJECTION, SOLUTION INTRAMUSCULAR; INTRAVENOUS; SUBCUTANEOUS EVERY 30 MIN PRN
Status: DISCONTINUED | OUTPATIENT
Start: 2024-09-21 | End: 2024-09-21

## 2024-09-21 RX ORDER — OXYCODONE HYDROCHLORIDE 5 MG/1
5 TABLET ORAL EVERY 4 HOURS PRN
Status: DISCONTINUED | OUTPATIENT
Start: 2024-09-21 | End: 2024-09-26 | Stop reason: HOSPADM

## 2024-09-21 RX ORDER — ONDANSETRON 2 MG/ML
4 INJECTION INTRAMUSCULAR; INTRAVENOUS EVERY 6 HOURS PRN
Status: DISCONTINUED | OUTPATIENT
Start: 2024-09-21 | End: 2024-09-26 | Stop reason: HOSPADM

## 2024-09-21 RX ORDER — CALCIUM CARBONATE 500 MG/1
1000 TABLET, CHEWABLE ORAL 4 TIMES DAILY PRN
Status: DISCONTINUED | OUTPATIENT
Start: 2024-09-21 | End: 2024-09-26 | Stop reason: HOSPADM

## 2024-09-21 RX ORDER — ONDANSETRON 4 MG/1
4 TABLET, ORALLY DISINTEGRATING ORAL EVERY 6 HOURS PRN
Status: DISCONTINUED | OUTPATIENT
Start: 2024-09-21 | End: 2024-09-26 | Stop reason: HOSPADM

## 2024-09-21 RX ORDER — ACETAMINOPHEN 500 MG
1000 TABLET ORAL EVERY 6 HOURS PRN
Status: DISCONTINUED | OUTPATIENT
Start: 2024-09-21 | End: 2024-09-26 | Stop reason: HOSPADM

## 2024-09-21 RX ORDER — LISINOPRIL 2.5 MG/1
5 TABLET ORAL AT BEDTIME
Status: DISCONTINUED | OUTPATIENT
Start: 2024-09-21 | End: 2024-09-25

## 2024-09-21 RX ORDER — NALOXONE HYDROCHLORIDE 0.4 MG/ML
0.2 INJECTION, SOLUTION INTRAMUSCULAR; INTRAVENOUS; SUBCUTANEOUS
Status: DISCONTINUED | OUTPATIENT
Start: 2024-09-21 | End: 2024-09-26 | Stop reason: HOSPADM

## 2024-09-21 RX ORDER — ACETAMINOPHEN 325 MG/1
975 TABLET ORAL ONCE
Status: COMPLETED | OUTPATIENT
Start: 2024-09-21 | End: 2024-09-21

## 2024-09-21 RX ORDER — OXYCODONE HYDROCHLORIDE 5 MG/1
5 TABLET ORAL EVERY 4 HOURS PRN
Status: DISCONTINUED | OUTPATIENT
Start: 2024-09-21 | End: 2024-09-21

## 2024-09-21 RX ORDER — ONDANSETRON 2 MG/ML
4 INJECTION INTRAMUSCULAR; INTRAVENOUS EVERY 30 MIN PRN
Status: DISCONTINUED | OUTPATIENT
Start: 2024-09-21 | End: 2024-09-21

## 2024-09-21 RX ORDER — HYDROMORPHONE HCL IN WATER/PF 6 MG/30 ML
0.2 PATIENT CONTROLLED ANALGESIA SYRINGE INTRAVENOUS
Status: DISCONTINUED | OUTPATIENT
Start: 2024-09-21 | End: 2024-09-22 | Stop reason: ALTCHOICE

## 2024-09-21 RX ORDER — IOPAMIDOL 755 MG/ML
500 INJECTION, SOLUTION INTRAVASCULAR ONCE
Status: COMPLETED | OUTPATIENT
Start: 2024-09-21 | End: 2024-09-21

## 2024-09-21 RX ORDER — SODIUM CHLORIDE 9 MG/ML
INJECTION, SOLUTION INTRAVENOUS CONTINUOUS
Status: DISCONTINUED | OUTPATIENT
Start: 2024-09-21 | End: 2024-09-23

## 2024-09-21 RX ORDER — LOPERAMIDE HCL 2 MG
2 CAPSULE ORAL EVERY OTHER DAY
Status: DISCONTINUED | OUTPATIENT
Start: 2024-09-21 | End: 2024-09-26 | Stop reason: HOSPADM

## 2024-09-21 RX ORDER — LOPERAMIDE HYDROCHLORIDE 2 MG/1
2 TABLET ORAL EVERY OTHER DAY
COMMUNITY

## 2024-09-21 RX ORDER — METOPROLOL SUCCINATE 50 MG/1
50 TABLET, EXTENDED RELEASE ORAL AT BEDTIME
Status: DISCONTINUED | OUTPATIENT
Start: 2024-09-21 | End: 2024-09-26 | Stop reason: HOSPADM

## 2024-09-21 RX ORDER — NALOXONE HYDROCHLORIDE 0.4 MG/ML
0.4 INJECTION, SOLUTION INTRAMUSCULAR; INTRAVENOUS; SUBCUTANEOUS
Status: DISCONTINUED | OUTPATIENT
Start: 2024-09-21 | End: 2024-09-26 | Stop reason: HOSPADM

## 2024-09-21 RX ORDER — PROCHLORPERAZINE MALEATE 5 MG
10 TABLET ORAL EVERY 6 HOURS PRN
Status: DISCONTINUED | OUTPATIENT
Start: 2024-09-21 | End: 2024-09-26 | Stop reason: HOSPADM

## 2024-09-21 RX ORDER — LIDOCAINE 40 MG/G
CREAM TOPICAL
Status: DISCONTINUED | OUTPATIENT
Start: 2024-09-21 | End: 2024-09-26 | Stop reason: HOSPADM

## 2024-09-21 RX ADMIN — ACETAMINOPHEN 975 MG: 325 TABLET ORAL at 19:30

## 2024-09-21 RX ADMIN — SODIUM CHLORIDE 1000 ML: 9 INJECTION, SOLUTION INTRAVENOUS at 16:06

## 2024-09-21 RX ADMIN — SODIUM CHLORIDE: 9 INJECTION, SOLUTION INTRAVENOUS at 20:59

## 2024-09-21 RX ADMIN — SODIUM CHLORIDE 60 ML: 9 INJECTION, SOLUTION INTRAVENOUS at 16:22

## 2024-09-21 RX ADMIN — SODIUM CHLORIDE 1000 ML: 9 INJECTION, SOLUTION INTRAVENOUS at 17:55

## 2024-09-21 RX ADMIN — PAROXETINE 10 MG: 10 TABLET, FILM COATED ORAL at 21:28

## 2024-09-21 RX ADMIN — IOPAMIDOL 100 ML: 755 INJECTION, SOLUTION INTRAVENOUS at 16:22

## 2024-09-21 ASSESSMENT — ACTIVITIES OF DAILY LIVING (ADL)
CONCENTRATING,_REMEMBERING_OR_MAKING_DECISIONS_DIFFICULTY: NO
ADLS_ACUITY_SCORE: 37
ADLS_ACUITY_SCORE: 28
DOING_ERRANDS_INDEPENDENTLY_DIFFICULTY: NO
ADLS_ACUITY_SCORE: 37
EQUIPMENT_CURRENTLY_USED_AT_HOME: COLOSTOMY/OSTOMY SUPPLIES
DRESSING/BATHING_DIFFICULTY: YES
ADLS_ACUITY_SCORE: 28
WALKING_OR_CLIMBING_STAIRS_DIFFICULTY: NO
ADLS_ACUITY_SCORE: 37
ADLS_ACUITY_SCORE: 28
WEAR_GLASSES_OR_BLIND: NO
DIFFICULTY_EATING/SWALLOWING: NO
HEARING_DIFFICULTY_OR_DEAF: NO
FALL_HISTORY_WITHIN_LAST_SIX_MONTHS: NO
DIFFICULTY_COMMUNICATING: NO
CHANGE_IN_FUNCTIONAL_STATUS_SINCE_ONSET_OF_CURRENT_ILLNESS/INJURY: NO
TOILETING_ISSUES: OTHER (SEE COMMENTS)
DRESSING/BATHING: DRESSING DIFFICULTY, ASSISTANCE 1 PERSON

## 2024-09-21 ASSESSMENT — COLUMBIA-SUICIDE SEVERITY RATING SCALE - C-SSRS
6. HAVE YOU EVER DONE ANYTHING, STARTED TO DO ANYTHING, OR PREPARED TO DO ANYTHING TO END YOUR LIFE?: NO
2. HAVE YOU ACTUALLY HAD ANY THOUGHTS OF KILLING YOURSELF IN THE PAST MONTH?: NO
1. IN THE PAST MONTH, HAVE YOU WISHED YOU WERE DEAD OR WISHED YOU COULD GO TO SLEEP AND NOT WAKE UP?: NO

## 2024-09-21 NOTE — MEDICATION SCRIBE - ADMISSION MEDICATION HISTORY
Medication Scribe Admission Medication History    Admission medication history is complete. The information provided in this note is only as accurate as the sources available at the time of the update.    Information Source(s): Patient and CareEverywhere/SureScripts via in-person    Pertinent Information: n/a    Changes made to PTA medication list:  Added: imodium  Deleted: albuterol inh, ergocalciferol  Changed: all scheduled meds to bedtime    Allergies reviewed with patient and updates made in EHR: yes    Medication History Completed By: DONALDO IBARRA 9/21/2024 4:56 PM    PTA Med List   Medication Sig Last Dose    acetaminophen (TYLENOL) 500 MG tablet Take 1,000 mg by mouth every 6 hours as needed for mild pain 9/19/2024 at hs    anastrozole (ARIMIDEX) 1 MG tablet Take 1 tablet (1 mg) by mouth daily (Patient taking differently: Take 1 mg by mouth at bedtime.) 9/20/2024 at hs    calcium carbonate 600 mg-vitamin D 400 units (CALTRATE) 600-400 MG-UNIT per tablet Take 1 tablet by mouth at bedtime. 9/20/2024 at hs    lisinopril (ZESTRIL) 5 MG tablet Take 1 tablet (5 mg) by mouth daily (Patient taking differently: Take 5 mg by mouth at bedtime.) 9/20/2024 at hs    loperamide (IMODIUM A-D) 2 MG tablet Take 2 mg by mouth every other day. At bedtime 9/18/2024 at hs    medical cannabis (Patient's own supply) See Admin Instructions (The purpose of this order is to document that the patient reports taking medical cannabis.  This is not a prescription, and is not used to certify that the patient has a qualifying medical condition.) 9/20/2024 at pm    melatonin 5 MG tablet Take 5 mg by mouth nightly as needed for sleep. 9/20/2024 at hs    metoprolol succinate ER (TOPROL XL) 50 MG 24 hr tablet Take 1 tablet (50 mg) by mouth at bedtime 9/20/2024 at hs    PARoxetine (PAXIL) 20 MG tablet TAKE 1 TABLET BY MOUTH ONCE DAILY .  APPOINTMENT  NEEDED  FOR  ADDITIONAL  REFILLS (Patient taking differently: Take 20 mg by mouth at bedtime.)  9/20/2024 at hs    spironolactone (ALDACTONE) 50 MG tablet 1/2 tablet daily (Patient taking differently: Take 25 mg by mouth at bedtime.) 9/20/2024 at hs    Vitamin D3 (VITAMIN D-1000 MAX ST) 25 mcg (1000 units) tablet Take 25 mcg by mouth at bedtime. 9/20/2024 at hs

## 2024-09-21 NOTE — ED PROVIDER NOTES
History     Chief Complaint   Patient presents with    Shortness of Breath     HPI  Mariluz A Gopie is a 63 year old female with history of obesity, metastatic breast cancer, diverticulitis with intramural abscess requiring colectomy and chronic ostomy placement who presents for evaluation of upper abdominal pain, shortness of breath, lightheadedness.  Patient describes progressively worsening symptoms over the last 3 days.  Pain is mostly localized in the left abdomen but also radiates through to the back.  She also reports nausea and occasional vomiting with any p.o. intake.  Stool output through her ostomy has been significantly decreased, though she still seems to be passing gas.  Symptoms are consistent with previous episode of diverticulitis/colitis that ultimately led up to her colectomy.  Patient otherwise feels well, denies any fevers, chest pain, manges in urinary habits, pain or swelling of the legs, focal numbness/tingling/weakness, other complaints today.    Allergies:  No Known Allergies    Problem List:    Patient Active Problem List    Diagnosis Date Noted    JESUS (acute kidney injury) (H24) 09/21/2024     Priority: Medium    Nausea with vomiting 09/21/2024     Priority: Medium    Abdominal pain, left upper quadrant 09/21/2024     Priority: Medium    Metabolic acidosis, increased anion gap 09/21/2024     Priority: Medium    Calcium kidney stone - nonobstruting 4 mm stone on right side 09/21/2024     Priority: Medium    Vertigo 02/07/2024     Priority: Medium    Acute diverticulitis with probable intramural abscess 09/29/2023     Priority: Medium    History of wheezing due to allergy 09/29/2023     Priority: Medium    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% in May 2024 09/29/2023     Priority: Medium    Sinus bradycardia 09/29/2023     Priority: Medium    Secondary malignant neoplasm of bone (H) 06/13/2023     Priority: Medium    Pancolitis (H) 06/13/2023     Priority: Medium     "Morbid obesity (H) 2020     Priority: Medium    Metastatic breast cancer 2019     Priority: Medium    Mild episode of recurrent major depressive disorder (H24) 2019     Priority: Medium    Essential hypertension 2019     Priority: Medium    Dermatitis 2019     Priority: Medium      Past Medical History:    Past Medical History:   Diagnosis Date    Basal cell carcinoma     Breast cancer (H)     Nausea with vomiting 2024    Obesity      Past Surgical History:    Past Surgical History:   Procedure Laterality Date    BREAST SURGERY      left tissue expander, LEFT MASTECTOMY    CHOLECYSTECTOMY      GYN SURGERY      oophorectomy    IR CHEST PORT PLACEMENT > 5 YRS OF AGE  2019    IR CHEST PORT PLACEMENT > 5 YRS OF AGE  2013    REMOVE TISSUE EXPANDER BREAST  2014    Procedure: REMOVAL OF LEFT BREAST TISSUE EXPANDER, IRRIGATION AND DEBRIDEMENT OF LEFT BREAST;  Surgeon: Marlon Luz MD;  Location: [facility]     Family History:    No family history on file.    Social History:  Marital Status:   [2]  Social History     Tobacco Use    Smoking status: Former     Current packs/day: 0.00     Types: Cigarettes     Quit date: 2000     Years since quittin.7    Smokeless tobacco: Never   Vaping Use    Vaping status: Never Used   Substance Use Topics    Alcohol use: Yes     Comment: social drinking    Drug use: Yes     Types: Marijuana     Comment: medical marijuana syrup       Medications:    No current outpatient medications on file.    Review of Systems   All other systems reviewed and are negative.  See HPI.    Physical Exam   BP: 116/66  Pulse: 85  Temp: 97.8  F (36.6  C)  Resp: 18  Height: 165.1 cm (5' 5\")  Weight: 105.2 kg (231 lb 14.4 oz)  SpO2: 99 %    Physical Exam  Vitals and nursing note reviewed.   Constitutional:       General: She is not in acute distress.     Appearance: Normal appearance. She is not diaphoretic.      Comments: Appears " uncomfortable/anxious.  Otherwise nontoxic.  Slightly pale.   HENT:      Head: Normocephalic and atraumatic.      Mouth/Throat:      Mouth: Mucous membranes are moist.   Eyes:      General: No scleral icterus.     Conjunctiva/sclera: Conjunctivae normal.      Pupils: Pupils are equal, round, and reactive to light.   Cardiovascular:      Rate and Rhythm: Normal rate and regular rhythm.      Heart sounds: Normal heart sounds.   Pulmonary:      Effort: Pulmonary effort is normal. No respiratory distress.      Breath sounds: Normal breath sounds. No decreased breath sounds or wheezing.      Comments: Speaking in full sentences, clear to auscultation.  Abdominal:      General: Abdomen is flat.      Tenderness: There is abdominal tenderness.      Comments: Mild generalized distention.  Ostomy in right abdomen with no concerning overlying skin changes or adjacent hernia/tenderness.  No rebound or guarding.  Small amount of brownish stool in collecting bag.   Musculoskeletal:         General: Normal range of motion.      Cervical back: Normal range of motion and neck supple.      Right lower leg: No tenderness.      Left lower leg: No tenderness.   Skin:     General: Skin is warm.      Capillary Refill: Capillary refill takes less than 2 seconds.      Coloration: Skin is pale.      Findings: No rash.   Neurological:      General: No focal deficit present.      Mental Status: She is alert and oriented to person, place, and time.   Psychiatric:         Mood and Affect: Mood is anxious.       ED Course        Procedures       EKG performed at 1552.  Sinus rhythm, rate 76.  Normal axis.  Chronic appearing left bundle branch block.  Otherwise normal intervals.  Nonspecific ST and T wave changes with no signs of acute ischemia.  Centimeters independently interpreted by me.  Appearance similar to study from September 2023.       Results for orders placed or performed during the hospital encounter of 09/21/24 (from the past 24  hour(s))   CBC with platelets differential    Narrative    The following orders were created for panel order CBC with platelets differential.  Procedure                               Abnormality         Status                     ---------                               -----------         ------                     CBC with platelets and d...[[id number]]  Abnormal            Final result                 Please view results for these tests on the individual orders.   Comprehensive metabolic panel   Result Value Ref Range    Sodium 133 (L) 135 - 145 mmol/L    Potassium 5.3 3.4 - 5.3 mmol/L    Carbon Dioxide (CO2) 14 (L) 22 - 29 mmol/L    Anion Gap 16 (H) 7 - 15 mmol/L    Urea Nitrogen 66.4 (H) 8.0 - 23.0 mg/dL    Creatinine 3.01 (H) 0.51 - 0.95 mg/dL    GFR Estimate 17 (L) >60 mL/min/1.73m2    Calcium 9.8 8.8 - 10.4 mg/dL    Chloride 103 98 - 107 mmol/L    Glucose 142 (H) 70 - 99 mg/dL    Alkaline Phosphatase 100 40 - 150 U/L    AST 20 0 - 45 U/L    ALT 24 0 - 50 U/L    Protein Total 7.8 6.4 - 8.3 g/dL    Albumin 4.4 3.5 - 5.2 g/dL    Bilirubin Total 0.3 <=1.2 mg/dL   Lactic Acid Whole Blood with 1X Repeat in 2 HR when >2   Result Value Ref Range    Lactic Acid, Initial 1.1 0.7 - 2.0 mmol/L   Lipase   Result Value Ref Range    Lipase 116 (H) 13 - 60 U/L   Troponin T, High Sensitivity   Result Value Ref Range    Troponin T, High Sensitivity 32 (H) <=14 ng/L   CBC with platelets and differential   Result Value Ref Range    WBC Count 10.2 4.0 - 11.0 10e3/uL    RBC Count 3.76 (L) 3.80 - 5.20 10e6/uL    Hemoglobin 12.1 11.7 - 15.7 g/dL    Hematocrit 35.2 35.0 - 47.0 %    MCV 94 78 - 100 fL    MCH 32.2 26.5 - 33.0 pg    MCHC 34.4 31.5 - 36.5 g/dL    RDW 13.7 10.0 - 15.0 %    Platelet Count 260 150 - 450 10e3/uL    % Neutrophils 75 %    % Lymphocytes 13 %    % Monocytes 8 %    % Eosinophils 3 %    % Basophils 0 %    % Immature Granulocytes 1 %    NRBCs per 100 WBC 0 <1 /100    Absolute Neutrophils 7.6 1.6 - 8.3 10e3/uL     Absolute Lymphocytes 1.3 0.8 - 5.3 10e3/uL    Absolute Monocytes 0.8 0.0 - 1.3 10e3/uL    Absolute Eosinophils 0.4 0.0 - 0.7 10e3/uL    Absolute Basophils 0.0 0.0 - 0.2 10e3/uL    Absolute Immature Granulocytes 0.1 <=0.4 10e3/uL    Absolute NRBCs 0.0 10e3/uL   CT Abdomen Pelvis w Contrast    Addendum: 9/21/2024    CLINICAL ADDENDUM:   Clinical information in this report has been modified from the previous version as follows:    4 mm stone is noted within the right collecting system. Additional tiny 2 mm stone noted within the right kidney. No stones identified within the left kidney. No hydronephrosis.    END ADDENDUM      Narrative    EXAM: CT ABDOMEN PELVIS W CONTRAST  LOCATION: [facility]  DATE: 9/21/2024    INDICATION: Diffuse upper left sided abdominal pain, decreased ostomy output, history of similar symptoms with diverticulitis bowel obstruction  COMPARISON: 12/13/2023  TECHNIQUE: CT scan of the abdomen and pelvis was performed following injection of IV contrast. Multiplanar reformats were obtained. Dose reduction techniques were used.  CONTRAST: Isovue 370, 100 mL    FINDINGS:   LOWER CHEST: Normal.    HEPATOBILIARY: Liver within normal limits. Cholecystectomy.    PANCREAS: Normal.    SPLEEN: Normal.    ADRENAL GLANDS: Normal.    KIDNEYS/BLADDER: No significant mass, stone, or hydronephrosis.    BOWEL: Right ileostomy with a moderate parastomal hernia containing loops of bowel. No evidence of obstruction.    LYMPH NODES: Normal.    VASCULATURE: Normal.    PELVIC ORGANS: Bladder decompressed. Uterus within normal limits.    MUSCULOSKELETAL: Normal.      Impression    IMPRESSION:   1.  Right lower quadrant ileostomy with a moderate parastomal hernia containing loops of bowel with no evidence of obstruction. No other acute findings in the abdomen and pelvis.   Troponin T, High Sensitivity   Result Value Ref Range    Troponin T, High Sensitivity 27 (H) <=14 ng/L   CRP inflammation    Result Value Ref Range    CRP Inflammation 4.57 <5.00 mg/L   Magnesium Lab   Result Value Ref Range    Magnesium 2.3 1.7 - 2.3 mg/dL   Phosphorus   Result Value Ref Range    Phosphorus 5.1 (H) 2.5 - 4.5 mg/dL   UA with Microscopic reflex to Culture    Specimen: Urine, Clean Catch   Result Value Ref Range    Color Urine Yellow Colorless, Straw, Light Yellow, Yellow    Appearance Urine Slightly Cloudy (A) Clear    Glucose Urine Negative Negative mg/dL    Bilirubin Urine Negative Negative    Ketones Urine Negative Negative mg/dL    Specific Gravity Urine 1.035 1.003 - 1.035    Blood Urine Negative Negative    pH Urine 5.0 5.0 - 7.0    Protein Albumin Urine Negative Negative mg/dL    Urobilinogen Urine Normal Normal, 2.0 mg/dL    Nitrite Urine Negative Negative    Leukocyte Esterase Urine Small (A) Negative    Mucus Urine Present (A) None Seen /LPF    Calcium Oxalate Crystals Urine Few (A) None Seen /HPF    RBC Urine 0 <=2 /HPF    WBC Urine 12 (H) <=5 /HPF    Narrative    Urine Culture ordered based on laboratory criteria   Fractional excretion of sodium    Narrative    The following orders were created for panel order Fractional excretion of sodium.  Procedure                               Abnormality         Status                     ---------                               -----------         ------                     Fractional Excretion of ...[[id number]]                      Final result                 Please view results for these tests on the individual orders.   Fractional Excretion of Sodium   Result Value Ref Range    Creatinine Urine mg/dL 2.2 mg/dL    Sodium Urine mmol/L <20 mmol/L    %FENA     Symptomatic COVID-19 Virus (Coronavirus) by PCR Nose    Specimen: Nose; Swab   Result Value Ref Range    SARS CoV2 PCR Negative Negative    Narrative    Testing was performed using the Xpert Xpress SARS-CoV-2 Assay on the Cepheid Gene-Xpert Instrument Systems. Additional information about this Emergency Use  Authorization (EUA) assay can be found via the Lab Guide. This test should be ordered for the detection of SARS-CoV-2 in individuals who meet SARS-CoV-2 clinical and/or epidemiological criteria as well as from individuals without symptoms or other reasons to suspect COVID-19. Test performance for asymptomatic patients has only been established in anterior nasal swab specimens. This test is for in vitro diagnostic use under the FDA EUA for laboratories certified under CLIA to perform high complexity testing. This test has not been FDA cleared or approved. A negative result does not rule out the presence of PCR inhibitors in the specimen or target RNA concentration below the limit of detection for the assay. The possibility of a false negative should be considered if the patient's recent exposure or clinical presentation suggests COVID-19. This test was validated by the [facility] and [facility]. This laboratory is certified under the Clinical Laboratory Improvement Amendments (CLIA) as qualified to perform high complexity laboratory testing.     Blood gas venous   Result Value Ref Range    pH Venous 7.20 (L) 7.32 - 7.43    pCO2 Venous 45 40 - 50 mm Hg    pO2 Venous 32 25 - 47 mm Hg    Bicarbonate Venous 18 (L) 21 - 28 mmol/L    Base Excess/Deficit Venous -10.1 (L) -3.0 - 3.0 mmol/L    FIO2 28     Oxyhemoglobin Venous 53 (L) 70 - 75 %    O2 Sat, Venous 53.6 (L) 70.0 - 75.0 %    Narrative    In healthy individuals, oxyhemoglobin (O2Hb) and oxygen saturation (SO2) are approximately equal. In the presence of dyshemoglobins, oxyhemoglobin can be considerably lower than oxygen saturation.   Basic metabolic panel   Result Value Ref Range    Sodium 129 (L) 135 - 145 mmol/L    Potassium 5.2 3.4 - 5.3 mmol/L    Chloride 101 98 - 107 mmol/L    Carbon Dioxide (CO2) 12 (L) 22 - 29 mmol/L    Anion Gap 16 (H) 7 - 15 mmol/L    Urea Nitrogen 63.6 (H) 8.0 - 23.0 mg/dL    Creatinine 2.55 (H) 0.51 -  0.95 mg/dL    GFR Estimate 20 (L) >60 mL/min/1.73m2    Calcium 8.6 (L) 8.8 - 10.4 mg/dL    Glucose 99 70 - 99 mg/dL       Medications   acetaminophen (TYLENOL) tablet 1,000 mg (has no administration in time range)   anastrozole (ARIMIDEX) tablet 1 mg (1 mg Oral Not Given 9/21/24 2137)   lisinopril (ZESTRIL) tablet 5 mg ( Oral Automatically Held 9/24/24 2100)   loperamide (IMODIUM) capsule 2 mg ( Oral Automatically Held 9/25/24 0900)   metoprolol succinate ER (TOPROL XL) 24 hr tablet 50 mg (50 mg Oral Not Given 9/21/24 2136)   PARoxetine (PAXIL) tablet 10 mg (10 mg Oral $Given 9/21/24 2128)   spironolactone (ALDACTONE) tablet 25 mg ( Oral Automatically Held 9/24/24 2100)   lidocaine 1 % 0.1-1 mL (has no administration in time range)   lidocaine (LMX4) kit (has no administration in time range)   sodium chloride (PF) 0.9% PF flush 3 mL (3 mLs Intracatheter Not Given 9/21/24 2058)   sodium chloride (PF) 0.9% PF flush 3 mL (has no administration in time range)   calcium carbonate (TUMS) chewable tablet 1,000 mg (has no administration in time range)   sodium chloride 0.9 % infusion ( Intravenous $New Bag 9/21/24 2059)   HYDROmorphone (DILAUDID) injection 0.2 mg (has no administration in time range)   ondansetron (ZOFRAN ODT) ODT tab 4 mg (has no administration in time range)     Or   ondansetron (ZOFRAN) injection 4 mg (has no administration in time range)   prochlorperazine (COMPAZINE) injection 10 mg (has no administration in time range)     Or   prochlorperazine (COMPAZINE) tablet 10 mg (has no administration in time range)     Or   prochlorperazine (COMPAZINE) suppository 25 mg (has no administration in time range)   naloxone (NARCAN) injection 0.2 mg (has no administration in time range)     Or   naloxone (NARCAN) injection 0.4 mg (has no administration in time range)     Or   naloxone (NARCAN) injection 0.2 mg (has no administration in time range)     Or   naloxone (NARCAN) injection 0.4 mg (has no administration in  time range)   oxyCODONE IR (ROXICODONE) half-tab 2.5 mg (has no administration in time range)     Or   oxyCODONE (ROXICODONE) tablet 5 mg (has no administration in time range)   sodium chloride 0.9% BOLUS 1,000 mL (0 mLs Intravenous Stopped 9/21/24 1729)   iopamidol (ISOVUE-370) solution 500 mL (100 mLs Intravenous $Given 9/21/24 1622)   sodium chloride 0.9 % bag 100mL for CT scan flush use (60 mLs Intravenous $Given 9/21/24 1622)   sodium chloride 0.9% BOLUS 1,000 mL ( Intravenous Rate/Dose Change 9/21/24 1857)   acetaminophen (TYLENOL) tablet 975 mg (975 mg Oral $Given 9/21/24 1930)       Assessments & Plan (with Medical Decision Making)     I have reviewed the nursing notes.    I have reviewed the findings, diagnosis, plan and need for follow up with the patient.  Medical Decision Making  Mariluz Stoner is a 63 year old female with history of obesity, metastatic breast cancer, diverticulitis with intramural abscess requiring colectomy and chronic ostomy placement who presents for evaluation of upper abdominal pain, shortness of breath, lightheadedness.  Normal vitals.  Patient does appear anxious and slightly pale.  Otherwise her lungs are clear to auscultation.  There is some generalized abdominal distention and mild left-sided tenderness, but the ostomy site is well-appearing, there is a small amount of brown stool in the collecting bag, and she demonstrates no rebound or guarding.  Patient reports that her symptoms are similar to previous episode of diverticulitis/colitis.  Differential also concerning for bowel obstruction, though could also represent gastritis, pancreatitis, ACS, among others.  Patient was given fluids, Zofran, pain medication on arrival.    Workup showed several abnormalities.  Patient had no leukocytosis or anemia and lactic acid was negative.  However, she has evidence of acute renal failure with creatinine now 3.01 compared to a baseline closer to 1.  BUN is also acutely elevated at  66.4, likely indicating prerenal cause of JESUS.  Patient also has a bicarb of 14, anion gap of 16, glucose 146.  Lipase was 116.  Troponin was also modestly elevated, but EKG showed a stable appearing LBBB with no new ischemia and repeat value was similar/stable.  CT of the abdomen showed a right lower quadrant ileostomy with a moderate parastomal hernia containing loops of bowel but no obstruction or other complicating features.  She separately has a 4 mm stone in the right renal pelvis with no hydronephrosis or other abnormalities.  These findings were discussed with the patient and I do think she requires admission for the sake of continued fluid resuscitation, management of nausea and discomfort.  Since she does not have an obstruction, I do not think an NG tube or surgical consult would be warranted.  Similarly, I suspect the right sided kidney stone is unrelated to her renal dysfunction based on size and location.      Findings were discussed with hospitalist, Dr. Ceballos.  She agrees to accept the patient for admission, but requested addition of VBG, FENA, and COVID-19 testing.  Urinalysis is also pending at this time.  Otherwise remained hemodynamically stable until the time of admission.    Current Discharge Medication List        Final diagnoses:   Acute renal failure, unspecified acute renal failure type (H24)   Elevated troponin   Nausea and vomiting, unspecified vomiting type   Parastomal hernia without obstruction or gangrene     9/21/2024   [facility] EMERGENCY DEPT       Ramsey Suarez MD  09/21/24 2229

## 2024-09-21 NOTE — ED TRIAGE NOTES
Patient presents with concern for shortness of breath, dizziness, and LUQ pain for the few days. Patient states that her colostomy is not having much output for the last few days. Patient states that she feels similar to when she had diverticulitis previously.

## 2024-09-22 LAB
ALBUMIN SERPL BCG-MCNC: 3.6 G/DL (ref 3.5–5.2)
ALP SERPL-CCNC: 81 U/L (ref 40–150)
ALT SERPL W P-5'-P-CCNC: 17 U/L (ref 0–50)
ANION GAP SERPL CALCULATED.3IONS-SCNC: 12 MMOL/L (ref 7–15)
AST SERPL W P-5'-P-CCNC: 17 U/L (ref 0–45)
BACTERIA UR CULT: NORMAL
BASE EXCESS BLDV CALC-SCNC: -11.9 MMOL/L (ref -3–3)
BILIRUB SERPL-MCNC: 0.3 MG/DL
BUN SERPL-MCNC: 56.8 MG/DL (ref 8–23)
CALCIUM SERPL-MCNC: 8.5 MG/DL (ref 8.8–10.4)
CHLORIDE SERPL-SCNC: 108 MMOL/L (ref 98–107)
CREAT SERPL-MCNC: 2.12 MG/DL (ref 0.51–0.95)
EGFRCR SERPLBLD CKD-EPI 2021: 26 ML/MIN/1.73M2
ERYTHROCYTE [DISTWIDTH] IN BLOOD BY AUTOMATED COUNT: 14.1 % (ref 10–15)
GLUCOSE SERPL-MCNC: 97 MG/DL (ref 70–99)
HCO3 BLDV-SCNC: 16 MMOL/L (ref 21–28)
HCO3 SERPL-SCNC: 13 MMOL/L (ref 22–29)
HCT VFR BLD AUTO: 32.4 % (ref 35–47)
HGB BLD-MCNC: 10.7 G/DL (ref 11.7–15.7)
LIPASE SERPL-CCNC: 73 U/L (ref 13–60)
MAGNESIUM SERPL-MCNC: 2.2 MG/DL (ref 1.7–2.3)
MCH RBC QN AUTO: 31.8 PG (ref 26.5–33)
MCHC RBC AUTO-ENTMCNC: 33 G/DL (ref 31.5–36.5)
MCV RBC AUTO: 96 FL (ref 78–100)
O2/TOTAL GAS SETTING VFR VENT: 21 %
OXYHGB MFR BLDV: 77 % (ref 70–75)
PCO2 BLDV: 42 MM HG (ref 40–50)
PH BLDV: 7.18 [PH] (ref 7.32–7.43)
PHOSPHATE SERPL-MCNC: 4.7 MG/DL (ref 2.5–4.5)
PLATELET # BLD AUTO: 187 10E3/UL (ref 150–450)
PO2 BLDV: 48 MM HG (ref 25–47)
POTASSIUM SERPL-SCNC: 4.9 MMOL/L (ref 3.4–5.3)
PROT SERPL-MCNC: 6.3 G/DL (ref 6.4–8.3)
RBC # BLD AUTO: 3.37 10E6/UL (ref 3.8–5.2)
SAO2 % BLDV: 78.5 % (ref 70–75)
SODIUM SERPL-SCNC: 133 MMOL/L (ref 135–145)
WBC # BLD AUTO: 5.8 10E3/UL (ref 4–11)

## 2024-09-22 PROCEDURE — 80053 COMPREHEN METABOLIC PANEL: CPT | Performed by: FAMILY MEDICINE

## 2024-09-22 PROCEDURE — 36415 COLL VENOUS BLD VENIPUNCTURE: CPT | Performed by: FAMILY MEDICINE

## 2024-09-22 PROCEDURE — 250N000013 HC RX MED GY IP 250 OP 250 PS 637: Performed by: FAMILY MEDICINE

## 2024-09-22 PROCEDURE — 120N000001 HC R&B MED SURG/OB

## 2024-09-22 PROCEDURE — 85018 HEMOGLOBIN: CPT | Performed by: FAMILY MEDICINE

## 2024-09-22 PROCEDURE — 82805 BLOOD GASES W/O2 SATURATION: CPT | Performed by: FAMILY MEDICINE

## 2024-09-22 PROCEDURE — 258N000003 HC RX IP 258 OP 636: Performed by: FAMILY MEDICINE

## 2024-09-22 PROCEDURE — 83690 ASSAY OF LIPASE: CPT | Performed by: FAMILY MEDICINE

## 2024-09-22 PROCEDURE — 83735 ASSAY OF MAGNESIUM: CPT | Performed by: FAMILY MEDICINE

## 2024-09-22 PROCEDURE — 99233 SBSQ HOSP IP/OBS HIGH 50: CPT | Performed by: INTERNAL MEDICINE

## 2024-09-22 PROCEDURE — 84100 ASSAY OF PHOSPHORUS: CPT | Performed by: FAMILY MEDICINE

## 2024-09-22 PROCEDURE — 250N000013 HC RX MED GY IP 250 OP 250 PS 637: Performed by: INTERNAL MEDICINE

## 2024-09-22 RX ADMIN — SODIUM CHLORIDE: 9 INJECTION, SOLUTION INTRAVENOUS at 15:09

## 2024-09-22 RX ADMIN — PAROXETINE 10 MG: 10 TABLET, FILM COATED ORAL at 20:44

## 2024-09-22 RX ADMIN — SODIUM CHLORIDE: 9 INJECTION, SOLUTION INTRAVENOUS at 03:35

## 2024-09-22 RX ADMIN — METOPROLOL SUCCINATE 50 MG: 50 TABLET, EXTENDED RELEASE ORAL at 20:44

## 2024-09-22 RX ADMIN — ANASTROZOLE 1 MG: 1 TABLET, COATED ORAL at 20:44

## 2024-09-22 ASSESSMENT — ACTIVITIES OF DAILY LIVING (ADL)
ADLS_ACUITY_SCORE: 29
ADLS_ACUITY_SCORE: 30
ADLS_ACUITY_SCORE: 29
ADLS_ACUITY_SCORE: 30
ADLS_ACUITY_SCORE: 29
ADLS_ACUITY_SCORE: 30
ADLS_ACUITY_SCORE: 29
ADLS_ACUITY_SCORE: 30
ADLS_ACUITY_SCORE: 29
ADLS_ACUITY_SCORE: 30
DEPENDENT_IADLS:: INDEPENDENT
ADLS_ACUITY_SCORE: 29
ADLS_ACUITY_SCORE: 30
ADLS_ACUITY_SCORE: 29

## 2024-09-22 NOTE — H&P
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  Do to patient's cardiomyopathy and high risk for CHF exacerbation, will admit to inpatient status and cautiously rehydrate with close monitoring.  At this time at least 2 midnights is anticipated.      Principal Problem:    JESUS (acute kidney injury) (H24)    Assessment: creatinine of 3.01 with previous baseline 0.7-0.9 in recent months.  BUN/creatinine ratio 22 and patient having minimal ostomy output and appears clinically dry.  Didn't feel well after COVID and flu shot 8 days ago with decreased PO intake but continued to take regular home regimen including spironolactone and lisinopril.  CT scan shows non-obstructing 4 mm stone without hydronephrosis.  Pre-renal azotemia suspected.  Patient received one liter NS fluids in the ED and is tolerating it well without signs of hypoxemia.      Plan:   -admit patient to inpatient status  -will continues NS at 100 ml/hr and monitor closely for respiratory tolerance, slowing if hypoxemia develops  -will collect FENA when urine is collected   -recheck BMP every 8 hours x2   -strict intake and output    Active Problems:    Metabolic acidosis, increased anion gap - suspected    Assessment: patient has anion gap of 16 and bicarb of 14, highly suspicious for metabolic acidosis from acute kidney injury.  Lactic acid normal.      Plan:   -have asked ED provider to add VBG to orders to confirm diagnosis and will continue to monitor  -will not use oxygen for shortness of breath  symptoms unless hypoxemia occurs.    -recheck VBG again tomorrow morning       Nausea with vomiting    Assessment: likely secondary to worsening metabolic acidosis as creatinine continued to increase.  CT of the abdomen shows no signs of inflammation, obstruction.      Plan:   -will monitor for improvement as acidosis resolves       Abdominal pain, left upper quadrant    Assessment: patient having pain in upper left abdomen/lower chest wall region.  Started yesterday after nausea/vomiting.  Very minimal tenderness on palpation currently and patient feels it is already improving.  CT scan did not show obvious etiology.  No diarrhea and patient actually has been having much less output than normal.  Troponins minimally elevated and down trending, EKG unchanged with LBBB     Plan:   -will continue with cautious rehydration and monitor closely.    -will add on CRP to labs already performed to evaluate for underlying inflammatory process that may be contributing   -treat with tylenol and as needed with narcotics       Elevated troponin    Assessment:  troponin is minimally elevated at 32 and has improved to 27 on recheck.  EKG is LBBB.  Elevation suspected secondary to JESUS above    Plan:  no further routine monitoring needed as long as renal function continues to improve unless clinical picture changes and concern for angina increases      Calcium kidney stone - nonobstruting 4 mm stone on right side    Assessment: noted on CT but is not obstructing and no pain on the right side    Plan:   -no acute work up or intervention needed      Ileostomy    Assessment:  patient has ileostomy following complicated diverticulitis with abscess formation.  Has been having significant reduction in output the past two days but no obstruction on exam.      Plan:    -will place ostomy care orders but patient is independent with her cares at home.   -monitor for improvement back to normal output has patient is hydrated appropriately        Parastomal hernia without obstruction or gangrene    Assessment:  noted on CT scan    Plan:    -encouraged follow up with outpatient general surgery following discharge       Metastatic breast cancer    Assessment: patient was diagnosed in 2017 and is s/p left mastectomy and lymph node dissection, s/p radiation and treatment with Herceptin and Perjeta which had to be stopped early secondary to developing cardiomyopathy in 2019.  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  On metoprolol XL 50 mg daily, lisinopril 5 mg daily and spironolactone 25 mg daily without daily symptoms.      Plan:   -will continue metoprolol XL as per home regimen  -hold lisinopril and spironolactone due to JESUS as above  -will need to cautiously rehydrate and monitor for signs of volume overload (edema, oxygen need, etc) as patient is at high risk of CHF exacerbation if overhydration or aggressive hydration is performed.        Mild episode of recurrent major depressive disorder (H24)    Assessment: patient is on Paxil 20 mg daily with symptoms fairly well controlled    Plan:   -will decrease paxil to 10 mg daily due to JESUS and low creatinine clearance but increase back to home regimen as able           Diet: Advance diet as tolerated:  Clear Liquid Diet to low salt diet  DVT Prophylaxis: Pneumatic Compression Devices  Orozco Catheter: Not present  Lines: PRESENT             Cardiac Monitoring: None  Code Status:  Full code    Clinically Significant Risk Factors Present on Admission         # Hyponatremia: Lowest Na = 133 mmol/L in last 2 days, will  monitor as appropriate          # Hypertension: Noted on problem list  # Chronic heart failure with reduced ejection fraction: last echo with EF <40%                   Disposition Plan     Medically Ready for Discharge: Anticipated in 2-4 Days           Maribel Ceballos MD  Hospitalist Service  [facility]  Securely message with Connectivity (more info)  Text page via [facility] Paging/Directory     ______________________________________________________________________    Chief Complaint   Shortness of breath x1 week, dizziness, nausea vomiting and left upper abdominal pain x2 days     History is obtained from the patient    History of Present Illness   Mariluz Stoner is a 63 year old female with PMHx of metastatic cancer s/p left mastectomy, lymph node dissection, radiation, chemo currently on Arimidex, cardiomyopathy from chemotherapy with EF 20-25%, depression, and obesity who had her flu shot and COVID shot 8 days ago and the following day was slightly sore and tired which improved but patient had mild shortness of breath that has been worsening throughout the week and yesterday she began having dizziness, nausea/vomiting and left upper abdominal pain which is worsening today.  She has had significant reduction in ostomy output in recent days and thought she was obstructed again, as this is similar to how she felt when she had bowel obstructions.  CT scan has shown no current obstruction and a 4 mm non-obstructing stone in the right ureter but no other acute findings.  Creatinine 3.01 with BUN/creatinine ratio of 22, VBG shows uncompensated anion gap elevated metabolic acidosis.  Patient is being admitted as above.        Past Medical History    Past Medical History:   Diagnosis Date    Basal cell carcinoma     Breast cancer (H)     Nausea with vomiting 9/21/2024    Obesity        Past Surgical History   Past Surgical History:   Procedure Laterality Date    BREAST SURGERY      left  tissue expander, LEFT MASTECTOMY    CHOLECYSTECTOMY      GYN SURGERY      oophorectomy    IR CHEST PORT PLACEMENT > 5 YRS OF AGE  1/18/2019    IR CHEST PORT PLACEMENT > 5 YRS OF AGE  4/16/2013    REMOVE TISSUE EXPANDER BREAST  4/16/2014    Procedure: REMOVAL OF LEFT BREAST TISSUE EXPANDER, IRRIGATION AND DEBRIDEMENT OF LEFT BREAST;  Surgeon: Marlon Lzu MD;  Location: [facility]       Prior to Admission Medications   Prior to Admission Medications   Prescriptions Last Dose Informant Patient Reported? Taking?   PARoxetine (PAXIL) 20 MG tablet 9/20/2024 at hs Self No Yes   Sig: TAKE 1 TABLET BY MOUTH ONCE DAILY .  APPOINTMENT  NEEDED  FOR  ADDITIONAL  REFILLS   Patient taking differently: Take 20 mg by mouth at bedtime.   Vitamin D3 (VITAMIN D-1000 MAX ST) 25 mcg (1000 units) tablet 9/20/2024 at hs Self Yes Yes   Sig: Take 25 mcg by mouth at bedtime.   acetaminophen (TYLENOL) 500 MG tablet 9/19/2024 at hs Self Yes Yes   Sig: Take 1,000 mg by mouth every 6 hours as needed for mild pain   anastrozole (ARIMIDEX) 1 MG tablet 9/20/2024 at hs Self No Yes   Sig: Take 1 tablet (1 mg) by mouth daily   Patient taking differently: Take 1 mg by mouth at bedtime.   calcium carbonate 600 mg-vitamin D 400 units (CALTRATE) 600-400 MG-UNIT per tablet 9/20/2024 at hs Self Yes Yes   Sig: Take 1 tablet by mouth at bedtime.   lisinopril (ZESTRIL) 5 MG tablet 9/20/2024 at hs  No Yes   Sig: Take 1 tablet (5 mg) by mouth daily   Patient taking differently: Take 5 mg by mouth at bedtime.   loperamide (IMODIUM A-D) 2 MG tablet 9/18/2024 at hs  Yes Yes   Sig: Take 2 mg by mouth every other day. At bedtime   medical cannabis (Patient's own supply) 9/20/2024 at pm Self Yes Yes   Sig: See Admin Instructions (The purpose of this order is to document that the patient reports taking medical cannabis.  This is not a prescription, and is not used to certify that the patient has a qualifying medical condition.)   melatonin 5 MG tablet 9/20/2024 at  hs  Yes Yes   Sig: Take 5 mg by mouth nightly as needed for sleep.   metoprolol succinate ER (TOPROL XL) 50 MG 24 hr tablet 2024 at hs  No Yes   Sig: Take 1 tablet (50 mg) by mouth at bedtime   spironolactone (ALDACTONE) 50 MG tablet 2024 at hs  No Yes   Si/2 tablet daily   Patient taking differently: Take 25 mg by mouth at bedtime.      Facility-Administered Medications: None        Physical Exam   Vital Signs: Temp: 97.8  F (36.6  C) Temp src: Temporal BP: 113/64 Pulse: 73   Resp: 27 SpO2: 100 % O2 Device: Nasal cannula Oxygen Delivery: 2 LPM  Weight: 231 lbs 14.4 oz    Constitutional: awake, alert, cooperative, no acute distress at rest, appears stated age  Respiratory: patient has tachypnea in to the mid to upper 20s, breath sounds decreased but no crackles or wheezes  Cardiovascular: RRR  GI: bowel sounds present, abdomen obese but soft and non-tender, even in the left upper quadrant were patient had been reporting pain   Skin: no redness, warmth, or swelling and no rashes  Neurologic: Awake, alert, oriented to name, place and situation     Medical Decision Making       80 MINUTES SPENT BY ME on the date of service doing chart review, history, exam, documentation & further activities per the note.      Data     I have personally reviewed the following data over the past 24 hrs:    10.2  \   12.1   / 260     133 (L) 103 66.4 (H) /  142 (H)   5.3 14 (L) 3.01 (H) \     ALT: 24 AST: 20 AP: 100 TBILI: 0.3   ALB: 4.4 TOT PROTEIN: 7.8 LIPASE: 116 (H)     Trop: 27 (H) BNP: N/A     Procal: N/A CRP: 4.57 Lactic Acid: 1.1         Imaging results reviewed over the past 24 hrs:   Recent Results (from the past 24 hour(s))   CT Abdomen Pelvis w Contrast    Addendum: 2024    CLINICAL ADDENDUM:   Clinical information in this report has been modified from the previous version as follows:    4 mm stone is noted within the right collecting system. Additional tiny 2 mm stone noted within the right kidney. No  stones identified within the left kidney. No hydronephrosis.    END ADDENDUM      Narrative    EXAM: CT ABDOMEN PELVIS W CONTRAST  LOCATION: [facility]  DATE: 9/21/2024    INDICATION: Diffuse upper left sided abdominal pain, decreased ostomy output, history of similar symptoms with diverticulitis bowel obstruction  COMPARISON: 12/13/2023  TECHNIQUE: CT scan of the abdomen and pelvis was performed following injection of IV contrast. Multiplanar reformats were obtained. Dose reduction techniques were used.  CONTRAST: Isovue 370, 100 mL    FINDINGS:   LOWER CHEST: Normal.    HEPATOBILIARY: Liver within normal limits. Cholecystectomy.    PANCREAS: Normal.    SPLEEN: Normal.    ADRENAL GLANDS: Normal.    KIDNEYS/BLADDER: No significant mass, stone, or hydronephrosis.    BOWEL: Right ileostomy with a moderate parastomal hernia containing loops of bowel. No evidence of obstruction.    LYMPH NODES: Normal.    VASCULATURE: Normal.    PELVIC ORGANS: Bladder decompressed. Uterus within normal limits.    MUSCULOSKELETAL: Normal.      Impression    IMPRESSION:   1.  Right lower quadrant ileostomy with a moderate parastomal hernia containing loops of bowel with no evidence of obstruction. No other acute findings in the abdomen and pelvis.
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
## 2024-09-22 NOTE — PLAN OF CARE
Goal Outcome Evaluation:       Patient alert and oriented. Vital signs stable. IV fluids infusing. Voiding well. Manages illeostomy independently. Has chronic neuropathy in BLE's. Clear lung sounds.

## 2024-09-22 NOTE — PLAN OF CARE
Goal Outcome Evaluation:      Plan of Care Reviewed With: patient    Overall Patient Progress: improvingOverall Patient Progress: improving    Outcome Evaluation: Patient progressing towards goals. SBA with IV help while ambulating. Good urine and stool output this shift. Tolerated reg diet for dinner this shift. Colostomy bag changed due to leak. VSS on RA denies any pain or SOB. Ambulated halls x 2. Fluids running at 100ml/hr.

## 2024-09-22 NOTE — PROGRESS NOTES
S-(situation): Patient arrives to room 270 via cart from ED    B-(background): JESUS    A-(assessment): vss, RA, IVF running, denies pain, states that she is bloated    R-(recommendations): Orders reviewed with patient. Will monitor patient per MD orders.     Inpatient nursing criteria listed below were met:    Health care directives status obtained and documented: Yes  VTE ordered/documented: Yes  Skin issues/needs documented:Yes  Isolation addressed and Signage used: NA  Fall Prevention: Care plan updated NA Education given and documented Yes  Care Plan initiated and Co-Morbidities added: Yes  Education Assessment documented:Yes  Admission Education Documented: Yes  If present CAUTI/CLABI Education done: Yes  New medication patient education completed and documented (Possible Side Effects of Common Medications handout): Yes  Allergies Reviewed: Yes  Admission Medication Reconciliation completed: Yes  Home medications if not able to send immediately home with family stored here: NA  Reminder note placed in discharge instructions regarding home meds: NA  Individualized care needs/preferences addressed and charted: Yes  Provider Notified that patient has arrived to the unit: Yes

## 2024-09-22 NOTE — PROGRESS NOTES
Patient A&O, VSS on room air. Reports dizziness with position changes and ambulation. Up SBA to bathroom. Manages illeostomy independently. Has chronic neuropathy in BLE's. Lungs CTA. IVF NS @ 100, tolerating well. Refused SCDs overnight, reporting they are preventing her from sleeping. PH lab value returned this a.m. at 7.18, critical value result reported to provider.

## 2024-09-22 NOTE — ED NOTES
ED Nursing criteria listed below was addressed during verbal handoff:     Abnormal vitals: Yes - soft b/p  Abnormal results: Yes - see results  Med Reconciliation completed: Yes  Meds given in ED: Yes - see MAR  Any Overdue Meds: No  Core Measures: N/A  Isolation: N/A  Special needs: Yes  Skin assessment: Yes    Observation Patient  Education provided: N/A

## 2024-09-22 NOTE — PROGRESS NOTES
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       JESUS normal renal function at baseline  - Didn't feel well after COVID and flu shot 8 days ago with decreased PO intake but continued to take regular home regimen including spironolactone and lisinopril.  -  CT scan shows non-obstructing 4 mm stone without hydronephrosis.    -will continues NS at 100 ml/hr and monitor closely for respiratory tolerance, slowing if hypoxemia develops  - Renal dose meds and avoid nephrotoxic agents   - Trend renal function  - holding lisinopril and spironolactone   - phos elevated trend   - diet renal non dialysis   - avoid hypotension      Metabolic acidosis, increased anion gap secondary to RTA   - anion gap of 16 supports MA likely due to JESUS   - AG this normalized   - continue IVF     Nausea with vomiting  - likely secondary to uremia with ARF   - appetite improving and symptoms resolving    - Ucx mixed urogenital organisms       Elevated troponin more likely due to ARF instead of demand ischemia   - 32 and has improved to 27 on recheck.  -   KG is LBBB.    - denies CP symptoms       Calcium kidney stone - nonobstruting 4 mm stone on right side  - hydration and monitor  - Ucx mixed urogenital organisms      Ileostomy following complicated diverticulitis with abscess formation.  Has been having significant reduction in output the past two days but no obstruction on exam.    - ostomy care orders but patient is independent with her cares at home.   - now having good output no abd pain,   -- CT shows no findings of SBO      Parastomal hernia without obstruction or gangrene  -outpatient monitoring      Metastatic breast cancer 2017 and is s/p left mastectomy and lymph node dissection, s/p radiation and treatment with Herceptin and Perjeta which had to be stopped early secondary to developing cardiomyopathy in 2019.    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   -  On metoprolol XL 50 mg daily continue with hold parameters   - holding lisinopril 5 mg daily and spironolactone 25 mg daily with ARF    - getting IVF and monitor for fluid overload   - recent Cardiac MRI: Mildly dilated left ventricle with moderately reduced left ventricle systolic function.  Quantitative LVEF  is 38%. Regadenoson induced stress perfusion is negative for ischemia. Normal right ventricle size with normal right ventricle systolic function.  Quantitative RVEF is 57%.Delayed hyperenhancement with no scar, fibrosis or evidence of infiltrative disease.      Mild episode of recurrent major depressive disorder (H24)  - on admission paxil decreased from 20mg to 10 mg daily due to JESUS and low creatinine clearance but increase back to home regimen as able      Abdominal pain, left upper quadrant  - no symptoms of this at this time and resolved    - personally reviewed CT     Low  "calcium, was normal on admission   - recheck in am  - if drops consider ionized calcemia     Clinically Significant Risk Factors Present on Admission         # Hyponatremia: Lowest Na = 129 mmol/L in last 2 days, will monitor as appropriate  # Hypocalcemia: Lowest Ca = 8.5 mg/dL in last 2 days, will monitor and replace as appropriate         # Hypertension: Noted on problem list    # Chronic heart failure with reduced ejection fraction: last echo with EF <40%   # Anemia: based on hgb <11       # Obesity: Estimated body mass index is 38.87 kg/m  as calculated from the following:    Height as of this encounter: 1.651 m (5' 5\").    Weight as of this encounter: 106 kg (233 lb 9.6 oz).                COVID-19 PCR Results          9/21/2024    18:40   COVID-19 PCR Results   SARS CoV2 PCR Negative      COVID-19 Antibody Results, Testing for Immunity           No data to display               Code Status: Full Code  VTE prophylaxis:  Pneumatic Compression Devices  DIET: Orders Placed This Encounter      Advance Diet as Tolerated: Regular Diet Adult; 2 gm NA Diet; Renal Diet (non-dialysis)    Drains/Lines: Patient has No line.    Orozco Catheter: Not present    Weight bearing status: WBAT    Expected Discharge Date: 09/23/2024      Destination: home with family        Medically Ready for Discharge: Anticipated Tomorrow    Subjective:  Able to tolerate diet, and now having output in ostomy, denies CP Abd pain or SOB     PHYSICAL EXAM  Temp:  [97.4  F (36.3  C)-98.6  F (37  C)] 98.6  F (37  C)  Pulse:  [70-85] 77  Resp:  [13-27] 16  BP: ()/(41-70) 114/53  SpO2:  [93 %-100 %] 99 %  Wt Readings from Last 1 Encounters:   09/22/24 106 kg (233 lb 9.6 oz)       Intake/Output Summary (Last 24 hours) at 9/22/2024 0753  Last data filed at 9/22/2024 0223  Gross per 24 hour   Intake --   Output 1200 ml   Net -1200 ml      Body mass index is 38.87 kg/m .    Physical Exam  Vitals and nursing note reviewed.   Constitutional:       " Appearance: She is obese.      Comments: NAD, nontoxic    Cardiovascular:      Rate and Rhythm: Normal rate and regular rhythm.      Pulses: Normal pulses.   Pulmonary:      Effort: Pulmonary effort is normal. No respiratory distress.      Breath sounds: Normal breath sounds. No stridor. No wheezing, rhonchi or rales.   Abdominal:      General: Bowel sounds are normal. There is no distension.      Palpations: Abdomen is soft.      Tenderness: There is no abdominal tenderness. There is no guarding.      Comments: Ostomy in place bag full   Musculoskeletal:         General: Normal range of motion.      Right lower leg: No edema.      Left lower leg: No edema.   Skin:     General: Skin is warm and dry.      Capillary Refill: Capillary refill takes less than 2 seconds.   Neurological:      General: No focal deficit present.      Mental Status: She is alert and oriented to person, place, and time. Mental status is at baseline.       PERTINENT LABS/IMAGING:  Results for orders placed or performed during the hospital encounter of 09/21/24   CT Abdomen Pelvis w Contrast    Impression    IMPRESSION:   1.  Right lower quadrant ileostomy with a moderate parastomal hernia containing loops of bowel with no evidence of obstruction. No other acute findings in the abdomen and pelvis.       Imaging results reviewed over the past 24 hrs:   Recent Results (from the past 24 hour(s))   CT Abdomen Pelvis w Contrast    Addendum: 9/21/2024    CLINICAL ADDENDUM:   Clinical information in this report has been modified from the previous version as follows:    4 mm stone is noted within the right collecting system. Additional tiny 2 mm stone noted within the right kidney. No stones identified within the left kidney. No hydronephrosis.    END ADDENDUM      Narrative    EXAM: CT ABDOMEN PELVIS W CONTRAST  LOCATION: [facility]  DATE: 9/21/2024    INDICATION: Diffuse upper left sided abdominal pain, decreased ostomy  output, history of similar symptoms with diverticulitis bowel obstruction  COMPARISON: 12/13/2023  TECHNIQUE: CT scan of the abdomen and pelvis was performed following injection of IV contrast. Multiplanar reformats were obtained. Dose reduction techniques were used.  CONTRAST: Isovue 370, 100 mL    FINDINGS:   LOWER CHEST: Normal.    HEPATOBILIARY: Liver within normal limits. Cholecystectomy.    PANCREAS: Normal.    SPLEEN: Normal.    ADRENAL GLANDS: Normal.    KIDNEYS/BLADDER: No significant mass, stone, or hydronephrosis.    BOWEL: Right ileostomy with a moderate parastomal hernia containing loops of bowel. No evidence of obstruction.    LYMPH NODES: Normal.    VASCULATURE: Normal.    PELVIC ORGANS: Bladder decompressed. Uterus within normal limits.    MUSCULOSKELETAL: Normal.      Impression    IMPRESSION:   1.  Right lower quadrant ileostomy with a moderate parastomal hernia containing loops of bowel with no evidence of obstruction. No other acute findings in the abdomen and pelvis.     Recent Labs   Lab 09/22/24  0532 09/21/24  2146 09/21/24  1550   WBC 5.8  --  10.2   HGB 10.7*  --  12.1   MCV 96  --  94     --  260   * 129* 133*   POTASSIUM 4.9 5.2 5.3   CHLORIDE 108* 101 103   CO2 13* 12* 14*   BUN 56.8* 63.6* 66.4*   CR 2.12* 2.55* 3.01*   ANIONGAP 12 16* 16*   SINDY 8.5* 8.6* 9.8   GLC 97 99 142*   ALBUMIN 3.6  --  4.4   PROTTOTAL 6.3*  --  7.8   BILITOTAL 0.3  --  0.3   ALKPHOS 81  --  100   ALT 17  --  24   AST 17  --  20   LIPASE 73*  --  116*     55 MINUTES SPENT BY ME on the date of service doing chart review, history, exam, documentation, discussion with nursing staff and specialist, & further activities per the note.  Reina Alexis MD  [facility] Medicine Service  [phone]

## 2024-09-22 NOTE — PLAN OF CARE
Goal Outcome Evaluation:      Plan of Care Reviewed With: patient          Outcome Evaluation: Return home

## 2024-09-23 PROBLEM — Z86.718 HISTORY OF VENOUS THROMBOSIS: Status: ACTIVE | Noted: 2024-09-23

## 2024-09-23 PROBLEM — Z93.3 COLOSTOMY PRESENT (H): Status: ACTIVE | Noted: 2024-09-23

## 2024-09-23 PROBLEM — Z93.2 ILEOSTOMY PRESENT (H): Status: ACTIVE | Noted: 2024-09-23

## 2024-09-23 LAB
ALBUMIN SERPL BCG-MCNC: 3.5 G/DL (ref 3.5–5.2)
ANION GAP SERPL CALCULATED.3IONS-SCNC: 6 MMOL/L (ref 7–15)
ANION GAP SERPL CALCULATED.3IONS-SCNC: 7 MMOL/L (ref 7–15)
ANION GAP SERPL CALCULATED.3IONS-SCNC: 9 MMOL/L (ref 7–15)
ANION GAP SERPL CALCULATED.3IONS-SCNC: 9 MMOL/L (ref 7–15)
BASE EXCESS BLDV CALC-SCNC: -5 MMOL/L (ref -3–3)
BASE EXCESS BLDV CALC-SCNC: -6.3 MMOL/L (ref -3–3)
BASE EXCESS BLDV CALC-SCNC: -7.2 MMOL/L (ref -3–3)
BUN SERPL-MCNC: 36.5 MG/DL (ref 8–23)
CALCIUM SERPL-MCNC: 8.4 MG/DL (ref 8.8–10.4)
CHLORIDE SERPL-SCNC: 108 MMOL/L (ref 98–107)
CHLORIDE SERPL-SCNC: 111 MMOL/L (ref 98–107)
CHLORIDE SERPL-SCNC: 112 MMOL/L (ref 98–107)
CHLORIDE SERPL-SCNC: 113 MMOL/L (ref 98–107)
CREAT SERPL-MCNC: 1.3 MG/DL (ref 0.51–0.95)
EGFRCR SERPLBLD CKD-EPI 2021: 46 ML/MIN/1.73M2
ERYTHROCYTE [DISTWIDTH] IN BLOOD BY AUTOMATED COUNT: 14 % (ref 10–15)
GLUCOSE SERPL-MCNC: 91 MG/DL (ref 70–99)
HCO3 BLDV-SCNC: 19 MMOL/L (ref 21–28)
HCO3 BLDV-SCNC: 21 MMOL/L (ref 21–28)
HCO3 BLDV-SCNC: 22 MMOL/L (ref 21–28)
HCO3 SERPL-SCNC: 16 MMOL/L (ref 22–29)
HCO3 SERPL-SCNC: 18 MMOL/L (ref 22–29)
HCO3 SERPL-SCNC: 19 MMOL/L (ref 22–29)
HCO3 SERPL-SCNC: 20 MMOL/L (ref 22–29)
HCT VFR BLD AUTO: 31 % (ref 35–47)
HGB BLD-MCNC: 10.2 G/DL (ref 11.7–15.7)
MAGNESIUM SERPL-MCNC: 2.1 MG/DL (ref 1.7–2.3)
MCH RBC QN AUTO: 31.8 PG (ref 26.5–33)
MCHC RBC AUTO-ENTMCNC: 32.9 G/DL (ref 31.5–36.5)
MCV RBC AUTO: 97 FL (ref 78–100)
O2/TOTAL GAS SETTING VFR VENT: 21 %
OXYHGB MFR BLDV: 42 % (ref 70–75)
OXYHGB MFR BLDV: 56 % (ref 70–75)
OXYHGB MFR BLDV: 87 % (ref 70–75)
PCO2 BLDV: 37 MM HG (ref 40–50)
PCO2 BLDV: 49 MM HG (ref 40–50)
PCO2 BLDV: 51 MM HG (ref 40–50)
PH BLDV: 7.22 [PH] (ref 7.32–7.43)
PH BLDV: 7.26 [PH] (ref 7.32–7.43)
PH BLDV: 7.32 [PH] (ref 7.32–7.43)
PHOSPHATE SERPL-MCNC: 3.2 MG/DL (ref 2.5–4.5)
PLATELET # BLD AUTO: 183 10E3/UL (ref 150–450)
PO2 BLDV: 26 MM HG (ref 25–47)
PO2 BLDV: 31 MM HG (ref 25–47)
PO2 BLDV: 54 MM HG (ref 25–47)
POTASSIUM SERPL-SCNC: 4.4 MMOL/L (ref 3.4–5.3)
POTASSIUM SERPL-SCNC: 4.9 MMOL/L (ref 3.4–5.3)
POTASSIUM SERPL-SCNC: 5.1 MMOL/L (ref 3.4–5.3)
POTASSIUM SERPL-SCNC: 5.4 MMOL/L (ref 3.4–5.3)
RBC # BLD AUTO: 3.21 10E6/UL (ref 3.8–5.2)
SAO2 % BLDV: 43.1 % (ref 70–75)
SAO2 % BLDV: 56.5 % (ref 70–75)
SAO2 % BLDV: 88.3 % (ref 70–75)
SODIUM SERPL-SCNC: 135 MMOL/L (ref 135–145)
SODIUM SERPL-SCNC: 136 MMOL/L (ref 135–145)
SODIUM SERPL-SCNC: 138 MMOL/L (ref 135–145)
SODIUM SERPL-SCNC: 139 MMOL/L (ref 135–145)
WBC # BLD AUTO: 4.6 10E3/UL (ref 4–11)

## 2024-09-23 PROCEDURE — 99233 SBSQ HOSP IP/OBS HIGH 50: CPT | Performed by: PEDIATRICS

## 2024-09-23 PROCEDURE — 82374 ASSAY BLOOD CARBON DIOXIDE: CPT | Performed by: PEDIATRICS

## 2024-09-23 PROCEDURE — 36415 COLL VENOUS BLD VENIPUNCTURE: CPT | Performed by: INTERNAL MEDICINE

## 2024-09-23 PROCEDURE — 83735 ASSAY OF MAGNESIUM: CPT | Performed by: INTERNAL MEDICINE

## 2024-09-23 PROCEDURE — 250N000013 HC RX MED GY IP 250 OP 250 PS 637: Performed by: PEDIATRICS

## 2024-09-23 PROCEDURE — 82805 BLOOD GASES W/O2 SATURATION: CPT | Performed by: PEDIATRICS

## 2024-09-23 PROCEDURE — 82040 ASSAY OF SERUM ALBUMIN: CPT | Performed by: INTERNAL MEDICINE

## 2024-09-23 PROCEDURE — 250N000013 HC RX MED GY IP 250 OP 250 PS 637: Performed by: INTERNAL MEDICINE

## 2024-09-23 PROCEDURE — 250N000011 HC RX IP 250 OP 636: Performed by: PEDIATRICS

## 2024-09-23 PROCEDURE — 250N000013 HC RX MED GY IP 250 OP 250 PS 637: Performed by: FAMILY MEDICINE

## 2024-09-23 PROCEDURE — 120N000001 HC R&B MED SURG/OB

## 2024-09-23 PROCEDURE — 99418 PROLNG IP/OBS E/M EA 15 MIN: CPT | Performed by: PEDIATRICS

## 2024-09-23 PROCEDURE — 84100 ASSAY OF PHOSPHORUS: CPT | Performed by: INTERNAL MEDICINE

## 2024-09-23 PROCEDURE — 36415 COLL VENOUS BLD VENIPUNCTURE: CPT | Performed by: PEDIATRICS

## 2024-09-23 PROCEDURE — 85027 COMPLETE CBC AUTOMATED: CPT | Performed by: INTERNAL MEDICINE

## 2024-09-23 RX ORDER — SODIUM BICARBONATE 650 MG/1
1300 TABLET ORAL 2 TIMES DAILY
Status: DISCONTINUED | OUTPATIENT
Start: 2024-09-23 | End: 2024-09-23

## 2024-09-23 RX ORDER — SODIUM BICARBONATE 650 MG/1
1300 TABLET ORAL 3 TIMES DAILY
Status: DISCONTINUED | OUTPATIENT
Start: 2024-09-23 | End: 2024-09-24

## 2024-09-23 RX ADMIN — METOPROLOL SUCCINATE 50 MG: 50 TABLET, EXTENDED RELEASE ORAL at 21:19

## 2024-09-23 RX ADMIN — ACETAMINOPHEN 1000 MG: 500 TABLET ORAL at 09:30

## 2024-09-23 RX ADMIN — SODIUM BICARBONATE 650 MG TABLET 1300 MG: at 21:41

## 2024-09-23 RX ADMIN — ACETAMINOPHEN 1000 MG: 500 TABLET ORAL at 00:27

## 2024-09-23 RX ADMIN — SODIUM BICARBONATE 650 MG TABLET 1300 MG: at 16:24

## 2024-09-23 RX ADMIN — SODIUM BICARBONATE 650 MG TABLET 1300 MG: at 09:27

## 2024-09-23 RX ADMIN — Medication 5 MG: at 21:41

## 2024-09-23 RX ADMIN — ANASTROZOLE 1 MG: 1 TABLET, COATED ORAL at 21:41

## 2024-09-23 RX ADMIN — PAROXETINE 10 MG: 10 TABLET, FILM COATED ORAL at 21:19

## 2024-09-23 RX ADMIN — DEXTROSE MONOHYDRATE 1000 ML: 50 INJECTION, SOLUTION INTRAVENOUS at 16:12

## 2024-09-23 ASSESSMENT — ACTIVITIES OF DAILY LIVING (ADL)
ADLS_ACUITY_SCORE: 30

## 2024-09-23 NOTE — PROGRESS NOTES
[facility]    Medicine Progress Note - Hospitalist Service    Date of Admission:  9/21/2024    Assessment & Plan      Mariluz Stoner is a 63 year old female with h/o metastatic breast cancer s/p surgery, radiation and chemo on daily Arimidex, cardiomyopathy from chemotherapy with EF 20-25% in May 2024, depression, obesity admitted on 9/21/2024 for acute kidney injury with anion gap elevated metabolic acidosis causing shortness of breath, dizziness, nausea/vomiting.     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  Initial urine sodium was undetectable, so fractional excretion of sodium could not be determined, but undetectable urine sodium raises suspicion for hypovolemia and prerenal azotemia.  Chronic ileostomy losses, chronic diuretic therapy, and recently decreased PO intake all probably contributed to hypovolemia.  Renal function is improving but has not yet recovered to baseline.  Incidental note was made of right kidney stones but there is neither clinical nor radiographic concern for urinary obstruction.  -Discontinued normal saline infusion today due to worsening hyperchloremia  -Ordered bolus 1 L D5W this afternoon for treatment of hyperkalemia  - Renal dose meds and avoid nephrotoxic agents   - ordered recheck renal function  -Continue to hold chronic lisinopril and  spironolactone   - diet renal non dialysis      Metabolic acidosis, increased anion gap   Presented with metabolic acidosis with increased anion gap which persists but is starting to improve.  Anion gap has normalized.  Suspect metabolic acidosis due to acute renal failure, but chronic ileostomy losses may contribute.  -Ordered serial monitoring of electrolytes and venous blood gases  -discontinue NS infusion   -Start oral bicarbonate supplementation and adjust dosing depending upon electrolyte and blood gas results    Hyponatremia  Mildly hyponatremic at admission with sodium 133 that decreased as low as 129 on 9/21 but has now recovered to normal while being treated with normal saline infusion.  -Ordered recheck sodium after stopping normal saline infusion     Nausea with vomiting  Presented with nausea and vomiting that likely exacerbated hypovolemia.  Suspect combination of uremia from ARF, metabolic acidosis, and possibly slowed bowel transit in the context of acute metabolic abnormalities all contributed.  Neither clinical course nor radiographic findings have been suspicious for bowel obstruction.  Nausea and vomiting have resolved.  - Advancing diet as tolerated    Ileostomy   Parastomal hernia without obstruction or gangrene  Patient underwent surgical treatment of complicated diverticulitis with abscess in November 2023 at [facility] at which time ileostomy was created.  At admission, patient noted ileostomy significant reduction in output the prior two days suspicious for delayed bowel transit probably a consequence of metabolic disturbances (see above).  Radiographic findings were not suspicious for bowel obstruction.  Incidental note was made of parastomal hernia also without obstruction.  Ileostomy function has improved during hospitalization.  -Continue ostomy care per routine   -Recommended nonurgent outpatient follow-up with her surgeon regarding the newly diagnosed parastomal  hernia    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  EF 20-25% in May 2024  Essential hypertension  Previously diagnosed with cardiomyopathy attributed to chemotherapy administered in 2019 with EF 2025% in May 2024 but 38% on recent cardiac MRI.  Also has hypertension.  She follows with cardiology chronically.  She does not carry diagnosis of heart failure clinically, but chronic medication treatments for cardiomyopathy and hypertension have included metoprolol XL 50 mg daily, lisinopril 5 mg daily and spironolactone 25 mg daily.  Cardiomyopathy has been clinically stable during hospitalization while holding chronic doses of lisinopril and spironolactone because of acute renal failure and while treating with IV fluids.  -Continue to hold chronic doses of lisinopril and spironolactone  -Continue chronic dose of Toprol-XL  -Continue to monitor for fluid overload   -Anticipate outpatient follow-up with her cardiology team    Elevated troponin   Noted to have mildly elevated troponin 32 in the ER that rapidly improved to 27. EKG demonstrated LBBB limiting interpretation for acute ischemia.  No angina and not known to have CAD.  Recent cardiac stress testing was negative for inducible ischemia.  Suspect mildly elevated troponin ER was due to acute renal failure.  -Not anticipating additional evaluation for ischemic heart disease during hospitalization unless her clinical status changes    Anemia, unspecified type  Initial hemoglobin 12, decreasing to 10 during hospitalization coinciding with aggressive IV fluid treatment and has now stabilized for 24 hours.  Active bleeding has not been clinically apparent.  Hemodilution seems most likely.  -Ordered recheck hemoglobin    History of mesenteric venous thrombosis  Had previously been diagnosed with mesenteric venous thrombosis which was treated with therapeutic anticoagulation for about 3 months.  Patient subsequently declined longer-term treatment with therapeutic  "anticoagulation.  -Monitor clinically with low threshold to pursue additional evaluation for acute venous thromboembolism depending upon clinical course     Metastatic breast cancer   Diagnosed with metastatic left breast cancer in 2017 treated with left mastectomy and lymph node dissection, radiation and Herceptin and Perjeta chemotherapy.  Chemotherapy was subsequently stopped early due to new onset cardiomyopathy in 2019.  Now maintained on Arimidex therapy.  She follows closely with oncology and reportedly has not had evidence of recurrence at most recent monitoring.  -Continue chronic dose of Arimidex       Mild episode of recurrent major depressive disorder  Previously diagnosed with depression treated chronically with Paxil.  On admission paxil dose was decreased from 20mg to 10 mg daily due to acute renal failure   -Continuing Paxil at lower dose until renal function recovers    Obesity  Appears obese with BMI 39  -No acute intervention           Diet: Advance Diet as Tolerated: Regular Diet Adult; 2 gm NA Diet; Renal Diet (non-dialysis)    DVT Prophylaxis: Pneumatic Compression Devices  Orozco Catheter: Not present  Lines: PRESENT             Cardiac Monitoring: None  Code Status: Full Code      Clinically Significant Risk Factors        # Hyperkalemia: Highest K = 5.4 mmol/L in last 2 days, will monitor as appropriate  # Hyponatremia: Lowest Na = 129 mmol/L in last 2 days, will monitor as appropriate  # Hypocalcemia: Lowest Ca = 8.4 mg/dL in last 2 days, will monitor and replace as appropriate         # Hypertension: Noted on problem list  # Chronic heart failure with reduced ejection fraction: last echo with EF <40%          # Obesity: Estimated body mass index is 39.06 kg/m  as calculated from the following:    Height as of this encounter: 1.651 m (5' 5\").    Weight as of this encounter: 106.5 kg (234 lb 11.2 oz)., PRESENT ON ADMISSION            Disposition Plan     Medically Ready for Discharge: " Anticipated in 2-4 Days             Nhan Mclaughlin MD  Hospitalist Service  [facility]  Securely message with [employer] (more info)  Text page via [facility] Paging/Directory   ______________________________________________________________________    Interval History   There were no significant overnight events.  She had presented with pain in her left chest below her breast area that has completely resolved and she is not having any other pain now during hospitalization.  Nausea has resolved and she is tolerating oral intake.  She feels tired but otherwise offers no new complaints.  She remains afebrile and hemodynamically stable.  Oxygenation has been normal.  Ileostomy output appears to be recovering to her usual baseline.  She has had good urine output.    Physical Exam   Vital Signs: Temp: 98.3  F (36.8  C) Temp src: Oral BP: 126/45 Pulse: 65   Resp: 20 SpO2: 99 % O2 Device: None (Room air)    Patient Vitals for the past 24 hrs:   BP Temp Temp src Pulse Resp SpO2 Weight   09/23/24 1532 126/45 98.3  F (36.8  C) Oral 65 20 99 % --   09/23/24 0759 117/50 98.1  F (36.7  C) Oral 64 20 98 % --   09/23/24 0541 -- -- -- -- -- -- 106.5 kg (234 lb 11.2 oz)   09/22/24 2251 121/53 98.3  F (36.8  C) Oral 65 20 96 % --     Weight: 234 lbs 11.2 oz  Vitals:    09/21/24 1525 09/21/24 2016 09/22/24 0223 09/23/24 0541   Weight: 105.2 kg (231 lb 14.4 oz) 107.1 kg (236 lb 3.2 oz) 106 kg (233 lb 9.6 oz) 106.5 kg (234 lb 11.2 oz)     Wt Readings from Last 4 Encounters:   09/23/24 106.5 kg (234 lb 11.2 oz)   07/11/24 105.9 kg (233 lb 8 oz)   04/30/24 101.6 kg (224 lb)   03/28/24 98.7 kg (217 lb 8 oz)       Intake/Output Summary (Last 24 hours) at 9/23/2024 1537  Last data filed at 9/23/2024 1245  Gross per 24 hour   Intake 1152 ml   Output 2500 ml   Net -1348 ml       General Appearance: Obese middle-aged woman without signs of acute distress while resting in bed  Respiratory: Normal respiratory effort,  diminished breath sounds, clear lungs  Cardiovascular: Regular rate and rhythm, no loud murmur or gallop, good radial pulse, normal capillary refill  GI: Obese abdomen, hypoactive bowel sounds, soft, nontender, ileostomy present in the right abdomen     Medical Decision Making       68 MINUTES SPENT BY ME on the date of service doing chart review, history, exam, documentation & further activities per the note.      Data     I have personally reviewed the following data over the past 24 hrs:    4.6  \   10.2 (L)   / 183     139 113 (H) 36.5 (H) /  91   5.4 (H) 20 (L) 1.30 (H) \     ALT: N/A AST: N/A AP: N/A TBILI: N/A   ALB: 3.5 TOT PROTEIN: N/A LIPASE: N/A         Magnesium 2.1, phosphorus 3.2    Venous Blood Gas  Recent Labs   Lab 09/23/24  1416 09/23/24  1011 09/22/24  0532 09/21/24  1840   PHV 7.26* 7.22* 7.18* 7.20*   PCO2V 49 51* 42 45   PO2V 31 26 48* 32   HCO3V 22 21 16* 18*   PETER -5.0* -7.2* -11.9* -10.1*   O2PER 21 21 21 28       Recent Labs   Lab 09/23/24  1416 09/23/24  1011 09/23/24  0528 09/22/24  0532 09/21/24  2146 09/21/24  1550   WBC  --   --  4.6 5.8  --  10.2   HGB  --   --  10.2* 10.7*  --  12.1   MCV  --   --  97 96  --  94   PLT  --   --  183 187  --  260    138 136 133* 129* 133*   POTASSIUM 5.4* 4.9 5.1 4.9 5.2 5.3   CHLORIDE 113* 112* 111* 108* 101 103   CO2 20* 19* 16* 13* 12* 14*   BUN  --   --  36.5* 56.8* 63.6* 66.4*   CR  --   --  1.30* 2.12* 2.55* 3.01*   ANIONGAP 6* 7 9 12 16* 16*   SINDY  --   --  8.4* 8.5* 8.6* 9.8   GLC  --   --  91 97 99 142*   ALBUMIN  --   --  3.5 3.6  --  4.4   PROTTOTAL  --   --   --  6.3*  --  7.8   BILITOTAL  --   --   --  0.3  --  0.3   ALKPHOS  --   --   --  81  --  100   ALT  --   --   --  17  --  24   AST  --   --   --  17  --  20   LIPASE  --   --   --  73*  --  116*

## 2024-09-23 NOTE — PLAN OF CARE
Goal Outcome Evaluation:      Plan of Care Reviewed With: patient    Overall Patient Progress: improvingOverall Patient Progress: improving    Outcome Evaluation: Pt. A&Ox 4, VSS on RA. Denies pain. Ambulating to bathroom SBA. Independent with colostomy cares, good output. Voiding, good outputs. IVF continues. Rested well between cares. Offers no new complaints. Mag this AM WNL, will recheck tomorrow AM.

## 2024-09-24 LAB
ANION GAP SERPL CALCULATED.3IONS-SCNC: 10 MMOL/L (ref 7–15)
ANION GAP SERPL CALCULATED.3IONS-SCNC: 13 MMOL/L (ref 7–15)
ANION GAP SERPL CALCULATED.3IONS-SCNC: 6 MMOL/L (ref 7–15)
BASE EXCESS BLDV CALC-SCNC: -3 MMOL/L (ref -3–3)
BASE EXCESS BLDV CALC-SCNC: -5.5 MMOL/L (ref -3–3)
BASE EXCESS BLDV CALC-SCNC: -6.1 MMOL/L (ref -3–3)
BUN SERPL-MCNC: 23.7 MG/DL (ref 8–23)
CALCIUM SERPL-MCNC: 8.5 MG/DL (ref 8.8–10.4)
CHLORIDE SERPL-SCNC: 110 MMOL/L (ref 98–107)
CHLORIDE SERPL-SCNC: 110 MMOL/L (ref 98–107)
CHLORIDE SERPL-SCNC: 111 MMOL/L (ref 98–107)
CREAT SERPL-MCNC: 1.03 MG/DL (ref 0.51–0.95)
EGFRCR SERPLBLD CKD-EPI 2021: 61 ML/MIN/1.73M2
GLUCOSE SERPL-MCNC: 117 MG/DL (ref 70–99)
HCO3 BLDV-SCNC: 20 MMOL/L (ref 21–28)
HCO3 BLDV-SCNC: 21 MMOL/L (ref 21–28)
HCO3 BLDV-SCNC: 22 MMOL/L (ref 21–28)
HCO3 SERPL-SCNC: 16 MMOL/L (ref 22–29)
HCO3 SERPL-SCNC: 18 MMOL/L (ref 22–29)
HCO3 SERPL-SCNC: 22 MMOL/L (ref 22–29)
HGB BLD-MCNC: 10 G/DL (ref 11.7–15.7)
MAGNESIUM SERPL-MCNC: 1.8 MG/DL (ref 1.7–2.3)
O2/TOTAL GAS SETTING VFR VENT: 21 %
OXYHGB MFR BLDV: 69 % (ref 70–75)
OXYHGB MFR BLDV: 81 % (ref 70–75)
OXYHGB MFR BLDV: 89 % (ref 70–75)
PCO2 BLDV: 39 MM HG (ref 40–50)
PCO2 BLDV: 40 MM HG (ref 40–50)
PCO2 BLDV: 44 MM HG (ref 40–50)
PH BLDV: 7.28 [PH] (ref 7.32–7.43)
PH BLDV: 7.31 [PH] (ref 7.32–7.43)
PH BLDV: 7.36 [PH] (ref 7.32–7.43)
PO2 BLDV: 38 MM HG (ref 25–47)
PO2 BLDV: 46 MM HG (ref 25–47)
PO2 BLDV: 58 MM HG (ref 25–47)
POTASSIUM SERPL-SCNC: 4.3 MMOL/L (ref 3.4–5.3)
POTASSIUM SERPL-SCNC: 4.4 MMOL/L (ref 3.4–5.3)
POTASSIUM SERPL-SCNC: 4.4 MMOL/L (ref 3.4–5.3)
SAO2 % BLDV: 70.2 % (ref 70–75)
SAO2 % BLDV: 82.4 % (ref 70–75)
SAO2 % BLDV: 90.2 % (ref 70–75)
SODIUM SERPL-SCNC: 138 MMOL/L (ref 135–145)
SODIUM SERPL-SCNC: 139 MMOL/L (ref 135–145)
SODIUM SERPL-SCNC: 139 MMOL/L (ref 135–145)

## 2024-09-24 PROCEDURE — 250N000013 HC RX MED GY IP 250 OP 250 PS 637: Performed by: INTERNAL MEDICINE

## 2024-09-24 PROCEDURE — 36415 COLL VENOUS BLD VENIPUNCTURE: CPT | Performed by: PEDIATRICS

## 2024-09-24 PROCEDURE — 82374 ASSAY BLOOD CARBON DIOXIDE: CPT | Performed by: PEDIATRICS

## 2024-09-24 PROCEDURE — 80048 BASIC METABOLIC PNL TOTAL CA: CPT | Performed by: PEDIATRICS

## 2024-09-24 PROCEDURE — 250N000013 HC RX MED GY IP 250 OP 250 PS 637: Performed by: PEDIATRICS

## 2024-09-24 PROCEDURE — 83735 ASSAY OF MAGNESIUM: CPT | Performed by: INTERNAL MEDICINE

## 2024-09-24 PROCEDURE — 250N000013 HC RX MED GY IP 250 OP 250 PS 637: Performed by: FAMILY MEDICINE

## 2024-09-24 PROCEDURE — 82805 BLOOD GASES W/O2 SATURATION: CPT | Performed by: PEDIATRICS

## 2024-09-24 PROCEDURE — 99232 SBSQ HOSP IP/OBS MODERATE 35: CPT | Performed by: PEDIATRICS

## 2024-09-24 PROCEDURE — 120N000001 HC R&B MED SURG/OB

## 2024-09-24 PROCEDURE — 85018 HEMOGLOBIN: CPT | Performed by: PEDIATRICS

## 2024-09-24 RX ORDER — SODIUM BICARBONATE 650 MG/1
1300 TABLET ORAL 4 TIMES DAILY
Status: DISCONTINUED | OUTPATIENT
Start: 2024-09-24 | End: 2024-09-26 | Stop reason: HOSPADM

## 2024-09-24 RX ADMIN — SODIUM BICARBONATE 650 MG TABLET 1300 MG: at 08:14

## 2024-09-24 RX ADMIN — PAROXETINE 10 MG: 10 TABLET, FILM COATED ORAL at 20:32

## 2024-09-24 RX ADMIN — Medication 5 MG: at 20:32

## 2024-09-24 RX ADMIN — SODIUM BICARBONATE 650 MG TABLET 1300 MG: at 20:32

## 2024-09-24 RX ADMIN — METOPROLOL SUCCINATE 50 MG: 50 TABLET, EXTENDED RELEASE ORAL at 20:32

## 2024-09-24 RX ADMIN — ANASTROZOLE 1 MG: 1 TABLET, COATED ORAL at 20:38

## 2024-09-24 RX ADMIN — SODIUM BICARBONATE 650 MG TABLET 1300 MG: at 16:04

## 2024-09-24 RX ADMIN — SODIUM BICARBONATE 650 MG TABLET 1300 MG: at 12:05

## 2024-09-24 ASSESSMENT — ACTIVITIES OF DAILY LIVING (ADL)
ADLS_ACUITY_SCORE: 33
ADLS_ACUITY_SCORE: 28
ADLS_ACUITY_SCORE: 34
ADLS_ACUITY_SCORE: 30
ADLS_ACUITY_SCORE: 28
ADLS_ACUITY_SCORE: 34
ADLS_ACUITY_SCORE: 28
ADLS_ACUITY_SCORE: 30
ADLS_ACUITY_SCORE: 34
ADLS_ACUITY_SCORE: 28
ADLS_ACUITY_SCORE: 34
ADLS_ACUITY_SCORE: 30
ADLS_ACUITY_SCORE: 28
ADLS_ACUITY_SCORE: 34
ADLS_ACUITY_SCORE: 28
ADLS_ACUITY_SCORE: 28

## 2024-09-24 NOTE — PLAN OF CARE
Goal Outcome Evaluation:      Plan of Care Reviewed With: patient    Overall Patient Progress: no changeOverall Patient Progress: no change       4 hour shift note: VSS on RA. Manages Ileostomy independently. Denies pain. Denies dizziness.

## 2024-09-24 NOTE — PROGRESS NOTES
Formerly [facility]    Medicine Progress Note - Hospitalist Service    Date of Admission:  9/21/2024    Assessment & Plan   Mariluz Stoner is a 63 year old female with h/o metastatic breast cancer s/p surgery, radiation and chemo on daily Arimidex, cardiomyopathy from chemotherapy with EF 20-25% in May 2024, depression, obesity admitted on 9/21/2024 for acute kidney injury with anion gap elevated metabolic acidosis causing shortness of breath, dizziness, nausea/vomiting.     Acute renal failure, suspect prerenal azotemia  Mild hyperkalemia  Baseline creatinine 0.7-0.8 in April 2024 and presented with creatinine 3.0 concerning for acute renal failure.  Initial urine sodium was undetectable, so fractional excretion of sodium could not be determined, but undetectable urine sodium raises suspicion for hypovolemia and prerenal azotemia.  Chronic ileostomy losses, chronic diuretic therapy, and recently decreased PO intake all probably contributed to hypovolemia.  Renal function is improving but has not yet recovered to baseline.  Incidental note was made of right kidney stones but there is neither clinical nor radiographic concern for urinary obstruction.  Acute renal failure and metabolic acidosis were complicated by transient mild hyperkalemia on 9/23 that resolved after additional bolus of IV fluid.  She did not have hyperkalemia at admission.  Chronic spironolactone and lisinopril therapies will increase her risk for hyperkalemia but have been held so far during hospitalization.  -not anticipating IVF treatment today unless status worsens  - Renal dose meds and avoid nephrotoxic agents   - ordered recheck renal function  -Continue to hold chronic lisinopril and spironolactone   - continue renal non dialysis diet     Metabolic acidosis, increased anion gap   Presented with metabolic acidosis with increased anion gap which is starting to improve.  Anion gap has normalized.  Suspect metabolic  acidosis due to acute renal failure, but chronic ileostomy bicarbonate losses likely contribute.  -Ordered ongoing serial monitoring of electrolytes and venous blood gases  -Increase oral bicarbonate supplementation today to 1300 mg 4 times daily    Hyponatremia  Mildly hyponatremic at admission with sodium 133 that decreased as low as 129 on 9/21 and subsequently recovered to normal and remained normal even after stopping previous normal saline infusion.  -Ordered recheck sodium     Nausea with vomiting  Presented with nausea and vomiting that likely exacerbated hypovolemia.  Suspect combination of uremia from ARF, metabolic acidosis, and possibly slowed bowel transit in the context of acute metabolic abnormalities including hyponatremia all contributed.  Neither clinical course nor radiographic findings have been suspicious for bowel obstruction, but ileus is possible.  Nausea and vomiting have resolved.  - Advancing diet as tolerated    Ileostomy   Parastomal hernia without obstruction or gangrene  Patient underwent surgical treatment of complicated diverticulitis with abscess in November 2023 at [facility] at which time ileostomy was created.  At admission, patient noted significant reduction in ileostomy output the prior two days suspicious for delayed bowel transit probably a consequence of metabolic disturbances and possible ileus (see above).  Radiographic findings were not suspicious for bowel obstruction.  Incidental note was made of parastomal hernia also without obstruction.  Ileostomy function has improved during hospitalization.  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  Also has  hypertension.  She follows with cardiology chronically.  She is not known to carry previous diagnosis of heart failure clinically.  Chronic medication treatments for cardiomyopathy and hypertension have included metoprolol XL 50 mg daily, lisinopril 5 mg daily and spironolactone 25 mg daily.  Although weight is starting to trend upward consistently, cardiomyopathy has otherwise been clinically stable during hospitalization without overt signs of CHF while holding chronic doses of lisinopril and spironolactone and treating with IV fluids.   -Continue to hold chronic doses of lisinopril and spironolactone  -Continue chronic dose of Toprol-XL  -Continue to monitor for fluid overload   -Anticipate outpatient follow-up with her cardiology team    Elevated troponin   Noted to have mildly elevated troponin 32 in the ER that rapidly improved to 27. EKG demonstrated LBBB limiting interpretation for acute ischemia.  No angina and not known to have CAD.  Recent cardiac stress testing was negative for inducible ischemia.  Suspect mildly elevated troponin ER was due to acute renal failure.  -Not anticipating additional evaluation for ischemic heart disease during hospitalization unless her clinical status changes    Anemia, unspecified type  Initial hemoglobin 12, decreasing to 10 during hospitalization coinciding with aggressive IV fluid treatment and has now stabilized for 2 days.  Active bleeding has not been clinically apparent.  Hemodilution seems most likely.  -Ordered recheck hemoglobin    History of mesenteric venous thrombosis  Had previously been diagnosed with mesenteric venous thrombosis which was treated with therapeutic anticoagulation for about 3 months.  Patient subsequently declined longer-term treatment with therapeutic anticoagulation.  -Monitor clinically with low threshold to pursue additional evaluation for acute venous thromboembolism if there is increasing clinical concern for it     Metastatic breast  "cancer   Diagnosed with metastatic left breast cancer in 2017 treated with left mastectomy and lymph node dissection, radiation and Herceptin and Perjeta chemotherapy.  Chemotherapy was subsequently stopped early due to new onset cardiomyopathy in 2019.  Now maintained on Arimidex therapy.  She follows closely with oncology and reportedly has not had evidence of recurrence at most recent monitoring.  -Continue chronic dose of Arimidex       Mild episode of recurrent major depressive disorder  Previously diagnosed with depression treated chronically with Paxil.  On admission paxil dose was decreased from 20mg to 10 mg daily due to acute renal failure.   -Continuing Paxil at lower dose until renal function recovers    Obesity  Appears obese with BMI 39  -No acute intervention          Diet: Advance Diet as Tolerated: Regular Diet Adult; 2 gm NA Diet; Renal Diet (non-dialysis)    DVT Prophylaxis: Pneumatic Compression Devices  Orozco Catheter: Not present  Lines: PRESENT             Cardiac Monitoring: None  Code Status: Full Code      Clinically Significant Risk Factors        # Hyperkalemia: Highest K = 5.4 mmol/L in last 2 days, will monitor as appropriate   # Hypocalcemia: Lowest Ca = 8.4 mg/dL in last 2 days, will monitor and replace as appropriate         # Hypertension: Noted on problem list  # Chronic heart failure with reduced ejection fraction: last echo with EF <40%          # Obesity: Estimated body mass index is 39.32 kg/m  as calculated from the following:    Height as of this encounter: 1.651 m (5' 5\").    Weight as of this encounter: 107.2 kg (236 lb 4.8 oz)., PRESENT ON ADMISSION            Disposition Plan     Medically Ready for Discharge: Anticipated in 2-4 Days             Nhan Mclaughlin MD  Hospitalist Service  [facility]  Securely message with [employer] (more info)  Text page via [facility] Paging/Directory "   ______________________________________________________________________    Interval History   There were no significant overnight events.  She says she is starting to feel better today.  She denies any abdominal pain.  She denies any nausea.  She is tolerating oral intake.  Her ileostomy now seems to be functioning like it normally does with normal output.  She denies any shortness of breath or sensation of swelling.  She notices intermittent firmness in her abdomen immediately adjacent to her ileostomy although has no pain in that area.    Physical Exam   Vital Signs: Temp: 98.2  F (36.8  C) Temp src: Oral BP: 133/64 Pulse: 68   Resp: 16 SpO2: 100 % O2 Device: None (Room air)    Patient Vitals for the past 24 hrs:   BP Temp Temp src Pulse Resp SpO2 Weight   09/24/24 0817 133/64 98.2  F (36.8  C) Oral 68 16 100 % --   09/24/24 0316 -- -- -- -- -- -- 107.2 kg (236 lb 4.8 oz)   09/23/24 2332 105/59 97.9  F (36.6  C) Oral 68 16 97 % --   09/23/24 2119 112/55 -- -- 71 -- -- --   09/23/24 1532 126/45 98.3  F (36.8  C) Oral 65 20 99 % --     Weight: 236 lbs 4.8 oz  Vitals:    09/21/24 1525 09/21/24 2016 09/22/24 0223 09/23/24 0541   Weight: 105.2 kg (231 lb 14.4 oz) 107.1 kg (236 lb 3.2 oz) 106 kg (233 lb 9.6 oz) 106.5 kg (234 lb 11.2 oz)    09/24/24 0316   Weight: 107.2 kg (236 lb 4.8 oz)       Intake/Output Summary (Last 24 hours) at 9/24/2024 1336  Last data filed at 9/24/2024 1235  Gross per 24 hour   Intake 402 ml   Output 1550 ml   Net -1148 ml       General Appearance: Obese woman without signs of acute distress while sitting in a chair resting  Respiratory: Normal respiratory effort  Cardiovascular: Regular rate and rhythm, no peripheral edema  GI: Normal bowel sounds, obese abdomen, soft, ileostomy present in the right abdomen, no abdominal tenderness, no parastomal hernia palpable at this time     Medical Decision Making             Data     I have personally reviewed the following data over the past 24  hrs:    N/A  \   10.0 (L)   / N/A     139 111 (H) 23.7 (H) /  117 (H)   4.4 22 1.03 (H) \       Magnesium 1.8    Venous Blood Gas  Recent Labs   Lab 09/24/24  1354 09/24/24  0939 09/24/24  0523 09/23/24  1842   PHV 7.36 7.31* 7.28* 7.32   PCO2V 40 39* 44 37*   PO2V 46 58* 38 54*   HCO3V 22 20* 21 19*   PETER -3.0 -6.1* -5.5* -6.3*   O2PER 21 21 21 21     Recent Labs   Lab 09/24/24  0939 09/24/24  0523 09/23/24  1842 09/23/24  1011 09/23/24  0528 09/22/24  0532 09/21/24  2146 09/21/24  1550   WBC  --   --   --   --  4.6 5.8  --  10.2   HGB  --  10.0*  --   --  10.2* 10.7*  --  12.1   MCV  --   --   --   --  97 96  --  94   PLT  --   --   --   --  183 187  --  260    138 135   < > 136 133*   < > 133*   POTASSIUM 4.3 4.4 4.4   < > 5.1 4.9   < > 5.3   CHLORIDE 110* 110* 108*   < > 111* 108*   < > 103   CO2 16* 18* 18*   < > 16* 13*   < > 14*   BUN  --  23.7*  --   --  36.5* 56.8*   < > 66.4*   CR  --  1.03*  --   --  1.30* 2.12*   < > 3.01*   ANIONGAP 13 10 9   < > 9 12   < > 16*   SINDY  --  8.5*  --   --  8.4* 8.5*   < > 9.8   GLC  --  117*  --   --  91 97   < > 142*   ALBUMIN  --   --   --   --  3.5 3.6  --  4.4   PROTTOTAL  --   --   --   --   --  6.3*  --  7.8   BILITOTAL  --   --   --   --   --  0.3  --  0.3   ALKPHOS  --   --   --   --   --  81  --  100   ALT  --   --   --   --   --  17  --  24   AST  --   --   --   --   --  17  --  20   LIPASE  --   --   --   --   --  73*  --  116*    < > = values in this interval not displayed.

## 2024-09-24 NOTE — PLAN OF CARE
"Goal Outcome Evaluation:      Plan of Care Reviewed With: patient    Overall Patient Progress: improvingOverall Patient Progress: improving    Outcome Evaluation: Pt is A&Ox4, milind to mary jane needs known. Independent in the room and independent with ileostomy. CHG bath completed. Denies pain. Tolerates oral medications well. Said had flu vaccine completed 09/14 at a "[facility] pharmacy.    Blood pressure 105/59, pulse 68, temperature 97.9  F (36.6  C), temperature source Oral, resp. rate 16, height 1.651 m (5' 5\"), weight 107.2 kg (236 lb 4.8 oz), SpO2 97%, not currently breastfeeding.    "

## 2024-09-24 NOTE — PLAN OF CARE
Goal Outcome Evaluation:      Plan of Care Reviewed With: patient    Overall Patient Progress: improving    Outcome Evaluation: A & O x4, able to make needs known.  Takes pills whole.  VSS.  On RA w SpO2 > 90%, lungs clear.  RFA stacy IV patent, fluids d/agnieszka.  Pt has a R chest port that is not accessed, dressing c/d/i.  Continent BB, ileostomy stoma pink, Ind w ileostomy, Ind in room.  C/o 1/10 headache, given prn Tylenol, effective.  K+ high at 5.4 this shift, given 1000mL bolus D5 for shifting.      Yaritza Hunt RN on 9/23/2024 at 8:06 PM

## 2024-09-24 NOTE — PLAN OF CARE
Goal Outcome Evaluation:      Plan of Care Reviewed With: patient          Outcome Evaluation: Patient is A&O x4. IND in room and IND with ileostomy, good output. Denies pain. Saline locked. Weight trending up 2lbs per day, provided updated. VSS on RA.

## 2024-09-25 LAB
ANION GAP SERPL CALCULATED.3IONS-SCNC: 8 MMOL/L (ref 7–15)
ANION GAP SERPL CALCULATED.3IONS-SCNC: 9 MMOL/L (ref 7–15)
BASE EXCESS BLDV CALC-SCNC: -0.4 MMOL/L (ref -3–3)
BASE EXCESS BLDV CALC-SCNC: -3 MMOL/L (ref -3–3)
BUN SERPL-MCNC: 20 MG/DL (ref 8–23)
CALCIUM SERPL-MCNC: 8.8 MG/DL (ref 8.8–10.4)
CHLORIDE SERPL-SCNC: 107 MMOL/L (ref 98–107)
CHLORIDE SERPL-SCNC: 107 MMOL/L (ref 98–107)
CREAT SERPL-MCNC: 1.07 MG/DL (ref 0.51–0.95)
EGFRCR SERPLBLD CKD-EPI 2021: 58 ML/MIN/1.73M2
GLUCOSE SERPL-MCNC: 103 MG/DL (ref 70–99)
HCO3 BLDV-SCNC: 25 MMOL/L (ref 21–28)
HCO3 BLDV-SCNC: 25 MMOL/L (ref 21–28)
HCO3 SERPL-SCNC: 21 MMOL/L (ref 22–29)
HCO3 SERPL-SCNC: 23 MMOL/L (ref 22–29)
HGB BLD-MCNC: 10.7 G/DL (ref 11.7–15.7)
MAGNESIUM SERPL-MCNC: 1.7 MG/DL (ref 1.7–2.3)
O2/TOTAL GAS SETTING VFR VENT: 21 %
O2/TOTAL GAS SETTING VFR VENT: 21 %
OXYHGB MFR BLDV: 38 % (ref 70–75)
OXYHGB MFR BLDV: 82 % (ref 70–75)
PCO2 BLDV: 45 MM HG (ref 40–50)
PCO2 BLDV: 52 MM HG (ref 40–50)
PH BLDV: 7.28 [PH] (ref 7.32–7.43)
PH BLDV: 7.36 [PH] (ref 7.32–7.43)
PO2 BLDV: 25 MM HG (ref 25–47)
PO2 BLDV: 47 MM HG (ref 25–47)
POTASSIUM SERPL-SCNC: 4.4 MMOL/L (ref 3.4–5.3)
POTASSIUM SERPL-SCNC: 5.1 MMOL/L (ref 3.4–5.3)
SAO2 % BLDV: 38 % (ref 70–75)
SAO2 % BLDV: 83.4 % (ref 70–75)
SODIUM SERPL-SCNC: 137 MMOL/L (ref 135–145)
SODIUM SERPL-SCNC: 138 MMOL/L (ref 135–145)

## 2024-09-25 PROCEDURE — 250N000013 HC RX MED GY IP 250 OP 250 PS 637: Performed by: FAMILY MEDICINE

## 2024-09-25 PROCEDURE — 36415 COLL VENOUS BLD VENIPUNCTURE: CPT | Performed by: PEDIATRICS

## 2024-09-25 PROCEDURE — 250N000011 HC RX IP 250 OP 636: Performed by: FAMILY MEDICINE

## 2024-09-25 PROCEDURE — 250N000013 HC RX MED GY IP 250 OP 250 PS 637: Performed by: INTERNAL MEDICINE

## 2024-09-25 PROCEDURE — 83735 ASSAY OF MAGNESIUM: CPT | Performed by: PEDIATRICS

## 2024-09-25 PROCEDURE — 120N000001 HC R&B MED SURG/OB

## 2024-09-25 PROCEDURE — 80051 ELECTROLYTE PANEL: CPT | Performed by: PEDIATRICS

## 2024-09-25 PROCEDURE — 250N000013 HC RX MED GY IP 250 OP 250 PS 637: Performed by: PEDIATRICS

## 2024-09-25 PROCEDURE — 80048 BASIC METABOLIC PNL TOTAL CA: CPT | Performed by: PEDIATRICS

## 2024-09-25 PROCEDURE — 85018 HEMOGLOBIN: CPT | Performed by: PEDIATRICS

## 2024-09-25 PROCEDURE — 82805 BLOOD GASES W/O2 SATURATION: CPT | Performed by: PEDIATRICS

## 2024-09-25 PROCEDURE — 99232 SBSQ HOSP IP/OBS MODERATE 35: CPT | Performed by: PEDIATRICS

## 2024-09-25 RX ORDER — LISINOPRIL 2.5 MG/1
2.5 TABLET ORAL AT BEDTIME
Status: DISCONTINUED | OUTPATIENT
Start: 2024-09-25 | End: 2024-09-26 | Stop reason: HOSPADM

## 2024-09-25 RX ADMIN — ANASTROZOLE 1 MG: 1 TABLET, COATED ORAL at 20:33

## 2024-09-25 RX ADMIN — Medication 5 MG: at 20:33

## 2024-09-25 RX ADMIN — LISINOPRIL 2.5 MG: 2.5 TABLET ORAL at 20:33

## 2024-09-25 RX ADMIN — METOPROLOL SUCCINATE 50 MG: 50 TABLET, EXTENDED RELEASE ORAL at 20:33

## 2024-09-25 RX ADMIN — LOPERAMIDE HYDROCHLORIDE 2 MG: 2 CAPSULE ORAL at 08:12

## 2024-09-25 RX ADMIN — ONDANSETRON 4 MG: 4 TABLET, ORALLY DISINTEGRATING ORAL at 09:08

## 2024-09-25 RX ADMIN — PAROXETINE 10 MG: 10 TABLET, FILM COATED ORAL at 20:33

## 2024-09-25 RX ADMIN — SODIUM BICARBONATE 650 MG TABLET 1300 MG: at 20:33

## 2024-09-25 RX ADMIN — SODIUM BICARBONATE 650 MG TABLET 1300 MG: at 08:12

## 2024-09-25 RX ADMIN — SODIUM BICARBONATE 650 MG TABLET 1300 MG: at 15:39

## 2024-09-25 RX ADMIN — SODIUM BICARBONATE 650 MG TABLET 1300 MG: at 12:06

## 2024-09-25 ASSESSMENT — ACTIVITIES OF DAILY LIVING (ADL)
ADLS_ACUITY_SCORE: 28
ADLS_ACUITY_SCORE: 29
ADLS_ACUITY_SCORE: 28

## 2024-09-25 ASSESSMENT — ANXIETY QUESTIONNAIRES
GAD7 TOTAL SCORE: 9
7. FEELING AFRAID AS IF SOMETHING AWFUL MIGHT HAPPEN: SEVERAL DAYS
5. BEING SO RESTLESS THAT IT IS HARD TO SIT STILL: SEVERAL DAYS
7. FEELING AFRAID AS IF SOMETHING AWFUL MIGHT HAPPEN: SEVERAL DAYS
2. NOT BEING ABLE TO STOP OR CONTROL WORRYING: MORE THAN HALF THE DAYS
GAD7 TOTAL SCORE: 9
8. IF YOU CHECKED OFF ANY PROBLEMS, HOW DIFFICULT HAVE THESE MADE IT FOR YOU TO DO YOUR WORK, TAKE CARE OF THINGS AT HOME, OR GET ALONG WITH OTHER PEOPLE?: SOMEWHAT DIFFICULT
3. WORRYING TOO MUCH ABOUT DIFFERENT THINGS: SEVERAL DAYS
GAD7 TOTAL SCORE: 9
4. TROUBLE RELAXING: SEVERAL DAYS
1. FEELING NERVOUS, ANXIOUS, OR ON EDGE: MORE THAN HALF THE DAYS
6. BECOMING EASILY ANNOYED OR IRRITABLE: SEVERAL DAYS
IF YOU CHECKED OFF ANY PROBLEMS ON THIS QUESTIONNAIRE, HOW DIFFICULT HAVE THESE PROBLEMS MADE IT FOR YOU TO DO YOUR WORK, TAKE CARE OF THINGS AT HOME, OR GET ALONG WITH OTHER PEOPLE: SOMEWHAT DIFFICULT

## 2024-09-25 NOTE — PLAN OF CARE
Goal Outcome Evaluation:      Plan of Care Reviewed With: patient    Overall Patient Progress: no changeOverall Patient Progress: no change    Outcome Evaluation: Pt is A&O x4. independent in room. VSS on RA at 98%. PRN melatonin given for sleep. Independent with Ileostomy. Saline locked. CHG bath completed. Denies pain.    Daily weight:106.96 kg  Mg= 1.7, AM draw.

## 2024-09-25 NOTE — PLAN OF CARE
Goal Outcome Evaluation:      Plan of Care Reviewed With: patient    Overall Patient Progress: improvingOverall Patient Progress: improving     VSS on RA. Manages ileostomy independently. Zofran given for nausea, effective. C/o of headache, refused PRN medications, aromatherapy utilized. Pt independent in room.  ROE.

## 2024-09-25 NOTE — PROGRESS NOTES
[facility]    Medicine Progress Note - Hospitalist Service    Date of Admission:  9/21/2024    Assessment & Plan   Mariluz Stoner is a 63 year old female with h/o metastatic breast cancer s/p surgery, radiation and chemo on daily Arimidex, cardiomyopathy from chemotherapy with EF 20-25% in May 2024, depression, obesity admitted on 9/21/2024 for acute kidney injury with anion gap elevated metabolic acidosis causing shortness of breath, dizziness, nausea/vomiting.     Acute renal failure, suspect prerenal azotemia  Mild hyperkalemia  Baseline creatinine 0.7-0.8 in April 2024 and presented with creatinine 3.0 concerning for acute renal failure.  Initial urine sodium was undetectable, so fractional excretion of sodium could not be determined, but undetectable urine sodium raised suspicion for hypovolemia and prerenal azotemia.  Chronic ileostomy losses, chronic diuretic therapy, and recently decreased PO intake all probably contributed to hypovolemia.  Renal function is improving but has not quite yet recovered to baseline.  Incidental note was made of right kidney stones but there is neither clinical nor radiographic concern for urinary obstruction.  Acute renal failure and metabolic acidosis were complicated by transient mild hyperkalemia on 9/23 that resolved after additional bolus of IV fluid.  She did not have hyperkalemia at admission.  Chronic spironolactone and lisinopril therapies will increase her risk for hyperkalemia but have been held so far during hospitalization.  -not anticipating IVF treatment today unless status worsens  -Renal dose meds and avoid nephrotoxic agents   -ordered recheck renal function  -Continue to hold chronic spironolactone   -continue renal non dialysis diet     Metabolic acidosis, increased anion gap   Presented with metabolic acidosis with increased anion gap which is starting to resolve.  Anion gap normalized.  Suspect metabolic acidosis due to  acute renal failure and chronic ileostomy bicarbonate losses.  -Ordered ongoing serial monitoring of electrolytes and venous blood gases  -continue oral bicarbonate supplementation 1300 mg 4 times daily and anticipate recommending that she continue this after hospital discharge    Hyponatremia  Mildly hyponatremic at admission with sodium 133 that decreased as low as 129 on 9/21 and subsequently recovered to normal and remained normal even after stopping previous normal saline infusion.  -Ordered recheck sodium     Nausea with vomiting  Presented with nausea and vomiting that likely exacerbated hypovolemia.  Suspect combination of uremia from ARF, metabolic acidosis, and possibly slowed bowel transit in the context of acute metabolic abnormalities including hyponatremia all contributed.  Neither clinical course nor radiographic findings have been suspicious for bowel obstruction, but ileus is possible.  Nausea and vomiting have resolved.  - Advancing diet as tolerated    Ileostomy   Parastomal hernia without obstruction or gangrene  Patient underwent surgical treatment of complicated diverticulitis with abscess in November 2023 at [facility] at which time ileostomy was created.  At admission, patient noted significant reduction in ileostomy output the prior two days suspicious for delayed bowel transit probably a consequence of metabolic disturbances and possible ileus (see above).  Radiographic findings were not suspicious for bowel obstruction.  Incidental note was made of parastomal hernia also without obstruction.  Ileostomy function has normalized during hospitalization.  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  Also has hypertension.  She follows with cardiology chronically.  She is not known to carry previous diagnosis of heart failure clinically.  Chronic medication treatments for cardiomyopathy and hypertension have included metoprolol XL 50 mg daily, lisinopril 5 mg daily and spironolactone 25 mg daily.  Weight has stabilized and cardiomyopathy has otherwise been clinically stable during hospitalization without overt signs of CHF while holding chronic doses of lisinopril and spironolactone and treating with IV fluids initially.  Patient providing history that spironolactone had recently been added by her PCP.  -Resume lisinopril today but will start 2.5 mg daily rather than 5 mg daily   -Continue to hold spironolactone and suspect that it would be recommended that she not resume spironolactone at discharge  -Continue chronic dose of Toprol-XL  -Continue to monitor for fluid overload   -Anticipate outpatient follow-up with her cardiology team    Elevated troponin   Noted to have mildly elevated troponin 32 in the ER that rapidly improved to 27. EKG demonstrated LBBB limiting interpretation for acute ischemia.  No angina and not known to have CAD.  Recent cardiac stress testing was negative for inducible ischemia.  Suspect mildly elevated troponin ER was due to acute renal failure.  -Not anticipating additional evaluation for ischemic heart disease during hospitalization unless her clinical status changes    Anemia, unspecified type  Initial hemoglobin 12, decreasing to 10 during hospitalization coinciding with aggressive IV fluid treatment and has now stabilized at 10-11.  Active bleeding has not been clinically apparent.  Hemodilution seems most likely.  -Anticipate outpatient follow-up with PCP for anemia    History of mesenteric venous thrombosis  Had previously been diagnosed with mesenteric venous thrombosis which was treated with therapeutic anticoagulation for about 3 months.  Patient  "subsequently declined longer-term treatment with therapeutic anticoagulation.  -Monitor clinically with low threshold to pursue additional evaluation for acute venous thromboembolism if there is increasing clinical concern for it     Metastatic breast cancer   Diagnosed with metastatic left breast cancer in 2017 treated with left mastectomy and lymph node dissection, radiation and Herceptin and Perjeta chemotherapy.  Chemotherapy was subsequently stopped early due to new onset cardiomyopathy in 2019.  Now maintained on Arimidex therapy.  She follows closely with oncology and reportedly has not had evidence of recurrence at most recent monitoring.  -Continue chronic dose of Arimidex       Mild episode of recurrent major depressive disorder  Previously diagnosed with depression treated chronically with Paxil.  On admission paxil dose was decreased from 20mg to 10 mg daily due to acute renal failure.   -Continuing Paxil at lower dose until renal function recovers    Obesity  Appears obese with BMI 39  -No acute intervention          Diet: Advance Diet as Tolerated: Regular Diet Adult; 2 gm NA Diet; Renal Diet (non-dialysis)    DVT Prophylaxis: Pneumatic Compression Devices  Orozco Catheter: Not present  Lines: PRESENT             Cardiac Monitoring: None  Code Status: Full Code      Clinically Significant Risk Factors        # Hyperkalemia: Highest K = 5.4 mmol/L in last 2 days, will monitor as appropriate           # Hypertension: Noted on problem list  # Chronic heart failure with reduced ejection fraction: last echo with EF <40%          # Obesity: Estimated body mass index is 39.24 kg/m  as calculated from the following:    Height as of this encounter: 1.651 m (5' 5\").    Weight as of this encounter: 107 kg (235 lb 12.8 oz)., PRESENT ON ADMISSION            Disposition Plan     Medically Ready for Discharge: Anticipated Tomorrow             Nhan Mclaughlin MD  Hospitalist Service  [facility] " [facility]  Securely message with Ct (more info)  Text page via [facility] Paging/Directory   ______________________________________________________________________    Interval History   There were no significant overnight events.  She says she feels better overall.  She has some transient nausea this morning and mild headache both of which have since resolved.  She was able to tolerate breakfast and has not vomited.  She remains afebrile and hemodynamically stable.  Oxygenation has been normal.  Urine output has improved and has been normal in the last day.  She says her ileostomy output has recovered to his usual baseline.      Additional history is provided by the patient today.  She reports that her PCP had recently added spironolactone therapy for her.    Physical Exam   Vital Signs: Temp: 98  F (36.7  C) Temp src: Oral BP: 135/67 Pulse: 75   Resp: 20 SpO2: 97 % O2 Device: None (Room air)    Patient Vitals for the past 24 hrs:   BP Temp Temp src Pulse Resp SpO2 Weight   09/25/24 0911 -- -- -- 75 -- -- --   09/25/24 0720 135/67 98  F (36.7  C) Oral 66 20 97 % --   09/25/24 0527 -- -- -- -- -- -- 107 kg (235 lb 12.8 oz)   09/24/24 2316 113/56 97.8  F (36.6  C) Oral 68 18 97 % --   09/24/24 2032 118/60 97.8  F (36.6  C) Oral 72 -- 98 % --   09/24/24 1527 101/53 98  F (36.7  C) Oral 67 18 97 % --     Weight: 235 lbs 12.8 oz  Vitals:    09/21/24 2016 09/22/24 0223 09/23/24 0541 09/24/24 0316   Weight: 107.1 kg (236 lb 3.2 oz) 106 kg (233 lb 9.6 oz) 106.5 kg (234 lb 11.2 oz) 107.2 kg (236 lb 4.8 oz)    09/25/24 0527   Weight: 107 kg (235 lb 12.8 oz)       Intake/Output Summary (Last 24 hours) at 9/25/2024 1243  Last data filed at 9/25/2024 1209  Gross per 24 hour   Intake 960 ml   Output 2980 ml   Net -2020 ml       General Appearance: Obese middle-aged woman without signs of distress sitting up in a chair  Respiratory: Normal respiratory effort  GI: Ileostomy present in the right abdomen     Medical Decision  Making             Data     I have personally reviewed the following data over the past 24 hrs:    N/A  \   10.7 (L)   / N/A     138 107 20.0 /  103 (H)   4.4 23 1.07 (H) \         Venous Blood Gas  Recent Labs   Lab 09/25/24  1011 09/25/24  0540 09/24/24  1354 09/24/24  0939   PHV 7.36 7.28* 7.36 7.31*   PCO2V 45 52* 40 39*   PO2V 47 25 46 58*   HCO3V 25 25 22 20*   PETER -0.4 -3.0 -3.0 -6.1*   O2PER 21 21 21 21       Recent Labs   Lab 09/25/24  1011 09/25/24  0540 09/24/24  1354 09/24/24  0939 09/24/24  0523 09/23/24  1011 09/23/24  0528 09/22/24  0532 09/21/24  2146 09/21/24  1550   WBC  --   --   --   --   --   --  4.6 5.8  --  10.2   HGB  --  10.7*  --   --  10.0*  --  10.2* 10.7*  --  12.1   MCV  --   --   --   --   --   --  97 96  --  94   PLT  --   --   --   --   --   --  183 187  --  260    137 139   < > 138   < > 136 133*   < > 133*   POTASSIUM 4.4 5.1 4.4   < > 4.4   < > 5.1 4.9   < > 5.3   CHLORIDE 107 107 111*   < > 110*   < > 111* 108*   < > 103   CO2 23 21* 22   < > 18*   < > 16* 13*   < > 14*   BUN  --  20.0  --   --  23.7*  --  36.5* 56.8*   < > 66.4*   CR  --  1.07*  --   --  1.03*  --  1.30* 2.12*   < > 3.01*   ANIONGAP 8 9 6*   < > 10   < > 9 12   < > 16*   SINDY  --  8.8  --   --  8.5*  --  8.4* 8.5*   < > 9.8   GLC  --  103*  --   --  117*  --  91 97   < > 142*   ALBUMIN  --   --   --   --   --   --  3.5 3.6  --  4.4   PROTTOTAL  --   --   --   --   --   --   --  6.3*  --  7.8   BILITOTAL  --   --   --   --   --   --   --  0.3  --  0.3   ALKPHOS  --   --   --   --   --   --   --  81  --  100   ALT  --   --   --   --   --   --   --  17  --  24   AST  --   --   --   --   --   --   --  17  --  20   LIPASE  --   --   --   --   --   --   --  73*  --  116*    < > = values in this interval not displayed.

## 2024-09-26 VITALS
WEIGHT: 236.6 LBS | OXYGEN SATURATION: 96 % | SYSTOLIC BLOOD PRESSURE: 116 MMHG | RESPIRATION RATE: 18 BRPM | HEIGHT: 65 IN | HEART RATE: 69 BPM | BODY MASS INDEX: 39.42 KG/M2 | DIASTOLIC BLOOD PRESSURE: 61 MMHG | TEMPERATURE: 98 F

## 2024-09-26 LAB
ANION GAP SERPL CALCULATED.3IONS-SCNC: 9 MMOL/L (ref 7–15)
BASE EXCESS BLDV CALC-SCNC: 0.7 MMOL/L (ref -3–3)
BUN SERPL-MCNC: 20 MG/DL (ref 8–23)
CALCIUM SERPL-MCNC: 8.8 MG/DL (ref 8.8–10.4)
CHLORIDE SERPL-SCNC: 105 MMOL/L (ref 98–107)
CREAT SERPL-MCNC: 1.04 MG/DL (ref 0.51–0.95)
EGFRCR SERPLBLD CKD-EPI 2021: 60 ML/MIN/1.73M2
GLUCOSE SERPL-MCNC: 118 MG/DL (ref 70–99)
HCO3 BLDV-SCNC: 28 MMOL/L (ref 21–28)
HCO3 SERPL-SCNC: 23 MMOL/L (ref 22–29)
HOLD SPECIMEN: NORMAL
MAGNESIUM SERPL-MCNC: 1.7 MG/DL (ref 1.7–2.3)
O2/TOTAL GAS SETTING VFR VENT: 21 %
OXYHGB MFR BLDV: 37 % (ref 70–75)
PCO2 BLDV: 56 MM HG (ref 40–50)
PH BLDV: 7.31 [PH] (ref 7.32–7.43)
PO2 BLDV: 24 MM HG (ref 25–47)
POTASSIUM SERPL-SCNC: 4.1 MMOL/L (ref 3.4–5.3)
SAO2 % BLDV: 37.3 % (ref 70–75)
SODIUM SERPL-SCNC: 137 MMOL/L (ref 135–145)

## 2024-09-26 PROCEDURE — 80048 BASIC METABOLIC PNL TOTAL CA: CPT | Performed by: PEDIATRICS

## 2024-09-26 PROCEDURE — 83735 ASSAY OF MAGNESIUM: CPT | Performed by: PEDIATRICS

## 2024-09-26 PROCEDURE — 82805 BLOOD GASES W/O2 SATURATION: CPT | Performed by: PEDIATRICS

## 2024-09-26 PROCEDURE — 99239 HOSP IP/OBS DSCHRG MGMT >30: CPT | Performed by: PEDIATRICS

## 2024-09-26 PROCEDURE — 250N000013 HC RX MED GY IP 250 OP 250 PS 637: Performed by: PEDIATRICS

## 2024-09-26 PROCEDURE — 36415 COLL VENOUS BLD VENIPUNCTURE: CPT | Performed by: PEDIATRICS

## 2024-09-26 RX ORDER — LISINOPRIL 5 MG/1
2.5 TABLET ORAL DAILY
COMMUNITY
Start: 2024-09-26

## 2024-09-26 RX ORDER — SODIUM BICARBONATE 650 MG/1
1300 TABLET ORAL 4 TIMES DAILY
Qty: 240 TABLET | Refills: 0 | Status: SHIPPED | OUTPATIENT
Start: 2024-09-26

## 2024-09-26 RX ADMIN — SODIUM BICARBONATE 650 MG TABLET 1300 MG: at 08:09

## 2024-09-26 ASSESSMENT — ACTIVITIES OF DAILY LIVING (ADL)
ADLS_ACUITY_SCORE: 28

## 2024-09-26 NOTE — DISCHARGE SUMMARY
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    Weight as of this encounter: 107.3 kg (236 lb 9.6 oz).       Follow-ups Needed After Discharge       Discharge Disposition   Discharged to home  Condition at discharge: Stable    Hospital Course   Mariluz Stoner is a 63 year old female with h/o metastatic breast cancer s/p surgery, radiation and chemo on daily Arimidex, cardiomyopathy from chemotherapy with EF 20-25% in May 2024, depression, and obesity who presented with shortness of breath, dizziness, and nausea/vomiting and was admitted due to concern for acute renal failure with increased anion gap metabolic acidosis.     Acute renal failure, suspect prerenal azotemia  Mild hyperkalemia  Baseline creatinine 0.7-0.8 in April 2024 and presented with creatinine 3.0 concerning for acute renal failure.  Initial urine sodium was undetectable, so fractional excretion of sodium could not be determined, but undetectable urine sodium raised suspicion for hypovolemia and prerenal azotemia.  Chronic ileostomy losses, chronic diuretic therapy, and recently decreased PO intake all probably " contributed to hypovolemia.  Renal function is improving with creatinine 1.0 at discharge although did not quite recover to previous baseline.  Incidental note was made of right kidney stones but there is neither clinical nor radiographic concern for urinary obstruction.  Acute renal failure and metabolic acidosis were complicated by transient mild hyperkalemia on 9/23 that resolved after additional bolus of IV fluid.  She did not have hyperkalemia at admission.  Chronic spironolactone was held during hospitalization.  Lisinopril was initially held but restarted without worsening renal function or recurrent hyperkalemia.  At discharge, she was advised to not restart chronic spironolactone therapy, and she was advised to reduce dose of lisinopril from 5 mg to 2.5 mg daily.  Close outpatient follow-up with PCP to monitor renal function was recommended.     Metabolic acidosis, increased anion gap   Presented with metabolic acidosis with increased anion gap.  After treatment with IV fluids and bicarbonate supplementation, anion gap normalized and metabolic acidosis resolved.  Suspect metabolic acidosis was due to both acute renal failure and chronic ileostomy bicarbonate losses.  At discharge, ongoing bicarbonate supplementation was recommended along with close follow-up with PCP to recheck blood gas.    Hyponatremia  Mildly hyponatremic at admission with sodium 133 that decreased as low as 129 and subsequently recovered to normal after treatment with isotonic IV fluids and remained normal even after stopping normal saline infusion.     Nausea with vomiting  Presented with nausea and vomiting that likely exacerbated hypovolemia.  Suspect combination of uremia from ARF, metabolic acidosis, and possibly slowed bowel transit in the context of acute metabolic abnormalities including hyponatremia all contributed to nausea and vomiting.  Neither clinical course nor radiographic findings were suspicious for bowel obstruction,  but ileus was possible.  Nausea and vomiting resolved and by discharge she tolerated advancing diet without difficulty.    Ileostomy   Parastomal hernia without obstruction or gangrene  Patient underwent surgical treatment of complicated diverticulitis with abscess in November 2023 at [facility] at which time ileostomy was created.  At this admission, patient reported significant reduction in ileostomy output the prior two days suspicious for delayed bowel transit and possible ileus probably a consequence of metabolic disturbances.  Radiographic findings were not suspicious for bowel obstruction.  Incidental note was made of parastomal hernia also without obstruction.  After treatment of metabolic disturbances, ileostomy function normalized during hospitalization.  Recommended nonurgent outpatient follow-up with her surgeon regarding the newly diagnosed parastomal hernia.    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  Also has hypertension.  She follows with cardiology chronically.  She is not known to carry previous diagnosis of heart failure clinically.  Chronic medication treatments for cardiomyopathy and hypertension included metoprolol XL 50 mg daily, lisinopril 5 mg daily and spironolactone 25 mg daily.  Weight was stable and cardiomyopathy was otherwise clinically stable during hospitalization without overt signs of CHF even while holding chronic doses of lisinopril and spironolactone and treating with IV fluids.  Due to concerns for acute renal failure that may have been exacerbated by chronic diuretic therapy as well as regular fluid losses from ileostomy, patient was advised to not restart spironolactone, and lower dose lisinopril therapy 2.5 mg daily was advised at discharge. Outpatient follow-up with her cardiology team was  anticipated.    Elevated troponin   Noted to have mildly elevated troponin 32 in the ER that rapidly improved to 27. EKG demonstrated LBBB limiting interpretation for acute ischemia.  She had no angina and was not known to have CAD.  Recent cardiac stress testing had been negative for inducible ischemia.  Suspect mildly elevated troponin in ER was due to acute renal failure.    Anemia, unspecified type  Initial hemoglobin was 12 and decreased to 10-11 during hospitalization coinciding with IV fluid treatment.  Active bleeding was not clinically apparent.  Hemodilution appeared most likely.  Outpatient follow-up with PCP regarding anemia was anticipated.    History of mesenteric venous thrombosis  Had previously been diagnosed with mesenteric venous thrombosis which was treated with therapeutic anticoagulation for about 3 months.  Patient subsequently declined longer-term treatment with therapeutic anticoagulation.     Metastatic breast cancer   Diagnosed with metastatic left breast cancer in 2017 treated with left mastectomy and lymph node dissection, radiation and Herceptin and Perjeta chemotherapy.  Chemotherapy was subsequently stopped early due to new onset cardiomyopathy in 2019.  Now maintained chronically on Arimidex therapy.  She follows closely with oncology and reportedly has not had evidence of recurrence at most recent monitoring.  Chronic dose of Arimidex was continued.      Mild episode of recurrent major depressive disorder  Previously diagnosed with depression treated chronically with Paxil.  On admission paxil dose was decreased from 20mg to 10 mg daily due to acute renal failure.  At discharge she was advised to resume her usual chronic dose of Paxil.    Obesity  Appeared obese with BMI 39    Consultations This Hospital Stay   CARE MANAGEMENT / SOCIAL WORK IP CONSULT    Code Status   Full Code    Time Spent on this Encounter   INhan MD, personally saw the patient today and spent  greater than 30 minutes discharging this patient.       Nhan Mclaughlin MD  [facility] 2A MEDICAL SURGICAL  [address]   [place] [address]  Phone: [phone]  ______________________________________________________________________    Physical Exam   Vital Signs: Temp: 98  F (36.7  C) Temp src: Oral BP: 116/61 Pulse: 69   Resp: 18 SpO2: 96 % O2 Device: None (Room air)    Weight: 236 lbs 9.6 oz  General Appearance: No acute distress  Respiratory: Normal respiratory effort        Primary Care Physician   Guy De La Cruz-[facility]    Discharge Orders       Significant Results and Procedures   Most Recent 3 CBC's:  Recent Labs   Lab Test 09/25/24  0540 09/24/24  0523 09/23/24  0528 09/22/24  0532 09/21/24  1550   WBC  --   --  4.6 5.8 10.2   HGB 10.7* 10.0* 10.2* 10.7* 12.1   MCV  --   --  97 96 94   PLT  --   --  183 187 260     Most Recent 3 BMP's:  Recent Labs   Lab Test 09/26/24  0534 09/25/24  1011 09/25/24  0540 09/24/24  0939 09/24/24  0523    138 137   < > 138   POTASSIUM 4.1 4.4 5.1   < > 4.4   CHLORIDE 105 107 107   < > 110*   CO2 23 23 21*   < > 18*   BUN 20.0  --  20.0  --  23.7*   CR 1.04*  --  1.07*  --  1.03*   ANIONGAP 9 8 9   < > 10   SINDY 8.8  --  8.8  --  8.5*   *  --  103*  --  117*    < > = values in this interval not displayed.     Most Recent 2 LFT's:  Recent Labs   Lab Test 09/22/24  0532 09/21/24  1550   AST 17 20   ALT 17 24   ALKPHOS 81 100   BILITOTAL 0.3 0.3     Most Recent Venous Blood Gas  Recent Labs   Lab 09/26/24  0534 09/25/24  1011 09/25/24  0540 09/24/24  1354   PHV 7.31* 7.36 7.28* 7.36   PCO2V 56* 45 52* 40   PO2V 24* 47 25 46   HCO3V 28 25 25 22   PETER 0.7 -0.4 -3.0 -3.0   O2PER 21 21 21 21      Results for orders placed or performed during the hospital encounter of 09/21/24   CT Abdomen Pelvis w Contrast    Addendum: 9/21/2024    CLINICAL ADDENDUM:   Clinical information in this report has been modified from the previous version as  follows:    4 mm stone is noted within the right collecting system. Additional tiny 2 mm stone noted within the right kidney. No stones identified within the left kidney. No hydronephrosis.    END ADDENDUM      Narrative    EXAM: CT ABDOMEN PELVIS W CONTRAST  LOCATION: [facility]  DATE: 9/21/2024    INDICATION: Diffuse upper left sided abdominal pain, decreased ostomy output, history of similar symptoms with diverticulitis bowel obstruction  COMPARISON: 12/13/2023  TECHNIQUE: CT scan of the abdomen and pelvis was performed following injection of IV contrast. Multiplanar reformats were obtained. Dose reduction techniques were used.  CONTRAST: Isovue 370, 100 mL    FINDINGS:   LOWER CHEST: Normal.    HEPATOBILIARY: Liver within normal limits. Cholecystectomy.    PANCREAS: Normal.    SPLEEN: Normal.    ADRENAL GLANDS: Normal.    KIDNEYS/BLADDER: No significant mass, stone, or hydronephrosis.    BOWEL: Right ileostomy with a moderate parastomal hernia containing loops of bowel. No evidence of obstruction.    LYMPH NODES: Normal.    VASCULATURE: Normal.    PELVIC ORGANS: Bladder decompressed. Uterus within normal limits.    MUSCULOSKELETAL: Normal.      Impression    IMPRESSION:   1.  Right lower quadrant ileostomy with a moderate parastomal hernia containing loops of bowel with no evidence of obstruction. No other acute findings in the abdomen and pelvis.       Discharge Medications   Current Discharge Medication List        CONTINUE these medications which have NOT CHANGED    Details   acetaminophen (TYLENOL) 500 MG tablet Take 1,000 mg by mouth every 6 hours as needed for mild pain      anastrozole (ARIMIDEX) 1 MG tablet Take 1 tablet (1 mg) by mouth daily  Qty: 90 tablet, Refills: 1    Associated Diagnoses: Metastatic breast cancer      calcium carbonate 600 mg-vitamin D 400 units (CALTRATE) 600-400 MG-UNIT per tablet Take 1 tablet by mouth at bedtime.      lisinopril (ZESTRIL) 5 MG  tablet Take 1 tablet (5 mg) by mouth daily  Qty: 30 tablet, Refills: 11    Associated Diagnoses: 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)      loperamide (IMODIUM A-D) 2 MG tablet Take 2 mg by mouth every other day. At bedtime      medical cannabis (Patient's own supply) See Admin Instructions (The purpose of this order is to document that the patient reports taking medical cannabis.  This is not a prescription, and is not used to certify that the patient has a qualifying medical condition.)      melatonin 5 MG tablet Take 5 mg by mouth nightly as needed for sleep.      metoprolol succinate ER (TOPROL XL) 50 MG 24 hr tablet Take 1 tablet (50 mg) by mouth at bedtime  Qty: 90 tablet, Refills: 11    Associated Diagnoses: 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)      PARoxetine (PAXIL) 20 MG tablet TAKE 1 TABLET BY MOUTH ONCE DAILY .  APPOINTMENT  NEEDED  FOR  ADDITIONAL  REFILLS  Qty: 90 tablet, Refills: 1    Associated Diagnoses: Mild episode of recurrent major depressive disorder (H24)      spironolactone (ALDACTONE) 50 MG tablet 1/2 tablet daily  Qty: 45 tablet, Refills: 11    Associated Diagnoses: 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)      Vitamin D3 (VITAMIN D-1000 MAX ST) 25 mcg (1000 units) tablet Take 25 mcg by mouth at bedtime.           Allergies   No Known Allergies

## 2024-09-26 NOTE — PROGRESS NOTES
S-(situation): Patient discharged to home via family vehicle with self    B-(background): Observation goals met yes    A-(assessment): VSS on RA. Denies pain. Denies nausea. Manages ileostomy independently. Independent in room.     R-(recommendations): Discharge instructions reviewed with patient. Listed belongings gathered and returned to patient.yes  Patient Education resolved: Yes  New medications-Pt. Has been educated about reason of use and side effects Yes  Home medications returned to patient Yes  Medication Bin checked and emptied on discharge Yes

## 2024-09-26 NOTE — PROGRESS NOTES
Care Management Discharge Note    Discharge Date: 09/26/2024     Discharge Disposition: Home    Discharge Services: None    Discharge DME: None    Discharge Transportation: family or friend will provide    Private pay costs discussed: Not applicable    Does the patient's insurance plan have a 3 day qualifying hospital stay waiver?  No    PAS Confirmation Code:  N/A  Patient/family educated on Medicare website which has current facility and service quality ratings: no    Education Provided on the Discharge Plan: Yes  Persons Notified of Discharge Plans: Patient  Patient/Family in Agreement with the Plan: yes    Handoff Referral Completed: Yes, [facility] PCP: Internal handoff referral completed    Additional Information:  Patient medically ready for discharge. No identified discharge needs.    Patient is High Risk for re-admission. PCP follow up appt on 9/30/24.    Patient to arrange transport home.    DAMON Villatoro  [facility] [phone]/ [facility] [phone]  Care Management

## 2024-09-26 NOTE — PLAN OF CARE
Goal Outcome Evaluation:      Plan of Care Reviewed With: patient    Overall Patient Progress: improvingOverall Patient Progress: improving    Outcome Evaluation: pt is alert and oriented x4. Independent in room. VSS on RA. PRN melatonin given for sleep. independent with ileostomy. Good output. daily weight 236.6 lbs   AM mg=1.7, AM draw.    Ileostomy bag was leaking, pt changed it herself.

## 2024-09-27 ENCOUNTER — PATIENT OUTREACH (OUTPATIENT)
Dept: CARE COORDINATION | Facility: CLINIC | Age: 64
End: 2024-09-27
Payer: COMMERCIAL

## 2024-09-27 NOTE — PROGRESS NOTES
Clinic Care Coordination Contact  [facility]/Voicemail    Clinical Data: Care Coordinator Outreach    Outreach Documentation Number of Outreach Attempt   9/27/2024  10:29 AM 1       Left message on patient's voicemail with call back information and requested return call.    Plan: Care Coordinator will try to reach patient again in 1-2 business days.    Lynda Jordan RN Care Coordination   [facility]  Email: [email or web address]  Phone: [phone]

## 2024-09-30 ENCOUNTER — OFFICE VISIT (OUTPATIENT)
Dept: FAMILY MEDICINE | Facility: CLINIC | Age: 64
End: 2024-09-30
Payer: COMMERCIAL

## 2024-09-30 VITALS
BODY MASS INDEX: 39.82 KG/M2 | HEART RATE: 59 BPM | RESPIRATION RATE: 20 BRPM | SYSTOLIC BLOOD PRESSURE: 108 MMHG | OXYGEN SATURATION: 98 % | DIASTOLIC BLOOD PRESSURE: 60 MMHG | WEIGHT: 239 LBS | HEIGHT: 65 IN | TEMPERATURE: 97.4 F

## 2024-09-30 DIAGNOSIS — D64.9 ANEMIA, UNSPECIFIED TYPE: ICD-10-CM

## 2024-09-30 DIAGNOSIS — F33.0 MILD EPISODE OF RECURRENT MAJOR DEPRESSIVE DISORDER (H): ICD-10-CM

## 2024-09-30 DIAGNOSIS — E78.5 HYPERLIPIDEMIA LDL GOAL <130: ICD-10-CM

## 2024-09-30 DIAGNOSIS — N17.9 ACUTE RENAL FAILURE, UNSPECIFIED ACUTE RENAL FAILURE TYPE (H): ICD-10-CM

## 2024-09-30 DIAGNOSIS — R42 VERTIGO: ICD-10-CM

## 2024-09-30 DIAGNOSIS — H53.60 NIGHT VISION LOSS: ICD-10-CM

## 2024-09-30 DIAGNOSIS — E87.29 METABOLIC ACIDOSIS, INCREASED ANION GAP: ICD-10-CM

## 2024-09-30 DIAGNOSIS — Z09 HOSPITAL DISCHARGE FOLLOW-UP: Primary | ICD-10-CM

## 2024-09-30 DIAGNOSIS — I42.7 CARDIOMYOPATHY SECONDARY TO DRUG (H): ICD-10-CM

## 2024-09-30 DIAGNOSIS — K43.5 PARASTOMAL HERNIA WITHOUT OBSTRUCTION OR GANGRENE: ICD-10-CM

## 2024-09-30 DIAGNOSIS — E87.1 HYPONATREMIA: ICD-10-CM

## 2024-09-30 DIAGNOSIS — E66.01 MORBID OBESITY (H): ICD-10-CM

## 2024-09-30 DIAGNOSIS — Z93.2 ILEOSTOMY PRESENT (H): ICD-10-CM

## 2024-09-30 DIAGNOSIS — E87.5 HYPERKALEMIA: ICD-10-CM

## 2024-09-30 DIAGNOSIS — I10 ESSENTIAL HYPERTENSION: ICD-10-CM

## 2024-09-30 DIAGNOSIS — R11.2 NAUSEA AND VOMITING, UNSPECIFIED VOMITING TYPE: ICD-10-CM

## 2024-09-30 PROBLEM — K51.00 PANCOLITIS (H): Status: RESOLVED | Noted: 2023-06-13 | Resolved: 2024-09-30

## 2024-09-30 PROBLEM — R10.12 ABDOMINAL PAIN, LEFT UPPER QUADRANT: Status: RESOLVED | Noted: 2024-09-21 | Resolved: 2024-09-30

## 2024-09-30 LAB
ANION GAP SERPL CALCULATED.3IONS-SCNC: 9 MMOL/L (ref 7–15)
BUN SERPL-MCNC: 20.1 MG/DL (ref 8–23)
CALCIUM SERPL-MCNC: 9.1 MG/DL (ref 8.8–10.4)
CHLORIDE SERPL-SCNC: 104 MMOL/L (ref 98–107)
CHOLEST SERPL-MCNC: 213 MG/DL
CREAT SERPL-MCNC: 1.08 MG/DL (ref 0.51–0.95)
EGFRCR SERPLBLD CKD-EPI 2021: 57 ML/MIN/1.73M2
ERYTHROCYTE [DISTWIDTH] IN BLOOD BY AUTOMATED COUNT: 13.8 % (ref 10–15)
FASTING STATUS PATIENT QL REPORTED: NO
FASTING STATUS PATIENT QL REPORTED: NO
GLUCOSE SERPL-MCNC: 91 MG/DL (ref 70–99)
HCO3 SERPL-SCNC: 27 MMOL/L (ref 22–29)
HCT VFR BLD AUTO: 32.1 % (ref 35–47)
HDLC SERPL-MCNC: 39 MG/DL
HGB BLD-MCNC: 10.3 G/DL (ref 11.7–15.7)
LDLC SERPL CALC-MCNC: 115 MG/DL
MCH RBC QN AUTO: 31.9 PG (ref 26.5–33)
MCHC RBC AUTO-ENTMCNC: 32.1 G/DL (ref 31.5–36.5)
MCV RBC AUTO: 99 FL (ref 78–100)
NONHDLC SERPL-MCNC: 174 MG/DL
PLATELET # BLD AUTO: 235 10E3/UL (ref 150–450)
POTASSIUM SERPL-SCNC: 4.2 MMOL/L (ref 3.4–5.3)
RBC # BLD AUTO: 3.23 10E6/UL (ref 3.8–5.2)
SODIUM SERPL-SCNC: 140 MMOL/L (ref 135–145)
TRIGL SERPL-MCNC: 297 MG/DL
VIT D+METAB SERPL-MCNC: 35 NG/ML (ref 20–50)
WBC # BLD AUTO: 6.7 10E3/UL (ref 4–11)

## 2024-09-30 ASSESSMENT — PAIN SCALES - GENERAL: PAINLEVEL: NO PAIN (0)

## 2024-09-30 ASSESSMENT — PATIENT HEALTH QUESTIONNAIRE - PHQ9
SUM OF ALL RESPONSES TO PHQ QUESTIONS 1-9: 5
10. IF YOU CHECKED OFF ANY PROBLEMS, HOW DIFFICULT HAVE THESE PROBLEMS MADE IT FOR YOU TO DO YOUR WORK, TAKE CARE OF THINGS AT HOME, OR GET ALONG WITH OTHER PEOPLE: SOMEWHAT DIFFICULT
SUM OF ALL RESPONSES TO PHQ QUESTIONS 1-9: 5

## 2024-09-30 NOTE — PROGRESS NOTES
Assessment & Plan   1. Hospital discharge follow-up  - Basic metabolic panel  (Ca, Cl, CO2, Creat, Gluc, K, Na, BUN); Future  - CBC with platelets; Future    2. Acute renal failure, unspecified acute renal failure type (H)  3. Metabolic acidosis, increased anion gap  4. Nausea and vomiting, unspecified vomiting type  5. Hyponatremia  6. Hyperkalemia  Appears resolved, recheck today. On bicarb.  - Basic metabolic panel  (Ca, Cl, CO2, Creat, Gluc, K, Na, BUN); Future    7. Parastomal hernia without obstruction or gangrene  Referral given.   - Adult Gen Surg  Referral; Future    8. Anemia, unspecified type  - CBC with platelets; Future    9. Mild episode of recurrent major depressive disorder (H)  On paxil. Stable.    10. Essential hypertension  11. 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 Off spironolactone and lower dose of lisinopril. Cardiology scheduled in November.    12. Hyperlipidemia LDL goal <130  - Lipid panel reflex to direct LDL Non-fasting; Future    13. Morbid obesity (H)  Noted. Encourage diet and exercise changes for weight loss and overall health improvement.    14. Vertigo  Referral given as requested. Agree with further evaluation by specialty based on patient symptoms.  - Physical Therapy  Referral; Future    15. Night vision loss  Referral given as requested. Agree with further evaluation by specialty based on patient symptoms.  - Adult Eye  Referral; Future    16. Ileostomy present (H)  - Vitamin D Deficiency; Future    17. Body mass index (BMI) 39.0-39.9, adult  - Vitamin D Deficiency; Future        Guy Brink MD  [facility]    Disclaimer: This note consists of symbols derived from keyboarding, dictation and/or voice recognition software. As a result, there may be errors in the script that have gone undetected. Please consider this when interpreting information found in this  "chart.    Subjective   Mariluz is a 63 year old, presenting for the following health issues:  Hospital F/U        12/11/2023    12:52 PM   Additional Questions   Roomed by Yk   Accompanied by self       Via the Health Maintenance questionnaire, the patient has reported the following services have been completed -Mammogram: [place] 2024-05-01, this information has been sent to the abstraction team.  [facility]       Hospital Follow-up Visit:    Hospital/Nursing Home/ Rehab Facility: [facility]  Date of Admission: 9/21/24  Date of Discharge: 9/26/24  Reason(s) for Admission: Acute Renal Failure  Was the patient in the ICU or did the patient experience delirium during hospitalization?  No  Do you have any other stressors you would like to discuss with your provider? No    Problems taking medications regularly:  None  Medication changes since discharge: Started to take sodium bicarbonate 8 tablets/day, 1/2 tablet of lisinopril/day, stopped taking spironolactone  Problems adhering to non-medication therapy:  None    Summary of hospitalization:  [facility] discharge summary reviewed  Diagnostic Tests/Treatments reviewed.  Follow up needed: CBC, BMP  Other Healthcare Providers Involved in Patient s Care:         Specialist appointment - oncology, cardiology, general surgery.  Update since discharge: improved.       Plan of care communicated with patient    \"Hospital Course  Mariluz Stoner is a 63 year old female with h/o metastatic breast cancer s/p surgery, radiation and chemo on daily Arimidex, cardiomyopathy from chemotherapy with EF 20-25% in May 2024, depression, and obesity who presented with shortness of breath, dizziness, and nausea/vomiting and was admitted due to concern for acute renal failure with increased anion gap metabolic acidosis.      Acute renal failure, suspect prerenal azotemia  Mild hyperkalemia  Baseline creatinine 0.7-0.8 in April 2024 and presented with creatinine " 3.0 concerning for acute renal failure.  Initial urine sodium was undetectable, so fractional excretion of sodium could not be determined, but undetectable urine sodium raised suspicion for hypovolemia and prerenal azotemia.  Chronic ileostomy losses, chronic diuretic therapy, and recently decreased PO intake all probably contributed to hypovolemia.  Renal function is improving with creatinine 1.0 at discharge although did not quite recover to previous baseline.  Incidental note was made of right kidney stones but there is neither clinical nor radiographic concern for urinary obstruction.  Acute renal failure and metabolic acidosis were complicated by transient mild hyperkalemia on 9/23 that resolved after additional bolus of IV fluid.  She did not have hyperkalemia at admission.  Chronic spironolactone was held during hospitalization.  Lisinopril was initially held but restarted without worsening renal function or recurrent hyperkalemia.  At discharge, she was advised to not restart chronic spironolactone therapy, and she was advised to reduce dose of lisinopril from 5 mg to 2.5 mg daily.  Close outpatient follow-up with PCP to monitor renal function was recommended.     Metabolic acidosis, increased anion gap   Presented with metabolic acidosis with increased anion gap.  After treatment with IV fluids and bicarbonate supplementation, anion gap normalized and metabolic acidosis resolved.  Suspect metabolic acidosis was due to both acute renal failure and chronic ileostomy bicarbonate losses.  At discharge, ongoing bicarbonate supplementation was recommended along with close follow-up with PCP to recheck blood gas.     Hyponatremia  Mildly hyponatremic at admission with sodium 133 that decreased as low as 129 and subsequently recovered to normal after treatment with isotonic IV fluids and remained normal even after stopping normal saline infusion.     Nausea with vomiting  Presented with nausea and vomiting that  likely exacerbated hypovolemia.  Suspect combination of uremia from ARF, metabolic acidosis, and possibly slowed bowel transit in the context of acute metabolic abnormalities including hyponatremia all contributed to nausea and vomiting.  Neither clinical course nor radiographic findings were suspicious for bowel obstruction, but ileus was possible.  Nausea and vomiting resolved and by discharge she tolerated advancing diet without difficulty.     Ileostomy   Parastomal hernia without obstruction or gangrene  Patient underwent surgical treatment of complicated diverticulitis with abscess in November 2023 at [facility] at which time ileostomy was created.  At this admission, patient reported significant reduction in ileostomy output the prior two days suspicious for delayed bowel transit and possible ileus probably a consequence of metabolic disturbances.  Radiographic findings were not suspicious for bowel obstruction.  Incidental note was made of parastomal hernia also without obstruction.  After treatment of metabolic disturbances, ileostomy function normalized during hospitalization.  Recommended nonurgent outpatient follow-up with her surgeon regarding the newly diagnosed parastomal hernia.     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  Also has hypertension.  She follows with cardiology chronically.  She is not known to carry previous diagnosis of heart failure clinically.  Chronic medication treatments for cardiomyopathy and hypertension included metoprolol XL 50 mg daily, lisinopril 5 mg daily and spironolactone 25 mg daily.  Weight was stable and cardiomyopathy was otherwise clinically stable during hospitalization without overt signs of CHF even while holding chronic doses of lisinopril and spironolactone and treating with IV  fluids.  Due to concerns for acute renal failure that may have been exacerbated by chronic diuretic therapy as well as regular fluid losses from ileostomy, patient was advised to not restart spironolactone, and lower dose lisinopril therapy 2.5 mg daily was advised at discharge. Outpatient follow-up with her cardiology team was anticipated.     Elevated troponin   Noted to have mildly elevated troponin 32 in the ER that rapidly improved to 27. EKG demonstrated LBBB limiting interpretation for acute ischemia.  She had no angina and was not known to have CAD.  Recent cardiac stress testing had been negative for inducible ischemia.  Suspect mildly elevated troponin in ER was due to acute renal failure.     Anemia, unspecified type  Initial hemoglobin was 12 and decreased to 10-11 during hospitalization coinciding with IV fluid treatment.  Active bleeding was not clinically apparent.  Hemodilution appeared most likely.  Outpatient follow-up with PCP regarding anemia was anticipated.     History of mesenteric venous thrombosis  Had previously been diagnosed with mesenteric venous thrombosis which was treated with therapeutic anticoagulation for about 3 months.  Patient subsequently declined longer-term treatment with therapeutic anticoagulation.     Metastatic breast cancer   Diagnosed with metastatic left breast cancer in 2017 treated with left mastectomy and lymph node dissection, radiation and Herceptin and Perjeta chemotherapy.  Chemotherapy was subsequently stopped early due to new onset cardiomyopathy in 2019.  Now maintained chronically on Arimidex therapy.  She follows closely with oncology and reportedly has not had evidence of recurrence at most recent monitoring.  Chronic dose of Arimidex was continued.      Mild episode of recurrent major depressive disorder  Previously diagnosed with depression treated chronically with Paxil.  On admission paxil dose was decreased from 20mg to 10 mg daily due to acute renal  "failure.  At discharge she was advised to resume her usual chronic dose of Paxil.     Obesity  Appeared obese with BMI 39\"         Today (9/30/2024):  Feeling very well today.  Using an leon to monitor sodium, protein, and potassium.  Off spironolactone and on 2.5 mg lisinopril.  Working on weight loss now.  Still having vertigo at times and would like referral to PT/OT  Would like to see Dr. Amador for her stoma hernia  Has cardiology scheduled in November.  Would like to see eye as well. Having issue with nighttime blindness.        Review of Systems  Constitutional, HEENT, cardiovascular, pulmonary, GI, , musculoskeletal, neuro, skin, endocrine and psych systems are negative, except as otherwise noted.      Objective    /60   Pulse 59   Temp 97.4  F (36.3  C) (Temporal)   Resp 20   Ht 1.651 m (5' 5\")   Wt 108.4 kg (239 lb)   SpO2 98%   BMI 39.77 kg/m    Body mass index is 39.77 kg/m .  Physical Exam  Vitals reviewed.   Constitutional:       Appearance: She is obese.   HENT:      Head: Normocephalic and atraumatic.   Eyes:      General:         Right eye: No discharge.         Left eye: No discharge.      Extraocular Movements: Extraocular movements intact.      Conjunctiva/sclera: Conjunctivae normal.      Pupils: Pupils are equal, round, and reactive to light.   Cardiovascular:      Rate and Rhythm: Normal rate and regular rhythm.      Heart sounds: Normal heart sounds. No murmur heard.     No friction rub.   Pulmonary:      Effort: Pulmonary effort is normal. No respiratory distress.      Breath sounds: Normal breath sounds. No wheezing or rhonchi.   Abdominal:      Comments: Ostomy in place   Musculoskeletal:         General: No swelling.      Cervical back: Normal range of motion and neck supple. No tenderness.   Lymphadenopathy:      Cervical: No cervical adenopathy.   Skin:     General: Skin is warm.      Findings: No rash.   Neurological:      General: No focal deficit present.      Mental " Status: She is alert.      Motor: No weakness.      Coordination: Coordination normal.      Gait: Gait normal.   Psychiatric:         Mood and Affect: Mood normal.         Behavior: Behavior normal.         Thought Content: Thought content normal.         Judgment: Judgment normal.            Labs: Pending        Signed Electronically by: Guy Brink MD    Answers submitted by the patient for this visit:  Patient Health Questionnaire (G7) (Submitted on 9/25/2024)  AUNG 7 TOTAL SCORE: 9

## 2024-09-30 NOTE — RESULT ENCOUNTER NOTE
Kathryn Mcgraw,    If you have not viewed these results on [employer] within 3 days, we will use an alternative method to contact you. We will contact you via the following protocol:    - Via letter if your results are normal.  - Via phone ([phone]) if your results are abnormal.     Here are my comments about your recent results:    Anemia is stable.    Please call the clinic ([phone]), or message us on [employer] with any questions you may have.     Have a great day,    Dr. De La Cruz

## 2024-09-30 NOTE — LETTER
October 3, 2024      Mariluz IVON tSoner  [address] [facility] [address]        Dear ,    Here are my comments about your recent results:     Anemia is stable.     Please call the clinic ([phone]), or message us on [employer] with any questions you may have.     Have a great day,     Dr. De La Cruz

## 2024-09-30 NOTE — PROGRESS NOTES
Clinic Care Coordination Contact  Care Coordination Clinician Chart Review    Situation: Patient chart reviewed by care coordinator.    Background: Clinic Care Coordination Referral received from inpatient care team for transition handoff communication following hospital admission.    Assessment: Upon chart review, patient is not a candidate for Primary Care Clinic Care Coordination enrollment due to reason stated below:  Patient is receiving duplicative services from PCP. Patient had hospital follow up visit with Primary Care Provider today and all needs were addressed.     Plan/Recommendations: Clinic Care Coordination Referral/order cancelled. RN/CATALINO CC will perform no further monitoring/outreaches at this time and will remain available as needed. If new needs arise, a new Care Coordination Referral may be placed.    Lynda Jordan RN Care Coordination   [facility]  Email: [email or web address]  Phone: [phone]

## 2024-09-30 NOTE — PATIENT INSTRUCTIONS
Important Takeaway Points From This Visit:     Epley Maneuver for vertigo:  [email or web address]

## 2024-10-02 NOTE — RESULT ENCOUNTER NOTE
"Kathryn Mcgraw,    If you have not viewed these results on [employer] within 3 days, we will use an alternative method to contact you. We will contact you via the following protocol:    - Via letter if your results are normal.  - Via phone ([phone]) if your results are abnormal.     Here are my comments about your recent results:    BMP - Your blood sugar was normal. Your kidney function and electrolytes were normal/stable for you.    Vitamin D - Your vitamin D level was normal.    Lipids - Your \"bad\" cholesterol was elevated. This can be improved with diet and exercise. All animal based foods such as meat, dairy, and eggs contain cholesterol. All plant based foods such as fruits, nuts, and vegetables do not.    You do not need a medication for this at this time.      Please call the clinic ([phone]), or message us on [employer] with any questions you may have.     Have a great day,    Dr. De La Cruz    The 10-year ASCVD risk score (Ml ROJAS, et al., 2019) is: 5.4%    Values used to calculate the score:      Age: 63 years      Sex: Female      Is Non- : No      Diabetic: No      Tobacco smoker: No      Systolic Blood Pressure: 108 mmHg      Is BP treated: Yes      HDL Cholesterol: 39 mg/dL      Total Cholesterol: 213 mg/dL"

## 2024-10-08 ENCOUNTER — TELEPHONE (OUTPATIENT)
Dept: FAMILY MEDICINE | Facility: CLINIC | Age: 64
End: 2024-10-08
Payer: COMMERCIAL

## 2024-10-08 NOTE — LETTER
"October 9, 2024      Mariluz DEL VALLE [place]  [address] [facility] [address]              Dear Mariluz,      If you have not viewed these results on [employer] within 3 days, we will use an alternative method to contact you. We will contact you via the following protocol:    - Via letter if your results are normal.  - Via phone ([phone]) if your results are abnormal.     Here are my comments about your recent results:     BMP - Your blood sugar was normal. Your kidney function and electrolytes were normal/stable for you.     Vitamin D - Your vitamin D level was normal.     Lipids - Your \"bad\" cholesterol was elevated. This can be improved with diet and exercise. All animal based foods such as meat, dairy, and eggs contain cholesterol. All plant based foods such as fruits, nuts, and vegetables do not.     You do not need a medication for this at this time.        Please call the clinic ([phone]), or message us on [employer] with any questions you may have.     Have a great day,     Dr. DeL a Cruz    "

## 2024-10-08 NOTE — TELEPHONE ENCOUNTER
RN Triage    Patient Contact    Attempt # 1    Was call answered?  No.  Left message on voicemail with information to call me back.    Upon call back please relay result note below.    Suzy Calderon RN  [facility] - Registered Nurse  Clinic Triage [place]   October 8, 2024       Negative

## 2024-10-08 NOTE — TELEPHONE ENCOUNTER
"f you have not viewed these results on [employer] within 3 days, we will use an alternative method to contact you. We will contact you via the following protocol:    - Via letter if your results are normal.  - Via phone ([phone]) if your results are abnormal.     Here are my comments about your recent results:     BMP - Your blood sugar was normal. Your kidney function and electrolytes were normal/stable for you.     Vitamin D - Your vitamin D level was normal.     Lipids - Your \"bad\" cholesterol was elevated. This can be improved with diet and exercise. All animal based foods such as meat, dairy, and eggs contain cholesterol. All plant based foods such as fruits, nuts, and vegetables do not.     You do not need a medication for this at this time.        Please call the clinic ([phone]), or message us on [employer] with any questions you may have.     Have a great day,     Dr. De La Cruz     The 10-year ASCVD risk score (Ml ROJAS, et al., 2019) is: 5.4%    Values used to calculate the score:      Age: 63 years      Sex: Female      Is Non- : No      Diabetic: No      Tobacco smoker: No      Systolic Blood Pressure: 108 mmHg      Is BP treated: Yes      HDL Cholesterol: 39 mg/dL      Total Cholesterol: 213 mg/dL  "

## 2024-10-09 NOTE — TELEPHONE ENCOUNTER
Please send letter 3 attempts      RN Triage    Patient Contact    Attempt # 2    Was call answered?  No.  Unable to leave message.    Please relay labs results below.    Suzy Calderon RN  [facility] - Registered Nurse  Clinic Triage [place]   October 9, 2024

## 2024-10-18 ENCOUNTER — THERAPY VISIT (OUTPATIENT)
Dept: PHYSICAL THERAPY | Facility: CLINIC | Age: 64
End: 2024-10-18
Attending: FAMILY MEDICINE
Payer: COMMERCIAL

## 2024-10-18 DIAGNOSIS — R42 VERTIGO: ICD-10-CM

## 2024-10-18 PROCEDURE — 97112 NEUROMUSCULAR REEDUCATION: CPT | Mod: GP,59 | Performed by: PHYSICAL THERAPIST

## 2024-10-18 PROCEDURE — 95992 CANALITH REPOSITIONING PROC: CPT | Mod: GP | Performed by: PHYSICAL THERAPIST

## 2024-10-18 PROCEDURE — 97161 PT EVAL LOW COMPLEX 20 MIN: CPT | Mod: GP | Performed by: PHYSICAL THERAPIST

## 2024-10-18 NOTE — PROGRESS NOTES
PHYSICAL THERAPY EVALUATION  Type of Visit: Evaluation       Fall Risk Screen:  Fall screen completed by: PT  Have you fallen 2 or more times in the past year?: No  Have you fallen and had an injury in the past year?: No  Is patient a fall risk?: No    Subjective   Pt reports now getting dizziness in/out of bed and rolling.   Pt reports sinus symptoms now but dizziness started before these symptoms.  Pt reports similar symptoms 1 year ago that was corrected with Eply and 95% improvement after evaluation.  Pt reports decreased severity of dizziness compared to last year.       Presenting condition or subjective complaint: Vertigo  Date of onset: 09/01/24    Relevant medical history:   cancer- no current chemotherapy/radiation but hx of both, but is on a daily medication; depression; ileostomy bag  Dates & types of surgery:  Nov 2023 bowel removal    Prior diagnostic imaging/testing results: Other Therapy   Prior therapy history for the same diagnosis, illness or injury: Yes      Living Environment  Social support: With a significant other or spouse   Type of home: House   Stairs to enter the home: Yes 4 Is there a railing: Yes     Ramp: No   Stairs inside the home: No       Help at home: None  Equipment owned:       Employment: No    Hobbies/Interests: Gardening    Patient goals for therapy:      Pain assessment:  None indicated     Objective        OBSERVATION: wearing mask due to sinus infection symptoms    BALANCE:  demonstrates appropriate balance with static and dynamic activities         VESTIBULAR EVALUATION  ADDITIONAL HISTORY:  Description of symptoms: I feel like the room is spinning  Dizzy attacks:   Start: oct 13   Last attack: this morning   Frequency of occurrences: daily   Length of attack: 15 sec  Difficulty hearing:    Noise in ears? No    Alleviates symptoms: closing eyes and lying still  Worsens symptoms: shaking head  Activities that bring on symptoms: Other  layig down and getting up    Pertinent  visual history:   Pertinent history of current vestibular problem: Hearing loss, Migraines, Motion sickness  DHI: Total Score: 32    Cervicogenic Screen    Neck ROM Normal   Vertebral Artery Test    Alar Ligament Test    Transverse Ligament Test    Distraction    Neck Torsion Test (head still, body rotating)    Neck Torsion Test (head and body rotating)         Oculomotor Screen    Ocular ROM Normal   Smooth Pursuit Normal   Saccades Normal   VOR Normal   VOR Cancellation    Head Impulse Test Normal   Convergence Testing Normal        Infrared Goggle Exam Vestibular Suppressant in Last 24 Hours? No  Exam Completed With: Room light, patient wearing mask and not comfortable with goggles and mask   Spontaneous Nystagmus Negative   Gaze Evoked Nystagmus Negative   Head Shake Horizontal Nystagmus Negative   Positional Testing Upbeating L torsional   Supine Head-Hanging Test     Left Right   [place] Upbeating L torsional Negative   Sidelying Test     HSCC Supine Roll Test     HSCC Forward Roll Test     [place] and Lean Test -  Sitting Erect    [place] and Lean Test - Seated, Head Bent 60 Degrees Forward    [place] and Lean Test - Seated, Head Bent Backwards       BPPV Canal(s): L Posterior  BPPV Type: Canalithasis      Assessment & Plan   CLINICAL IMPRESSIONS  Medical Diagnosis: Vertigo    Treatment Diagnosis: Vertigo   Impression/Assessment: Patient is a 63 year old female with dizziness complaints.  The following significant findings have been identified: Impaired gait, Decreased activity tolerance, Instability, and Dizziness. These impairments interfere with their ability to perform self care tasks, recreational activities, household chores, household mobility, and community mobility as compared to previous level of function.     Clinical Decision Making (Complexity):  Clinical Presentation: Evolving/Changing  Clinical Presentation Rationale: based on medical and personal factors listed in PT evaluation  Clinical Decision  Making (Complexity): Moderate complexity    PLAN OF CARE  Treatment Interventions:  Modalities:  Modalities as indicated  Interventions: Gait Training, Manual Therapy, Neuromuscular Re-education, Therapeutic Activity, Canalith Repositioning    Long Term Goals     PT Goal 1  Goal Identifier: 1  Goal Description: The patient will report 80% improvement in symptoms with all daily activities  Target Date: 12/16/24      Frequency of Treatment: 5 visits  Duration of Treatment: 60 days    Recommended Referrals to Other Professionals:  None indicated  Education Assessment:   Learner/Method: Patient;Listening;Demonstration;Pictures/Video;No Barriers to Learning    Risks and benefits of evaluation/treatment have been explained.   Patient/Family/caregiver agrees with Plan of Care.     Evaluation Time:     PT José Miguel Low Complexity Minutes (56684): 15       Signing Clinician: Catherine Villa, PT        [facility]                                                                                   OUTPATIENT PHYSICAL THERAPY      PLAN OF TREATMENT FOR OUTPATIENT REHABILITATION   Patient's Last Name, First Name, Mariluz Salazar YOB: 1960   Provider's Name   [facility]   Medical Record No.  [id number]     Onset Date: 09/01/24  Start of Care Date: 10/18/24     Medical Diagnosis:  Vertigo      PT Treatment Diagnosis:  Vertigo Plan of Treatment  Frequency/Duration: 5 visits/ 60 days    Certification date from 10/18/24 to 12/16/24         See note for plan of treatment details and functional goals     Catherine Villa, PT                         I CERTIFY THE NEED FOR THESE SERVICES FURNISHED UNDER        THIS PLAN OF TREATMENT AND WHILE UNDER MY CARE     (Physician attestation of this document indicates review and certification of the therapy plan).              Referring Provider:  Guy Brink    Initial Assessment  See Epic Evaluation-  Start of Care Date: 10/18/24

## 2024-10-23 ENCOUNTER — THERAPY VISIT (OUTPATIENT)
Dept: PHYSICAL THERAPY | Facility: CLINIC | Age: 64
End: 2024-10-23
Attending: FAMILY MEDICINE
Payer: COMMERCIAL

## 2024-10-23 DIAGNOSIS — R42 VERTIGO: Primary | ICD-10-CM

## 2024-10-23 PROCEDURE — 95992 CANALITH REPOSITIONING PROC: CPT | Mod: GP | Performed by: PHYSICAL THERAPIST

## 2024-11-06 DIAGNOSIS — I42.7 CARDIOMYOPATHY SECONDARY TO DRUG (H): ICD-10-CM

## 2024-11-06 RX ORDER — LISINOPRIL 5 MG/1
2.5 TABLET ORAL DAILY
Qty: 90 TABLET | Refills: 0 | Status: SHIPPED | OUTPATIENT
Start: 2024-11-06

## 2024-11-06 NOTE — TELEPHONE ENCOUNTER
Patient is wondering if they could have a 2.5 mg tablet instead of a 5mg and braking them?      Patient is out of medication

## 2024-12-03 ENCOUNTER — OFFICE VISIT (OUTPATIENT)
Dept: OPHTHALMOLOGY | Facility: CLINIC | Age: 64
End: 2024-12-03
Payer: COMMERCIAL

## 2024-12-03 DIAGNOSIS — H25.813 COMBINED FORMS OF AGE-RELATED CATARACT OF BOTH EYES: ICD-10-CM

## 2024-12-03 DIAGNOSIS — H52.203 HYPEROPIA OF BOTH EYES WITH ASTIGMATISM AND PRESBYOPIA: Primary | ICD-10-CM

## 2024-12-03 DIAGNOSIS — H52.03 HYPEROPIA OF BOTH EYES WITH ASTIGMATISM AND PRESBYOPIA: Primary | ICD-10-CM

## 2024-12-03 DIAGNOSIS — H53.60 NIGHT VISION LOSS: ICD-10-CM

## 2024-12-03 DIAGNOSIS — H52.4 HYPEROPIA OF BOTH EYES WITH ASTIGMATISM AND PRESBYOPIA: Primary | ICD-10-CM

## 2024-12-03 ASSESSMENT — REFRACTION_MANIFEST
OS_CYLINDER: +0.50
OD_SPHERE: -1.00
OD_AXIS: 025
OD_ADD: +2.50
OS_AXIS: 030
OS_SPHERE: PLANO
OS_ADD: +2.50
OD_CYLINDER: +1.00

## 2024-12-03 ASSESSMENT — SLIT LAMP EXAM - LIDS
COMMENTS: NORMAL
COMMENTS: NORMAL

## 2024-12-03 ASSESSMENT — VISUAL ACUITY
METHOD: SNELLEN - LINEAR
OD_SC: 20/50
OS_SC: 20/25
OS_SC+: -2

## 2024-12-03 ASSESSMENT — CONF VISUAL FIELD
METHOD: COUNTING FINGERS
OD_SUPERIOR_TEMPORAL_RESTRICTION: 0
OS_INFERIOR_NASAL_RESTRICTION: 0
OS_SUPERIOR_TEMPORAL_RESTRICTION: 0
OD_SUPERIOR_NASAL_RESTRICTION: 0
OD_INFERIOR_NASAL_RESTRICTION: 0
OS_SUPERIOR_NASAL_RESTRICTION: 0
OD_NORMAL: 1
OS_INFERIOR_TEMPORAL_RESTRICTION: 0
OD_INFERIOR_TEMPORAL_RESTRICTION: 0
OS_NORMAL: 1

## 2024-12-03 ASSESSMENT — TONOMETRY
IOP_METHOD: ICARE
OD_IOP_MMHG: 19
OS_IOP_MMHG: 16

## 2024-12-03 ASSESSMENT — CUP TO DISC RATIO
OS_RATIO: 0.3
OD_RATIO: 0.2

## 2024-12-03 NOTE — PROGRESS NOTES
HPI       COMPREHENSIVE EYE EXAM    In both eyes.  Since onset it is gradually worsening.  Associated symptoms include floaters.  Negative for flashes.  Treatments tried include no treatments.             Comments    Mariluz Stoner is a(n) 64 year old female who presents for a comprehensive exam. Last eye exam was 1 year(s) ago. Since exam, vision has some slight changes. No lubricating drops. No flashes with occasional floaters. No eye pain.     Lab Results       Component                Value               Date                       A1C                      5.3                 01/29/2018              José Fournier COT 12:06 PM December 3, 2024               Last edited by José Fournier on 12/3/2024 12:07 PM.         Review of systems for the eyes was negative other than the pertinent positives/negatives listed in the HPI.      Assessment & Plan    HPI:  Mariluz Stoner is a 64 year old female with history of breast CA, HTN, refractive error presents for exam. She notes some difficulty with night driving. Uses OTC readers for near      POHx: refractive error  PMHx:  breast CA, HTN  Current Medications:   Current Outpatient Medications   Medication Sig Dispense Refill    acetaminophen (TYLENOL) 500 MG tablet Take 1,000 mg by mouth every 6 hours as needed for mild pain      anastrozole (ARIMIDEX) 1 MG tablet Take 1 tablet (1 mg) by mouth daily (Patient taking differently: Take 1 mg by mouth at bedtime.) 90 tablet 1    calcium carbonate 600 mg-vitamin D 400 units (CALTRATE) 600-400 MG-UNIT per tablet Take 1 tablet by mouth at bedtime.      lisinopril (ZESTRIL) 5 MG tablet Take 0.5 tablets (2.5 mg) by mouth daily. 90 tablet 0    loperamide (IMODIUM A-D) 2 MG tablet Take 2 mg by mouth every other day. At bedtime      medical cannabis (Patient's own supply) See Admin Instructions (The purpose of this order is to document that the patient reports taking medical cannabis.  This is not a prescription, and is not used to certify  that the patient has a qualifying medical condition.)      melatonin 5 MG tablet Take 5 mg by mouth nightly as needed for sleep.      metoprolol succinate ER (TOPROL XL) 50 MG 24 hr tablet Take 1 tablet (50 mg) by mouth at bedtime 90 tablet 11    PARoxetine (PAXIL) 20 MG tablet TAKE 1 TABLET BY MOUTH ONCE DAILY .  APPOINTMENT  NEEDED  FOR  ADDITIONAL  REFILLS (Patient taking differently: Take 20 mg by mouth at bedtime.) 90 tablet 1    sodium bicarbonate 650 MG tablet Take 2 tablets (1,300 mg) by mouth 4 times daily. 240 tablet 0    Vitamin D3 (VITAMIN D-1000 MAX ST) 25 mcg (1000 units) tablet Take 25 mcg by mouth at bedtime.       No current facility-administered medications for this visit.     FHx:  denies family history of ocular conditions   PSHx:  denies history of ocular surgeries       Current Eye Medications:      Assessment & Plan:  (H52.03,  H52.203,  H52.4) Hyperopia of both eyes with astigmatism and presbyopia  (primary encounter diagnosis)  Patient has minimal change in hyperopia but a copy of today's glasses prescription was given.  The patient may wish to update the glasses if the lenses are scratched or the frames are too small.  Presbyopia is difficulty seeing up close and is treated with bifocals or over the counter reading glasses    (H53.60) Night vision loss  (H25.813) Combined forms of age-related cataract of both eyes  Mild cataracts are present and may account for some of the patient's visual complaints. Patient prefers to attempt spectacle correction prior to considering surgery. MRx dispensed. The patient will monitor for vision changes and contact us with any decrease in vision. Recheck next visit       Return in about 1 year (around 12/3/2025), or if symptoms worsen or fail to improve, for Annual Visit-v/t/d/MRx.        Rubén Garcia MD     Attending Physician Attestation:  Complete documentation of historical and exam elements from today's encounter can be found in the full  encounter summary report (not reduplicated in this progress note).  I personally obtained the chief complaint(s) and history of present illness.  I confirmed and edited as necessary the review of systems, past medical/surgical history, family history, social history, and examination findings as documented by others; and I examined the patient myself.  I personally reviewed the relevant tests, images, and reports as documented above.  I formulated and edited as necessary the assessment and plan and discussed the findings and management plan with the patient and family. - Rubén Garcia MD

## 2025-02-03 DIAGNOSIS — I42.7 CARDIOMYOPATHY SECONDARY TO DRUG: ICD-10-CM

## 2025-02-03 RX ORDER — METOPROLOL SUCCINATE 50 MG/1
50 TABLET, EXTENDED RELEASE ORAL AT BEDTIME
Qty: 90 TABLET | Refills: 3 | Status: SHIPPED | OUTPATIENT
Start: 2025-02-03

## 2025-02-03 NOTE — TELEPHONE ENCOUNTER
Last Written Prescription Date:  2/1/24  Last Fill Quantity: 90,  # refills: 11   Last office visit: 7/11/2024   Future Office Visit:  No showed 11/13/24 and is rescheduled to follow up 5/22/25

## 2025-03-09 ENCOUNTER — HEALTH MAINTENANCE LETTER (OUTPATIENT)
Age: 65
End: 2025-03-09

## 2025-04-16 ENCOUNTER — PATIENT OUTREACH (OUTPATIENT)
Dept: CARE COORDINATION | Facility: CLINIC | Age: 65
End: 2025-04-16
Payer: COMMERCIAL

## 2025-05-14 ENCOUNTER — PATIENT OUTREACH (OUTPATIENT)
Dept: CARE COORDINATION | Facility: CLINIC | Age: 65
End: 2025-05-14
Payer: COMMERCIAL

## 2025-05-21 NOTE — PROGRESS NOTES
~Cardiology Clinic Visit~    Primary Cardiologist: Dr. Vazquez  Reason for visit: Annual Follow up    History of Present Illness    Mariluz Stoner is a very pleasant 64 year old female with a past medical history notable for metastatic breast cancer with left mastectomy and lymph node dissection, diverticulitis,benign positional vertigo, hypertension, obesity, HFrEF, left bundle branch block, and mesenteric thrombosis.    Last visit was 24 with Khadijah Alexander CNP. At that visit she had been feeling well and her energy levels had been good. She denied cardiac symptoms at that time. Echocardiogram was ordered at that time but was not completed.    She had a hospital stay 24- 24, presenting with shortness of breath, dizziness, and nausea and vomiting. There was concern for acute renal failure with increased anion gap metabolic acidosis. She was treated with IV fluids and recommended to discontinue spironolactone and decrease lisinopril dosing to 2.5 mg.     Diagnostics:  cMRI 2024: EF 37.5%.  Normal RV function at 57%.  Regadenoson induced stress perfusion is negative for ischemia.  Delayed hyperenhancement with no scar, fibrosis, or evidence of infiltrative disease.  Echocardiogram 2024: EF 20-25%, severe global hypokinesia. Normal RV function  Echocardiogram 2019: EF 55 to 60%.  (Care Everywhere)  Echocardiogram 2019: EF 40 to 45%.  No significant valvular insufficiency (Care Everywhere  Echocardiogram 10/2023: EF 25 to 30% trace of valvular insufficiency.  RV function normal.  Grade 1 diastolic dysfunction noted.  Severe global hypokinesis noted of the left ventricle.    Labs:    24: Na 140, K 4.2, Creat 1.08, GFR 57, HgB 10.3, , TC: 213, HDL: 39, LDL: 115, T    Interval 25  Mariluz states that she is feeling better than she has in a while. She denies chest pain, shortness of breath, dizziness, lightheadedness, or orthopnea. She states that since distancing  herself from her  and living in a different area on their property she has been sleeping better, increased mood, and better head space. She does note that she had some recent PET scans that showed some concerning lymph nodes but she is working with her oncologist for this. She is unsure why she never completed the echocardiogram scheduled for this year, but is interested in completing that. She is eager to see if there is any improvement of her heart.    She states that she really wants to work on getting her heart function better. We discussed with her the importance of her current medications and potentially adding some to help with this, pending her echocardiogram results. She is open to this. We also discussed adding in 20-40 minute walks 4-5 times weekly to help strengthen her cardiovascular strength.   __________________________________________________________________         Assessment and Impression:     Metastatic breast cancer s/p left mastectomy and lymph node dissection  Treated with radiation, Herceptin followed by Tamoxifen. Currently on Arimidex  Diverticulitis  S/p emergent laparotomy and subtotal colectomy and ileostomy  Hoping for reversal of subtotal colectomy ileostomy. She is cleared from a cardiology standpoint for this procedure with no further cardiac workup necessary.   Per oncology reversal not possible  Benign positional vertigo  Hypertension  Stable  Lisinopril 2.5 mg daily, Toprol XL 50 mg daily  Obesity  HFrEF suspected secondary to chemotherapy (Herceptin/Perjeta in Jan 2019- October 2019 stopped due to CM)   EF initially 55% prior to treatment 08/2019. Last echocardiogram EF 25-30%. cMRI 7/5/24 showed EF of 37%  GDMT:  metoprolol ER 50 mg daily  Was previously on spironolactone but was recommended to discontinue during hospitalization on 9/21/24 for acute renal failure with increased anion gap metabolic acidosis  Patient is adamant about not being on unnecessary medications  On  lisinopril 2.5 mg daily while looking into insurance coverage of Entresto.  Does not appear she has checked into this.   LBBB  Mesenteric thrombosis   Short course of apixaban (3 months) has since been discontinued          Recommendations and Plan:     Echocardiogram to be completed for reassessment of EF within the next month. We will contact Mariluz following this scan to discuss results.   We will recommend follow up appointment scheduling pending results of echocardiogram.   Continue taking medications as prescribed.   __________________________________________________________________    Thank you for the opportunity to participate in this pleasant patient's care.    We would be happy to see this patient sooner for any concerns in the meantime.    Charlotte Macias PA-C   Physician Assistant  [facility]      I, Khadijah Alexander NP, VERONIKA REID was present with the MEHRAN student who participated in the service and in the documentation of the note.  I have verified the history and personally performed the physical exam and medical decision making.  I agree with the assessment and plan of care as documented in the note.       Items personally reviewed: vitals, labs, and plan and agree with the interpretation documented in the note.  I agree with the interpretation documented in the note and the assessment and plan as above.    Mariluz is not a very good historian.  Uncertain why her medications were stopped.  As mentioned above, she had JESUS and spironolactone was discontinued, lisinopril decreased.  Repeat echo ordered.  We will send the results via [employer].  Patient is encouraged to start a walking program 20 to 30 minutes daily, 4-5 times a week.  If she is interested in further optimization of medications, I would be happy to change lisinopril to Entresto or perhaps consider adding an SGLT2 inhibitor.  Uncertain if cost would be an issue.    Khadijah Alexander NP, VERONIKA CNP      Orders this Visit:  Orders  Placed This Encounter   Procedures    Echocardiogram Complete     Orders Placed This Encounter   Medications    lisinopril (ZESTRIL) 2.5 MG tablet     Sig: Take 1 tablet (2.5 mg) by mouth daily.     Dispense:  90 tablet     Refill:  3     Medications Discontinued During This Encounter   Medication Reason    lisinopril (ZESTRIL) 5 MG tablet      Encounter Diagnosis   Name Primary?    Cardiomyopathy secondary to drug      CURRENT MEDICATIONS:  Current Outpatient Medications   Medication Sig Dispense Refill    anastrozole (ARIMIDEX) 1 MG tablet Take 1 tablet (1 mg) by mouth daily (Patient taking differently: Take 1 mg by mouth at bedtime.) 90 tablet 1    calcium carbonate 600 mg-vitamin D 400 units (CALTRATE) 600-400 MG-UNIT per tablet Take 1 tablet by mouth at bedtime.      lisinopril (ZESTRIL) 2.5 MG tablet Take 1 tablet (2.5 mg) by mouth daily. 90 tablet 3    loperamide (IMODIUM A-D) 2 MG tablet Take 2 mg by mouth every other day. At bedtime      medical cannabis (Patient's own supply) See Admin Instructions (The purpose of this order is to document that the patient reports taking medical cannabis.  This is not a prescription, and is not used to certify that the patient has a qualifying medical condition.)      melatonin 5 MG tablet Take 5 mg by mouth nightly as needed for sleep.      metoprolol succinate ER (TOPROL XL) 50 MG 24 hr tablet Take 1 tablet (50 mg) by mouth at bedtime. 90 tablet 3    PARoxetine (PAXIL) 20 MG tablet TAKE 1 TABLET BY MOUTH ONCE DAILY .  APPOINTMENT  NEEDED  FOR  ADDITIONAL  REFILLS (Patient taking differently: Take 20 mg by mouth at bedtime.) 90 tablet 1    Vitamin D3 (VITAMIN D-1000 MAX ST) 25 mcg (1000 units) tablet Take 25 mcg by mouth at bedtime.      acetaminophen (TYLENOL) 500 MG tablet Take 1,000 mg by mouth every 6 hours as needed for mild pain      sodium bicarbonate 650 MG tablet Take 2 tablets (1,300 mg) by mouth 4 times daily. (Patient not taking: Reported on 5/22/2025) 240  "tablet 0     ALLERGIES     Allergies   Allergen Reactions    Spironolactone      Suspected cause of acute kidney injury.     PAST MEDICAL, SURGICAL, FAMILY, SOCIAL HISTORY:  History was reviewed and updated as needed, see medical record.    Review of Systems:  A 10-point Review Of Systems is otherwise normal except for that which is noted in the HPI and interval summary.    Physical Exam:    Vitals: /66 (BP Location: Right arm, Patient Position: Sitting, Cuff Size: Adult Large)   Pulse 66   Resp 18   Ht 1.651 m (5' 5\")   Wt 106.6 kg (235 lb)   SpO2 96%   BMI 39.11 kg/m    Constitutional: Appears stated age, well nourished, NAD.  Respiratory: Non-labored. Lungs clear to auscultation bilaterally.   Cardiovascular: RR, normal S1 and S2. No lower extremity edema.  Skin: Warm and dry.   Musculoskeletal/Extremities: Symmetrical movement.  Neurologic: No gross focal deficits. Alert, awake.  Psychiatric: Affect appropriate. Mentation normal.    Recent Lab Results:  LIPID RESULTS:  Lab Results   Component Value Date    CHOL 213 (H) 09/30/2024    CHOL 219 (A) 01/29/2018    HDL 39 (L) 09/30/2024    HDL 45 01/29/2018     (H) 09/30/2024     01/29/2018    TRIG 297 (H) 09/30/2024    TRIG 225 01/29/2018     LIVER ENZYME RESULTS:  Lab Results   Component Value Date    AST 17 09/22/2024    AST 17 11/14/2019    ALT 17 09/22/2024    ALT 16 11/14/2019     CBC RESULTS:  Lab Results   Component Value Date    WBC 6.7 09/30/2024    WBC 6.2 04/15/2019    RBC 3.23 (L) 09/30/2024    RBC 3.98 04/15/2019    HGB 10.3 (L) 09/30/2024    HGB 12.4 04/15/2019    HCT 32.1 (L) 09/30/2024    HCT 38.0 04/15/2019    MCV 99 09/30/2024    MCV 96 04/15/2019    MCH 31.9 09/30/2024    MCH 31.2 04/15/2019    MCHC 32.1 09/30/2024    MCHC 32.6 04/15/2019    RDW 13.8 09/30/2024    RDW 13.2 04/15/2019     09/30/2024     04/15/2019     BMP RESULTS:  Lab Results   Component Value Date     09/30/2024     04/15/2019    " "POTASSIUM 4.2 09/30/2024    POTASSIUM 4.0 11/14/2019    CHLORIDE 104 09/30/2024    CHLORIDE 106 04/15/2019    CO2 27 09/30/2024    CO2 26 04/15/2019    ANIONGAP 9 09/30/2024    ANIONGAP 8 04/15/2019    GLC 91 09/30/2024    GLC 85 10/01/2023     11/14/2019    BUN 20.1 09/30/2024    BUN 13 04/15/2019    CR 1.08 (H) 09/30/2024    CR 0.66 11/14/2019    GFRESTIMATED 57 (L) 09/30/2024    GFRESTIMATED >60 11/14/2019    GFRESTBLACK >60 04/19/2019    SINDY 9.1 09/30/2024    SINDY 8.5 04/15/2019      A1C RESULTS:  Lab Results   Component Value Date    A1C 5.3 01/29/2018     INR RESULTS:  No results found for: \"INR\"    No results found for this or any previous visit (from the past 4320 hours).     "

## 2025-05-22 ENCOUNTER — OFFICE VISIT (OUTPATIENT)
Dept: CARDIOLOGY | Facility: CLINIC | Age: 65
End: 2025-05-22
Payer: COMMERCIAL

## 2025-05-22 VITALS
DIASTOLIC BLOOD PRESSURE: 66 MMHG | RESPIRATION RATE: 18 BRPM | HEIGHT: 65 IN | BODY MASS INDEX: 39.15 KG/M2 | HEART RATE: 66 BPM | WEIGHT: 235 LBS | OXYGEN SATURATION: 96 % | SYSTOLIC BLOOD PRESSURE: 112 MMHG

## 2025-05-22 DIAGNOSIS — I10 BENIGN ESSENTIAL HYPERTENSION: ICD-10-CM

## 2025-05-22 DIAGNOSIS — I44.7 LEFT BUNDLE BRANCH BLOCK (LBBB): ICD-10-CM

## 2025-05-22 DIAGNOSIS — I42.7 CARDIOMYOPATHY SECONDARY TO DRUG: Primary | ICD-10-CM

## 2025-05-22 RX ORDER — LISINOPRIL 2.5 MG/1
2.5 TABLET ORAL DAILY
Qty: 90 TABLET | Refills: 3 | Status: SHIPPED | OUTPATIENT
Start: 2025-05-22

## 2025-05-22 ASSESSMENT — PAIN SCALES - GENERAL: PAINLEVEL_OUTOF10: NO PAIN (0)

## 2025-05-22 NOTE — PATIENT INSTRUCTIONS
"Call my nurse with any questions or concerns:  [phone]  *If you have concerns after hours, please call [phone], option 2 to speak with on call Cardiologist.    Will repeat echo to re-look at the \"pump\" of the heart  Try to start a walking program and watch your diet closely.    We will send you the results on [facility]         "

## 2025-05-22 NOTE — LETTER
5/22/2025    Guy Brink MD  [address] [facility] [address]    RE: Mariluz Stoner       Dear Colleague,     I had the pleasure of seeing Mariluz Stoner in the [facility].              ~Cardiology Clinic Visit~    Primary Cardiologist: Dr. Vazquez  Reason for visit: Annual Follow up    History of Present Illness    Mariluz Stoner is a very pleasant 64 year old female with a past medical history notable for metastatic breast cancer with left mastectomy and lymph node dissection, diverticulitis,benign positional vertigo, hypertension, obesity, HFrEF, left bundle branch block, and mesenteric thrombosis.    Last visit was 07/11/24 with Khadijah Alexander CNP. At that visit she had been feeling well and her energy levels had been good. She denied cardiac symptoms at that time. Echocardiogram was ordered at that time but was not completed.    She had a hospital stay 9/21/24- 9/26/24, presenting with shortness of breath, dizziness, and nausea and vomiting. There was concern for acute renal failure with increased anion gap metabolic acidosis. She was treated with IV fluids and recommended to discontinue spironolactone and decrease lisinopril dosing to 2.5 mg.     Diagnostics:  cMRI 7/2024: EF 37.5%.  Normal RV function at 57%.  Regadenoson induced stress perfusion is negative for ischemia.  Delayed hyperenhancement with no scar, fibrosis, or evidence of infiltrative disease.  Echocardiogram 5/2024: EF 20-25%, severe global hypokinesia. Normal RV function  Echocardiogram 8/2019: EF 55 to 60%.  (Care Everywhere)  Echocardiogram 12/2019: EF 40 to 45%.  No significant valvular insufficiency (Care Everywhere  Echocardiogram 10/2023: EF 25 to 30% trace of valvular insufficiency.  RV function normal.  Grade 1 diastolic dysfunction noted.  Severe global hypokinesis noted of the left ventricle.    Labs:    9/30/24: Na 140, K 4.2, Creat 1.08, GFR 57, HgB 10.3, , TC: 213, HDL: 39, LDL: 115, TG:  297    Interval 5/22/25  Mariluz states that she is feeling better than she has in a while. She denies chest pain, shortness of breath, dizziness, lightheadedness, or orthopnea. She states that since distancing herself from her  and living in a different area on their property she has been sleeping better, increased mood, and better head space. She does note that she had some recent PET scans that showed some concerning lymph nodes but she is working with her oncologist for this. She is unsure why she never completed the echocardiogram scheduled for this year, but is interested in completing that. She is eager to see if there is any improvement of her heart.    She states that she really wants to work on getting her heart function better. We discussed with her the importance of her current medications and potentially adding some to help with this, pending her echocardiogram results. She is open to this. We also discussed adding in 20-40 minute walks 4-5 times weekly to help strengthen her cardiovascular strength.   __________________________________________________________________         Assessment and Impression:     Metastatic breast cancer s/p left mastectomy and lymph node dissection  Treated with radiation, Herceptin followed by Tamoxifen. Currently on Arimidex  Diverticulitis  S/p emergent laparotomy and subtotal colectomy and ileostomy  Hoping for reversal of subtotal colectomy ileostomy. She is cleared from a cardiology standpoint for this procedure with no further cardiac workup necessary.   Per oncology reversal not possible  Benign positional vertigo  Hypertension  Stable  Lisinopril 2.5 mg daily, Toprol XL 50 mg daily  Obesity  HFrEF suspected secondary to chemotherapy (Herceptin/Perjeta in Jan 2019- October 2019 stopped due to CM)   EF initially 55% prior to treatment 08/2019. Last echocardiogram EF 25-30%. cMRI 7/5/24 showed EF of 37%  GDMT:  metoprolol ER 50 mg daily  Was previously on  spironolactone but was recommended to discontinue during hospitalization on 9/21/24 for acute renal failure with increased anion gap metabolic acidosis  Patient is adamant about not being on unnecessary medications  On lisinopril 2.5 mg daily while looking into insurance coverage of Entresto.  Does not appear she has checked into this.   LBBB  Mesenteric thrombosis   Short course of apixaban (3 months) has since been discontinued          Recommendations and Plan:     Echocardiogram to be completed for reassessment of EF within the next month. We will contact Mariluz following this scan to discuss results.   We will recommend follow up appointment scheduling pending results of echocardiogram.   Continue taking medications as prescribed.   __________________________________________________________________    Thank you for the opportunity to participate in this pleasant patient's care.    We would be happy to see this patient sooner for any concerns in the meantime.    LUBA MirandaC   Physician Assistant  [facility], Khadijah Alexander NP, APRN CNP was present with the MEHRAN student who participated in the service and in the documentation of the note.  I have verified the history and personally performed the physical exam and medical decision making.  I agree with the assessment and plan of care as documented in the note.       Items personally reviewed: vitals, labs, and plan and agree with the interpretation documented in the note.  I agree with the interpretation documented in the note and the assessment and plan as above.    Mariluz is not a very good historian.  Uncertain why her medications were stopped.  As mentioned above, she had JESUS and spironolactone was discontinued, lisinopril decreased.  Repeat echo ordered.  We will send the results via [employer].  Patient is encouraged to start a walking program 20 to 30 minutes daily, 4-5 times a week.  If she is interested in further  optimization of medications, I would be happy to change lisinopril to Entresto or perhaps consider adding an SGLT2 inhibitor.  Uncertain if cost would be an issue.    Khadijah Alexander NP, APRN CNP      Orders this Visit:  Orders Placed This Encounter   Procedures     Echocardiogram Complete     Orders Placed This Encounter   Medications     lisinopril (ZESTRIL) 2.5 MG tablet     Sig: Take 1 tablet (2.5 mg) by mouth daily.     Dispense:  90 tablet     Refill:  3     Medications Discontinued During This Encounter   Medication Reason     lisinopril (ZESTRIL) 5 MG tablet      Encounter Diagnosis   Name Primary?     Cardiomyopathy secondary to drug      CURRENT MEDICATIONS:  Current Outpatient Medications   Medication Sig Dispense Refill     anastrozole (ARIMIDEX) 1 MG tablet Take 1 tablet (1 mg) by mouth daily (Patient taking differently: Take 1 mg by mouth at bedtime.) 90 tablet 1     calcium carbonate 600 mg-vitamin D 400 units (CALTRATE) 600-400 MG-UNIT per tablet Take 1 tablet by mouth at bedtime.       lisinopril (ZESTRIL) 2.5 MG tablet Take 1 tablet (2.5 mg) by mouth daily. 90 tablet 3     loperamide (IMODIUM A-D) 2 MG tablet Take 2 mg by mouth every other day. At bedtime       medical cannabis (Patient's own supply) See Admin Instructions (The purpose of this order is to document that the patient reports taking medical cannabis.  This is not a prescription, and is not used to certify that the patient has a qualifying medical condition.)       melatonin 5 MG tablet Take 5 mg by mouth nightly as needed for sleep.       metoprolol succinate ER (TOPROL XL) 50 MG 24 hr tablet Take 1 tablet (50 mg) by mouth at bedtime. 90 tablet 3     PARoxetine (PAXIL) 20 MG tablet TAKE 1 TABLET BY MOUTH ONCE DAILY .  APPOINTMENT  NEEDED  FOR  ADDITIONAL  REFILLS (Patient taking differently: Take 20 mg by mouth at bedtime.) 90 tablet 1     Vitamin D3 (VITAMIN D-1000 MAX ST) 25 mcg (1000 units) tablet Take 25 mcg by mouth at bedtime.    "    acetaminophen (TYLENOL) 500 MG tablet Take 1,000 mg by mouth every 6 hours as needed for mild pain       sodium bicarbonate 650 MG tablet Take 2 tablets (1,300 mg) by mouth 4 times daily. (Patient not taking: Reported on 5/22/2025) 240 tablet 0     ALLERGIES     Allergies   Allergen Reactions     Spironolactone      Suspected cause of acute kidney injury.     PAST MEDICAL, SURGICAL, FAMILY, SOCIAL HISTORY:  History was reviewed and updated as needed, see medical record.    Review of Systems:  A 10-point Review Of Systems is otherwise normal except for that which is noted in the HPI and interval summary.    Physical Exam:    Vitals: /66 (BP Location: Right arm, Patient Position: Sitting, Cuff Size: Adult Large)   Pulse 66   Resp 18   Ht 1.651 m (5' 5\")   Wt 106.6 kg (235 lb)   SpO2 96%   BMI 39.11 kg/m    Constitutional: Appears stated age, well nourished, NAD.  Respiratory: Non-labored. Lungs clear to auscultation bilaterally.   Cardiovascular: RR, normal S1 and S2. No lower extremity edema.  Skin: Warm and dry.   Musculoskeletal/Extremities: Symmetrical movement.  Neurologic: No gross focal deficits. Alert, awake.  Psychiatric: Affect appropriate. Mentation normal.    Recent Lab Results:  LIPID RESULTS:  Lab Results   Component Value Date    CHOL 213 (H) 09/30/2024    CHOL 219 (A) 01/29/2018    HDL 39 (L) 09/30/2024    HDL 45 01/29/2018     (H) 09/30/2024     01/29/2018    TRIG 297 (H) 09/30/2024    TRIG 225 01/29/2018     LIVER ENZYME RESULTS:  Lab Results   Component Value Date    AST 17 09/22/2024    AST 17 11/14/2019    ALT 17 09/22/2024    ALT 16 11/14/2019     CBC RESULTS:  Lab Results   Component Value Date    WBC 6.7 09/30/2024    WBC 6.2 04/15/2019    RBC 3.23 (L) 09/30/2024    RBC 3.98 04/15/2019    HGB 10.3 (L) 09/30/2024    HGB 12.4 04/15/2019    HCT 32.1 (L) 09/30/2024    HCT 38.0 04/15/2019    MCV 99 09/30/2024    MCV 96 04/15/2019    MCH 31.9 09/30/2024    [facility] 31.2 " "04/15/2019    MCHC 32.1 09/30/2024    MCHC 32.6 04/15/2019    RDW 13.8 09/30/2024    RDW 13.2 04/15/2019     09/30/2024     04/15/2019     BMP RESULTS:  Lab Results   Component Value Date     09/30/2024     04/15/2019    POTASSIUM 4.2 09/30/2024    POTASSIUM 4.0 11/14/2019    CHLORIDE 104 09/30/2024    CHLORIDE 106 04/15/2019    CO2 27 09/30/2024    CO2 26 04/15/2019    ANIONGAP 9 09/30/2024    ANIONGAP 8 04/15/2019    GLC 91 09/30/2024    GLC 85 10/01/2023     11/14/2019    BUN 20.1 09/30/2024    BUN 13 04/15/2019    CR 1.08 (H) 09/30/2024    CR 0.66 11/14/2019    GFRESTIMATED 57 (L) 09/30/2024    GFRESTIMATED >60 11/14/2019    GFRESTBLACK >60 04/19/2019    SINDY 9.1 09/30/2024    SINDY 8.5 04/15/2019      A1C RESULTS:  Lab Results   Component Value Date    A1C 5.3 01/29/2018     INR RESULTS:  No results found for: \"INR\"    No results found for this or any previous visit (from the past 4320 hours).       Thank you for allowing me to participate in the care of your patient.      Sincerely,     Khadijah Alexander, JETT, APRN CNP     [facility]  cc:   VERONIKA Burroughs CNP  [address]  [place] [id number]      "

## 2025-06-08 ENCOUNTER — HOSPITAL ENCOUNTER (EMERGENCY)
Facility: CLINIC | Age: 65
Discharge: HOME | End: 2025-06-08
Attending: FAMILY MEDICINE | Admitting: FAMILY MEDICINE
Payer: COMMERCIAL

## 2025-06-08 ENCOUNTER — APPOINTMENT (OUTPATIENT)
Dept: GENERAL RADIOLOGY | Facility: CLINIC | Age: 65
End: 2025-06-08
Attending: FAMILY MEDICINE
Payer: COMMERCIAL

## 2025-06-08 VITALS
HEART RATE: 70 BPM | TEMPERATURE: 98.1 F | OXYGEN SATURATION: 99 % | HEIGHT: 65 IN | WEIGHT: 235 LBS | BODY MASS INDEX: 39.15 KG/M2 | DIASTOLIC BLOOD PRESSURE: 75 MMHG | SYSTOLIC BLOOD PRESSURE: 128 MMHG | RESPIRATION RATE: 20 BRPM

## 2025-06-08 DIAGNOSIS — N39.0 URINARY TRACT INFECTION WITHOUT HEMATURIA, SITE UNSPECIFIED: ICD-10-CM

## 2025-06-08 DIAGNOSIS — R42 VERTIGO: ICD-10-CM

## 2025-06-08 DIAGNOSIS — M17.12 PRIMARY OSTEOARTHRITIS OF LEFT KNEE: ICD-10-CM

## 2025-06-08 LAB
ALBUMIN UR-MCNC: NEGATIVE MG/DL
ANION GAP SERPL CALCULATED.3IONS-SCNC: 14 MMOL/L (ref 7–15)
APPEARANCE UR: ABNORMAL
BACTERIA #/AREA URNS HPF: ABNORMAL /HPF
BASOPHILS # BLD AUTO: 0 10E3/UL (ref 0–0.2)
BASOPHILS NFR BLD AUTO: 0 %
BILIRUB UR QL STRIP: NEGATIVE
BUN SERPL-MCNC: 22.5 MG/DL (ref 8–23)
CALCIUM SERPL-MCNC: 10 MG/DL (ref 8.8–10.4)
CHLORIDE SERPL-SCNC: 103 MMOL/L (ref 98–107)
COLOR UR AUTO: ABNORMAL
CREAT SERPL-MCNC: 1.16 MG/DL (ref 0.51–0.95)
EGFRCR SERPLBLD CKD-EPI 2021: 52 ML/MIN/1.73M2
EOSINOPHIL # BLD AUTO: 0 10E3/UL (ref 0–0.7)
EOSINOPHIL NFR BLD AUTO: 0 %
ERYTHROCYTE [DISTWIDTH] IN BLOOD BY AUTOMATED COUNT: 14.3 % (ref 10–15)
GLUCOSE SERPL-MCNC: 170 MG/DL (ref 70–99)
GLUCOSE UR STRIP-MCNC: NEGATIVE MG/DL
HCO3 SERPL-SCNC: 21 MMOL/L (ref 22–29)
HCT VFR BLD AUTO: 41.2 % (ref 35–47)
HGB BLD-MCNC: 13.7 G/DL (ref 11.7–15.7)
HGB UR QL STRIP: ABNORMAL
IMM GRANULOCYTES # BLD: 0.1 10E3/UL
IMM GRANULOCYTES NFR BLD: 0 %
KETONES UR STRIP-MCNC: NEGATIVE MG/DL
LEUKOCYTE ESTERASE UR QL STRIP: ABNORMAL
LYMPHOCYTES # BLD AUTO: 0.8 10E3/UL (ref 0.8–5.3)
LYMPHOCYTES NFR BLD AUTO: 5 %
MAGNESIUM SERPL-MCNC: 1.8 MG/DL (ref 1.7–2.3)
MCH RBC QN AUTO: 31.8 PG (ref 26.5–33)
MCHC RBC AUTO-ENTMCNC: 33.3 G/DL (ref 31.5–36.5)
MCV RBC AUTO: 96 FL (ref 78–100)
MONOCYTES # BLD AUTO: 0.8 10E3/UL (ref 0–1.3)
MONOCYTES NFR BLD AUTO: 5 %
MUCOUS THREADS #/AREA URNS LPF: PRESENT /LPF
NEUTROPHILS # BLD AUTO: 12.7 10E3/UL (ref 1.6–8.3)
NEUTROPHILS NFR BLD AUTO: 89 %
NITRATE UR QL: POSITIVE
NRBC # BLD AUTO: 0 10E3/UL
NRBC BLD AUTO-RTO: 0 /100
PH UR STRIP: 5 [PH] (ref 5–7)
PLATELET # BLD AUTO: 276 10E3/UL (ref 150–450)
POTASSIUM SERPL-SCNC: 4.3 MMOL/L (ref 3.4–5.3)
RBC # BLD AUTO: 4.31 10E6/UL (ref 3.8–5.2)
RBC URINE: 9 /HPF
SODIUM SERPL-SCNC: 138 MMOL/L (ref 135–145)
SP GR UR STRIP: 1.01 (ref 1–1.03)
SQUAMOUS EPITHELIAL: 1 /HPF
UROBILINOGEN UR STRIP-MCNC: NORMAL MG/DL
WBC # BLD AUTO: 14.4 10E3/UL (ref 4–11)
WBC URINE: 61 /HPF

## 2025-06-08 PROCEDURE — 96374 THER/PROPH/DIAG INJ IV PUSH: CPT | Performed by: FAMILY MEDICINE

## 2025-06-08 PROCEDURE — 83735 ASSAY OF MAGNESIUM: CPT | Performed by: FAMILY MEDICINE

## 2025-06-08 PROCEDURE — 87186 SC STD MICRODIL/AGAR DIL: CPT | Performed by: FAMILY MEDICINE

## 2025-06-08 PROCEDURE — 250N000011 HC RX IP 250 OP 636: Performed by: FAMILY MEDICINE

## 2025-06-08 PROCEDURE — 96361 HYDRATE IV INFUSION ADD-ON: CPT | Performed by: FAMILY MEDICINE

## 2025-06-08 PROCEDURE — 99284 EMERGENCY DEPT VISIT MOD MDM: CPT | Mod: 25 | Performed by: FAMILY MEDICINE

## 2025-06-08 PROCEDURE — 81001 URINALYSIS AUTO W/SCOPE: CPT | Performed by: FAMILY MEDICINE

## 2025-06-08 PROCEDURE — 85025 COMPLETE CBC W/AUTO DIFF WBC: CPT | Performed by: FAMILY MEDICINE

## 2025-06-08 PROCEDURE — 258N000003 HC RX IP 258 OP 636: Performed by: FAMILY MEDICINE

## 2025-06-08 PROCEDURE — 82435 ASSAY OF BLOOD CHLORIDE: CPT | Performed by: FAMILY MEDICINE

## 2025-06-08 PROCEDURE — 36415 COLL VENOUS BLD VENIPUNCTURE: CPT | Performed by: FAMILY MEDICINE

## 2025-06-08 PROCEDURE — 73562 X-RAY EXAM OF KNEE 3: CPT | Mod: LT

## 2025-06-08 PROCEDURE — 99284 EMERGENCY DEPT VISIT MOD MDM: CPT | Performed by: FAMILY MEDICINE

## 2025-06-08 RX ORDER — MECLIZINE HYDROCHLORIDE 25 MG/1
25 TABLET ORAL ONCE
Status: DISCONTINUED | OUTPATIENT
Start: 2025-06-08 | End: 2025-06-08 | Stop reason: HOSPADM

## 2025-06-08 RX ORDER — CEPHALEXIN 500 MG/1
500 CAPSULE ORAL 2 TIMES DAILY
Qty: 14 CAPSULE | Refills: 0 | Status: SHIPPED | OUTPATIENT
Start: 2025-06-08 | End: 2025-06-15

## 2025-06-08 RX ORDER — ONDANSETRON 2 MG/ML
4 INJECTION INTRAMUSCULAR; INTRAVENOUS EVERY 30 MIN PRN
Status: DISCONTINUED | OUTPATIENT
Start: 2025-06-08 | End: 2025-06-08 | Stop reason: HOSPADM

## 2025-06-08 RX ADMIN — ONDANSETRON 4 MG: 2 INJECTION, SOLUTION INTRAMUSCULAR; INTRAVENOUS at 10:00

## 2025-06-08 RX ADMIN — SODIUM CHLORIDE 1000 ML: 0.9 INJECTION, SOLUTION INTRAVENOUS at 10:00

## 2025-06-08 ASSESSMENT — ENCOUNTER SYMPTOMS
HEADACHES: 0
DYSURIA: 0
MYALGIAS: 0
ACTIVITY CHANGE: 0
DIZZINESS: 0
CHILLS: 0
DIARRHEA: 0
COUGH: 0
APPETITE CHANGE: 0
FEVER: 0
NAUSEA: 0
FATIGUE: 0
SORE THROAT: 0
ARTHRALGIAS: 0
SHORTNESS OF BREATH: 0
HEMATURIA: 0
ABDOMINAL PAIN: 0
EYE REDNESS: 0
NECK STIFFNESS: 0
RHINORRHEA: 0
VOMITING: 0

## 2025-06-08 ASSESSMENT — ACTIVITIES OF DAILY LIVING (ADL)
ADLS_ACUITY_SCORE: 58

## 2025-06-08 NOTE — ED NOTES
Pt sleeping, her oxygen saturations dropped to 79% on room air.  Spoke with pt, she has had sleep apnea in the past and does not wear CPAP.  Placed pt on 3 LPM of oxygen via nasal cannula, updated provider.

## 2025-06-08 NOTE — ED PROVIDER NOTES
History     Chief Complaint   Patient presents with    Nausea & Vomiting     HPI  Mariluz Stoner is a 64 year old female who presents with nausea and vomiting and vertigo symptoms.  Patient has a long history of vertigo and normally does repositioning exercises but has not been doing them here recently.  She states that her vertigo got really bad this morning and she has been throwing up all morning.  She states this feels like her previous vertigo episodes, this does not feel different.  She denies any extremity weakness or any other neurological findings.  Patient has an ostomy and states that she has had decreased output here over the last 24 hours.  Denies any dysuria or hematuria.  Denies any recent fevers or chills.    Allergies:  Allergies   Allergen Reactions    Spironolactone      Suspected cause of acute kidney injury.       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% in May 2024 09/29/2023     Priority: Medium    Sinus bradycardia 09/29/2023     Priority: Medium    Secondary malignant neoplasm of bone (H) 06/13/2023     Priority: Medium    Morbid obesity (H) 03/18/2020     Priority: Medium    Metastatic breast cancer 12/11/2019     Priority: Medium    Mild episode of recurrent major depressive disorder 12/11/2019     Priority: Medium    Essential hypertension 12/11/2019     Priority: Medium    Dermatitis 12/11/2019     Priority: Medium        Past Medical History:    Past Medical History:   Diagnosis Date     Basal cell carcinoma     Breast cancer (H)     Nausea with vomiting 2024    Obesity        Past Surgical History:    Past Surgical History:   Procedure Laterality Date    BREAST SURGERY      left tissue expander, LEFT MASTECTOMY    CHOLECYSTECTOMY      GYN SURGERY      oophorectomy    IR CHEST PORT PLACEMENT > 5 YRS OF AGE  2019    IR CHEST PORT PLACEMENT > 5 YRS OF AGE  2013    REMOVE TISSUE EXPANDER BREAST  2014    Procedure: REMOVAL OF LEFT BREAST TISSUE EXPANDER, IRRIGATION AND DEBRIDEMENT OF LEFT BREAST;  Surgeon: Marlon Luz MD;  Location: [facility]       Family History:    No family history on file.    Social History:  Marital Status:   [2]  Social History     Tobacco Use    Smoking status: Former     Current packs/day: 0.00     Types: Cigarettes     Quit date: 2000     Years since quittin.4    Smokeless tobacco: Never   Vaping Use    Vaping status: Never Used   Substance Use Topics    Alcohol use: Yes     Comment: social drinking    Drug use: Yes     Types: Marijuana     Comment: medical marijuana syrup         Medications:    acetaminophen (TYLENOL) 500 MG tablet  anastrozole (ARIMIDEX) 1 MG tablet  calcium carbonate 600 mg-vitamin D 400 units (CALTRATE) 600-400 MG-UNIT per tablet  lisinopril (ZESTRIL) 2.5 MG tablet  loperamide (IMODIUM A-D) 2 MG tablet  medical cannabis (Patient's own supply)  melatonin 5 MG tablet  metoprolol succinate ER (TOPROL XL) 50 MG 24 hr tablet  PARoxetine (PAXIL) 20 MG tablet  sodium bicarbonate 650 MG tablet  Vitamin D3 (VITAMIN D-1000 MAX ST) 25 mcg (1000 units) tablet          Review of Systems   Constitutional:  Negative for activity change, appetite change, chills, fatigue and fever.   HENT:  Negative for congestion, rhinorrhea and sore throat.    Eyes:  Negative for redness.   Respiratory:  Negative for cough and shortness of breath.    Cardiovascular:  Negative for chest pain.   Gastrointestinal:  Negative for abdominal pain,  "diarrhea, nausea and vomiting.   Genitourinary:  Negative for dysuria and hematuria.   Musculoskeletal:  Negative for arthralgias, myalgias and neck stiffness.   Skin:  Negative for rash.   Neurological:  Negative for dizziness and headaches.   All other systems reviewed and are negative.      Physical Exam   BP: 137/70  Pulse: 67  Temp: 98.1  F (36.7  C)  Resp: 20  Height: 165.1 cm (5' 5\")  Weight: 106.6 kg (235 lb)  SpO2: 98 %      Physical Exam  Vitals and nursing note reviewed.   Constitutional:       General: She is not in acute distress.     Appearance: She is well-developed. She is not diaphoretic.   Eyes:      Extraocular Movements:      Right eye: Nystagmus present.      Left eye: Nystagmus present.      Conjunctiva/sclera: Conjunctivae normal.   Cardiovascular:      Rate and Rhythm: Normal rate and regular rhythm.      Heart sounds: Normal heart sounds. No murmur heard.     No friction rub. No gallop.   Pulmonary:      Effort: Pulmonary effort is normal. No respiratory distress.      Breath sounds: Normal breath sounds. No wheezing or rales.   Chest:      Chest wall: No tenderness.   Abdominal:      General: Bowel sounds are normal. There is no distension.      Palpations: Abdomen is soft. There is no mass.      Tenderness: There is no abdominal tenderness. There is no guarding.   Musculoskeletal:         General: No tenderness. Normal range of motion.      Cervical back: Normal range of motion and neck supple.   Skin:     General: Skin is warm and dry.      Findings: No rash.   Neurological:      Mental Status: She is alert and oriented to person, place, and time.   Psychiatric:         Judgment: Judgment normal.         ED Course        Procedures           Results for orders placed or performed during the hospital encounter of 06/08/25 (from the past 24 hours)   CBC with platelets differential    Narrative    The following orders were created for panel order CBC with platelets differential.  Procedure   "                             Abnormality         Status                     ---------                               -----------         ------                     CBC with platelets and ...[[id number]]  Abnormal            Final result                 Please view results for these tests on the individual orders.   Basic metabolic panel   Result Value Ref Range    Sodium 138 135 - 145 mmol/L    Potassium 4.3 3.4 - 5.3 mmol/L    Chloride 103 98 - 107 mmol/L    Carbon Dioxide (CO2) 21 (L) 22 - 29 mmol/L    Anion Gap 14 7 - 15 mmol/L    Urea Nitrogen 22.5 8.0 - 23.0 mg/dL    Creatinine 1.16 (H) 0.51 - 0.95 mg/dL    GFR Estimate 52 (L) >60 mL/min/1.73m2    Calcium 10.0 8.8 - 10.4 mg/dL    Glucose 170 (H) 70 - 99 mg/dL   Magnesium   Result Value Ref Range    Magnesium 1.8 1.7 - 2.3 mg/dL   CBC with platelets and differential   Result Value Ref Range    WBC Count 14.4 (H) 4.0 - 11.0 10e3/uL    RBC Count 4.31 3.80 - 5.20 10e6/uL    Hemoglobin 13.7 11.7 - 15.7 g/dL    Hematocrit 41.2 35.0 - 47.0 %    MCV 96 78 - 100 fL    MCH 31.8 26.5 - 33.0 pg    MCHC 33.3 31.5 - 36.5 g/dL    RDW 14.3 10.0 - 15.0 %    Platelet Count 276 150 - 450 10e3/uL    % Neutrophils 89 %    % Lymphocytes 5 %    % Monocytes 5 %    % Eosinophils 0 %    % Basophils 0 %    % Immature Granulocytes 0 %    NRBCs per 100 WBC 0 <1 /100    Absolute Neutrophils 12.7 (H) 1.6 - 8.3 10e3/uL    Absolute Lymphocytes 0.8 0.8 - 5.3 10e3/uL    Absolute Monocytes 0.8 0.0 - 1.3 10e3/uL    Absolute Eosinophils 0.0 0.0 - 0.7 10e3/uL    Absolute Basophils 0.0 0.0 - 0.2 10e3/uL    Absolute Immature Granulocytes 0.1 <=0.4 10e3/uL    Absolute NRBCs 0.0 10e3/uL   UA with Microscopic reflex to Culture    Specimen: Urine, Midstream   Result Value Ref Range    Color Urine Light Yellow Colorless, Straw, Light Yellow, Yellow    Appearance Urine Slightly Cloudy (A) Clear    Glucose Urine Negative Negative mg/dL    Bilirubin Urine Negative Negative    Ketones Urine Negative Negative  mg/dL    Specific Gravity Urine 1.015 1.003 - 1.035    Blood Urine Moderate (A) Negative    pH Urine 5.0 5.0 - 7.0    Protein Albumin Urine Negative Negative mg/dL    Urobilinogen Urine Normal Normal mg/dL    Nitrite Urine Positive (A) Negative    Leukocyte Esterase Urine Moderate (A) Negative    Bacteria Urine Few (A) None Seen /HPF    Mucus Urine Present (A) None Seen /LPF    RBC Urine 9 (H) <=2 /HPF    WBC Urine 61 (H) <=5 /HPF    Squamous Epithelials Urine 1 <=1 /HPF    Narrative    Urine Culture ordered based on laboratory criteria   XR Knee Left 3 Views    Narrative    EXAM: XR KNEE LEFT 3 VIEWS  LOCATION: [facility]  DATE: 6/8/2025    INDICATION: knee pain  COMPARISON: None.      Impression    IMPRESSION: Mild degenerative changes of the medial and patellofemoral compartments. No acute fracture or joint effusion.       Medications   ondansetron (ZOFRAN) injection 4 mg (4 mg Intravenous $Given 6/8/25 1000)   meclizine (ANTIVERT) tablet 25 mg (has no administration in time range)   sodium chloride 0.9% BOLUS 1,000 mL (0 mLs Intravenous Stopped 6/8/25 1220)     Labs have come back and it looks like patient has a urinary tract infection.  There is no significant complications though looking at the other labs.  Patient requested get an x-ray of the knee because she been having some pain since doing some dancing the other day and wanted this looked at.  X-ray of the knee just showed some arthritic changes no fractures.  Will plan on discharging the patient on Keflex for the next 7 days to treat the urine infection.  Culture is pending.  As for the dizziness, she is improved with the fluids and the nausea, this seems like her chronic vertigo.  Recommend that she continue to do her daily exercises for vestibular dysfunction.  Patient will be discharged at this time.    Assessments & Plan (with Medical Decision Making)  Vertigo, urinary tract infection, osteoarthritis left knee     I  have reviewed the nursing notes.    I have reviewed the findings, diagnosis, plan and need for follow up with the patient.        6/8/2025   [facility] EMERGENCY DEPT       Hayes Giron MD  06/08/25 [id number]

## 2025-06-08 NOTE — ED TRIAGE NOTES
Pt presents with concerns of nausea, vomiting, and dizziness.  Pt states that she has a history of vertigo, currently experiencing increased incident of intermittent dizziness.  Pt states that she started having nausea and vomiting since this am.  She states that she feels dehydrated because of the vomiting and she has a ostomy which makes it more likely per pt.       Triage Assessment (Adult)       Row Name 06/08/25 0942          Triage Assessment    Airway WDL WDL        Respiratory WDL    Respiratory WDL WDL        Skin Circulation/Temperature WDL    Skin Circulation/Temperature WDL WDL        Cardiac WDL    Cardiac WDL WDL        Peripheral/Neurovascular WDL    Peripheral Neurovascular WDL WDL        Cognitive/Neuro/Behavioral WDL    Cognitive/Neuro/Behavioral WDL WDL

## 2025-06-10 LAB
BACTERIA UR CULT: ABNORMAL
BACTERIA UR CULT: ABNORMAL

## 2025-06-11 ENCOUNTER — TELEPHONE (OUTPATIENT)
Dept: NURSING | Facility: CLINIC | Age: 65
End: 2025-06-11
Payer: COMMERCIAL

## 2025-06-11 NOTE — TELEPHONE ENCOUNTER
[facility]    Reason for call: Lab Result Notification     Lab Result (including Rx patient on, if applicable).  If culture, copy of lab report at bottom.  Lab Result: Urine Culture - see below    ED Rx: cephALEXin (KEFLEX) 500 MG capsule - Take 1 capsule (500 mg) by mouth 2 times daily for 7 days   >100,000 CFU/mL Klebsiella aerogenes (Resistant)    Creatinine Level (mg/dl)   Creatinine   Date Value Ref Range Status   06/08/2025 1.16 (H) 0.51 - 0.95 mg/dL Final   11/14/2019 0.66 0.57 - 1.11 mg/dL Final    Creatinine clearance (ml/min), if applicable    Serum creatinine: 1.16 mg/dL (H) 06/08/25 1001  Estimated creatinine clearance: 59.4 mL/min (A)     ED Symptoms: Patient presented to [facility] ED on 6/8/2025 with nausea, vomiting, and vertigo.    RN Recommendations/Instructions per [place] ED lab result protocol:   [facility] ED lab result protocol utilized: Urine Culture  Recommend changing antibiotic to Cipro pending patient assessment    Unable to reach patient/caregiver.     Left voicemail message requesting a call back to [phone] between 9 a.m. and 5:30 p.m. for patient's ED/ lab results.      Letter pended to be sent via [employer].    ROSEANNE Givens RN

## 2025-06-11 NOTE — LETTER
June 11, 2025        Mariluz Stoner  [address] [address] [place] [id number]          Dear Mariluz Stoner:    You were seen in the [facility] at [facility] on 6/8/2025.  We are unable to reach you by phone, so we are sending you this letter.     It is important that you call [facility] lab result nurse at [phone], as we have information to relay to you AND/OR we MAY have to make some changes in your treatment.    Best time to call back is between 9AM and 5:30PM, 7 days a week.      Sincerely,     [facility] Lab Result RN  [phone]

## 2025-06-20 ENCOUNTER — HOSPITAL ENCOUNTER (INPATIENT)
Facility: CLINIC | Age: 65
LOS: 4 days | Discharge: HOME | End: 2025-06-25
Attending: EMERGENCY MEDICINE | Admitting: INTERNAL MEDICINE
Payer: COMMERCIAL

## 2025-06-20 ENCOUNTER — APPOINTMENT (OUTPATIENT)
Dept: CT IMAGING | Facility: CLINIC | Age: 65
End: 2025-06-20
Attending: EMERGENCY MEDICINE
Payer: COMMERCIAL

## 2025-06-20 DIAGNOSIS — E86.1 HYPOTENSION DUE TO HYPOVOLEMIA: ICD-10-CM

## 2025-06-20 DIAGNOSIS — N17.9 ACUTE KIDNEY INJURY: Primary | ICD-10-CM

## 2025-06-20 DIAGNOSIS — Z71.89 OTHER SPECIFIED COUNSELING: Chronic | ICD-10-CM

## 2025-06-20 LAB
ALBUMIN SERPL BCG-MCNC: 3.3 G/DL (ref 3.5–5.2)
ALBUMIN UR-MCNC: 30 MG/DL
ALP SERPL-CCNC: 174 U/L (ref 40–150)
ALT SERPL W P-5'-P-CCNC: 21 U/L (ref 0–50)
AMORPH CRY #/AREA URNS HPF: ABNORMAL /HPF
ANION GAP SERPL CALCULATED.3IONS-SCNC: 19 MMOL/L (ref 7–15)
ANION GAP SERPL CALCULATED.3IONS-SCNC: 22 MMOL/L (ref 7–15)
APPEARANCE UR: ABNORMAL
AST SERPL W P-5'-P-CCNC: 14 U/L (ref 0–45)
BASOPHILS # BLD MANUAL: 0 10E3/UL (ref 0–0.2)
BASOPHILS NFR BLD MANUAL: 0 %
BILIRUB SERPL-MCNC: 0.4 MG/DL
BILIRUB UR QL STRIP: NEGATIVE
BUN SERPL-MCNC: 31.5 MG/DL (ref 8–23)
BUN SERPL-MCNC: 59.8 MG/DL (ref 8–23)
CALCIUM SERPL-MCNC: 6.6 MG/DL (ref 8.8–10.4)
CALCIUM SERPL-MCNC: 8.7 MG/DL (ref 8.8–10.4)
CHLORIDE SERPL-SCNC: 94 MMOL/L (ref 98–107)
CHLORIDE SERPL-SCNC: 98 MMOL/L (ref 98–107)
COLOR UR AUTO: ABNORMAL
CREAT SERPL-MCNC: 2.23 MG/DL (ref 0.51–0.95)
CREAT SERPL-MCNC: 5.03 MG/DL (ref 0.51–0.95)
CREAT UR-MCNC: 171.8 MG/DL
EGFRCR SERPLBLD CKD-EPI 2021: 24 ML/MIN/1.73M2
EGFRCR SERPLBLD CKD-EPI 2021: 9 ML/MIN/1.73M2
EOSINOPHIL # BLD MANUAL: 0 10E3/UL (ref 0–0.7)
EOSINOPHIL NFR BLD MANUAL: 0 %
ERYTHROCYTE [DISTWIDTH] IN BLOOD BY AUTOMATED COUNT: 14.9 % (ref 10–15)
FRACT EXCRET NA UR+SERPL-RTO: NORMAL %
GLUCOSE BLDC GLUCOMTR-MCNC: 147 MG/DL (ref 70–99)
GLUCOSE BLDC GLUCOMTR-MCNC: 187 MG/DL (ref 70–99)
GLUCOSE BLDC GLUCOMTR-MCNC: 203 MG/DL (ref 70–99)
GLUCOSE BLDC GLUCOMTR-MCNC: 37 MG/DL (ref 70–99)
GLUCOSE SERPL-MCNC: 125 MG/DL (ref 70–99)
GLUCOSE SERPL-MCNC: 167 MG/DL (ref 70–99)
GLUCOSE UR STRIP-MCNC: NEGATIVE MG/DL
HCO3 SERPL-SCNC: 12 MMOL/L (ref 22–29)
HCO3 SERPL-SCNC: 9 MMOL/L (ref 22–29)
HCT VFR BLD AUTO: 39.7 % (ref 35–47)
HGB BLD-MCNC: 12.6 G/DL (ref 11.7–15.7)
HGB UR QL STRIP: ABNORMAL
HOLD SPECIMEN: NORMAL
HYALINE CASTS: 2 /LPF
KETONES UR STRIP-MCNC: NEGATIVE MG/DL
LACTATE SERPL-SCNC: 0.5 MMOL/L (ref 0.7–2)
LEUKOCYTE ESTERASE UR QL STRIP: ABNORMAL
LIPASE SERPL-CCNC: 87 U/L (ref 13–60)
LYMPHOCYTES # BLD MANUAL: 1.2 10E3/UL (ref 0.8–5.3)
LYMPHOCYTES NFR BLD MANUAL: 11 %
MCH RBC QN AUTO: 31 PG (ref 26.5–33)
MCHC RBC AUTO-ENTMCNC: 31.7 G/DL (ref 31.5–36.5)
MCV RBC AUTO: 98 FL (ref 78–100)
METAMYELOCYTES # BLD MANUAL: 0.3 10E3/UL
METAMYELOCYTES NFR BLD MANUAL: 3 %
MONOCYTES # BLD MANUAL: 0.4 10E3/UL (ref 0–1.3)
MONOCYTES NFR BLD MANUAL: 4 %
MUCOUS THREADS #/AREA URNS LPF: PRESENT /LPF
MYELOCYTES # BLD MANUAL: 0.3 10E3/UL
MYELOCYTES NFR BLD MANUAL: 3 %
NEUTROPHILS # BLD MANUAL: 8.8 10E3/UL (ref 1.6–8.3)
NEUTROPHILS NFR BLD MANUAL: 79 %
NITRATE UR QL: NEGATIVE
PH UR STRIP: 5 [PH] (ref 5–7)
PLAT MORPH BLD: NORMAL
PLATELET # BLD AUTO: 435 10E3/UL (ref 150–450)
POTASSIUM SERPL-SCNC: 2.3 MMOL/L (ref 3.4–5.3)
POTASSIUM SERPL-SCNC: 4.2 MMOL/L (ref 3.4–5.3)
POTASSIUM SERPL-SCNC: 6.1 MMOL/L (ref 3.4–5.3)
PROT SERPL-MCNC: 7.4 G/DL (ref 6.4–8.3)
RBC # BLD AUTO: 4.06 10E6/UL (ref 3.8–5.2)
RBC MORPH BLD: NORMAL
RBC URINE: 149 /HPF
SODIUM SERPL-SCNC: 125 MMOL/L (ref 135–145)
SODIUM SERPL-SCNC: 129 MMOL/L (ref 135–145)
SODIUM UR-SCNC: <20 MMOL/L
SP GR UR STRIP: 1.02 (ref 1–1.03)
SQUAMOUS EPITHELIAL: <1 /HPF
TROPONIN T SERPL HS-MCNC: 28 NG/L
TROPONIN T SERPL HS-MCNC: 51 NG/L
UROBILINOGEN UR STRIP-MCNC: NORMAL MG/DL
WBC # BLD AUTO: 11.1 10E3/UL (ref 4–11)
WBC URINE: 45 /HPF

## 2025-06-20 PROCEDURE — 99291 CRITICAL CARE FIRST HOUR: CPT | Mod: 25

## 2025-06-20 PROCEDURE — 250N000009 HC RX 250: Performed by: EMERGENCY MEDICINE

## 2025-06-20 PROCEDURE — 83690 ASSAY OF LIPASE: CPT | Performed by: EMERGENCY MEDICINE

## 2025-06-20 PROCEDURE — 74176 CT ABD & PELVIS W/O CONTRAST: CPT

## 2025-06-20 PROCEDURE — 96374 THER/PROPH/DIAG INJ IV PUSH: CPT

## 2025-06-20 PROCEDURE — 258N000003 HC RX IP 258 OP 636: Performed by: EMERGENCY MEDICINE

## 2025-06-20 PROCEDURE — 96361 HYDRATE IV INFUSION ADD-ON: CPT

## 2025-06-20 PROCEDURE — 36415 COLL VENOUS BLD VENIPUNCTURE: CPT | Performed by: EMERGENCY MEDICINE

## 2025-06-20 PROCEDURE — 82962 GLUCOSE BLOOD TEST: CPT

## 2025-06-20 PROCEDURE — 96360 HYDRATION IV INFUSION INIT: CPT

## 2025-06-20 PROCEDURE — 82570 ASSAY OF URINE CREATININE: CPT | Performed by: EMERGENCY MEDICINE

## 2025-06-20 PROCEDURE — 93005 ELECTROCARDIOGRAM TRACING: CPT

## 2025-06-20 PROCEDURE — 96375 TX/PRO/DX INJ NEW DRUG ADDON: CPT

## 2025-06-20 PROCEDURE — 82947 ASSAY GLUCOSE BLOOD QUANT: CPT | Performed by: EMERGENCY MEDICINE

## 2025-06-20 PROCEDURE — 87106 FUNGI IDENTIFICATION YEAST: CPT | Performed by: EMERGENCY MEDICINE

## 2025-06-20 PROCEDURE — 84484 ASSAY OF TROPONIN QUANT: CPT | Performed by: EMERGENCY MEDICINE

## 2025-06-20 PROCEDURE — 51798 US URINE CAPACITY MEASURE: CPT

## 2025-06-20 PROCEDURE — 85007 BL SMEAR W/DIFF WBC COUNT: CPT | Performed by: EMERGENCY MEDICINE

## 2025-06-20 PROCEDURE — 81001 URINALYSIS AUTO W/SCOPE: CPT | Performed by: EMERGENCY MEDICINE

## 2025-06-20 PROCEDURE — 82040 ASSAY OF SERUM ALBUMIN: CPT | Performed by: EMERGENCY MEDICINE

## 2025-06-20 PROCEDURE — 83605 ASSAY OF LACTIC ACID: CPT | Performed by: EMERGENCY MEDICINE

## 2025-06-20 PROCEDURE — 258N000001 HC RX 258: Performed by: EMERGENCY MEDICINE

## 2025-06-20 PROCEDURE — 84132 ASSAY OF SERUM POTASSIUM: CPT | Performed by: EMERGENCY MEDICINE

## 2025-06-20 PROCEDURE — 250N000011 HC RX IP 250 OP 636: Performed by: EMERGENCY MEDICINE

## 2025-06-20 PROCEDURE — 85014 HEMATOCRIT: CPT | Performed by: EMERGENCY MEDICINE

## 2025-06-20 PROCEDURE — 250N000012 HC RX MED GY IP 250 OP 636 PS 637: Performed by: EMERGENCY MEDICINE

## 2025-06-20 PROCEDURE — 250N000013 HC RX MED GY IP 250 OP 250 PS 637: Performed by: EMERGENCY MEDICINE

## 2025-06-20 RX ORDER — NICOTINE POLACRILEX 4 MG
15-30 LOZENGE BUCCAL
Status: DISCONTINUED | OUTPATIENT
Start: 2025-06-20 | End: 2025-06-21

## 2025-06-20 RX ORDER — ROPIVACAINE IN 0.9% SOD CHL/PF 0.1 %
.01-.2 PLASTIC BAG, INJECTION (ML) EPIDURAL CONTINUOUS
Status: DISCONTINUED | OUTPATIENT
Start: 2025-06-20 | End: 2025-06-21

## 2025-06-20 RX ORDER — INDOMETHACIN 25 MG/1
50 CAPSULE ORAL ONCE
Status: COMPLETED | OUTPATIENT
Start: 2025-06-20 | End: 2025-06-20

## 2025-06-20 RX ORDER — DEXTROSE MONOHYDRATE 25 G/50ML
25 INJECTION, SOLUTION INTRAVENOUS ONCE
Status: COMPLETED | OUTPATIENT
Start: 2025-06-20 | End: 2025-06-20

## 2025-06-20 RX ORDER — ALBUTEROL SULFATE 5 MG/ML
10 SOLUTION RESPIRATORY (INHALATION) ONCE
Status: COMPLETED | OUTPATIENT
Start: 2025-06-20 | End: 2025-06-20

## 2025-06-20 RX ORDER — ACETAMINOPHEN 500 MG
1000 TABLET ORAL ONCE
Status: COMPLETED | OUTPATIENT
Start: 2025-06-20 | End: 2025-06-20

## 2025-06-20 RX ORDER — CIPROFLOXACIN 2 MG/ML
400 INJECTION, SOLUTION INTRAVENOUS ONCE
Status: COMPLETED | OUTPATIENT
Start: 2025-06-20 | End: 2025-06-21

## 2025-06-20 RX ORDER — PIPERACILLIN SODIUM, TAZOBACTAM SODIUM 4; .5 G/20ML; G/20ML
4.5 INJECTION, POWDER, LYOPHILIZED, FOR SOLUTION INTRAVENOUS ONCE
Status: DISCONTINUED | OUTPATIENT
Start: 2025-06-20 | End: 2025-06-20

## 2025-06-20 RX ORDER — DEXTROSE MONOHYDRATE 25 G/50ML
25-50 INJECTION, SOLUTION INTRAVENOUS
Status: DISCONTINUED | OUTPATIENT
Start: 2025-06-20 | End: 2025-06-21

## 2025-06-20 RX ORDER — CALCIUM GLUCONATE 20 MG/ML
1 INJECTION, SOLUTION INTRAVENOUS ONCE
Status: COMPLETED | OUTPATIENT
Start: 2025-06-20 | End: 2025-06-20

## 2025-06-20 RX ADMIN — ACETAMINOPHEN 1000 MG: 500 TABLET, FILM COATED ORAL at 22:52

## 2025-06-20 RX ADMIN — DEXTROSE 300 ML: 10 SOLUTION INTRAVENOUS at 21:22

## 2025-06-20 RX ADMIN — SODIUM CHLORIDE 1000 ML: 0.9 INJECTION, SOLUTION INTRAVENOUS at 19:45

## 2025-06-20 RX ADMIN — SODIUM CHLORIDE, SODIUM LACTATE, POTASSIUM CHLORIDE, AND CALCIUM CHLORIDE 1000 ML: .6; .31; .03; .02 INJECTION, SOLUTION INTRAVENOUS at 23:15

## 2025-06-20 RX ADMIN — SODIUM BICARBONATE 50 MEQ: 84 INJECTION INTRAVENOUS at 21:20

## 2025-06-20 RX ADMIN — SODIUM CHLORIDE 10 UNITS: 0.9 INJECTION, SOLUTION INTRAVENOUS at 21:20

## 2025-06-20 RX ADMIN — DEXTROSE MONOHYDRATE 25 G: 25 INJECTION, SOLUTION INTRAVENOUS at 21:10

## 2025-06-20 RX ADMIN — DEXTROSE MONOHYDRATE 50 ML: 25 INJECTION, SOLUTION INTRAVENOUS at 21:18

## 2025-06-20 RX ADMIN — ALBUTEROL SULFATE 10 MG: 2.5 SOLUTION RESPIRATORY (INHALATION) at 21:19

## 2025-06-20 RX ADMIN — CALCIUM GLUCONATE 1 G: 20 INJECTION, SOLUTION INTRAVENOUS at 20:58

## 2025-06-20 RX ADMIN — SODIUM CHLORIDE, SODIUM LACTATE, POTASSIUM CHLORIDE, AND CALCIUM CHLORIDE 1000 ML: .6; .31; .03; .02 INJECTION, SOLUTION INTRAVENOUS at 21:46

## 2025-06-20 ASSESSMENT — ACTIVITIES OF DAILY LIVING (ADL)
ADLS_ACUITY_SCORE: 58

## 2025-06-20 NOTE — ED PROVIDER NOTES
Emergency Department Note      History of Present Illness     Chief Complaint   Dizziness      HPI   HPI   Mariluz Stoner is a 64 year old female with a past medical history significant for gram negative sepsis 2ndary to UTI, metastatic breast cancer in remission, hypertension who presents to the ED via/accompanied by nephew with a chief complaint of presenting with anxiety, lightheadedness from the airport.  Patient reports that she was recently diagnosed with urinary tract infection and stone with ureteral stenting on 6/11/2025.  She reports she is planning to leave on a trip for [facility] today and became very anxious in the airport when her travel  (nephew) was unable to board due to to a passport issue.  Patient reports that she felt very anxious, lightheaded and short of breath similar to her previous episodes of high anxiety.  Patient denies chest pain, fevers, chills although she reports feeling generally weak after her recent admission and procedure.  She reports feeling significantly proved in the emergency department denies anxiety currently.  And route to the emergency department, patient received droperidol from EMS.    Independent Historian   None    Review of External Notes   Hospital admission with [facility] on 6/10/2025 for sepsis due to gram-negative UTI    Past Medical History     Medical History and Problem List   Past Medical History:   Diagnosis Date    Basal cell carcinoma     Breast cancer (H)     Nausea with vomiting 9/21/2024    Obesity        Medications   acetaminophen (TYLENOL) 500 MG tablet  anastrozole (ARIMIDEX) 1 MG tablet  calcium carbonate 600 mg-vitamin D 400 units (CALTRATE) 600-400 MG-UNIT per tablet  lisinopril (ZESTRIL) 2.5 MG tablet  loperamide (IMODIUM A-D) 2 MG tablet  medical cannabis (Patient's own supply)  melatonin 5 MG tablet  metoprolol succinate ER (TOPROL XL) 50 MG 24 hr tablet  PARoxetine (PAXIL) 20 MG tablet  sodium bicarbonate 650 MG tablet  Vitamin D3  (VITAMIN D-1000 MAX ST) 25 mcg (1000 units) tablet        Surgical History   Past Surgical History:   Procedure Laterality Date    BREAST SURGERY      left tissue expander, LEFT MASTECTOMY    CHOLECYSTECTOMY      GYN SURGERY      oophorectomy    IR CHEST PORT PLACEMENT > 5 YRS OF AGE  1/18/2019    IR CHEST PORT PLACEMENT > 5 YRS OF AGE  4/16/2013    REMOVE TISSUE EXPANDER BREAST  4/16/2014    Procedure: REMOVAL OF LEFT BREAST TISSUE EXPANDER, IRRIGATION AND DEBRIDEMENT OF LEFT BREAST;  Surgeon: Marlon Luz MD;  Location: [facility]       Physical Exam     Patient Vitals for the past 24 hrs:   BP Temp Temp src Pulse Resp SpO2   06/20/25 2356 89/72 -- -- 108 23 98 %   06/20/25 2346 -- -- -- 104 22 92 %   06/20/25 2301 113/77 -- -- 99 23 97 %   06/20/25 2249 -- -- -- 105 23 97 %   06/20/25 2234 (!) 72/52 -- -- 104 17 97 %   06/20/25 2219 97/44 -- -- 99 23 97 %   06/20/25 2216 -- 97.2  F (36.2  C) -- -- -- --   06/20/25 2204 97/59 -- -- 104 (!) 38 --   06/20/25 2149 90/45 -- -- 103 26 --   06/20/25 2134 (!) 88/50 -- -- 90 20 100 %   06/20/25 2119 94/60 -- -- 83 20 96 %   06/20/25 2104 (!) 78/60 -- -- -- -- --   06/20/25 2019 (!) 88/55 -- -- 86 (!) 34 --   06/20/25 1922 94/57 -- -- 78 19 95 %   06/20/25 1907 (!) 82/35 -- -- 75 14 --   06/20/25 1830 (!) 88/48 -- -- 85 28 --   06/20/25 1822 93/57 97.6  F (36.4  C) Oral 80 29 98 %     Physical Exam  Constitutional: Well developed, nontox appearance  Head: Atraumatic.   Mouth/Throat: Oropharynx is clear and moist.   Neck:  no stridor  Eyes: no scleral icterus  Cardiovascular: Regular tachycardia, 2+ bilat radial pulses  Pulmonary/Chest: nml resp effort, Clear BS bilat  Abdominal: ND, soft, NT, no rebound or guarding   : no CVA tenderness bilat  Ext: Warm, well perfused, no edema  Neurological: A&O, symmetric facies, moves ext x4  Skin: Skin is warm and dry.   Psychiatric: Behavior is normal. Thought content normal.   Nursing note and vitals reviewed.    Diagnostics      Lab Results   Labs Ordered and Resulted from Time of ED Arrival to Time of ED Departure   COMPREHENSIVE METABOLIC PANEL - Abnormal       Result Value    Sodium 125 (*)     Potassium 6.1 (*)     Carbon Dioxide (CO2) 12 (*)     Anion Gap 19 (*)     Urea Nitrogen 59.8 (*)     Creatinine 5.03 (*)     GFR Estimate 9 (*)     Calcium 8.7 (*)     Chloride 94 (*)     Glucose 125 (*)     Alkaline Phosphatase 174 (*)     AST 14      ALT 21      Protein Total 7.4      Albumin 3.3 (*)     Bilirubin Total 0.4     LIPASE - Abnormal    Lipase 87 (*)    TROPONIN T, HIGH SENSITIVITY - Abnormal    Troponin T, High Sensitivity 51 (*)    ROUTINE UA WITH MICROSCOPIC REFLEX TO CULTURE - Abnormal    Color Urine Orange (*)     Appearance Urine Slightly Cloudy (*)     Glucose Urine Negative      Bilirubin Urine Negative      Ketones Urine Negative      Specific Gravity Urine 1.017      Blood Urine Large (*)     pH Urine 5.0      Protein Albumin Urine 30 (*)     Urobilinogen Urine Normal      Nitrite Urine Negative      Leukocyte Esterase Urine Small (*)     Mucus Urine Present (*)     Amorphous Crystals Urine Few (*)     RBC Urine 149 (*)     WBC Urine 45 (*)     Squamous Epithelials Urine <1      Hyaline Casts Urine 2     LACTIC ACID WHOLE BLOOD WITH 1X REPEAT IN 2 HR WHEN >2 - Abnormal    Lactic Acid, Initial 0.5 (*)    CBC WITH PLATELETS AND DIFFERENTIAL - Abnormal    WBC Count 11.1 (*)     RBC Count 4.06      Hemoglobin 12.6      Hematocrit 39.7      MCV 98      MCH 31.0      MCHC 31.7      RDW 14.9      Platelet Count 435     MANUAL DIFFERENTIAL - Abnormal    % Neutrophils 79      % Lymphocytes 11      % Monocytes 4      % Eosinophils 0      % Basophils 0      % Metamyelocytes 3      % Myelocytes 3      Absolute Neutrophils 8.8 (*)     Absolute Lymphocytes 1.2      Absolute Monocytes 0.4      Absolute Eosinophils 0.0      Absolute Basophils 0.0      Absolute Metamyelocytes 0.3 (*)     Absolute Myelocytes 0.3 (*)    TROPONIN T, HIGH  SENSITIVITY - Abnormal    Troponin T, High Sensitivity 28 (*)    POTASSIUM - Abnormal    Potassium 2.3 (*)    GLUCOSE BY METER - Abnormal    GLUCOSE BY METER POCT 37 (*)    GLUCOSE BY METER - Abnormal    GLUCOSE BY METER POCT 203 (*)    BASIC METABOLIC PANEL - Abnormal    Sodium 129 (*)     Potassium 4.2      Chloride 98      Carbon Dioxide (CO2) 9 (*)     Anion Gap 22 (*)     Urea Nitrogen 31.5 (*)     Creatinine 2.23 (*)     GFR Estimate 24 (*)     Calcium 6.6 (*)     Glucose 167 (*)    GLUCOSE BY METER - Abnormal    GLUCOSE BY METER POCT 147 (*)    GLUCOSE BY METER - Abnormal    GLUCOSE BY METER POCT 187 (*)    GLUCOSE BY METER - Abnormal    GLUCOSE BY METER POCT 133 (*)    RBC AND PLATELET MORPHOLOGY    RBC Morphology Confirmed RBC Indices      Platelet Assessment        Value: Automated Count Confirmed. Platelet morphology is normal.   FRACTIONAL EXCRETION OF SODIUM    Creatinine Urine mg/dL 171.8      Sodium Urine mmol/L <20      %FENA       GLUCOSE MONITOR NURSING POCT   GLUCOSE MONITOR NURSING POCT   GLUCOSE MONITOR NURSING POCT   POTASSIUM   BLOOD CULTURE   BLOOD CULTURE   URINE CULTURE   FRACTIONAL EXCRETION OF SODIUM       Imaging   CT Abdomen Pelvis w/o Contrast   Final Result   IMPRESSION:    1.  No acute findings in the abdomen or pelvis, within limitations of noncontrast exam.      2.  Right double-J ureteral stent in place. No hydronephrosis. Nonobstructing right renal midpole 1.6 cm calculus. No ureteral calculi.      3.  Right lower quadrant ileostomy with parastomal hernia containing nondilated small bowel. No bowel obstruction or evidence of strangulation.             EKG   ECG results from 06/20/25   EKG 12 lead     Value    Systolic Blood Pressure     Diastolic Blood Pressure     Ventricular Rate 87    Atrial Rate 87    WY Interval 188    QRS Duration 162        QTc 531    P Axis 33    R AXIS 26    T Axis 122    Interpretation ECG      Sinus rhythm  Left bundle branch block  Abnormal  ECG  When compared with ECG of 05-Jul-2024 15:00, (unconfirmed)  QRS duration has increased  T wave amplitude has increased in Anterior leads  T wave inversion more evident in Lateral leads         Independent Interpretation   EKG significant for normal sinus rhythm with a left bundle branch block, peaked T waves in comparison to previous on 7/5/2024    ED Course      Medications Administered   Medications   glucose gel 15-30 g ( Oral See Alternative 6/20/25 2118)     Or   dextrose 50 % injection 25-50 mL (50 mLs Intravenous $Given 6/20/25 2118)     Or   glucagon injection 1 mg ( Subcutaneous See Alternative 6/20/25 2118)   dextrose 10% BOLUS 300 mL ( Intravenous Rate/Dose Verify 6/20/25 2355)   ciprofloxacin (CIPRO) infusion 400 mg (has no administration in time range)   norepinephrine (LEVOPHED) 4 mg in  mL PERIPHERAL infusion (0 mcg/kg/min × 106.6 kg Intravenous Hold 6/21/25 0031)   sodium chloride 0.9% BOLUS 1,000 mL (0 mLs Intravenous Stopped 6/20/25 2206)   calcium gluconate 1 g in 50 mL in sodium chloride intermittent infusion (1 g Intravenous $Given 6/20/25 2058)   sodium bicarbonate 8.4 % injection 50 mEq (50 mEq Intravenous $Given 6/20/25 2120)   dextrose 50 % injection 25 g (25 g Intravenous $Given 6/20/25 2110)   insulin regular 1 unit/mL injection 10 Units (10 Units Intravenous $Given 6/20/25 2120)   albuterol (PROVENTIL) neb solution 10 mg (10 mg Nebulization $Given 6/20/25 2119)   lactated ringers BOLUS 1,000 mL (0 mLs Intravenous Stopped 6/20/25 2315)   acetaminophen (TYLENOL) tablet 1,000 mg (1,000 mg Oral $Given 6/20/25 2252)   lactated ringers BOLUS 1,000 mL (1,000 mLs Intravenous $New Bag 6/20/25 2315)       Procedures   Procedures     Discussion of Management   Admitting HospitalistAlejandro    ED Course        Additional Documentation  None    Medical Decision Making / Diagnosis     CMS Diagnoses: IV Antibiotics given and/or elevated Lactate of 0.5 and no sepsis note found - Delete this  reminder and enter the sepsis note or '.edcms' before signing chart.>>>Doubt severe sepsis, septic shock; hypotension likely secondary to volume depletion/dehydration    MIPS       None    MDM     64 year old female presenting w/ anxiety, transient lightheadedness, generalized weakness    Differential diagnosis includes electrolyte abnormality, recurrent UTI, generalized anxiety disorder, panic attack, stent migration.  Labs significant for acute renal failure with hyperkalemia, mild elevated troponin level, hyponatremia.  CT abdomen pelvis did not demonstrate any stent migration or hydronephrosis.  Urinalysis was delayed but appears consistent with a stent urine.  Given the patient's persistent hypotension, which was likely somewhat positional for the lowest pressures given the patient was lying on her right side for most of her ED visit, antibiotics and blood cultures ordered for treatment of possible sepsis although less likely.  Patient treated for hyperkalemia with repeat BMP demonstrating improvement in potassium level and creatinine.  Given the patient's persistent hypotension, she is admitted to the hospital service under IMC status.  Patient was counseled on results, diagnosis and disposition.  She is understanding and agreeable to plan.  Cardiogenic shock seems less likely given well-perfused extremities, no evidence of acute CHF.  Doubt PE given no hypoxia, chest pain or other persistent respiratory symptoms..  Pt and nephrew counseled on all results, disposition and diagnosis.  They are understanding and agreeable to plan. Patient admitted in guarded condition.        Critical Care time:  was 40 minutes for this patient excluding procedures.    Disposition   The patient was admitted to the hospital.     Diagnosis     ICD-10-CM    1. Hypotension due to hypovolemia  E86.1       2. Acute kidney injury  N17.9            Discharge Medications   New Prescriptions    No medications on file         Isaiah  MD Yves Medrano Christopher E, MD  06/21/25 0052

## 2025-06-21 PROBLEM — E86.1 HYPOTENSION DUE TO HYPOVOLEMIA: Status: ACTIVE | Noted: 2025-06-21

## 2025-06-21 PROBLEM — N17.9 ACUTE KIDNEY INJURY: Status: ACTIVE | Noted: 2025-06-21

## 2025-06-21 LAB
ANION GAP SERPL CALCULATED.3IONS-SCNC: 10 MMOL/L (ref 7–15)
ANION GAP SERPL CALCULATED.3IONS-SCNC: 11 MMOL/L (ref 7–15)
ANION GAP SERPL CALCULATED.3IONS-SCNC: 15 MMOL/L (ref 7–15)
BUN SERPL-MCNC: 41.6 MG/DL (ref 8–23)
BUN SERPL-MCNC: 46.8 MG/DL (ref 8–23)
BUN SERPL-MCNC: 53.8 MG/DL (ref 8–23)
CALCIUM SERPL-MCNC: 8.2 MG/DL (ref 8.8–10.4)
CALCIUM SERPL-MCNC: 8.2 MG/DL (ref 8.8–10.4)
CALCIUM SERPL-MCNC: 8.4 MG/DL (ref 8.8–10.4)
CHLORIDE SERPL-SCNC: 102 MMOL/L (ref 98–107)
CHLORIDE SERPL-SCNC: 104 MMOL/L (ref 98–107)
CHLORIDE SERPL-SCNC: 99 MMOL/L (ref 98–107)
CREAT SERPL-MCNC: 2.11 MG/DL (ref 0.51–0.95)
CREAT SERPL-MCNC: 2.42 MG/DL (ref 0.51–0.95)
CREAT SERPL-MCNC: 3.7 MG/DL (ref 0.51–0.95)
EGFRCR SERPLBLD CKD-EPI 2021: 13 ML/MIN/1.73M2
EGFRCR SERPLBLD CKD-EPI 2021: 22 ML/MIN/1.73M2
EGFRCR SERPLBLD CKD-EPI 2021: 26 ML/MIN/1.73M2
ERYTHROCYTE [DISTWIDTH] IN BLOOD BY AUTOMATED COUNT: 14.8 % (ref 10–15)
GLUCOSE BLDC GLUCOMTR-MCNC: 105 MG/DL (ref 70–99)
GLUCOSE BLDC GLUCOMTR-MCNC: 133 MG/DL (ref 70–99)
GLUCOSE BLDC GLUCOMTR-MCNC: 140 MG/DL (ref 70–99)
GLUCOSE BLDC GLUCOMTR-MCNC: 85 MG/DL (ref 70–99)
GLUCOSE SERPL-MCNC: 102 MG/DL (ref 70–99)
GLUCOSE SERPL-MCNC: 107 MG/DL (ref 70–99)
GLUCOSE SERPL-MCNC: 94 MG/DL (ref 70–99)
HCO3 SERPL-SCNC: 17 MMOL/L (ref 22–29)
HCO3 SERPL-SCNC: 20 MMOL/L (ref 22–29)
HCO3 SERPL-SCNC: 21 MMOL/L (ref 22–29)
HCT VFR BLD AUTO: 32 % (ref 35–47)
HGB BLD-MCNC: 10.4 G/DL (ref 11.7–15.7)
MCH RBC QN AUTO: 30.9 PG (ref 26.5–33)
MCHC RBC AUTO-ENTMCNC: 32.5 G/DL (ref 31.5–36.5)
MCV RBC AUTO: 95 FL (ref 78–100)
PLATELET # BLD AUTO: 374 10E3/UL (ref 150–450)
POTASSIUM SERPL-SCNC: 4.6 MMOL/L (ref 3.4–5.3)
POTASSIUM SERPL-SCNC: 4.9 MMOL/L (ref 3.4–5.3)
POTASSIUM SERPL-SCNC: 5.5 MMOL/L (ref 3.4–5.3)
POTASSIUM SERPL-SCNC: 5.7 MMOL/L (ref 3.4–5.3)
RBC # BLD AUTO: 3.37 10E6/UL (ref 3.8–5.2)
SODIUM SERPL-SCNC: 131 MMOL/L (ref 135–145)
SODIUM SERPL-SCNC: 133 MMOL/L (ref 135–145)
SODIUM SERPL-SCNC: 135 MMOL/L (ref 135–145)
WBC # BLD AUTO: 11.2 10E3/UL (ref 4–11)

## 2025-06-21 PROCEDURE — 36415 COLL VENOUS BLD VENIPUNCTURE: CPT | Performed by: INTERNAL MEDICINE

## 2025-06-21 PROCEDURE — 80048 BASIC METABOLIC PNL TOTAL CA: CPT | Performed by: INTERNAL MEDICINE

## 2025-06-21 PROCEDURE — 82962 GLUCOSE BLOOD TEST: CPT

## 2025-06-21 PROCEDURE — 80048 BASIC METABOLIC PNL TOTAL CA: CPT | Performed by: EMERGENCY MEDICINE

## 2025-06-21 PROCEDURE — 250N000011 HC RX IP 250 OP 636: Performed by: EMERGENCY MEDICINE

## 2025-06-21 PROCEDURE — 87154 CUL TYP ID BLD PTHGN 6+ TRGT: CPT | Performed by: EMERGENCY MEDICINE

## 2025-06-21 PROCEDURE — 250N000013 HC RX MED GY IP 250 OP 250 PS 637: Performed by: INTERNAL MEDICINE

## 2025-06-21 PROCEDURE — 250N000011 HC RX IP 250 OP 636: Performed by: INTERNAL MEDICINE

## 2025-06-21 PROCEDURE — 120N000001 HC R&B MED SURG/OB

## 2025-06-21 PROCEDURE — 85027 COMPLETE CBC AUTOMATED: CPT | Performed by: INTERNAL MEDICINE

## 2025-06-21 PROCEDURE — 36415 COLL VENOUS BLD VENIPUNCTURE: CPT | Performed by: EMERGENCY MEDICINE

## 2025-06-21 PROCEDURE — 99223 1ST HOSP IP/OBS HIGH 75: CPT | Performed by: INTERNAL MEDICINE

## 2025-06-21 PROCEDURE — 99207 PR APP CREDIT; MD BILLING SHARED VISIT: CPT | Performed by: INTERNAL MEDICINE

## 2025-06-21 PROCEDURE — 258N000003 HC RX IP 258 OP 636: Performed by: INTERNAL MEDICINE

## 2025-06-21 PROCEDURE — 87040 BLOOD CULTURE FOR BACTERIA: CPT | Performed by: EMERGENCY MEDICINE

## 2025-06-21 PROCEDURE — 99418 PROLNG IP/OBS E/M EA 15 MIN: CPT | Performed by: INTERNAL MEDICINE

## 2025-06-21 RX ORDER — ENOXAPARIN SODIUM 100 MG/ML
40 INJECTION SUBCUTANEOUS EVERY 24 HOURS
Status: DISCONTINUED | OUTPATIENT
Start: 2025-06-21 | End: 2025-06-21

## 2025-06-21 RX ORDER — HEPARIN SODIUM (PORCINE) LOCK FLUSH IV SOLN 100 UNIT/ML 100 UNIT/ML
5-10 SOLUTION INTRAVENOUS
Status: DISCONTINUED | OUTPATIENT
Start: 2025-06-21 | End: 2025-06-25 | Stop reason: HOSPADM

## 2025-06-21 RX ORDER — OXYBUTYNIN CHLORIDE 5 MG/1
5 TABLET ORAL 3 TIMES DAILY
COMMUNITY
End: 2025-07-02

## 2025-06-21 RX ORDER — NALOXONE HYDROCHLORIDE 0.4 MG/ML
0.2 INJECTION, SOLUTION INTRAMUSCULAR; INTRAVENOUS; SUBCUTANEOUS
Status: DISCONTINUED | OUTPATIENT
Start: 2025-06-21 | End: 2025-06-25 | Stop reason: HOSPADM

## 2025-06-21 RX ORDER — HYDROCODONE BITARTRATE AND ACETAMINOPHEN 5; 325 MG/1; MG/1
1 TABLET ORAL EVERY 6 HOURS PRN
Status: DISCONTINUED | OUTPATIENT
Start: 2025-06-21 | End: 2025-06-25 | Stop reason: HOSPADM

## 2025-06-21 RX ORDER — TAMSULOSIN HYDROCHLORIDE 0.4 MG/1
0.4 CAPSULE ORAL DAILY PRN
COMMUNITY
End: 2025-07-02

## 2025-06-21 RX ORDER — HYDROCODONE BITARTRATE AND ACETAMINOPHEN 5; 325 MG/1; MG/1
1 TABLET ORAL EVERY 6 HOURS PRN
COMMUNITY
End: 2025-07-02

## 2025-06-21 RX ORDER — POLYETHYLENE GLYCOL 3350 17 G/17G
17 POWDER, FOR SOLUTION ORAL 2 TIMES DAILY PRN
Status: DISCONTINUED | OUTPATIENT
Start: 2025-06-21 | End: 2025-06-25 | Stop reason: HOSPADM

## 2025-06-21 RX ORDER — PAROXETINE 20 MG/1
20 TABLET, FILM COATED ORAL AT BEDTIME
Status: DISCONTINUED | OUTPATIENT
Start: 2025-06-21 | End: 2025-06-25 | Stop reason: HOSPADM

## 2025-06-21 RX ORDER — HYDRALAZINE HYDROCHLORIDE 10 MG/1
10 TABLET, FILM COATED ORAL EVERY 4 HOURS PRN
Status: DISCONTINUED | OUTPATIENT
Start: 2025-06-21 | End: 2025-06-25 | Stop reason: HOSPADM

## 2025-06-21 RX ORDER — ACETAMINOPHEN 325 MG/1
650 TABLET ORAL EVERY 4 HOURS PRN
Status: DISCONTINUED | OUTPATIENT
Start: 2025-06-21 | End: 2025-06-21

## 2025-06-21 RX ORDER — PROCHLORPERAZINE MALEATE 10 MG
10 TABLET ORAL EVERY 6 HOURS PRN
Status: DISCONTINUED | OUTPATIENT
Start: 2025-06-21 | End: 2025-06-25 | Stop reason: HOSPADM

## 2025-06-21 RX ORDER — PHENAZOPYRIDINE HYDROCHLORIDE 200 MG/1
200 TABLET, FILM COATED ORAL 3 TIMES DAILY PRN
COMMUNITY
End: 2025-07-02

## 2025-06-21 RX ORDER — ANASTROZOLE 1 MG/1
1 TABLET ORAL AT BEDTIME
Status: DISCONTINUED | OUTPATIENT
Start: 2025-06-21 | End: 2025-06-25 | Stop reason: HOSPADM

## 2025-06-21 RX ORDER — ONDANSETRON 4 MG/1
4 TABLET, ORALLY DISINTEGRATING ORAL EVERY 6 HOURS PRN
Status: DISCONTINUED | OUTPATIENT
Start: 2025-06-21 | End: 2025-06-21

## 2025-06-21 RX ORDER — ONDANSETRON 2 MG/ML
4 INJECTION INTRAMUSCULAR; INTRAVENOUS EVERY 6 HOURS PRN
Status: DISCONTINUED | OUTPATIENT
Start: 2025-06-21 | End: 2025-06-21

## 2025-06-21 RX ORDER — SODIUM CHLORIDE, SODIUM LACTATE, POTASSIUM CHLORIDE, CALCIUM CHLORIDE 600; 310; 30; 20 MG/100ML; MG/100ML; MG/100ML; MG/100ML
INJECTION, SOLUTION INTRAVENOUS CONTINUOUS
Status: DISCONTINUED | OUTPATIENT
Start: 2025-06-21 | End: 2025-06-24

## 2025-06-21 RX ORDER — NALOXONE HYDROCHLORIDE 0.4 MG/ML
0.4 INJECTION, SOLUTION INTRAMUSCULAR; INTRAVENOUS; SUBCUTANEOUS
Status: DISCONTINUED | OUTPATIENT
Start: 2025-06-21 | End: 2025-06-25 | Stop reason: HOSPADM

## 2025-06-21 RX ORDER — HEPARIN SODIUM,PORCINE 10 UNIT/ML
5-10 VIAL (ML) INTRAVENOUS
Status: DISCONTINUED | OUTPATIENT
Start: 2025-06-21 | End: 2025-06-25 | Stop reason: HOSPADM

## 2025-06-21 RX ORDER — AMOXICILLIN 250 MG
1 CAPSULE ORAL 2 TIMES DAILY PRN
Status: DISCONTINUED | OUTPATIENT
Start: 2025-06-21 | End: 2025-06-25 | Stop reason: HOSPADM

## 2025-06-21 RX ORDER — ACETAMINOPHEN 500 MG
1000 TABLET ORAL EVERY 6 HOURS PRN
Status: DISCONTINUED | OUTPATIENT
Start: 2025-06-21 | End: 2025-06-25 | Stop reason: HOSPADM

## 2025-06-21 RX ORDER — ONDANSETRON 2 MG/ML
4 INJECTION INTRAMUSCULAR; INTRAVENOUS EVERY 6 HOURS PRN
Status: DISCONTINUED | OUTPATIENT
Start: 2025-06-21 | End: 2025-06-25 | Stop reason: HOSPADM

## 2025-06-21 RX ORDER — BISACODYL 10 MG
10 SUPPOSITORY, RECTAL RECTAL DAILY PRN
Status: DISCONTINUED | OUTPATIENT
Start: 2025-06-21 | End: 2025-06-25 | Stop reason: HOSPADM

## 2025-06-21 RX ORDER — ONDANSETRON 4 MG/1
4 TABLET, ORALLY DISINTEGRATING ORAL EVERY 6 HOURS PRN
Status: DISCONTINUED | OUTPATIENT
Start: 2025-06-21 | End: 2025-06-25 | Stop reason: HOSPADM

## 2025-06-21 RX ORDER — AMOXICILLIN 250 MG
2 CAPSULE ORAL 2 TIMES DAILY PRN
Status: DISCONTINUED | OUTPATIENT
Start: 2025-06-21 | End: 2025-06-25 | Stop reason: HOSPADM

## 2025-06-21 RX ORDER — CIPROFLOXACIN 2 MG/ML
400 INJECTION, SOLUTION INTRAVENOUS EVERY 12 HOURS
Status: DISCONTINUED | OUTPATIENT
Start: 2025-06-21 | End: 2025-06-21

## 2025-06-21 RX ORDER — HEPARIN SODIUM 5000 [USP'U]/.5ML
5000 INJECTION, SOLUTION INTRAVENOUS; SUBCUTANEOUS EVERY 8 HOURS
Status: DISCONTINUED | OUTPATIENT
Start: 2025-06-21 | End: 2025-06-25 | Stop reason: HOSPADM

## 2025-06-21 RX ORDER — LIDOCAINE 40 MG/G
CREAM TOPICAL
Status: DISCONTINUED | OUTPATIENT
Start: 2025-06-21 | End: 2025-06-25 | Stop reason: HOSPADM

## 2025-06-21 RX ORDER — TAMSULOSIN HYDROCHLORIDE 0.4 MG/1
0.4 CAPSULE ORAL DAILY PRN
Status: DISCONTINUED | OUTPATIENT
Start: 2025-06-21 | End: 2025-06-25 | Stop reason: HOSPADM

## 2025-06-21 RX ORDER — HEPARIN SODIUM,PORCINE 10 UNIT/ML
5-10 VIAL (ML) INTRAVENOUS EVERY 24 HOURS
Status: DISCONTINUED | OUTPATIENT
Start: 2025-06-21 | End: 2025-06-25 | Stop reason: HOSPADM

## 2025-06-21 RX ORDER — ACETAMINOPHEN 650 MG/1
650 SUPPOSITORY RECTAL EVERY 4 HOURS PRN
Status: DISCONTINUED | OUTPATIENT
Start: 2025-06-21 | End: 2025-06-21

## 2025-06-21 RX ORDER — CIPROFLOXACIN 2 MG/ML
400 INJECTION, SOLUTION INTRAVENOUS EVERY 24 HOURS
Status: DISCONTINUED | OUTPATIENT
Start: 2025-06-21 | End: 2025-06-22

## 2025-06-21 RX ORDER — HYDRALAZINE HYDROCHLORIDE 20 MG/ML
10 INJECTION INTRAMUSCULAR; INTRAVENOUS EVERY 4 HOURS PRN
Status: DISCONTINUED | OUTPATIENT
Start: 2025-06-21 | End: 2025-06-25 | Stop reason: HOSPADM

## 2025-06-21 RX ORDER — DOCUSATE SODIUM 100 MG/1
100 CAPSULE, LIQUID FILLED ORAL 2 TIMES DAILY
COMMUNITY

## 2025-06-21 RX ADMIN — SODIUM CHLORIDE, SODIUM LACTATE, POTASSIUM CHLORIDE, AND CALCIUM CHLORIDE: .6; .31; .03; .02 INJECTION, SOLUTION INTRAVENOUS at 05:42

## 2025-06-21 RX ADMIN — Medication 5 ML: at 22:15

## 2025-06-21 RX ADMIN — HEPARIN SODIUM 5000 UNITS: 5000 INJECTION, SOLUTION INTRAVENOUS; SUBCUTANEOUS at 23:29

## 2025-06-21 RX ADMIN — ANASTROZOLE 1 MG: 1 TABLET, COATED ORAL at 22:14

## 2025-06-21 RX ADMIN — ONDANSETRON 4 MG: 2 INJECTION, SOLUTION INTRAMUSCULAR; INTRAVENOUS at 22:14

## 2025-06-21 RX ADMIN — HEPARIN SODIUM 5000 UNITS: 5000 INJECTION, SOLUTION INTRAVENOUS; SUBCUTANEOUS at 15:32

## 2025-06-21 RX ADMIN — SODIUM ZIRCONIUM CYCLOSILICATE 10 G: 5 POWDER, FOR SUSPENSION ORAL at 18:21

## 2025-06-21 RX ADMIN — SODIUM CHLORIDE, SODIUM LACTATE, POTASSIUM CHLORIDE, AND CALCIUM CHLORIDE: .6; .31; .03; .02 INJECTION, SOLUTION INTRAVENOUS at 15:35

## 2025-06-21 RX ADMIN — CIPROFLOXACIN 400 MG: 400 INJECTION, SOLUTION INTRAVENOUS at 00:56

## 2025-06-21 RX ADMIN — CIPROFLOXACIN 400 MG: 400 INJECTION, SOLUTION INTRAVENOUS at 22:23

## 2025-06-21 RX ADMIN — ACETAMINOPHEN 1000 MG: 500 TABLET, FILM COATED ORAL at 15:32

## 2025-06-21 RX ADMIN — PAROXETINE HYDROCHLORIDE 20 MG: 20 TABLET, FILM COATED ORAL at 22:14

## 2025-06-21 ASSESSMENT — ACTIVITIES OF DAILY LIVING (ADL)
ADLS_ACUITY_SCORE: 58
ADLS_ACUITY_SCORE: 65
ADLS_ACUITY_SCORE: 58
ADLS_ACUITY_SCORE: 65
ADLS_ACUITY_SCORE: 58
ADLS_ACUITY_SCORE: 65
ADLS_ACUITY_SCORE: 58
ADLS_ACUITY_SCORE: 65
ADLS_ACUITY_SCORE: 58
ADLS_ACUITY_SCORE: 65
ADLS_ACUITY_SCORE: 58

## 2025-06-21 NOTE — ED NOTES
[facility]  ED Nurse Handoff Report    ED Chief complaint: Dizziness      ED Diagnosis:   Final diagnoses:   Hypotension due to hypovolemia   Acute kidney injury       Code Status: TBD by provider    Allergies:   Allergies   Allergen Reactions    Spironolactone      Suspected cause of acute kidney injury.       Patient Story: Patient arricving from airport for dizziness, nausea, and light headedness after what was described as a possible anxiety attack when told she could not fly with her partner.EMS reports difficulty obtaining BP and O2 and administered 5 of droperidol and 15 of toradol for back pain. She had a recent kidney stent placed and hx of breast cance   Focused Assessment:  Patient hypotensive and lethargic, critical K+ and CO2, elevated creatinine.     Treatments and/or interventions provided: Fluids, K+ shift, ABX  Patient's response to treatments and/or interventions: tolerated well    To be done/followed up on inpatient unit:  inpatient orders    Does this patient have any cognitive concerns?: NA    Activity level - Baseline/Home:  Independent  Activity Level - Current:   Unknown    Patient's Preferred language: English   Needed?: No    Isolation: None  Infection: Not Applicable  Sepsis treatment initiated: No  Patient tested for COVID 19 prior to admission: NO  Bariatric?: No    Vital Signs:   Vitals:    06/20/25 2249 06/20/25 2301 06/20/25 2346 06/20/25 2356   BP:  113/77  89/72   Pulse: 105 99 104 108   Resp: 23 23 22 23   Temp:       TempSrc:       SpO2: 97% 97% 92% 98%         For the majority of the shift this patient's behavior was Green.       ED NURSE PHONE NUMBER: [phone]

## 2025-06-21 NOTE — ED NOTES
Summation of Shift Events:    ARRIVED: Via EMS for dizziness/anxiety  HX: Recent kidney stone, stent placed, breast cancer receiving chemo, port-un accessed.Colostomy  ACCESS: 20 R hand; U/S guided 20 R FA  Vitals: A&Ox4;Ongoing hypotension, Levo ordered for MAP less than 65- not started at this time.  LABS: Hyperkalemic- meds given to shift. Hypoglycemic prior to shift, 1 PRN dose of D50 given. BG stabalized. Sepsis orders sent. Urine obtained via straight cath, unable to urinate on her own at this time. Purwick in place. Currently on hourly BG checks.  MEDS: 1L NS, 2L LR, 300 ML D10, Cipro, tylenol PRN for H/A. Hyperkalemia protocol medications given.

## 2025-06-21 NOTE — PHARMACY-ADMISSION MEDICATION HISTORY
Pharmacy Intern Admission Medication History    Admission medication history is complete. The information provided in this note is only as accurate as the sources available at the time of the update.    Information Source(s): Patient and CareEverywhere/SureScripts via in-person    Pertinent Information: Pt reports not having taken meds in a couple of days.  Pt stated she currently takes lisinopril 2.5 mg (filled 5/22/25). There is a lisinopril 10 mg tab dispensed on 6/16/25.   Pt has supply of docusate sodium, Norco, and tamsulosin but reported to have not taken yet.    Changes made to PTA medication list:  Added: docusate sodium, Norco, oxybutynin, tamsulosin, phenazopyrid  Deleted: Sodium bicarbonate  Changed: None    Allergies reviewed with patient and updates made in EHR: no    Medication History Completed By: Breana Pimentel 6/21/2025 8:41 AM    PTA Med List   Medication Sig Last Dose/Taking    acetaminophen (TYLENOL) 500 MG tablet Take 1,000 mg by mouth every 6 hours as needed for mild pain Taking As Needed    anastrozole (ARIMIDEX) 1 MG tablet Take 1 tablet (1 mg) by mouth daily (Patient taking differently: Take 1 mg by mouth at bedtime.) Taking Differently    calcium carbonate 600 mg-vitamin D 400 units (CALTRATE) 600-400 MG-UNIT per tablet Take 1 tablet by mouth at bedtime. Taking    docusate sodium (COLACE) 100 MG capsule Take 100 mg by mouth 2 times daily. Taking    HYDROcodone-acetaminophen (NORCO) 5-325 MG tablet Take 1 tablet by mouth every 6 hours as needed for severe pain. Taking As Needed    lisinopril (ZESTRIL) 2.5 MG tablet Take 1 tablet (2.5 mg) by mouth daily. Taking    loperamide (IMODIUM A-D) 2 MG tablet Take 2 mg by mouth every other day. At bedtime Taking    medical cannabis (Patient's own supply) See Admin Instructions (The purpose of this order is to document that the patient reports taking medical cannabis.  This is not a prescription, and is not used to certify that the patient has a qualifying  medical condition.) Taking    melatonin 5 MG tablet Take 5 mg by mouth nightly as needed for sleep. Taking As Needed    metoprolol succinate ER (TOPROL XL) 50 MG 24 hr tablet Take 1 tablet (50 mg) by mouth at bedtime. Taking    oxyBUTYnin (DITROPAN) 5 MG tablet Take 5 mg by mouth. 1 tablet by mouth three times daily for bladder cramps/pain from stent Taking    PARoxetine (PAXIL) 20 MG tablet TAKE 1 TABLET BY MOUTH ONCE DAILY .  APPOINTMENT  NEEDED  FOR  ADDITIONAL  REFILLS (Patient taking differently: Take 20 mg by mouth at bedtime.) Taking Differently    phenazopyridine (PYRIDIUM) 200 MG tablet Take 200 mg by mouth 3 times daily as needed for irritation. Taking As Needed    tamsulosin (FLOMAX) 0.4 MG capsule Take 0.4 mg by mouth. 1 capsule once daily as needed for stent discomfort Taking    Vitamin D3 (VITAMIN D-1000 MAX ST) 25 mcg (1000 units) tablet Take 25 mcg by mouth at bedtime. Taking

## 2025-06-21 NOTE — H&P
[facility]    History and Physical - Hospitalist Service       Date of Admission:  6/20/2025    Assessment & Plan   Mariluz Stoner is a 64 year old female with past medical history significant for recent pyelonephritis due to obstructive uropathy status post right ureteral stent, chemotherapy induced cardiomyopathy with chronic HFrEF, hypertension, chronic kidney disease who presents with light-headedness and anxiety. Admitted on 6/20/2025.     Acute kidney injury  Hyperkalemia  Hypovolemic hyponatremia   AGMA  Hypotension, mild   Recent severe sepsis secondary to acute pyelonephritis complicated by obstructive uropathy and right-sided nephrolithiasis status post ureteral stent placement 6/11/2025  *Presents with light-headedness and anxiety/panic symptoms  *BMP notable for sodium 125, potassium 6.1, creatinine 5.03 (baseline 1.1-1.2), bicarb 12, AG 19. Following IV fluids and shifting of potassium initially dropped to 2.3, suspected spurious with repeat BMP notable for sodium 129, potassium 4.2, creatinine 2.23, bicarb 9, AG 22. Lactic acid normal. Urine sodium <20, unable to calculate FENa.   *Mild hypotension in ED improved with IVF  *Rapid improvement in creatinine suggestive of pre-renal, reports she became nauseated and vomited several times day of presentation after taking oxybutynin for discomfort associated with recent stent. Otherwise reports eating/drinking normally. Some recent increase in ostomy output since being on antibiotics, had improved with   *CT abd/pelvis with ureteral stent in place, no hydronephrosis or signs of infection elsewhere. UA abnormal but consistent with recent stent placement   *Recent urine culture 6/8 with Klebsiella aerogenes resistant to cephalosporins and piperacillin-tazobactam IV, susceptible to flouroquinolones  *Hospitalized at [facility] 6/10-6/14/25 for infection with stent placement as above, discharged on levofloxacin. Urine culture from  that admission appears to have low quantity gram positive organism and GNR.   - Continue IVF with LR  - BMP in AM  - Avoid nephrotoxins   - Blood and urine cultures in process  - Ciprofloxacin IV for now, discontinue if cultures negative and continues to improve with fluids as primarily suspect hypovolemia rather than sepsis     Chemotherapy-induced cardiomyopathy   Chronic HFrEF  Mild troponin elevation with recent probable nonischemic myocardial injury  Hypertension  *TTE 6/11/2025 with EF 20-25%. Troponin during recent admission 233->211   *Troponin here 51->28. Denies chest pain.   *Suspect hypovolemic on admission   - Hold prior to admission lisinopril until renal function improved to baseline   - Hold prior to admission metoprolol XL pending stable blood pressure  - Daily weights, monitor for volume overload with fluids      Depression   - Continue prior to admission paroxetine when dose confirmed by pharmacy     Metastatic breast cancer  - Continue prior to admission anastrozole when dose confirmed by pharmacy          Diet: 2 Gram Sodium Diet    DVT Prophylaxis: Enoxaparin (Lovenox) SQ  Orozco Catheter: Not present  Code Status: Full code     Disposition Plan   Admit to inpatient.      Medically Ready for Discharge: Anticipated in 2-4 Days       Entered: Brien Allen MD 06/21/2025, 12:17 AM     The patient's care was discussed with the ED provider, patient and patient's significant other     Medical Decision Making       86 MINUTES SPENT BY ME on the date of service doing chart review, history, exam, documentation & further activities per the note.      Clinically Significant Risk Factors Present on Admission        # Hypokalemia: Lowest K = 2.3 mmol/L in last 2 days, will replace as needed  # Hyperkalemia: Highest K = 6.1 mmol/L in last 2 days, will monitor as appropriate  # Hyponatremia: Lowest Na = 125 mmol/L in last 2 days, will monitor as appropriate  # Hypochloremia: Lowest Cl = 94 mmol/L in last 2  "days, will monitor as appropriate  # Hypocalcemia: Lowest Ca = 6.6 mg/dL in last 2 days, will monitor and replace as appropriate    # Anion Gap Metabolic Acidosis: Highest Anion Gap = 22 mmol/L in last 2 days, will monitor and treat as appropriate  # Hypoalbuminemia: Lowest albumin = 3.3 g/dL at 6/20/2025  6:42 PM, will monitor as appropriate     # Hypertension: Noted on problem list  # Chronic heart failure with reduced ejection fraction: last echo with EF <40%          # Obesity: Estimated body mass index is 39.11 kg/m  as calculated from the following:    Height as of 6/8/25: 1.651 m (5' 5\").    Weight as of 6/8/25: 106.6 kg (235 lb).                   Brien Allen MD  [facility]    ______________________________________________________________________    Chief Complaint   Light-headedness, anxiety    History of Present Illness   Mariluz Stoner is a 64 year old female who presents with the above chief complaint.    History is obtained from the patient, discussion with ED provider and review of medical record. The patient reports she went to bed feeling normal 6/19. 6/20 AM she was having some discomfort associated with her recent stent and took oxybutynin for the first time. She subsequently became nauseated and had several episodes of vomiting. She reports in total on 6/20 she had a banana and some grapefruit, felt she was drinking fluids normally. She went to the airport to fly to [facility], she reports feeling some lightheadedness while walking through the airport; when she got to the gate there was an issue with the passport of her travel  and she began to feel increasingly lightheaded with progressive severe anxiety and shortness of breath similar to previous anxiety/panic attacks.  EMS was called and she was brought to the emergency department for further evaluation.  She reports she has recently had some more watery stools and increased ostomy output since being on " antibiotics, since has increased the amount of fiber which seems to have been thickening up her stool some.  She reports feeling some subjective temperature variations associated with going between the air conditioning and hot weather, no measured fevers.    In the Emergency Department, the patient was seen by Dr. Marinelli, with whom I discussed the patient's presenting symptoms and emergency department course.  Initial vital signs were a temperature of 97.6F, HR 80, BP 93/57, RR 29, SpO2 98% on room air. Pertinent work-up as noted in A&P. Hospitalists were contacted for admission to the hospital.     Past Medical History    I have reviewed this patient's medical history and updated it with pertinent information if needed.   Past Medical History:   Diagnosis Date    Basal cell carcinoma     Breast cancer (H)     HFrEF (heart failure with reduced ejection fraction) (H)     Hypertension     Nausea with vomiting 2024    Nephrolithiasis     Obesity     Pyelonephritis     Secondary cardiomyopathy (H)        Past Surgical History   I have reviewed this patient's surgical history and updated it with pertinent information if needed.  Past Surgical History:   Procedure Laterality Date    BREAST SURGERY      left tissue expander, LEFT MASTECTOMY    CHOLECYSTECTOMY      GYN SURGERY      oophorectomy    IR CHEST PORT PLACEMENT > 5 YRS OF AGE  2019    IR CHEST PORT PLACEMENT > 5 YRS OF AGE  2013    REMOVE TISSUE EXPANDER BREAST  2014    Procedure: REMOVAL OF LEFT BREAST TISSUE EXPANDER, IRRIGATION AND DEBRIDEMENT OF LEFT BREAST;  Surgeon: Marlon Luz MD;  Location: [facility]         Social History   I have reviewed this patient's social history and updated it with pertinent information if needed.  Social History     Tobacco Use    Smoking status: Former     Current packs/day: 0.00     Types: Cigarettes     Quit date: 2000     Years since quittin.4    Smokeless tobacco: Never   Vaping Use     Vaping status: Never Used   Substance Use Topics    Alcohol use: Yes     Comment: social drinking    Drug use: Yes     Types: Marijuana     Comment: medical marijuana syrup           Prior to Admission Medications     Prior to Admission Medications   Prescriptions Last Dose Informant Patient Reported? Taking?   PARoxetine (PAXIL) 20 MG tablet  Self No No   Sig: TAKE 1 TABLET BY MOUTH ONCE DAILY .  APPOINTMENT  NEEDED  FOR  ADDITIONAL  REFILLS   Patient taking differently: Take 20 mg by mouth at bedtime.   Vitamin D3 (VITAMIN D-1000 MAX ST) 25 mcg (1000 units) tablet  Self Yes No   Sig: Take 25 mcg by mouth at bedtime.   acetaminophen (TYLENOL) 500 MG tablet  Self Yes No   Sig: Take 1,000 mg by mouth every 6 hours as needed for mild pain   anastrozole (ARIMIDEX) 1 MG tablet  Self No No   Sig: Take 1 tablet (1 mg) by mouth daily   Patient taking differently: Take 1 mg by mouth at bedtime.   calcium carbonate 600 mg-vitamin D 400 units (CALTRATE) 600-400 MG-UNIT per tablet  Self Yes No   Sig: Take 1 tablet by mouth at bedtime.   lisinopril (ZESTRIL) 2.5 MG tablet   No No   Sig: Take 1 tablet (2.5 mg) by mouth daily.   loperamide (IMODIUM A-D) 2 MG tablet   Yes No   Sig: Take 2 mg by mouth every other day. At bedtime   medical cannabis (Patient's own supply)  Self Yes No   Sig: See Admin Instructions (The purpose of this order is to document that the patient reports taking medical cannabis.  This is not a prescription, and is not used to certify that the patient has a qualifying medical condition.)   melatonin 5 MG tablet   Yes No   Sig: Take 5 mg by mouth nightly as needed for sleep.   metoprolol succinate ER (TOPROL XL) 50 MG 24 hr tablet   No No   Sig: Take 1 tablet (50 mg) by mouth at bedtime.   sodium bicarbonate 650 MG tablet   No No   Sig: Take 2 tablets (1,300 mg) by mouth 4 times daily.   Patient not taking: Reported on 5/22/2025      Facility-Administered Medications: None     Allergies   Allergies   Allergen  Reactions    Spironolactone      Suspected cause of acute kidney injury.       Physical Exam   Vital Signs: Temp: 97.2  F (36.2  C) Temp src: Oral BP: 89/72 Pulse: 108   Resp: 23 SpO2: 98 %      Weight: 0 lbs 0 oz    Constitutional: Well-appearing, NAD  Respiratory: Clear to auscultation bilaterally, good air movement, normal effort   Cardiovascular: RRR, no m/r/g. No peripheral edema.    GI: Soft, non-tender, non-distended. No rebound tenderness or guarding.  Right sided ostomy with loose green stool.   Skin: Warm, dry   Neurologic: Alert. Responding to questions appropriately. Following commands.    Psychiatric: Normal affect, appropriate      Data   Data reviewed today: I reviewed all medications, new labs and imaging results over the last 24 hours. I personally reviewed the EKG tracing showing sinus rhythm, LBBB.    Recent Labs   Lab 06/20/25 2258 06/20/25 2217 06/20/25 2214 06/20/25  2139 06/20/25  2110 06/20/25  2109 06/20/25  1842   WBC  --   --   --   --   --   --  11.1*   HGB  --   --   --   --   --   --  12.6   MCV  --   --   --   --   --   --  98   PLT  --   --   --   --   --   --  435   NA  --  129*  --   --   --   --  125*   POTASSIUM  --  4.2  --   --  2.3*  --  6.1*   CHLORIDE  --  98  --   --   --   --  94*   CO2  --  9*  --   --   --   --  12*   BUN  --  31.5*  --   --   --   --  59.8*   CR  --  2.23*  --   --   --   --  5.03*   ANIONGAP  --  22*  --   --   --   --  19*   SINDY  --  6.6*  --   --   --   --  8.7*   * 167* 147*   < >  --    < > 125*   ALBUMIN  --   --   --   --   --   --  3.3*   PROTTOTAL  --   --   --   --   --   --  7.4   BILITOTAL  --   --   --   --   --   --  0.4   ALKPHOS  --   --   --   --   --   --  174*   ALT  --   --   --   --   --   --  21   AST  --   --   --   --   --   --  14   LIPASE  --   --   --   --   --   --  87*    < > = values in this interval not displayed.       Recent Results (from the past 24 hours)   CT Abdomen Pelvis w/o Contrast    Narrative    EXAM:  CT ABDOMEN PELVIS W/O CONTRAST  LOCATION: [facility]  DATE: 6/20/2025    INDICATION: Recent right ureteral stent placement. Now with hypotension.  COMPARISON: 9/21/2024  TECHNIQUE: CT scan of the abdomen and pelvis was performed without IV contrast. Multiplanar reformats were obtained. Dose reduction techniques were used.  CONTRAST: None.    FINDINGS:   LOWER CHEST: Mild dependent atelectasis. No focal airspace disease.    HEPATOBILIARY: Liver appears unremarkable. Status post cholecystectomy.    PANCREAS: Normal.    SPLEEN: Normal.    ADRENAL GLANDS: Normal.    KIDNEYS/BLADDER: Right double-J ureteral stent with proximal coil in the renal pelvis and distal coil in the bladder. Nonobstructing right renal midpole calculus measuring 1.6 cm. No ureteral calculi. No hydronephrosis. Urinary bladder appears normal.    BOWEL: Status post subtotal colectomy with Gutierrez's pouch formation. Right lower quadrant ileostomy. Parastomal hernia containing several nondilated small bowel loops. No bowel obstruction or evidence of bowel inflammation.    LYMPH NODES: No lymphadenopathy.    VASCULATURE: Mild atherosclerotic calcifications.    PELVIC ORGANS: Unremarkable.    MUSCULOSKELETAL: Mild multilevel degenerative changes of the spine.      Impression    IMPRESSION:   1.  No acute findings in the abdomen or pelvis, within limitations of noncontrast exam.    2.  Right double-J ureteral stent in place. No hydronephrosis. Nonobstructing right renal midpole 1.6 cm calculus. No ureteral calculi.    3.  Right lower quadrant ileostomy with parastomal hernia containing nondilated small bowel. No bowel obstruction or evidence of strangulation.

## 2025-06-21 NOTE — PROGRESS NOTES
[facility]    Hospitalist Progress Note    Date of Admission: 6/20/2025    Assessment & Plan   Mariluz Stoner is a 64 year old female with PMHx of hypertension, chemotherapy-induced cardiomyopathy with chronic HFrEF and CKD among other medical problems.     She was seen in [facility] ED on 6/8 for evaluation of nausea/vomiting and diagnosed with Vertigo and a UTI. UC obtained and grew 2 strains of >100k klebsiella aerogenes (susceptible to quinolones). She was then admitted to [facility] from 6/10/25-6/14/25 for management of severe sepsis dt pyelonephritis complicated by obstructive uropathy and right-sided nephrolithiasis s/p ureteral stent placement 6/11/25. UC that stay grew low quantity GP organism and GNR. Discharged from that stay on an additional 5 days of Levaquin, and prns for symptom mgmt (Flomax, Ditropan, Norco, Pyridium).     On the day of admission, patient was at the airport to leave for a trip to [facility] when she developed ew onset lightheadedness, dizziness and anxiety/panic symptoms and was referred to [facility] ED for evaluation. On presentation the ED, she was found to be hypotensive with several lab abnormalities and was subsequently admitted for management of JESUS and associated electrolyte abnormalities.     Acute kidney injury  Hypovolemic hyponatremia   Hyperkalemia/hypokalemia: Resolved  AGMA  *With recent history and presentation as above.   *On admission this stay, was afebrile, hypotensive with SBPs 80-90s, HRs 80-100s. Labs notable for Na 1125, K 6.1, bicarb 12, Cr 5.0 (baseline 1.1-1.2), AG 19; WBC 11.1, trops 51 -- 28 (but in the context of severe JESUS).  Following IV fluids and shifting of K initially dropped to 2.3, suspected spurious with repeat BMP notable for Na 129, K 4.2, Cr 2.23, bicarb 9, AG 22. Lactic acid normal. Urine sodium <20, unable to calculate FENa. BPs improved after IVFs. Clinical suspicion was for pre-renal JESUS dt recent  nausea/vomiting episodes after having taking Ditropan earlier in the day for ureteral stent discomfort. Denied recent illness or other changes in po intake, mild increase in ostomy output since being on abx. Admitted to the hospital for continued hydration.   -- cont IVFs with LR  -- repeat BMP this afternoon at 1500 given bump in Cr this AM  -- daily labs while hospitalized    Recent severe sepsis dt acute pyelonephritis complicated by obstructive uropathy and right-sided nephrolithiasis, s/p R ureteral stent placement 6/11/25  Abnormal UA  *With presentation/labs as above.   *UA abnl with 45 WBCs and small leuk est, 149 RBCs. CT abd/pelvis benign, showed stent in place with no hydronephrosis. Given dose of IV ciprofloxacin on admission to cover for possible concurrent UTI.   -- remains on IV cipro while urine/blood cultures pending   -- OP follow up with urology as previously advised -- sched for stent exchange on 7/11    Chemotherapy-induced cardiomyopathy   Chronic HFrEF  Mild troponin elevation with recent probable nonischemic myocardial injury  Hypertension, with hypotension on admission  *TTE 6/11/25 with EF 20-25%. Troponin during recent admission 233->211.   *Trops on admission this stay improved,  51->28. Denies chest pain.   *Appeared hypovolemic on admission.   -- holding PTA lisinopril, metoprolol on admission until lytes/renal function and BPs stabilize  -- monitor for volume overload     Depression   *Chronic and stable on Paxil    Metastatic breast cancer  *Chronic and stable on anastrozole    Hx of subtotal colectomy with ileostomy in 11/2023 for history of complicated diverticulitis  *Noted more liquid-like stools in the past week since completing antibiotics. Typically takes Imodium or metamucil to bulk stools.  -- monitor ostomy output    FEN: cont IVFs with LR @100ml/h, low Na diet ordered  DVT Prophylaxis: subQ heparin  Code Status: Full Code    Disposition: Anticipate discharge home in 2-3d  "pending stable renal function and electrolytes    Medically Ready for Discharge: Anticipated in 2-4 Days      Margarita Maribeldara Serna, DO    Clinically Significant Risk Factors Present on Admission        # Hypokalemia: Lowest K = 2.3 mmol/L in last 2 days, will replace as needed  # Hyperkalemia: Highest K = 6.1 mmol/L in last 2 days, will monitor as appropriate  # Hyponatremia: Lowest Na = 125 mmol/L in last 2 days, will monitor as appropriate  # Hypochloremia: Lowest Cl = 94 mmol/L in last 2 days, will monitor as appropriate  # Hypocalcemia: Lowest Ca = 6.6 mg/dL in last 2 days, will monitor and replace as appropriate    # Anion Gap Metabolic Acidosis: Highest Anion Gap = 22 mmol/L in last 2 days, will monitor and treat as appropriate  # Hypoalbuminemia: Lowest albumin = 3.3 g/dL at 6/20/2025  6:42 PM, will monitor as appropriate    # Acute Kidney Injury, unspecified: based on a >150% or 0.3 mg/dL increase in last creatinine compared to past 90 day average, will monitor renal function  # Hypertension: Noted on problem list  # Chronic heart failure with reduced ejection fraction: last echo with EF <40%     # Anemia: based on hgb <11       # Obesity: Estimated body mass index is 39.11 kg/m  as calculated from the following:    Height as of 6/8/25: 1.651 m (5' 5\").    Weight as of 6/8/25: 106.6 kg (235 lb).                  Medical Decision Making       Please see A&P for additional details of medical decision making.      I spent an additional 45 minutes in direct patient care, including review of the medical record, discussion of care plan with patient and bedside RN       Interval History   Chart reviewed. Seen this afternoon. Complaints of a headache this afternoon. Tolerating po intake with no recurrent nausea/vomiting. Ongoing liquid output per ostomy. No abd pain. No cp/sob/cough. Generally feeling better today than on admission last night.     -Data reviewed today: I reviewed all new labs and imaging results " over the last 24 hours. I personally reviewed no images or EKG's today.    Physical Exam   Temp: 97.2  F (36.2  C) Temp src: Oral BP: 109/46 Pulse: 94   Resp: 12 SpO2: 98 %      There were no vitals filed for this visit.  Vital Signs with Ranges  Temp:  [97.2  F (36.2  C)-97.6  F (36.4  C)] 97.2  F (36.2  C)  Pulse:  [] 94  Resp:  [12-38] 12  BP: ()/(35-77) 109/46  SpO2:  [92 %-100 %] 98 %  I/O last 3 completed shifts:  In: 3400 [I.V.:300; IV Piggyback:3100]  Out: -     Constitutional: Resting comfortably, alert and conversing appropriately, NAD  Respiratory: CTAB, no wheeze, no increased work of breathing  Cardiovascular: HRRR, no MGR, no LE edema  GI: large amount of liquid stool in ostomy bag  Skin/Integumen: warm/dry  Other:      Medications   Current Facility-Administered Medications   Medication Dose Route Frequency Provider Last Rate Last Admin    lactated ringers infusion   Intravenous Continuous Brien Allen  mL/hr at 06/21/25 0542 New Bag at 06/21/25 0542     Current Facility-Administered Medications   Medication Dose Route Frequency Provider Last Rate Last Admin    ciprofloxacin (CIPRO) infusion 400 mg  400 mg Intravenous Q24H Brien Allen MD        enoxaparin ANTICOAGULANT (LOVENOX) injection 40 mg  40 mg Subcutaneous Q24H Brien Allen MD        sodium chloride (PF) 0.9% PF flush 3 mL  3 mL Intracatheter Q8H Brien Allen MD           Data   Recent Labs   Lab 06/21/25  0609 06/21/25  0530 06/21/25  0404 06/21/25  0310 06/21/25  0059 06/20/25  2258 06/20/25  2217 06/20/25  2109 06/20/25  1842   WBC 11.2*  --   --   --   --   --   --   --  11.1*   HGB 10.4*  --   --   --   --   --   --   --  12.6   MCV 95  --   --   --   --   --   --   --  98     --   --   --   --   --   --   --  435   *  --   --   --   --   --  129*  --  125*   POTASSIUM 4.6  --   --   --  4.9  --  4.2   < > 6.1*   CHLORIDE 99  --   --   --   --   --  98  --  94*   CO2  17*  --   --   --   --   --  9*  --  12*   BUN 53.8*  --   --   --   --   --  31.5*  --  59.8*   CR 3.70*  --   --   --   --   --  2.23*  --  5.03*   ANIONGAP 15  --   --   --   --   --  22*  --  19*   SINDY 8.2*  --   --   --   --   --  6.6*  --  8.7*   GLC 94 140* 85   < >  --    < > 167*   < > 125*   ALBUMIN  --   --   --   --   --   --   --   --  3.3*   PROTTOTAL  --   --   --   --   --   --   --   --  7.4   BILITOTAL  --   --   --   --   --   --   --   --  0.4   ALKPHOS  --   --   --   --   --   --   --   --  174*   ALT  --   --   --   --   --   --   --   --  21   AST  --   --   --   --   --   --   --   --  14   LIPASE  --   --   --   --   --   --   --   --  87*    < > = values in this interval not displayed.       Recent Results (from the past 24 hours)   CT Abdomen Pelvis w/o Contrast    Narrative    EXAM: CT ABDOMEN PELVIS W/O CONTRAST  LOCATION: [facility]  DATE: 6/20/2025    INDICATION: Recent right ureteral stent placement. Now with hypotension.  COMPARISON: 9/21/2024  TECHNIQUE: CT scan of the abdomen and pelvis was performed without IV contrast. Multiplanar reformats were obtained. Dose reduction techniques were used.  CONTRAST: None.    FINDINGS:   LOWER CHEST: Mild dependent atelectasis. No focal airspace disease.    HEPATOBILIARY: Liver appears unremarkable. Status post cholecystectomy.    PANCREAS: Normal.    SPLEEN: Normal.    ADRENAL GLANDS: Normal.    KIDNEYS/BLADDER: Right double-J ureteral stent with proximal coil in the renal pelvis and distal coil in the bladder. Nonobstructing right renal midpole calculus measuring 1.6 cm. No ureteral calculi. No hydronephrosis. Urinary bladder appears normal.    BOWEL: Status post subtotal colectomy with Gutierrez's pouch formation. Right lower quadrant ileostomy. Parastomal hernia containing several nondilated small bowel loops. No bowel obstruction or evidence of bowel inflammation.    LYMPH NODES: No lymphadenopathy.    VASCULATURE:  Mild atherosclerotic calcifications.    PELVIC ORGANS: Unremarkable.    MUSCULOSKELETAL: Mild multilevel degenerative changes of the spine.      Impression    IMPRESSION:   1.  No acute findings in the abdomen or pelvis, within limitations of noncontrast exam.    2.  Right double-J ureteral stent in place. No hydronephrosis. Nonobstructing right renal midpole 1.6 cm calculus. No ureteral calculi.    3.  Right lower quadrant ileostomy with parastomal hernia containing nondilated small bowel. No bowel obstruction or evidence of strangulation.

## 2025-06-22 LAB
ANION GAP SERPL CALCULATED.3IONS-SCNC: 7 MMOL/L (ref 7–15)
ANION GAP SERPL CALCULATED.3IONS-SCNC: 7 MMOL/L (ref 7–15)
ATRIAL RATE - MUSE: 87 BPM
BUN SERPL-MCNC: 27 MG/DL (ref 8–23)
BUN SERPL-MCNC: 32.1 MG/DL (ref 8–23)
CALCIUM SERPL-MCNC: 8.8 MG/DL (ref 8.8–10.4)
CALCIUM SERPL-MCNC: 9 MG/DL (ref 8.8–10.4)
CHLORIDE SERPL-SCNC: 103 MMOL/L (ref 98–107)
CHLORIDE SERPL-SCNC: 104 MMOL/L (ref 98–107)
CREAT SERPL-MCNC: 1.48 MG/DL (ref 0.51–0.95)
CREAT SERPL-MCNC: 1.71 MG/DL (ref 0.51–0.95)
DIASTOLIC BLOOD PRESSURE - MUSE: NORMAL MMHG
EGFRCR SERPLBLD CKD-EPI 2021: 33 ML/MIN/1.73M2
EGFRCR SERPLBLD CKD-EPI 2021: 39 ML/MIN/1.73M2
GLUCOSE SERPL-MCNC: 103 MG/DL (ref 70–99)
GLUCOSE SERPL-MCNC: 106 MG/DL (ref 70–99)
HCO3 SERPL-SCNC: 22 MMOL/L (ref 22–29)
HCO3 SERPL-SCNC: 22 MMOL/L (ref 22–29)
INTERPRETATION ECG - MUSE: NORMAL
P AXIS - MUSE: 33 DEGREES
POTASSIUM SERPL-SCNC: 5 MMOL/L (ref 3.4–5.3)
POTASSIUM SERPL-SCNC: 5.6 MMOL/L (ref 3.4–5.3)
PR INTERVAL - MUSE: 188 MS
QRS DURATION - MUSE: 162 MS
QT - MUSE: 442 MS
QTC - MUSE: 531 MS
R AXIS - MUSE: 26 DEGREES
SODIUM SERPL-SCNC: 132 MMOL/L (ref 135–145)
SODIUM SERPL-SCNC: 133 MMOL/L (ref 135–145)
SYSTOLIC BLOOD PRESSURE - MUSE: NORMAL MMHG
T AXIS - MUSE: 122 DEGREES
VENTRICULAR RATE- MUSE: 87 BPM

## 2025-06-22 PROCEDURE — 250N000011 HC RX IP 250 OP 636: Performed by: INTERNAL MEDICINE

## 2025-06-22 PROCEDURE — 120N000001 HC R&B MED SURG/OB

## 2025-06-22 PROCEDURE — 80048 BASIC METABOLIC PNL TOTAL CA: CPT | Performed by: INTERNAL MEDICINE

## 2025-06-22 PROCEDURE — 258N000003 HC RX IP 258 OP 636: Performed by: INTERNAL MEDICINE

## 2025-06-22 PROCEDURE — 250N000013 HC RX MED GY IP 250 OP 250 PS 637: Performed by: INTERNAL MEDICINE

## 2025-06-22 PROCEDURE — 99232 SBSQ HOSP IP/OBS MODERATE 35: CPT | Performed by: INTERNAL MEDICINE

## 2025-06-22 RX ADMIN — SODIUM ZIRCONIUM CYCLOSILICATE 10 G: 5 POWDER, FOR SUSPENSION ORAL at 08:21

## 2025-06-22 RX ADMIN — ANASTROZOLE 1 MG: 1 TABLET, COATED ORAL at 21:22

## 2025-06-22 RX ADMIN — Medication 5 ML: at 16:04

## 2025-06-22 RX ADMIN — HEPARIN SODIUM 5000 UNITS: 5000 INJECTION, SOLUTION INTRAVENOUS; SUBCUTANEOUS at 08:21

## 2025-06-22 RX ADMIN — ACETAMINOPHEN 1000 MG: 500 TABLET, FILM COATED ORAL at 05:30

## 2025-06-22 RX ADMIN — SODIUM CHLORIDE, SODIUM LACTATE, POTASSIUM CHLORIDE, AND CALCIUM CHLORIDE: .6; .31; .03; .02 INJECTION, SOLUTION INTRAVENOUS at 13:53

## 2025-06-22 RX ADMIN — PAROXETINE HYDROCHLORIDE 20 MG: 20 TABLET, FILM COATED ORAL at 21:22

## 2025-06-22 RX ADMIN — SODIUM CHLORIDE, SODIUM LACTATE, POTASSIUM CHLORIDE, AND CALCIUM CHLORIDE: .6; .31; .03; .02 INJECTION, SOLUTION INTRAVENOUS at 01:27

## 2025-06-22 RX ADMIN — HEPARIN SODIUM 5000 UNITS: 5000 INJECTION, SOLUTION INTRAVENOUS; SUBCUTANEOUS at 15:10

## 2025-06-22 ASSESSMENT — ACTIVITIES OF DAILY LIVING (ADL)
ADLS_ACUITY_SCORE: 47
ADLS_ACUITY_SCORE: 61
ADLS_ACUITY_SCORE: 47
ADLS_ACUITY_SCORE: 65
ADLS_ACUITY_SCORE: 61
ADLS_ACUITY_SCORE: 47
ADLS_ACUITY_SCORE: 47
ADLS_ACUITY_SCORE: 61
ADLS_ACUITY_SCORE: 47
ADLS_ACUITY_SCORE: 61
ADLS_ACUITY_SCORE: 61
ADLS_ACUITY_SCORE: 47
ADLS_ACUITY_SCORE: 61
ADLS_ACUITY_SCORE: 47
ADLS_ACUITY_SCORE: 61
ADLS_ACUITY_SCORE: 47
ADLS_ACUITY_SCORE: 61
ADLS_ACUITY_SCORE: 47
ADLS_ACUITY_SCORE: 47

## 2025-06-22 NOTE — PLAN OF CARE
Goal Outcome Evaluation:    Plan of Care Reviewed With: patient    Overall Patient Progress: improving    Orientation: A&O x4  Aggression Stop Light: Green  Activity: SBA w/ IV pole  Diet/BS Checks: 2g Na  IV Access/Drains: Chest port accessed in ED, 1 PIV infusing LR 100mL/hr, 1 PIV SL, ostomy bag.   Pain Management: denies pain.  Abnormal VS/Results: VSS on RA.  Bowel/Bladder: Incontinent B/B. Ext cath in place.   Skin/Wounds: blanchable redness to stacy area and coccyx. L mastectomy.   D/C Disposition: pending  Other Info: K+ 5.7 , on K+ replacement protocol. Pt home meds sent down to pharmacy.

## 2025-06-22 NOTE — PLAN OF CARE
Goal Outcome Evaluation:    Plan of Care Reviewed With: patient    Overall Patient Progress: improving    Orientation: A&O x4  Aggression Stop Light: Green  Activity: SBA w/ IV pole  Diet/BS Checks: 2g Na  IV Access/Drains: Chest port hep locked, 1 PIV infusing LR 50mL/hr, 1 PIV SL, ostomy bag connected to drainage bag.    Pain Management: denies pain and nausea.   Abnormal VS/Results: VSS on RA.  Bowel/Bladder: Incontinent B/B. Ext cath in place.   Skin/Wounds: blanchable redness to coccyx. L mastectomy and lymphedema   D/C Disposition: possibly tomorrow.  Other Info: Pt home meds sent down to pharmacy will need for discharge. K+ 5.0 this evening.

## 2025-06-22 NOTE — PLAN OF CARE
"Goal Outcome Evaluation: No Change   K+ remains elevated in mid '5\" range. LR infusing at 100cc/hr. Excellent UOP per donnell. Ileostomy emptied of 450 ml's loose brown stool. C/O HA this am, tylenol given with relief; Nausea earlier in shift, Zofran given with good results. Tele= SR w/ BBB                            "

## 2025-06-22 NOTE — PROGRESS NOTES
[facility]    Hospitalist Progress Note    Date of Admission: 6/20/2025    Assessment & Plan   Mariluz Stoner is a 64 year old female with PMHx of hypertension, chemotherapy-induced cardiomyopathy with chronic HFrEF and CKD among other medical problems.     She was seen in [facility] ED on 6/8 for evaluation of nausea/vomiting and diagnosed with Vertigo and a UTI. UC obtained and grew 2 strains of >100k klebsiella aerogenes (susceptible to quinolones). She was then admitted to [facility] from 6/10/25-6/14/25 for management of severe sepsis dt pyelonephritis complicated by obstructive uropathy and right-sided nephrolithiasis s/p ureteral stent placement 6/11/25. UC that stay grew low quantity GP organism and GNR. Discharged from that stay on an additional 5 days of Levaquin, and prns for symptom mgmt (Flomax, Ditropan, Norco, Pyridium).     On the day of admission, patient was at the airport to leave for a trip to [place] when she developed ew onset lightheadedness, dizziness and anxiety/panic symptoms and was referred to [facility] ED for evaluation. On presentation the ED, she was found to be hypotensive with several lab abnormalities and was subsequently admitted for management of JESUS and associated electrolyte abnormalities.     Acute kidney injury: Improved  Hypovolemic hyponatremia: Improved  Hyperkalemia/hypokalemia  AGMA: Resolved  *With recent history and presentation as above.   *On admission this stay, was afebrile, hypotensive with SBPs 80-90s, HRs 80-100s. Labs notable for Na 1125, K 6.1, bicarb 12, Cr 5.0 (baseline 1.1-1.2), AG 19; WBC 11.1, trops 51 -- 28 (in the context of severe JESUS).  Following IV fluids and shifting of K initially dropped to 2.3, suspected spurious with repeat BMP notable for Na 129, K 4.2, Cr 2.23, bicarb 9, AG 22. Lactic acid normal. Urine sodium <20, unable to calculate FENa. BPs improved after IVFs. Clinical suspicion was for pre-renal JESUS dt recent  nausea/vomiting episodes after having taking Ditropan earlier in the day for ureteral stent discomfort. Denied recent illness or other changes in po intake, mild increase in ostomy output since being on abx. Admitted to the hospital for continued hydration.   -- Cr, Na, and AG all improved, K trended up to 5.6-5.7  -- now able to take po, will cont IVFs with LR but will decrease rate from 100ml/h to 50ml/h  -- etiology of ongoing hyperkalemia unclear -- given Lokelma x1 on 6/21 (did not take entire dose per RN), will repeat on 6/22  -- repeat BMP this afternoon  -- daily labs while hospitalized    Recent severe sepsis dt acute pyelonephritis complicated by obstructive uropathy and right-sided nephrolithiasis, s/p R ureteral stent placement 6/11/25  Abnormal UA, UTI ruled out  *With presentation/labs as above.   *UA abnl with 45 WBCs and small leuk est, 149 RBCs. CT abd/pelvis benign, showed stent in place with no hydronephrosis. Given dose of IV ciprofloxacin on admission to cover for possible concurrent UTI.   *UA this stay grew <10k candida albicans. Blood cultures remained negative. Cipro stopped 6/22.   -- OP follow up with urology as previously advised -- sched for stent exchange on 7/11    Chemotherapy-induced cardiomyopathy   Chronic HFrEF  Mild troponin elevation with recent probable nonischemic myocardial injury  Hypertension, with hypotension on admission  *TTE 6/11/25 with EF 20-25%. Troponin during recent admission 233->211.   *Trops on admission this stay improved,  51->28. Denies chest pain.   *Appeared hypovolemic on admission.   -- holding PTA lisinopril, metoprolol on admission until lytes/renal function and BPs stabilize, consider resumption at discharge  -- monitor for volume overload     Depression   *Chronic and stable on Paxil    Metastatic breast cancer  *Chronic and stable on anastrozole    Hx of subtotal colectomy with ileostomy in 11/2023 for history of complicated diverticulitis  *Noted  "more liquid-like stools in the past week since completing antibiotics. Typically takes Imodium or metamucil to bulk stools.  -- monitor ostomy output    FEN: cont IVFs with LR but decreased from 100ml/h to 50ml/h, cont low Na diet  DVT Prophylaxis: subQ heparin  Code Status: Full Code    Disposition: Anticipate discharge home tomorrow pending stable renal function and electrolytes    Medically Ready for Discharge: Anticipated Tomorrow      Margarita Serna, DO    Clinically Significant Risk Factors        # Hypokalemia: Lowest K = 2.3 mmol/L in last 2 days, will replace as needed  # Hyperkalemia: Highest K = 6.1 mmol/L in last 2 days, will monitor as appropriate  # Hyponatremia: Lowest Na = 125 mmol/L in last 2 days, will monitor as appropriate  # Hypochloremia: Lowest Cl = 94 mmol/L in last 2 days, will monitor as appropriate  # Hypocalcemia: Lowest Ca = 6.6 mg/dL in last 2 days, will monitor and replace as appropriate    # Anion Gap Metabolic Acidosis: Highest Anion Gap = 22 mmol/L in last 2 days, will monitor and treat as appropriate  # Hypoalbuminemia: Lowest albumin = 3.3 g/dL at 6/20/2025  6:42 PM, will monitor as appropriate       # Hypertension: Noted on problem list  # Chronic heart failure with reduced ejection fraction: last echo with EF <40%           # Obesity: Estimated body mass index is 38.14 kg/m  as calculated from the following:    Height as of 6/8/25: 1.651 m (5' 5\").    Weight as of this encounter: 104 kg (229 lb 3.2 oz)., PRESENT ON ADMISSION                Medical Decision Making       Please see A&P for additional details of medical decision making.          Interval History   Chart reviewed.  Per bedside RN, patient did not take all of her Lokelma last evening.  Seen this morning.  Tells me she feels tired and wants to take a nap, had some nausea this morning that improved after Zofran.  Has a slight headache right now.  Appetite okay, and ate most of her breakfast tray.  Ongoing " liquid stool output from ostomy but no reports of abdominal pain.  Generally she tells me that she is feeling better than when she first came to the hospital.    -Data reviewed today: I reviewed all new labs and imaging results over the last 24 hours. I personally reviewed no images or EKG's today.    Physical Exam   Temp: 99.2  F (37.3  C) Temp src: Oral BP: 106/55 Pulse: 86   Resp: 16 SpO2: 96 % O2 Device: None (Room air)    Vitals:    06/22/25 0644   Weight: 104 kg (229 lb 3.2 oz)     Vital Signs with Ranges  Temp:  [98.6  F (37  C)-99.2  F (37.3  C)] 99.2  F (37.3  C)  Pulse:  [] 86  Resp:  [10-26] 16  BP: ()/(42-68) 106/55  SpO2:  [90 %-100 %] 96 %  I/O last 3 completed shifts:  In: 2380 [I.V.:2380]  Out: 2400 [Urine:1850; Stool:550]    Constitutional: Resting comfortably, alert and conversing appropriately, NAD  Respiratory: CTAB, no wheeze, no increased work of breathing  Cardiovascular: HRRR, no MGR, no LE edema  GI: S, NT, +liquid brown stool from ileostomy  Skin/Integumen: warm/dry  Other:      Medications   Current Facility-Administered Medications   Medication Dose Route Frequency Provider Last Rate Last Admin    lactated ringers infusion   Intravenous Continuous Brien Allen  mL/hr at 06/22/25 0127 New Bag at 06/22/25 0127     Current Facility-Administered Medications   Medication Dose Route Frequency Provider Last Rate Last Admin    anastrozole (ARIMIDEX) tablet 1 mg  1 mg Oral At Bedtime Margarita Serna, DO   1 mg at 06/21/25 2214    ciprofloxacin (CIPRO) infusion 400 mg  400 mg Intravenous Q24H Margarita Serna  mL/hr at 06/21/25 2223 400 mg at 06/21/25 2223    heparin ANTICOAGULANT injection 5,000 Units  5,000 Units Subcutaneous Q8H Margarita Serna DO   5,000 Units at 06/21/25 2329    heparin lock flush 10 unit/mL injection 5-10 mL  5-10 mL Intracatheter Q24H Dante Romo MD   5 mL at 06/21/25 1697    heparin lock flush 100 unit/mL  injection 5-10 mL  5-10 mL Intracatheter Q28 Days Dante Romo MD        PARoxetine (PAXIL) tablet 20 mg  20 mg Oral At Bedtime Margarita Serna DO   20 mg at 06/21/25 2214    sodium chloride (PF) 0.9% PF flush 10-20 mL  10-20 mL Intracatheter Q28 Days Dante Romo MD        sodium chloride (PF) 0.9% PF flush 3 mL  3 mL Intracatheter Q8H Brien Allen MD   3 mL at 06/22/25 0541    sodium zirconium cyclosilicate (LOKELMA) packet 10 g  10 g Oral Once Margarita Serna DO           Data   Recent Labs   Lab 06/22/25  0532 06/21/25  2155 06/21/25  1623 06/21/25  0609 06/20/25  2109 06/20/25  1842   WBC  --   --   --  11.2*  --  11.1*   HGB  --   --   --  10.4*  --  12.6   MCV  --   --   --  95  --  98   PLT  --   --   --  374  --  435   * 135 133* 131*   < > 125*   POTASSIUM 5.6* 5.5* 5.7* 4.6   < > 6.1*   CHLORIDE 104 104 102 99   < > 94*   CO2 22 21* 20* 17*   < > 12*   BUN 32.1* 41.6* 46.8* 53.8*   < > 59.8*   CR 1.71* 2.11* 2.42* 3.70*   < > 5.03*   ANIONGAP 7 10 11 15   < > 19*   SINDY 9.0 8.2* 8.4* 8.2*   < > 8.7*   * 107* 102* 94   < > 125*   ALBUMIN  --   --   --   --   --  3.3*   PROTTOTAL  --   --   --   --   --  7.4   BILITOTAL  --   --   --   --   --  0.4   ALKPHOS  --   --   --   --   --  174*   ALT  --   --   --   --   --  21   AST  --   --   --   --   --  14   LIPASE  --   --   --   --   --  87*    < > = values in this interval not displayed.       No results found for this or any previous visit (from the past 24 hours).

## 2025-06-23 LAB
ACB COMPLEX DNA BLD POS QL NAA+NON-PROBE: NOT DETECTED
ANION GAP SERPL CALCULATED.3IONS-SCNC: 8 MMOL/L (ref 7–15)
B FRAGILIS DNA BLD POS QL NAA+NON-PROBE: NOT DETECTED
BACTERIA UR CULT: ABNORMAL
BUN SERPL-MCNC: 20.5 MG/DL (ref 8–23)
C ALBICANS DNA BLD POS QL NAA+NON-PROBE: NOT DETECTED
C AURIS DNA BLD POS QL NAA+NON-PROBE: NOT DETECTED
C GATTII+NEOFOR DNA BLD POS QL NAA+N-PRB: NOT DETECTED
C GLABRATA DNA BLD POS QL NAA+NON-PROBE: NOT DETECTED
C KRUSEI DNA BLD POS QL NAA+NON-PROBE: NOT DETECTED
C PARAP DNA BLD POS QL NAA+NON-PROBE: NOT DETECTED
C TROPICLS DNA BLD POS QL NAA+NON-PROBE: NOT DETECTED
CALCIUM SERPL-MCNC: 8.7 MG/DL (ref 8.8–10.4)
CHLORIDE SERPL-SCNC: 103 MMOL/L (ref 98–107)
CREAT SERPL-MCNC: 1.12 MG/DL (ref 0.51–0.95)
E CLOAC COMP DNA BLD POS NAA+NON-PROBE: NOT DETECTED
E COLI DNA BLD POS QL NAA+NON-PROBE: NOT DETECTED
E FAECALIS DNA BLD POS QL NAA+NON-PROBE: NOT DETECTED
E FAECIUM DNA BLD POS QL NAA+NON-PROBE: NOT DETECTED
EGFRCR SERPLBLD CKD-EPI 2021: 55 ML/MIN/1.73M2
ENTEROBACTERALES DNA BLD POS NAA+N-PRB: NOT DETECTED
GLUCOSE SERPL-MCNC: 109 MG/DL (ref 70–99)
GP B STREP DNA BLD POS QL NAA+NON-PROBE: NOT DETECTED
HAEM INFLU DNA BLD POS QL NAA+NON-PROBE: NOT DETECTED
HCO3 SERPL-SCNC: 24 MMOL/L (ref 22–29)
K OXYTOCA DNA BLD POS QL NAA+NON-PROBE: NOT DETECTED
KLEBSIELLA SP DNA BLD POS QL NAA+NON-PRB: NOT DETECTED
KLEBSIELLA SP DNA BLD POS QL NAA+NON-PRB: NOT DETECTED
L MONOCYTOG DNA BLD POS QL NAA+NON-PROBE: NOT DETECTED
N MEN DNA BLD POS QL NAA+NON-PROBE: NOT DETECTED
P AERUGINOSA DNA BLD POS NAA+NON-PROBE: NOT DETECTED
POTASSIUM SERPL-SCNC: 4.8 MMOL/L (ref 3.4–5.3)
PROTEUS SP DNA BLD POS QL NAA+NON-PROBE: NOT DETECTED
S AUREUS DNA BLD POS QL NAA+NON-PROBE: NOT DETECTED
S AUREUS+CONS DNA BLD POS NAA+NON-PROBE: DETECTED
S EPIDERMIDIS DNA BLD POS QL NAA+NON-PRB: NOT DETECTED
S LUGDUNENSIS DNA BLD POS QL NAA+NON-PRB: NOT DETECTED
S MALTOPHILIA DNA BLD POS QL NAA+NON-PRB: NOT DETECTED
S MARCESCENS DNA BLD POS NAA+NON-PROBE: NOT DETECTED
S PNEUM DNA BLD POS QL NAA+NON-PROBE: NOT DETECTED
S PYO DNA BLD POS QL NAA+NON-PROBE: NOT DETECTED
SALMONELLA DNA BLD POS QL NAA+NON-PROBE: NOT DETECTED
SODIUM SERPL-SCNC: 135 MMOL/L (ref 135–145)
STREPTOCOCCUS DNA BLD POS NAA+NON-PROBE: NOT DETECTED

## 2025-06-23 PROCEDURE — 250N000013 HC RX MED GY IP 250 OP 250 PS 637: Performed by: INTERNAL MEDICINE

## 2025-06-23 PROCEDURE — 99222 1ST HOSP IP/OBS MODERATE 55: CPT | Performed by: SPECIALIST

## 2025-06-23 PROCEDURE — 87040 BLOOD CULTURE FOR BACTERIA: CPT | Performed by: INTERNAL MEDICINE

## 2025-06-23 PROCEDURE — G0463 HOSPITAL OUTPT CLINIC VISIT: HCPCS

## 2025-06-23 PROCEDURE — 250N000013 HC RX MED GY IP 250 OP 250 PS 637: Performed by: SPECIALIST

## 2025-06-23 PROCEDURE — 250N000011 HC RX IP 250 OP 636: Performed by: INTERNAL MEDICINE

## 2025-06-23 PROCEDURE — 36415 COLL VENOUS BLD VENIPUNCTURE: CPT | Performed by: INTERNAL MEDICINE

## 2025-06-23 PROCEDURE — 258N000003 HC RX IP 258 OP 636: Performed by: INTERNAL MEDICINE

## 2025-06-23 PROCEDURE — 80048 BASIC METABOLIC PNL TOTAL CA: CPT | Performed by: INTERNAL MEDICINE

## 2025-06-23 PROCEDURE — 99232 SBSQ HOSP IP/OBS MODERATE 35: CPT | Performed by: INTERNAL MEDICINE

## 2025-06-23 PROCEDURE — G0545 PR INHRENT VISIT TO INPT/OBS W CNFRM/SUSPCT INFCT DIS BY INFCT DIS SPCIALST: HCPCS | Performed by: SPECIALIST

## 2025-06-23 PROCEDURE — 120N000001 HC R&B MED SURG/OB

## 2025-06-23 RX ORDER — FLUCONAZOLE 200 MG/1
200 TABLET ORAL DAILY
Status: DISCONTINUED | OUTPATIENT
Start: 2025-06-23 | End: 2025-06-25 | Stop reason: HOSPADM

## 2025-06-23 RX ADMIN — VANCOMYCIN HYDROCHLORIDE 2250 MG: 1 INJECTION, POWDER, LYOPHILIZED, FOR SOLUTION INTRAVENOUS at 09:45

## 2025-06-23 RX ADMIN — ANASTROZOLE 1 MG: 1 TABLET, COATED ORAL at 21:53

## 2025-06-23 RX ADMIN — Medication 5 ML: at 12:08

## 2025-06-23 RX ADMIN — Medication 5 ML: at 21:53

## 2025-06-23 RX ADMIN — HEPARIN SODIUM 5000 UNITS: 5000 INJECTION, SOLUTION INTRAVENOUS; SUBCUTANEOUS at 09:45

## 2025-06-23 RX ADMIN — PAROXETINE HYDROCHLORIDE 20 MG: 20 TABLET, FILM COATED ORAL at 21:53

## 2025-06-23 RX ADMIN — HEPARIN SODIUM 5000 UNITS: 5000 INJECTION, SOLUTION INTRAVENOUS; SUBCUTANEOUS at 16:21

## 2025-06-23 RX ADMIN — FLUCONAZOLE 200 MG: 200 TABLET ORAL at 17:31

## 2025-06-23 RX ADMIN — Medication 5 ML: at 05:24

## 2025-06-23 RX ADMIN — HEPARIN SODIUM 5000 UNITS: 5000 INJECTION, SOLUTION INTRAVENOUS; SUBCUTANEOUS at 23:51

## 2025-06-23 RX ADMIN — HEPARIN SODIUM 5000 UNITS: 5000 INJECTION, SOLUTION INTRAVENOUS; SUBCUTANEOUS at 01:40

## 2025-06-23 RX ADMIN — SODIUM CHLORIDE, SODIUM LACTATE, POTASSIUM CHLORIDE, AND CALCIUM CHLORIDE: .6; .31; .03; .02 INJECTION, SOLUTION INTRAVENOUS at 09:48

## 2025-06-23 ASSESSMENT — ACTIVITIES OF DAILY LIVING (ADL)
ADLS_ACUITY_SCORE: 48
ADLS_ACUITY_SCORE: 47
ADLS_ACUITY_SCORE: 47
ADLS_ACUITY_SCORE: 48
ADLS_ACUITY_SCORE: 47
ADLS_ACUITY_SCORE: 48
ADLS_ACUITY_SCORE: 47
ADLS_ACUITY_SCORE: 48
ADLS_ACUITY_SCORE: 48

## 2025-06-23 NOTE — PROGRESS NOTES
[facility]  Hospitalist Progress Note  Samir Cárdenas MD  06/23/2025    Assessment & Plan   Mariluz Stoner is a 64 year old female with PMHx of hypertension, chemotherapy-induced cardiomyopathy with chronic HFrEF and CKD among other medical problems.      She was seen in [facility] ED on 6/8 for evaluation of nausea/vomiting and diagnosed with Vertigo and a UTI. UC obtained and grew 2 strains of >100k klebsiella aerogenes (susceptible to quinolones). She was then admitted to [facility] from 6/10/25-6/14/25 for management of severe sepsis dt pyelonephritis complicated by obstructive uropathy and right-sided nephrolithiasis s/p ureteral stent placement 6/11/25. UC that stay grew low quantity GP organism and GNR. Discharged from that stay on an additional 5 days of Levaquin, and prns for symptom mgmt (Flomax, Ditropan, Norco, Pyridium).      On the day of admission, patient was at the airport to leave for a trip to [facility] when she developed ew onset lightheadedness, dizziness and anxiety/panic symptoms and was referred to [facility] ED for evaluation. On presentation the ED, she was found to be hypotensive with several lab abnormalities and was subsequently admitted for management of JESUS and associated electrolyte abnormalities.      Acute kidney injury: Improved  Hypovolemic hyponatremia: Improved  Hyperkalemia/hypokalemia  AGMA: Resolved  *With recent history and presentation as above.   *On admission this stay, was afebrile, hypotensive with SBPs 80-90s, HRs 80-100s. Labs notable for Na 1125, K 6.1, bicarb 12, Cr 5.0 (baseline 1.1-1.2), AG 19; WBC 11.1, trops 51 -- 28 (in the context of severe JESUS).  Following IV fluids and shifting of K initially dropped to 2.3, suspected spurious with repeat BMP notable for Na 129, K 4.2, Cr 2.23, bicarb 9, AG 22. Lactic acid normal. Urine sodium <20, unable to calculate FENa. BPs improved after IVFs. Clinical suspicion was for pre-renal JESUS dt  recent nausea/vomiting episodes after having taking Ditropan earlier in the day for ureteral stent discomfort. Denied recent illness or other changes in po intake, mild increase in ostomy output since being on abx. Admitted to the hospital for continued hydration.   -- Cr, Na, and AG all improved, K trended up to 5.6-5.7  -- now able to take po, will cont IVFs with LR but will decrease rate from 100ml/h to 50ml/h  -- etiology of ongoing hyperkalemia unclear -- given Lokelma x1 on 6/21 (did not take entire dose per RN), will repeat on 6/22  -- repeat BMP this afternoon  -- daily labs while hospitalized     Recent severe sepsis dt acute pyelonephritis complicated by obstructive uropathy and right-sided nephrolithiasis, s/p R ureteral stent placement 6/11/25  Abnormal UA, UTI ruled out  GPC in clusters 1/2 bottles late growth  *With presentation/labs as above.   *UA abnl with 45 WBCs and small leuk est, 149 RBCs. CT abd/pelvis benign, showed stent in place with no hydronephrosis. Given dose of IV ciprofloxacin on admission to cover for possible concurrent UTI.   *UA this stay grew <10k candida albicans. Blood cultures remained negative. Cipro stopped 6/22.   -- OP follow up with urology as previously advised -- sched for stent exchange on 7/11  -- Bcx shows staph species, started on vancomycin, ? Contaminant, will repeat BCx today.  -- ID to see     Chemotherapy-induced cardiomyopathy   Chronic HFrEF  Mild troponin elevation with recent probable nonischemic myocardial injury  Hypertension, with hypotension on admission  *TTE 6/11/25 with EF 20-25%. Troponin during recent admission 233->211.   *Trops on admission this stay improved,  51->28. Denies chest pain.   *Appeared hypovolemic on admission.   -- holding PTA lisinopril, metoprolol on admission until lytes/renal function and BPs stabilize, consider resumption at discharge, blood pressure is well controlled.  -- monitor for volume overload     Depression   *Chronic  and stable on Paxil     Metastatic breast cancer  *Chronic and stable on anastrozole     Hx of subtotal colectomy with ileostomy in 11/2023 for history of complicated diverticulitis  *Noted more liquid-like stools in the past week since completing antibiotics. Typically takes Imodium or metamucil to bulk stools.  -- monitor ostomy output     FEN: cont IVFs with LR but decreased from 100ml/h to 50ml/h, cont low Na diet  DVT Prophylaxis: subQ heparin  Code Status: Full Code     Disposition: Anticipate discharge home tomorrow pending Bcx ID     Medically Ready for Discharge:   -- anticipate on 6/24 vs 6/25    Disposition:   -- anticipate home    Interval History   -- chart reviewed  -- Bcx 1/2 with GPC  -- Cr is back to baseline  -- patient states she is feeling really well    -Data reviewed today: I reviewed all new labs and imaging over the last 24 hours. I personally reviewed no images or EKG's today.    Physical Exam    , Blood pressure 111/57, pulse 87, temperature 99  F (37.2  C), temperature source Oral, resp. rate 16, weight 103.6 kg (228 lb 8 oz), SpO2 98%, not currently breastfeeding.  Vitals:    06/22/25 0644 06/23/25 0557   Weight: 104 kg (229 lb 3.2 oz) 103.6 kg (228 lb 8 oz)     Vital Signs with Ranges  Temp:  [98.3  F (36.8  C)-99  F (37.2  C)] 99  F (37.2  C)  Pulse:  [83-99] 87  Resp:  [16-20] 16  BP: (111-131)/(57-71) 111/57  SpO2:  [94 %-98 %] 98 %  I/O's Last 24 hours  I/O last 3 completed shifts:  In: 840 [P.O.:840]  Out: 1900 [Urine:1625; Stool:275]    Constitutional: Awake, alert, cooperative, no apparent distress  Respiratory: Clear to auscultation bilaterally, no crackles or wheezing  Cardiovascular: Regular rate and rhythm, normal S1 and S2   GI: Normal bowel sounds, soft, non-distended, non-tender  Skin/Integumen: No rashes, no cyanosis, no edema  Other:      Medications   All medications were reviewed.  Current Facility-Administered Medications   Medication Dose Route Frequency Provider Last  Rate Last Admin    lactated ringers infusion   Intravenous Continuous Margarita Serna, DO 50 mL/hr at 06/23/25 0948 New Bag at 06/23/25 0948     Current Facility-Administered Medications   Medication Dose Route Frequency Provider Last Rate Last Admin    anastrozole (ARIMIDEX) tablet 1 mg  1 mg Oral At Bedtime Margarita Serna DO   1 mg at 06/22/25 2122    heparin ANTICOAGULANT injection 5,000 Units  5,000 Units Subcutaneous Q8H Margarita Serna DO   5,000 Units at 06/23/25 0945    heparin lock flush 10 unit/mL injection 5-10 mL  5-10 mL Intracatheter Q24H Dante Romo MD   5 mL at 06/21/25 2215    heparin lock flush 100 unit/mL injection 5-10 mL  5-10 mL Intracatheter Q28 Days Dante Romo MD        PARoxetine (PAXIL) tablet 20 mg  20 mg Oral At Bedtime Margarita Serna DO   20 mg at 06/22/25 2122    sodium chloride (PF) 0.9% PF flush 10-20 mL  10-20 mL Intracatheter Q28 Days Dante Romo MD        sodium chloride (PF) 0.9% PF flush 3 mL  3 mL Intracatheter Q8H rBien Allen MD   3 mL at 06/23/25 0524    [START ON 6/24/2025] vancomycin (VANCOCIN) 1,500 mg in 0.9% NaCl 250 mL intermittent infusion  1,500 mg Intravenous Q18H Brien Allen MD            Data   Recent Labs   Lab 06/23/25  0525 06/22/25  1605 06/22/25  0532 06/21/25  1623 06/21/25  0609 06/20/25  2109 06/20/25  1842   WBC  --   --   --   --  11.2*  --  11.1*   HGB  --   --   --   --  10.4*  --  12.6   MCV  --   --   --   --  95  --  98   PLT  --   --   --   --  374  --  435    132* 133*   < > 131*   < > 125*   POTASSIUM 4.8 5.0 5.6*   < > 4.6   < > 6.1*   CHLORIDE 103 103 104   < > 99   < > 94*   CO2 24 22 22   < > 17*   < > 12*   BUN 20.5 27.0* 32.1*   < > 53.8*   < > 59.8*   CR 1.12* 1.48* 1.71*   < > 3.70*   < > 5.03*   ANIONGAP 8 7 7   < > 15   < > 19*   SINDY 8.7* 8.8 9.0   < > 8.2*   < > 8.7*   * 106* 103*   < > 94   < > 125*   ALBUMIN  --   --   --   --   --   --   3.3*   PROTTOTAL  --   --   --   --   --   --  7.4   BILITOTAL  --   --   --   --   --   --  0.4   ALKPHOS  --   --   --   --   --   --  174*   ALT  --   --   --   --   --   --  21   AST  --   --   --   --   --   --  14   LIPASE  --   --   --   --   --   --  87*    < > = values in this interval not displayed.       No results found for this or any previous visit (from the past 24 hours).    Samir Cárdenas MD  Text Page  (7am to 6pm)

## 2025-06-23 NOTE — CONSULTS
[facility]    Infectious Disease Consultation     Date of Admission:  6/20/2025  Date of Consult (When I saw the patient): 06/23/25      Assessment:  64YF with metastatic breast cancer and recent history of pyelonephritis due klebsiella aerogenes secondary to obstructive uropathy status post right ureteral stent, chemotherapy induced cardiomyopathy with chronic CHF and chronic kidney disease, who has been admitted since 6/20/2025 due to light headedness and abdominal cramps, and was found to have hyponatremia and JESUS with creatinine of 5.03 (baseline 1.16) felt to be pre renal. Her imaging appears stable with a right ureteral stent and non obstructing right renal calculus.  A single blood cx is growing staph sp, likely a contaminant    -Staph sp in a single blood cx is likely a contaminant  -JESUS is improving  -Recent hospitalization from 6/10-6/14 for pyelonephritis secondary to right ureterolithiasis with hydronephrosis and JESUS related to Klebsiella aerogenes, s/p right ureteral stent, and treated with Levofloxacin  -Metastatic breast cancer- On anastrozole   -Chronic medical conditions   hemotherapy induced cardiomyopathy, HTN, Depression, s/p ileostomy    Recommendations  Discontinue Vancomycin, as a single blood cx with staph sp (other than staph aureus and staph lugdunensis), likely represents contamination  CT abdomen pelvis does not show any focus of infection  JESUS is improving  Monitor off antibiotics  Urine cx is growing < 10,000 cfu/ml of candida albicans. Patient is planned for definitive stone management soon, treat with oral Fluconazole 200 mg daily for one week to decrease risk of post procedure fungemia    Thais Osborn MD    Reason for Consult   Reason for consult: I was asked to evaluate this patient for bacteremia.    Primary Care Physician   Guy De La Cruz[facility]    Chief Complaint   Dizziness and abdominal pain    History is obtained from the patient and medical  records    History of Present Illness   Mariluz Stoner is a 64 year old female with hx of breast cancer, ileostomy, recent hospitalization from 6/10-6/14 for pyelonephritis secondary to right ureterolithiasis with hydronephrosis and JESUS related to Klebsiella aerogenes for which she was treated at [facility] with right ureteral stent, and discharged on Levofloxacin. Now admitted with light headedness and abdominal cramps, and was found to have hyponatremia and JESUS with creatinine of 5.03 (baseline 1.16) felt to be pre renal. Her imaging appears stable with a right ureteral stent and non obstructing right renal calculus.     She has remained afebrile, had mild leukocytosis on admission and has a single positive blood cx for staph sp -not staph aureus or staph lugdunensis. Urine cx grew small amount of candida albicans. Patient was initiated on vancomycin and ID has been asked to assist with further management      Past Medical History   I have reviewed this patient's medical history and updated it with pertinent information if needed.   Past Medical History:   Diagnosis Date    Basal cell carcinoma     Breast cancer (H)     HFrEF (heart failure with reduced ejection fraction) (H)     Hypertension     Nausea with vomiting 09/21/2024    Nephrolithiasis     Obesity     Pyelonephritis     Secondary cardiomyopathy (H)        Past Surgical History   I have reviewed this patient's surgical history and updated it with pertinent information if needed.  Past Surgical History:   Procedure Laterality Date    BREAST SURGERY      left tissue expander, LEFT MASTECTOMY    CHOLECYSTECTOMY      COLECTOMY SUBTOTAL  11/2023    with ileostomy; done at [facility] for history of complicated diverticulitis    GYN SURGERY      oophorectomy    IR CHEST PORT PLACEMENT > 5 YRS OF AGE  01/18/2019    IR CHEST PORT PLACEMENT > 5 YRS OF AGE  04/16/2013    REMOVE TISSUE EXPANDER BREAST  04/16/2014    Procedure: REMOVAL OF LEFT BREAST TISSUE  EXPANDER, IRRIGATION AND DEBRIDEMENT OF LEFT BREAST;  Surgeon: Marlon Luz MD;  Location: [facility]       Prior to Admission Medications   Prior to Admission Medications   Prescriptions Last Dose Informant Patient Reported? Taking?   HYDROcodone-acetaminophen (NORCO) 5-325 MG tablet   Yes Yes   Sig: Take 1 tablet by mouth every 6 hours as needed for severe pain.   PARoxetine (PAXIL) 20 MG tablet  Self No Yes   Sig: TAKE 1 TABLET BY MOUTH ONCE DAILY .  APPOINTMENT  NEEDED  FOR  ADDITIONAL  REFILLS   Patient taking differently: Take 20 mg by mouth at bedtime.   Vitamin D3 (VITAMIN D-1000 MAX ST) 25 mcg (1000 units) tablet  Self Yes Yes   Sig: Take 25 mcg by mouth at bedtime.   acetaminophen (TYLENOL) 500 MG tablet  Self Yes Yes   Sig: Take 1,000 mg by mouth every 6 hours as needed for mild pain   anastrozole (ARIMIDEX) 1 MG tablet  Self No Yes   Sig: Take 1 tablet (1 mg) by mouth daily   Patient taking differently: Take 1 mg by mouth at bedtime.   calcium carbonate 600 mg-vitamin D 400 units (CALTRATE) 600-400 MG-UNIT per tablet  Self Yes Yes   Sig: Take 1 tablet by mouth at bedtime.   docusate sodium (COLACE) 100 MG capsule   Yes Yes   Sig: Take 100 mg by mouth 2 times daily.   lisinopril (ZESTRIL) 2.5 MG tablet   No Yes   Sig: Take 1 tablet (2.5 mg) by mouth daily.   loperamide (IMODIUM A-D) 2 MG tablet   Yes Yes   Sig: Take 2 mg by mouth every other day. At bedtime   medical cannabis (Patient's own supply)  Self Yes Yes   Sig: See Admin Instructions (The purpose of this order is to document that the patient reports taking medical cannabis.  This is not a prescription, and is not used to certify that the patient has a qualifying medical condition.)   melatonin 5 MG tablet   Yes Yes   Sig: Take 5 mg by mouth nightly as needed for sleep.   metoprolol succinate ER (TOPROL XL) 50 MG 24 hr tablet   No Yes   Sig: Take 1 tablet (50 mg) by mouth at bedtime.   oxyBUTYnin (DITROPAN) 5 MG tablet   Yes Yes   Sig: Take 5 mg  by mouth 3 times daily. for bladder cramps/pain from stent   phenazopyridine (PYRIDIUM) 200 MG tablet   Yes Yes   Sig: Take 200 mg by mouth 3 times daily as needed for irritation.   tamsulosin (FLOMAX) 0.4 MG capsule   Yes Yes   Sig: Take 0.4 mg by mouth daily as needed (stent discomfort).      Facility-Administered Medications: None     Allergies   Allergies   Allergen Reactions    Spironolactone      Suspected cause of acute kidney injury.       Immunization History   Immunization History   Administered Date(s) Administered    COVID-19 12+ (MODERNA) 09/14/2024    COVID-19 Bivalent 12+ (Pfizer) 10/07/2022    COVID-19 Monovalent 18+ (Moderna) 01/26/2021, 02/23/2021, 11/11/2021    Influenza (IIV3) PF 11/07/2001, 10/01/2012    Influenza Vaccine 18-64 (Flublok) 12/11/2019    Influenza Vaccine >6 months,quad, PF 03/04/2016, 01/29/2018, 10/06/2023    Influenza Vaccine Trivalent (FluBlok) 09/14/2024    Mantoux Tuberculin Skin Test 11/02/2000    TDAP (Adacel,Boostrix) 08/03/2010    Td (Adult), Adsorbed 08/08/1995    Tdap (Adult) Unspecified Formulation 08/03/2010    Zoster recombinant adjuvanted (Shingrix) 10/07/2022       Social History   I have reviewed this patient's social history and updated it with pertinent information if needed. Mariluz Stoner  reports that she quit smoking about 25 years ago. Her smoking use included cigarettes. She has never used smokeless tobacco. She reports current alcohol use. She reports current drug use. Drug: Marijuana.    Family History   I have reviewed this patient's family history and updated it with pertinent information if needed.   No family history on file.    Review of Systems   The 10 point Review of Systems is  as per HPI    Physical Exam   Temp: 99.5  F (37.5  C) Temp src: Axillary BP: 116/62 Pulse: 87   Resp: 16 SpO2: 98 % O2 Device: None (Room air)    Vital Signs with Ranges  Temp:  [98.3  F (36.8  C)-99.5  F (37.5  C)] 99.5  F (37.5  C)  Pulse:  [83-99] 87  Resp:  [16-20]  16  BP: (111-131)/(57-71) 116/62  SpO2:  [94 %-98 %] 98 %  228 lbs 8 oz  Body mass index is 38.02 kg/m .    GENERAL APPEARANCE:  awake  EYES: Eyes grossly normal to inspection  NECK: no adenopathy  RESP: lungs clear   CV: regular rates and rhythm  LYMPHATICS: normal ant/post cervical and supraclavicular nodes  ABDOMEN: soft, tender, ostomy  MS: extremities normal  SKIN: no suspicious lesions or rashes        Data   All laboratory data reviewed  Component      Latest Ref Rng 6/21/2025  6:09 AM 6/23/2025  5:25 AM   Sodium      135 - 145 mmol/L  135    Potassium      3.4 - 5.3 mmol/L  4.8    Chloride      98 - 107 mmol/L  103    Carbon Dioxide (CO2)      22 - 29 mmol/L  24    Anion Gap      7 - 15 mmol/L  8    Urea Nitrogen      8.0 - 23.0 mg/dL  20.5    Creatinine      0.51 - 0.95 mg/dL  1.12 (H)    GFR Estimate      >60 mL/min/1.73m2  55 (L)    Calcium      8.8 - 10.4 mg/dL  8.7 (L)    Glucose      70 - 99 mg/dL  109 (H)    WBC      4.0 - 11.0 10e3/uL 11.2 (H)     RBC Count      3.80 - 5.20 10e6/uL 3.37 (L)     Hemoglobin      11.7 - 15.7 g/dL 10.4 (L)     Hematocrit      35.0 - 47.0 % 32.0 (L)     MCV      78 - 100 fL 95     MCH      26.5 - 33.0 pg 30.9     MCHC      31.5 - 36.5 g/dL 32.5     RDW      10.0 - 15.0 % 14.8     Platelet Count      150 - 450 10e3/uL 374        Imaging  EXAM: CT ABDOMEN PELVIS W/O CONTRAST  LOCATION: [facility]  DATE: 6/20/2025     INDICATION: Recent right ureteral stent placement. Now with hypotension.  COMPARISON: 9/21/2024  TECHNIQUE: CT scan of the abdomen and pelvis was performed without IV contrast. Multiplanar reformats were obtained. Dose reduction techniques were used.  CONTRAST: None.     FINDINGS:   LOWER CHEST: Mild dependent atelectasis. No focal airspace disease.     HEPATOBILIARY: Liver appears unremarkable. Status post cholecystectomy.     PANCREAS: Normal.     SPLEEN: Normal.     ADRENAL GLANDS: Normal.     KIDNEYS/BLADDER: Right double-J  ureteral stent with proximal coil in the renal pelvis and distal coil in the bladder. Nonobstructing right renal midpole calculus measuring 1.6 cm. No ureteral calculi. No hydronephrosis. Urinary bladder appears normal.     BOWEL: Status post subtotal colectomy with Gutierrez's pouch formation. Right lower quadrant ileostomy. Parastomal hernia containing several nondilated small bowel loops. No bowel obstruction or evidence of bowel inflammation.     LYMPH NODES: No lymphadenopathy.     VASCULATURE: Mild atherosclerotic calcifications.     PELVIC ORGANS: Unremarkable.     MUSCULOSKELETAL: Mild multilevel degenerative changes of the spine.                                                                      IMPRESSION:   1.  No acute findings in the abdomen or pelvis, within limitations of noncontrast exam.     2.  Right double-J ureteral stent in place. No hydronephrosis. Nonobstructing right renal midpole 1.6 cm calculus. No ureteral calculi.     3.  Right lower quadrant ileostomy with parastomal hernia containing nondilated small bowel. No bowel obstruction or evidence of strangulation.

## 2025-06-23 NOTE — PLAN OF CARE
"Patient Name: Omar  MRN: [id number]  Date of Admission: 6/20/2025  Reason for Admission: hypotension  Level of Care: acute    Vitals:   BP Readings from Last 1 Encounters:   06/23/25 131/64     Pulse Readings from Last 1 Encounters:   06/23/25 85     Wt Readings from Last 1 Encounters:   06/22/25 104 kg (229 lb 3.2 oz)     Ht Readings from Last 1 Encounters:   06/08/25 1.651 m (5' 5\")     Estimated body mass index is 38.14 kg/m  as calculated from the following:    Height as of 6/8/25: 1.651 m (5' 5\").    Weight as of this encounter: 104 kg (229 lb 3.2 oz).  Temp Readings from Last 1 Encounters:   06/23/25 98.5  F (36.9  C) (Oral)       Pain: Denies     CV Surgery Patient: No    Assessment    Resp: Lung sounds clear. On room air.  Telemetry: SR w/ BBB  Neuro: A+Ox4 neuros intact ex baseline neuropathy in BLE  GI/: voiding adequately via purewick. Flatus and stool through ostomy. Bag changed  Skin/Wounds: blanchable redness to coccyx.  Lines/Drains: port heparin locked. PIV x2  Activity: SBA  Sleep: fair    Aggression Stop Light: Green          Patient Care Plan: monitor lytes and hypotension. Continue plan of care   "

## 2025-06-23 NOTE — DISCHARGE INSTRUCTIONS
OSTOMY CARES  Resume previous outpatient care plan   No changes to DME  [facility] Outpatient ostomy clinic: [facility] 8a-4p [phone]

## 2025-06-23 NOTE — CONSULTS
"[facility]  WOC Nurse Inpatient Assessment     Consulted for: Ostomy ileostomy     Summary: patient with established ileostomy and home supplies at bedside, states she uses metamucil and immodium to help titrate stool volumes, initial woc assessment 6/23, woc team signing off at this time, reocnsult as needed     WOC nurse follow-up plan: signing off    Patient History (according to provider note(s):      \"64 year old female with PMHx of hypertension, chemotherapy-induced cardiomyopathy with chronic HFrEF and CKD among other medical problems.      She was seen in [facility] ED on 6/8 for evaluation of nausea/vomiting and diagnosed with Vertigo and a UTI. UC obtained and grew 2 strains of >100k klebsiella aerogenes (susceptible to quinolones). She was then admitted to [facility] from 6/10/25-6/14/25 for management of severe sepsis dt pyelonephritis complicated by obstructive uropathy and right-sided nephrolithiasis s/p ureteral stent placement 6/11/25. UC that stay grew low quantity GP organism and GNR. Discharged from that stay on an additional 5 days of Levaquin, and prns for symptom mgmt (Flomax, Ditropan, Norco, Pyridium). \"    Assessment:      Areas visualized during today's visit: Focused: and Abdomen    Assessment of Established end Ileostomy:  How long has patient had ostomy: 2 years   Stoma: viable, healthy, normal-appearing, and protruberant  Mucutaneous junction: not visualized (barrier in place)  Peristomal skin: not visualized (barrier in place), patient denies   Output: liquid, soft, and brown    Is patient independent with ostomy care?: Yes and Independent  Home pouching system: Tiffanie one piece with filter   Pouching issues and/or educational needs:none  Interventions completed today: Fluid and electrolyte balance , When to seek medical attention, Lifestyle adjustments , and Discharge instructions  Pouching system in use while hospitalized:  Tiffanie one " piece  Supplies location: at bedside         Treatment Plan:     06/23/25 1314  Ileostomy; RLQ Care  EFFECTIVE NOW        Comments: Patient has recently tried high output pouching and does not like the larger capacity of the pouch, prefers to use home supplies, tiffanie drainable pouches  ~ Encourage patient participation with ostomy care,  ~ Empty pouch when 1/3 to 1/2 full,  ~ Notify WOC for ongoing ostomy pouch leakage,  ~ Document stoma output volume, color, consistency EVERY shift  ~ Supplies used  ~ Pouch: Tiffanie (in suitcase) with filter one piece   Question Answer Comment   Type of Ostomy Ileostomy    Location RLQ    Pouch Change Frequency PRN Leakage    Pouch changed by Patient    Pouch emptied by Patient independent    Straight Drainage No            06/23/25 1317  Patient care order  PER UNIT ROUTINE        Comments: Per patient, she requires daily metamucil and immodium to help titrate ileostomy output          Orders: Written    RECOMMEND PRIMARY TEAM ORDER: None, at this time  Education provided: plan of care  Discussed plan of care with: Patient and Nurse  Notify WOC if wound(s) deteriorate.  Nursing to notify the Provider(s) and re-consult the WOC Nurse if new skin concern.    DATA:     Current support surface: Standard  Standard gel mattress (Isoflex)  Containment of urine/stool: Ileostomy pouch  BMI: Body mass index is 38.02 kg/m .   Active diet order: Orders Placed This Encounter      2 Gram Sodium Diet     Output: I/O last 3 completed shifts:  In: 840 [P.O.:840]  Out: 1900 [Urine:1625; Stool:275]     Labs:   Recent Labs   Lab 06/21/25  0609 06/20/25  1842   ALBUMIN  --  3.3*   HGB 10.4* 12.6   WBC 11.2* 11.1*     Pressure injury risk assessment:   Sensory Perception: 4-->no impairment  Moisture: 3-->occasionally moist  Activity: 3-->walks occasionally  Mobility: 3-->slightly limited  Nutrition: 3-->adequate  Friction and Shear: 3-->no apparent problem  Sage Score: 19    Sandra CWOCN   1st  choice: Securely message with [employer] ([facility] [employer] [facility])   (2nd option: [facility] Office Phone [phone], messages checked periodically Mon-Fri 8a-4p)

## 2025-06-23 NOTE — PLAN OF CARE
Patient A/Ox4. VSS, RA. Tele SR/BBB. Denies pain. LS clear. SBA. Ileostomy with loose output. Positive BC this AM - IV Vanco given. ID consulted - discontinued Vanco and started po Diflucan. Repeat BC in process. PIV infusing LR @ 50ml/hr. Port accessed - heparin locked.

## 2025-06-23 NOTE — PHARMACY-VANCOMYCIN DOSING SERVICE
Pharmacy Vancomycin Initial Note  Date of Service 2025  Patient's  1960  64 year old, female    Indication: Sepsis    Current estimated CrCl = Estimated Creatinine Clearance: 60.6 mL/min (A) (based on SCr of 1.12 mg/dL (H)).    Creatinine for last 3 days  2025:  6:42 PM Creatinine 5.03 mg/dL; 10:17 PM Creatinine 2.23 mg/dL  2025:  6:09 AM Creatinine 3.70 mg/dL;  4:23 PM Creatinine 2.42 mg/dL;  9:55 PM Creatinine 2.11 mg/dL  2025:  5:32 AM Creatinine 1.71 mg/dL;  4:05 PM Creatinine 1.48 mg/dL  2025:  5:25 AM Creatinine 1.12 mg/dL    Recent Vancomycin Level(s) for last 3 days  No results found for requested labs within last 3 days.      Vancomycin IV Administrations (past 72 hours)        No vancomycin orders with administrations in past 72 hours.                    Nephrotoxins and other renal medications (From now, onward)      Start     Dose/Rate Route Frequency Ordered Stop    25 0900  vancomycin (VANCOCIN) 2,250 mg in sodium chloride 0.9 % 500 mL intermittent infusion         2,250 mg  over 120 Minutes Intravenous ONCE 25 0852              Contrast Orders - past 72 hours (72h ago, onward)      None          Insight[employer] Prediction of Planned Initial Vancomycin Regimen  Loading dose: 2250 mg at 09:00 2025.  Regimen: 1500 mg IV every 18 hours.  Start time: 03:00 on 2025  Exposure target: AUC24 (range) 400-600 mg/L.hr   AUC24,ss: 591 mg/L.hr  Probability of AUC24 > 400: 86 %  Ctrough,ss: 18.6 mg/L  Probability of Ctrough,ss > 20: 44 %  Probability of nephrotoxicity (Lodise MICHAEL ): 15 %        Plan:  Start vancomycin 2250 mg IV once followed by 1500 mg IV q18h   Vancomycin monitoring method: AUC  Vancomycin therapeutic monitoring goal: 400-600 mg*h/L  Pharmacy will check vancomycin levels as appropriate in 1-3 Days.    Serum creatinine levels will be ordered daily for the first week of therapy and at least twice weekly for subsequent weeks.      Linda  Jeffry, [facility]

## 2025-06-24 LAB
ANION GAP SERPL CALCULATED.3IONS-SCNC: 10 MMOL/L (ref 7–15)
BUN SERPL-MCNC: 14.2 MG/DL (ref 8–23)
CALCIUM SERPL-MCNC: 9 MG/DL (ref 8.8–10.4)
CHLORIDE SERPL-SCNC: 101 MMOL/L (ref 98–107)
CREAT SERPL-MCNC: 1 MG/DL (ref 0.51–0.95)
EGFRCR SERPLBLD CKD-EPI 2021: 63 ML/MIN/1.73M2
ERYTHROCYTE [DISTWIDTH] IN BLOOD BY AUTOMATED COUNT: 14.4 % (ref 10–15)
GLUCOSE SERPL-MCNC: 99 MG/DL (ref 70–99)
HCO3 SERPL-SCNC: 23 MMOL/L (ref 22–29)
HCT VFR BLD AUTO: 30.5 % (ref 35–47)
HGB BLD-MCNC: 10.2 G/DL (ref 11.7–15.7)
MCH RBC QN AUTO: 31.4 PG (ref 26.5–33)
MCHC RBC AUTO-ENTMCNC: 33.4 G/DL (ref 31.5–36.5)
MCV RBC AUTO: 94 FL (ref 78–100)
PLATELET # BLD AUTO: 373 10E3/UL (ref 150–450)
POTASSIUM SERPL-SCNC: 4.6 MMOL/L (ref 3.4–5.3)
RBC # BLD AUTO: 3.25 10E6/UL (ref 3.8–5.2)
SODIUM SERPL-SCNC: 134 MMOL/L (ref 135–145)
WBC # BLD AUTO: 8.5 10E3/UL (ref 4–11)

## 2025-06-24 PROCEDURE — 250N000011 HC RX IP 250 OP 636: Performed by: INTERNAL MEDICINE

## 2025-06-24 PROCEDURE — 250N000013 HC RX MED GY IP 250 OP 250 PS 637: Performed by: SPECIALIST

## 2025-06-24 PROCEDURE — 85014 HEMATOCRIT: CPT | Performed by: INTERNAL MEDICINE

## 2025-06-24 PROCEDURE — 36415 COLL VENOUS BLD VENIPUNCTURE: CPT | Performed by: INTERNAL MEDICINE

## 2025-06-24 PROCEDURE — 80048 BASIC METABOLIC PNL TOTAL CA: CPT | Performed by: INTERNAL MEDICINE

## 2025-06-24 PROCEDURE — 250N000013 HC RX MED GY IP 250 OP 250 PS 637: Performed by: INTERNAL MEDICINE

## 2025-06-24 PROCEDURE — 120N000001 HC R&B MED SURG/OB

## 2025-06-24 PROCEDURE — 99232 SBSQ HOSP IP/OBS MODERATE 35: CPT | Performed by: HOSPITALIST

## 2025-06-24 PROCEDURE — 258N000003 HC RX IP 258 OP 636: Performed by: INTERNAL MEDICINE

## 2025-06-24 RX ADMIN — SODIUM CHLORIDE, SODIUM LACTATE, POTASSIUM CHLORIDE, AND CALCIUM CHLORIDE: .6; .31; .03; .02 INJECTION, SOLUTION INTRAVENOUS at 05:54

## 2025-06-24 RX ADMIN — ANASTROZOLE 1 MG: 1 TABLET, COATED ORAL at 21:34

## 2025-06-24 RX ADMIN — FLUCONAZOLE 200 MG: 200 TABLET ORAL at 09:42

## 2025-06-24 RX ADMIN — Medication 5 ML: at 21:34

## 2025-06-24 RX ADMIN — ACETAMINOPHEN 1000 MG: 500 TABLET, FILM COATED ORAL at 21:33

## 2025-06-24 RX ADMIN — PAROXETINE HYDROCHLORIDE 20 MG: 20 TABLET, FILM COATED ORAL at 21:34

## 2025-06-24 RX ADMIN — HEPARIN SODIUM 5000 UNITS: 5000 INJECTION, SOLUTION INTRAVENOUS; SUBCUTANEOUS at 09:41

## 2025-06-24 RX ADMIN — ONDANSETRON 4 MG: 4 TABLET, ORALLY DISINTEGRATING ORAL at 01:41

## 2025-06-24 RX ADMIN — HEPARIN SODIUM 5000 UNITS: 5000 INJECTION, SOLUTION INTRAVENOUS; SUBCUTANEOUS at 16:50

## 2025-06-24 ASSESSMENT — ACTIVITIES OF DAILY LIVING (ADL)
ADLS_ACUITY_SCORE: 48
ADLS_ACUITY_SCORE: 46
ADLS_ACUITY_SCORE: 48
ADLS_ACUITY_SCORE: 46
ADLS_ACUITY_SCORE: 48
ADLS_ACUITY_SCORE: 48
ADLS_ACUITY_SCORE: 47
ADLS_ACUITY_SCORE: 48
ADLS_ACUITY_SCORE: 46
ADLS_ACUITY_SCORE: 48
ADLS_ACUITY_SCORE: 46
ADLS_ACUITY_SCORE: 46
ADLS_ACUITY_SCORE: 48
ADLS_ACUITY_SCORE: 46
ADLS_ACUITY_SCORE: 48
DEPENDENT_IADLS:: INDEPENDENT
ADLS_ACUITY_SCORE: 48

## 2025-06-24 NOTE — PLAN OF CARE
Goal Outcome Evaluation:      Plan of Care Reviewed With: patient          Outcome Evaluation: Home to son's house pending medically ready

## 2025-06-24 NOTE — CONSULTS
Care Management Initial Consult    General Information  Assessment completed with: Patient,    Type of CM/SW Visit: Initial Assessment    Primary Care Provider verified and updated as needed: Yes   Readmission within the last 30 days:        Reason for Consult: discharge planning  Advance Care Planning: Advance Care Planning Reviewed: present on chart          Communication Assessment  Patient's communication style: spoken language (English or Bilingual)    Hearing Difficulty or Deaf: no   Wear Glasses or Blind: no    Cognitive  Cognitive/Neuro/Behavioral: WDL  Level of Consciousness: alert                   Living Environment:   People in home: spouse     Current living Arrangements: house      Able to return to prior arrangements: other (see comments) (plans to go to son's house)       Family/Social Support:  Care provided by: self  Provides care for: spouse  Marital Status:   Support system:            Description of Support System:           Current Resources:   Patient receiving home care services: No        Community Resources: None  Equipment currently used at home: colostomy/ostomy supplies  Supplies currently used at home:      Employment/Financial:  Employment Status: retired        Financial Concerns:             Does the patient's insurance plan have a 3 day qualifying hospital stay waiver?  Yes     Which insurance plan 3 day waiver is available? Alternative insurance waiver    Will the waiver be used for post-acute placement? No    Lifestyle & Psychosocial Needs:  Social Drivers of Health     Food Insecurity: No Food Insecurity (6/11/2025)    Received from [facility]     Hunger Vital Sign     Worried About Running Out of Food in the Last Year: Never true     Ran Out of Food in the Last Year: Never true   Depression: Not at risk (9/30/2024)    PHQ-2     PHQ-2 Score: 2   Housing Stability: Low Risk  (6/11/2025)    Received from [facility]     Housing Stability Vital Sign      "Unable to Pay for Housing in the Last Year: No     Number of Times Moved in the Last Year: 0     Homeless in the Last Year: No   Tobacco Use: Medium Risk (6/11/2025)    Received from [facility]     Patient History     Smoking Tobacco Use: Former     Smokeless Tobacco Use: Never     Passive Exposure: Not on file   Financial Resource Strain: Low Risk  (9/21/2024)    Financial Resource Strain     Within the past 12 months, have you or your family members you live with been unable to get utilities (heat, electricity) when it was really needed?: No   Alcohol Use: Not on file   Transportation Needs: No Transportation Needs (6/11/2025)    Received from [facility]     PRAPARE - Transportation     Lack of Transportation (Medical): No     Lack of Transportation (Non-Medical): No   Physical Activity: Not on file   Interpersonal Safety: Not At Risk (6/11/2025)    Received from [facility]     Humiliation, Afraid, Rape, and Kick questionnaire     Fear of Current or Ex-Partner: No     Emotionally Abused: No     Physically Abused: No     Sexually Abused: No   Stress: Not on file   Social Connections: Not on file   Health Literacy: Not on file       Functional Status:  Prior to admission patient needed assistance:   Dependent ADLs:: Independent  Dependent IADLs:: Independent       Mental Health Status:          Chemical Dependency Status:                Values/Beliefs:  Spiritual, Cultural Beliefs, Faith Practices, Values that affect care:                 Discussed  Partnership in Safe Discharge Planning  document with patient/family: No    Additional Information:  CC met with patient to discuss role in discharge planning. Pt lives in home with spouse in [place].  Pt indep at baseline, no services in the home. Manages own ADL's, drives, and uses no equipment.  She reports she helps her .  He is on dialysis and she drives him, helps with dressing at times and \"picks him up when she falls\" " " has no services in the home.  Pt reports that he will drive himself \"even though he shouldn't be\"   Pt was due to go on a trip to [facility] with a nephew for 18 days.  She had made arrangements for a neighbor to drive him to dialysis and check on him.  She notes that this neighbor quit as  was mean and angry with her.  Pt has talked with  and he reports he is doing fine.  Pt's sons \"will have nothing to do with him\" She reports he is an angry man and hard to live with.  Denies abuse.  Pt at discharge plans to go to son Gregory's house in [facility].  She notes she \"needs a break\" from her . She feels family can help her there.  She notes family has talked of her getting her own place (apartment)   Pt does feel weaker than baseline.  CC requested PT order from MD  Supportive listening used.  Pt appears overwhelmed, anxious at times over situation with     Senior linkage line/resources given to patient to possible assist .      Next Steps: Follow for needs.   Pt notes she will take an uber/taxi at discharge.     Elizabeth Del Angel RN     "

## 2025-06-24 NOTE — PROGRESS NOTES
Shift Summary [id number]    Admitting Diagnosis: Acute kidney injury [N17.9]  Hypotension due to hypovolemia [E86.1]   Vitals:Baseline  Pain:Denies,No PRN given  A&Ox4  Voiding:BR  Mobility:IND/SBA in he room  Tele:NSR w/BBB  CMS:Intact  Lung Sounds:Denies SOB  GI:ostomy   Dressing:NA  IV-SL. On R port   Regular Diet     Plan: Blanchable redness to coccyx. Discharge anticipated 6/25.pt needs met at this time.

## 2025-06-24 NOTE — PLAN OF CARE
"Goal Outcome Evaluation:    Patient Name: Omar  MRN: [id number]  Date of Admission: 6/20/2025  Reason for Admission:   Level of Care: medical    Vitals:   BP Readings from Last 1 Encounters:   06/23/25 132/77     Pulse Readings from Last 1 Encounters:   06/23/25 98     Wt Readings from Last 1 Encounters:   06/23/25 103.6 kg (228 lb 8 oz)     Ht Readings from Last 1 Encounters:   06/08/25 1.651 m (5' 5\")     Estimated body mass index is 38.02 kg/m  as calculated from the following:    Height as of 6/8/25: 1.651 m (5' 5\").    Weight as of this encounter: 103.6 kg (228 lb 8 oz).  Temp Readings from Last 1 Encounters:   06/23/25 98  F (36.7  C) (Oral)       Pain: Pain goal 0 Pain Rating 0 Effective pain medication/regimen         Assessment    Resp: RA.LS clr  Telemetry: SR w/ BBB  Neuro: A/Ox4  GI/: purewick in place. Adequate uop.stool,flatus+ via ostomy. PRN zofran x1 for nausea.  Skin/Wounds: blanchable redness to coccyx. Mepi in place.   Lines/Drains: PIV x1 SL. R port HL.   Activity: up SBA  Sleep: good      Aggression Stop Light: Green          Patient Care Plan: discharge plan pending.   "

## 2025-06-24 NOTE — PROGRESS NOTES
[facility]  Hospitalist Progress Note  Eliezer Rodriguez,   06/24/2025    Assessment & Plan   Mariluz Stoner is a 64 year old female with PMHx of hypertension, chemotherapy-induced cardiomyopathy with chronic HFrEF and CKD among other medical problems.      She was seen in [facility] ED on 6/8 for evaluation of nausea/vomiting and diagnosed with Vertigo and a UTI. UC obtained and grew 2 strains of >100k klebsiella aerogenes (susceptible to quinolones). She was then admitted to [facility] from 6/10/25-6/14/25 for management of severe sepsis dt pyelonephritis complicated by obstructive uropathy and right-sided nephrolithiasis s/p ureteral stent placement 6/11/25. UC that stay grew low quantity GP organism and GNR. Discharged from that stay on an additional 5 days of Levaquin, and prns for symptom mgmt (Flomax, Ditropan, Norco, Pyridium).      On the day of admission, patient was at the airport to leave for a trip to [facility] when she developed ew onset lightheadedness, dizziness and anxiety/panic symptoms and was referred to [facility] ED for evaluation. On presentation the ED, she was found to be hypotensive with several lab abnormalities and was subsequently admitted for management of JESUS and associated electrolyte abnormalities.      Acute kidney injury: Improved  Hypovolemic hyponatremia: Improved  Hyperkalemia/hypokalemia  AGMA: Resolved  *With recent history and presentation as above.   *On admission this stay, was afebrile, hypotensive with SBPs 80-90s, HRs 80-100s. Labs notable for Na 1125, K 6.1, bicarb 12, Cr 5.0 (baseline 1.1-1.2), AG 19; WBC 11.1, trops 51 -- 28 (in the context of severe JESUS).  Following IV fluids and shifting of K initially dropped to 2.3, suspected spurious with repeat BMP notable for Na 129, K 4.2, Cr 2.23, bicarb 9, AG 22. Lactic acid normal. Urine sodium <20, unable to calculate FENa. BPs improved after IVFs. Clinical suspicion was for pre-renal JESUS dt  recent nausea/vomiting episodes after having taking Ditropan earlier in the day for ureteral stent discomfort. Denied recent illness or other changes in po intake, mild increase in ostomy output since being on abx. Admitted to the hospital for continued hydration.   -- stop IVF and follow-up on the BMP     Recent severe sepsis dt acute pyelonephritis complicated by obstructive uropathy and right-sided nephrolithiasis, s/p R ureteral stent placement 6/11/25  Abnormal UA, UTI ruled out  Staph capitis 1/2 bottles late growth suspected contaminant  *With presentation/labs as above.   *UA abnl with 45 WBCs and small leuk est, 149 RBCs. CT abd/pelvis benign, showed stent in place with no hydronephrosis. Given dose of IV ciprofloxacin on admission to cover for possible concurrent UTI.   *UA this stay grew <10k candida albicans. Blood cultures remained negative. Cipro stopped 6/22.   ID evaluated the patient and determined the staph was contaminant  Plan  - complete course of fluconzole given the candida and plan for future stone treatment per ID  - otherwise off of antiinfectives      Chemotherapy-induced cardiomyopathy   Chronic HFrEF  Mild troponin elevation with recent probable nonischemic myocardial injury  Hypertension, with hypotension on admission  *TTE 6/11/25 with EF 20-25%. Troponin during recent admission 233->211.   *Trops on admission this stay improved,  51->28. Denies chest pain.   *Appeared hypovolemic on admission.   -- holding PTA lisinopril, metoprolol and consider restart on discharge  -- monitor for volume overload     Depression   *Chronic and stable on Paxil     Metastatic breast cancer  *Chronic and stable on anastrozole     Hx of subtotal colectomy with ileostomy in 11/2023 for history of complicated diverticulitis  *Noted more liquid-like stools in the past week since completing antibiotics. Typically takes Imodium or metamucil to bulk stools.  -- monitor ostomy output     FEN: cont IVFs with LR  but decreased from 100ml/h to 50ml/h, cont low Na diet  DVT Prophylaxis: subQ heparin  Code Status: Full Code     Disposition: Anticipate discharge home 6/24 if renal function remains stable, ambulating, and eating and drinking well     Medically Ready for Discharge:   -- anticipate 6/25    Disposition:   -- anticipate home    Interval History   -- doing well after antibiotics    -Data reviewed today: I reviewed all new labs and imaging over the last 24 hours. I personally reviewed no images or EKG's today.    Physical Exam    , Blood pressure 126/69, pulse 95, temperature 99  F (37.2  C), temperature source Oral, resp. rate 17, weight 103.9 kg (229 lb), SpO2 95%, not currently breastfeeding.  Vitals:    06/22/25 0644 06/23/25 0557 06/24/25 0608   Weight: 104 kg (229 lb 3.2 oz) 103.6 kg (228 lb 8 oz) 103.9 kg (229 lb)     Vital Signs with Ranges  Temp:  [97.3  F (36.3  C)-99  F (37.2  C)] 99  F (37.2  C)  Pulse:  [95-98] 95  Resp:  [16-17] 17  BP: (117-134)/(63-77) 126/69  SpO2:  [92 %-95 %] 95 %  I/O's Last 24 hours  I/O last 3 completed shifts:  In: -   Out: 2925 [Urine:2450; Stool:475]    Constitutional: Awake, alert, cooperative, no apparent distress  Respiratory: Clear to auscultation bilaterally, no crackles or wheezing  Cardiovascular: Regular rate and rhythm, normal S1 and S2   GI: Normal bowel sounds, soft, non-distended, non-tender  Skin/Integumen: No rashes, no cyanosis, no edema  Other:      Medications   All medications were reviewed.  Current Facility-Administered Medications   Medication Dose Route Frequency Provider Last Rate Last Admin     Current Facility-Administered Medications   Medication Dose Route Frequency Provider Last Rate Last Admin    anastrozole (ARIMIDEX) tablet 1 mg  1 mg Oral At Bedtime Margarita Serna DO   1 mg at 06/23/25 9103    fluconazole (DIFLUCAN) tablet 200 mg  200 mg Oral Daily Thais Osborn MD   200 mg at 06/24/25 0942    heparin ANTICOAGULANT injection 5,000 Units   5,000 Units Subcutaneous Q8H Margarita Serna, DO   5,000 Units at 06/24/25 0941    heparin lock flush 10 unit/mL injection 5-10 mL  5-10 mL Intracatheter Q24H Dante Romo MD   5 mL at 06/23/25 2153    heparin lock flush 100 unit/mL injection 5-10 mL  5-10 mL Intracatheter Q28 Days Dante Romo MD        PARoxetine (PAXIL) tablet 20 mg  20 mg Oral At Bedtime Margarita Serna DO   20 mg at 06/23/25 2153    sodium chloride (PF) 0.9% PF flush 10-20 mL  10-20 mL Intracatheter Q28 Days Dante Romo MD        sodium chloride (PF) 0.9% PF flush 3 mL  3 mL Intracatheter Q8H Brien Allen MD   3 mL at 06/23/25 0524        Data   Recent Labs   Lab 06/24/25  0527 06/23/25  0525 06/22/25  1605 06/21/25  1623 06/21/25  0609 06/20/25  2109 06/20/25  1842   WBC 8.5  --   --   --  11.2*  --  11.1*   HGB 10.2*  --   --   --  10.4*  --  12.6   MCV 94  --   --   --  95  --  98     --   --   --  374  --  435   * 135 132*   < > 131*   < > 125*   POTASSIUM 4.6 4.8 5.0   < > 4.6   < > 6.1*   CHLORIDE 101 103 103   < > 99   < > 94*   CO2 23 24 22   < > 17*   < > 12*   BUN 14.2 20.5 27.0*   < > 53.8*   < > 59.8*   CR 1.00* 1.12* 1.48*   < > 3.70*   < > 5.03*   ANIONGAP 10 8 7   < > 15   < > 19*   SINDY 9.0 8.7* 8.8   < > 8.2*   < > 8.7*   GLC 99 109* 106*   < > 94   < > 125*   ALBUMIN  --   --   --   --   --   --  3.3*   PROTTOTAL  --   --   --   --   --   --  7.4   BILITOTAL  --   --   --   --   --   --  0.4   ALKPHOS  --   --   --   --   --   --  174*   ALT  --   --   --   --   --   --  21   AST  --   --   --   --   --   --  14   LIPASE  --   --   --   --   --   --  87*    < > = values in this interval not displayed.       No results found for this or any previous visit (from the past 24 hours).

## 2025-06-25 ENCOUNTER — APPOINTMENT (OUTPATIENT)
Dept: PHYSICAL THERAPY | Facility: CLINIC | Age: 65
DRG: 683 | End: 2025-06-25
Attending: HOSPITALIST
Payer: COMMERCIAL

## 2025-06-25 VITALS
WEIGHT: 228.84 LBS | OXYGEN SATURATION: 95 % | HEART RATE: 104 BPM | RESPIRATION RATE: 18 BRPM | SYSTOLIC BLOOD PRESSURE: 119 MMHG | BODY MASS INDEX: 38.08 KG/M2 | TEMPERATURE: 98.8 F | DIASTOLIC BLOOD PRESSURE: 68 MMHG

## 2025-06-25 LAB
ANION GAP SERPL CALCULATED.3IONS-SCNC: 8 MMOL/L (ref 7–15)
BUN SERPL-MCNC: 14 MG/DL (ref 8–23)
CALCIUM SERPL-MCNC: 9.2 MG/DL (ref 8.8–10.4)
CHLORIDE SERPL-SCNC: 101 MMOL/L (ref 98–107)
CREAT SERPL-MCNC: 0.97 MG/DL (ref 0.51–0.95)
EGFRCR SERPLBLD CKD-EPI 2021: 65 ML/MIN/1.73M2
GLUCOSE SERPL-MCNC: 103 MG/DL (ref 70–99)
HCO3 SERPL-SCNC: 26 MMOL/L (ref 22–29)
POTASSIUM SERPL-SCNC: 4.4 MMOL/L (ref 3.4–5.3)
SODIUM SERPL-SCNC: 135 MMOL/L (ref 135–145)

## 2025-06-25 PROCEDURE — 250N000011 HC RX IP 250 OP 636: Performed by: INTERNAL MEDICINE

## 2025-06-25 PROCEDURE — 99239 HOSP IP/OBS DSCHRG MGMT >30: CPT | Performed by: HOSPITALIST

## 2025-06-25 PROCEDURE — 99232 SBSQ HOSP IP/OBS MODERATE 35: CPT | Performed by: SPECIALIST

## 2025-06-25 PROCEDURE — 250N000013 HC RX MED GY IP 250 OP 250 PS 637: Performed by: SPECIALIST

## 2025-06-25 PROCEDURE — G0545 PR INHRENT VISIT TO INPT/OBS W CNFRM/SUSPCT INFCT DIS BY INFCT DIS SPCIALST: HCPCS | Performed by: SPECIALIST

## 2025-06-25 PROCEDURE — 80048 BASIC METABOLIC PNL TOTAL CA: CPT | Performed by: HOSPITALIST

## 2025-06-25 PROCEDURE — 97161 PT EVAL LOW COMPLEX 20 MIN: CPT | Mod: GP | Performed by: PHYSICAL THERAPIST

## 2025-06-25 RX ORDER — HEPARIN SODIUM (PORCINE) LOCK FLUSH IV SOLN 100 UNIT/ML 100 UNIT/ML
5-10 SOLUTION INTRAVENOUS
Status: DISCONTINUED | OUTPATIENT
Start: 2025-06-25 | End: 2025-06-25 | Stop reason: HOSPADM

## 2025-06-25 RX ORDER — FLUCONAZOLE 200 MG/1
200 TABLET ORAL DAILY
Qty: 5 TABLET | Refills: 0 | Status: SHIPPED | OUTPATIENT
Start: 2025-06-25 | End: 2025-06-30

## 2025-06-25 RX ADMIN — HEPARIN SODIUM 5000 UNITS: 5000 INJECTION, SOLUTION INTRAVENOUS; SUBCUTANEOUS at 01:03

## 2025-06-25 RX ADMIN — FLUCONAZOLE 200 MG: 200 TABLET ORAL at 09:17

## 2025-06-25 RX ADMIN — HEPARIN SODIUM 5000 UNITS: 5000 INJECTION, SOLUTION INTRAVENOUS; SUBCUTANEOUS at 14:20

## 2025-06-25 RX ADMIN — HEPARIN SODIUM 5000 UNITS: 5000 INJECTION, SOLUTION INTRAVENOUS; SUBCUTANEOUS at 09:17

## 2025-06-25 RX ADMIN — Medication 5 ML: at 14:56

## 2025-06-25 ASSESSMENT — ACTIVITIES OF DAILY LIVING (ADL)
ADLS_ACUITY_SCORE: 46

## 2025-06-25 NOTE — DISCHARGE SUMMARY
"[facility]  Hospitalist Discharge Summary      Date of Admission:  6/20/2025  Date of Discharge:  6/25/2025  Discharging Provider: Eliezer Rodriguez DO  Discharge Service: Hospitalist Service    Discharge Diagnoses   Acute kidney injury: Improved  Hypovolemic hyponatremia: Improved  Hyperkalemia/hypokalemia  AGMA: Resolved  Recent severe sepsis dt acute pyelonephritis complicated by obstructive uropathy and right-sided nephrolithiasis, s/p R ureteral stent placement 6/11/25  Abnormal UA, UTI ruled out  Staph capitis 1/2 bottles late growth suspected contaminant    Clinically Significant Risk Factors     # Obesity: Estimated body mass index is 38.08 kg/m  as calculated from the following:    Height as of 6/8/25: 1.651 m (5' 5\").    Weight as of this encounter: 103.8 kg (228 lb 13.4 oz).       Follow-ups Needed After Discharge   Follow-up Appointments       Hospital Follow-up with Existing Primary Care Provider (PCP)          Schedule Primary Care visit within: 7 Days               Unresulted Labs Ordered in the Past 30 Days of this Admission       Date and Time Order Name Status Description    6/23/2025  2:05 PM Blood Culture Peripheral blood (BC) Hand, Right Preliminary     6/20/2025 11:22 PM Blood Culture Peripheral blood (BC) Arm, Right Preliminary     6/20/2025 11:22 PM Blood Culture Peripheral blood (BC) Hand, Right Preliminary         These results will be followed up by PCP    Discharge Disposition   Discharged to home  Condition at discharge: stable    Hospital Course   Mariluz Stoner is a 64 year old female with PMHx of hypertension, chemotherapy-induced cardiomyopathy with chronic HFrEF and CKD among other medical problems.      She was seen in [facility] ED on 6/8 for evaluation of nausea/vomiting and diagnosed with Vertigo and a UTI. UC obtained and grew 2 strains of >100k klebsiella aerogenes (susceptible to quinolones). She was then admitted to [facility] from " 6/10/25-6/14/25 for management of severe sepsis dt pyelonephritis complicated by obstructive uropathy and right-sided nephrolithiasis s/p ureteral stent placement 6/11/25. UC that stay grew low quantity GP organism and GNR. Discharged from that stay on an additional 5 days of Levaquin, and prns for symptom mgmt (Flomax, Ditropan, Norco, Pyridium).      On the day of admission, patient was at the airport to leave for a trip to [facility] when she developed ew onset lightheadedness, dizziness and anxiety/panic symptoms and was referred to [facility] ED for evaluation. On presentation the ED, she was found to be hypotensive with several lab abnormalities and was subsequently admitted for management of JESUS and associated electrolyte abnormalities.      Acute kidney injury: Improved  Hypovolemic hyponatremia: Improved  Hyperkalemia/hypokalemia  AGMA: Resolved  *With recent history and presentation as above.   *On admission this stay, was afebrile, hypotensive with SBPs 80-90s, HRs 80-100s. Labs notable for Na 1125, K 6.1, bicarb 12, Cr 5.0 (baseline 1.1-1.2), AG 19; WBC 11.1, trops 51 -- 28 (in the context of severe JESUS).  Following IV fluids and shifting of K initially dropped to 2.3, suspected spurious with repeat BMP notable for Na 129, K 4.2, Cr 2.23, bicarb 9, AG 22. Lactic acid normal. Urine sodium <20, unable to calculate FENa. BPs improved after IVFs. Clinical suspicion was for pre-renal JESUS dt recent nausea/vomiting episodes after having taking Ditropan earlier in the day for ureteral stent discomfort. Denied recent illness or other changes in po intake, mild increase in ostomy output since being on abx. Admitted to the hospital for continued hydration.   -- stop IVF and follow-up on the BMP     Recent severe sepsis dt acute pyelonephritis complicated by obstructive uropathy and right-sided nephrolithiasis, s/p R ureteral stent placement 6/11/25  Abnormal UA, UTI ruled out  Staph capitis 1/2 bottles late growth suspected  contaminant  *With presentation/labs as above.   *UA abnl with 45 WBCs and small leuk est, 149 RBCs. CT abd/pelvis benign, showed stent in place with no hydronephrosis. Given dose of IV ciprofloxacin on admission to cover for possible concurrent UTI.   *UA this stay grew <10k candida albicans. Blood cultures remained negative. Cipro stopped 6/22.   ID evaluated the patient and determined the staph was contaminant  Plan  - complete course of fluconzole given the candida and plan for future stone treatment has appointment in July per the patient  - otherwise off of antiinfectives        Chemotherapy-induced cardiomyopathy   Chronic HFrEF  Mild troponin elevation with recent probable nonischemic myocardial injury  Hypertension, with hypotension on admission  *TTE 6/11/25 with EF 20-25%. Troponin during recent admission 233->211.   *Trops on admission this stay improved,  51->28. Denies chest pain.   *Appeared hypovolemic on admission.   -- holding PTA lisinopril on discharge until OP follow-up  -- resume metoprolol on dsicharge  -- monitor for volume overload     Depression   *Chronic and stable on Paxil     Metastatic breast cancer  *Chronic and stable on anastrozole     Hx of subtotal colectomy with ileostomy in 11/2023 for history of complicated diverticulitis  *Noted more liquid-like stools in the past week since completing antibiotics. Typically takes Imodium or metamucil to bulk stools.  -- monitor ostomy output    Consultations This Hospital Stay   WOUND OSTOMY CONTINENCE NURSE  IP CONSULT  CARE MANAGEMENT / SOCIAL WORK IP CONSULT  PHARMACY TO DOSE VANCO  INFECTIOUS DISEASES IP CONSULT  PHYSICAL THERAPY ADULT IP CONSULT    Code Status   Full Code    Time Spent on this Encounter   I, Eliezer Rodriguez DO, personally saw the patient today and spent greater than 30 minutes discharging this patient.       Eliezer Rodriguez DO  [facility]  [address]  [address]  Phone: [phone]  Fax: [phone]  ______________________________________________________________________    Physical Exam   Vital Signs: Temp: 98.8  F (37.1  C) Temp src: Oral BP: 119/68 Pulse: 104   Resp: 18 SpO2: 95 % O2 Device: None (Room air)    Weight: 228 lbs 13.4 oz  Constitutional: Awake, alert, cooperative, no apparent distress  Respiratory:     Clear to auscultation bilaterally, no crackles or wheezing  Cardiovascular: Regular rate and rhythm, normal S1 and S2   GI: Normal bowel sounds, soft, non-distended, non-tender  Skin/Integumen: No rashes, no cyanosis, no edema  Other:          Primary Care Physician   [facility] [place]    Discharge Orders      Primary Care - Care Coordination Referral      Reason for your hospital stay    Acute kidney injury from dehydration  Nausea and vomiting  Candida (yeast) urinary tract colonization     Activity    Your activity upon discharge: activity as tolerated     Discharge Instructions    Hold on taking your lisinopril until primary doctor follow up     Diet    Follow this diet upon discharge: Current Diet:Orders Placed This Encounter      Regular Diet Adult     Hospital Follow-up with Existing Primary Care Provider (PCP)            Significant Results and Procedures   Most Recent 3 CBC's:  Recent Labs   Lab Test 06/24/25  0527 06/21/25  0609 06/20/25 1842   WBC 8.5 11.2* 11.1*   HGB 10.2* 10.4* 12.6   MCV 94 95 98    374 435     Most Recent 3 BMP's:  Recent Labs   Lab Test 06/25/25  0609 06/24/25  0527 06/23/25  0525    134* 135   POTASSIUM 4.4 4.6 4.8   CHLORIDE 101 101 103   CO2 26 23 24   BUN 14.0 14.2 20.5   CR 0.97* 1.00* 1.12*   ANIONGAP 8 10 8   SINDY 9.2 9.0 8.7*   * 99 109*     Most Recent 2 LFT's:  Recent Labs   Lab Test 06/20/25 1842 09/22/24  0532   AST 14 17   ALT 21 17   ALKPHOS 174* 81   BILITOTAL 0.4 0.3   ,   Results for orders placed or performed during the hospital encounter of 06/20/25   CT Abdomen Pelvis  w/o Contrast    Narrative    EXAM: CT ABDOMEN PELVIS W/O CONTRAST  LOCATION: [facility]  DATE: 6/20/2025    INDICATION: Recent right ureteral stent placement. Now with hypotension.  COMPARISON: 9/21/2024  TECHNIQUE: CT scan of the abdomen and pelvis was performed without IV contrast. Multiplanar reformats were obtained. Dose reduction techniques were used.  CONTRAST: None.    FINDINGS:   LOWER CHEST: Mild dependent atelectasis. No focal airspace disease.    HEPATOBILIARY: Liver appears unremarkable. Status post cholecystectomy.    PANCREAS: Normal.    SPLEEN: Normal.    ADRENAL GLANDS: Normal.    KIDNEYS/BLADDER: Right double-J ureteral stent with proximal coil in the renal pelvis and distal coil in the bladder. Nonobstructing right renal midpole calculus measuring 1.6 cm. No ureteral calculi. No hydronephrosis. Urinary bladder appears normal.    BOWEL: Status post subtotal colectomy with Gutierrez's pouch formation. Right lower quadrant ileostomy. Parastomal hernia containing several nondilated small bowel loops. No bowel obstruction or evidence of bowel inflammation.    LYMPH NODES: No lymphadenopathy.    VASCULATURE: Mild atherosclerotic calcifications.    PELVIC ORGANS: Unremarkable.    MUSCULOSKELETAL: Mild multilevel degenerative changes of the spine.      Impression    IMPRESSION:   1.  No acute findings in the abdomen or pelvis, within limitations of noncontrast exam.    2.  Right double-J ureteral stent in place. No hydronephrosis. Nonobstructing right renal midpole 1.6 cm calculus. No ureteral calculi.    3.  Right lower quadrant ileostomy with parastomal hernia containing nondilated small bowel. No bowel obstruction or evidence of strangulation.         Discharge Medications      Review of your medicines        START taking        Dose / Directions   fluconazole 200 MG tablet  Commonly known as: DIFLUCAN  Indication: uti      Dose: 200 mg  Take 1 tablet (200 mg) by mouth daily for  5 days.  Quantity: 5 tablet  Refills: 0            CHANGE how you take these medications        Dose / Directions   anastrozole 1 MG tablet  Commonly known as: ARIMIDEX  This may have changed: when to take this  Used for: Metastatic breast cancer      Dose: 1 mg  Take 1 tablet (1 mg) by mouth daily  Quantity: 90 tablet  Refills: 1     PARoxetine 20 MG tablet  Commonly known as: PAXIL  This may have changed: See the new instructions.  Used for: Mild episode of recurrent major depressive disorder      TAKE 1 TABLET BY MOUTH ONCE DAILY .  APPOINTMENT  NEEDED  FOR  ADDITIONAL  REFILLS  Quantity: 90 tablet  Refills: 1            CONTINUE these medicines which have NOT CHANGED        Dose / Directions   acetaminophen 500 MG tablet  Commonly known as: TYLENOL      Dose: 1,000 mg  Take 1,000 mg by mouth every 6 hours as needed for mild pain  Refills: 0     calcium carbonate 600 mg-vitamin D 400 units 600-400 MG-UNIT per tablet  Commonly known as: CALTRATE      Dose: 1 tablet  Take 1 tablet by mouth at bedtime.  Refills: 0     docusate sodium 100 MG capsule  Commonly known as: COLACE      Dose: 100 mg  Take 100 mg by mouth 2 times daily.  Refills: 0     HYDROcodone-acetaminophen 5-325 MG tablet  Commonly known as: NORCO      Dose: 1 tablet  Take 1 tablet by mouth every 6 hours as needed for severe pain.  Refills: 0     loperamide 2 MG tablet  Commonly known as: IMODIUM A-D      Dose: 2 mg  Take 2 mg by mouth every other day. At bedtime  Refills: 0     medical cannabis (Patient's own supply)      See Admin Instructions (The purpose of this order is to document that the patient reports taking medical cannabis.  This is not a prescription, and is not used to certify that the patient has a qualifying medical condition.)  Refills: 0     melatonin 5 MG tablet      Dose: 5 mg  Take 5 mg by mouth nightly as needed for sleep.  Refills: 0     metoprolol succinate ER 50 MG 24 hr tablet  Commonly known as: TOPROL XL  Used for:  Cardiomyopathy secondary to drug      Dose: 50 mg  Take 1 tablet (50 mg) by mouth at bedtime.  Quantity: 90 tablet  Refills: 3     oxyBUTYnin 5 MG tablet  Commonly known as: DITROPAN      Dose: 5 mg  Take 5 mg by mouth 3 times daily. for bladder cramps/pain from stent  Refills: 0     phenazopyridine 200 MG tablet  Commonly known as: PYRIDIUM      Dose: 200 mg  Take 200 mg by mouth 3 times daily as needed for irritation.  Refills: 0     tamsulosin 0.4 MG capsule  Commonly known as: FLOMAX      Dose: 0.4 mg  Take 0.4 mg by mouth daily as needed (stent discomfort).  Refills: 0     Vitamin D-1000 [address] 25 mcg (1000 units) tablet  Generic drug: Vitamin D3      Dose: 25 mcg  Take 25 mcg by mouth at bedtime.  Refills: 0            STOP taking      lisinopril 2.5 MG tablet  Commonly known as: ZESTRIL                  Where to get your medicines        These medications were sent to [facility]  D[place] - [address] [address]  [address] [address] [address]      Phone: [phone]   fluconazole 200 MG tablet       Allergies   Allergies   Allergen Reactions    Spironolactone      Suspected cause of acute kidney injury.

## 2025-06-25 NOTE — PROGRESS NOTES
[facility]    Infectious Disease Progress Note    Date of Service (when I saw the patient): 06/25/2025     Assessment:  64YF with metastatic breast cancer and recent history of pyelonephritis due klebsiella aerogenes secondary to obstructive uropathy status post right ureteral stent, chemotherapy induced cardiomyopathy with chronic CHF and chronic kidney disease, who has been admitted since 6/20/2025 due to light headedness and abdominal cramps, and was found to have hyponatremia and JESUS with creatinine of 5.03 (baseline 1.16) felt to be pre renal. Her imaging appears stable with a right ureteral stent and non obstructing right renal calculus.  A single blood cx with staph capitis is a contaminant     -Staph capitis in a single blood cx is a contaminant  -JESUS has resolved  -Recent hospitalization from 6/10-6/14 for pyelonephritis secondary to right ureterolithiasis with hydronephrosis and JESUS related to Klebsiella aerogenes, s/p right ureteral stent, and treated with Levofloxacin  -Metastatic breast cancer- On anastrozole   -Chronic medical conditions   hemotherapy induced cardiomyopathy, HTN, Depression, s/p ileostomy     Recommendations  Remains stable off antibiotics  Renal functions have normalized  Urine cx is growing < 10,000 cfu/ml of candida albicans. Patient is planned for definitive stone management soon, treat with oral Fluconazole 200 mg daily for one week to decrease risk of post procedure fungemia   Definitive stone management per Urology  ID will sign off, please call us back as needed      Thais Osborn MD    Interval History   Feels better,, complains of some knee pain otherwise overall improved      Physical Exam   Temp: 98.6  F (37  C) Temp src: Oral BP: 123/66 Pulse: 89   Resp: 18 SpO2: 96 % O2 Device: None (Room air)    Vitals:    06/23/25 0557 06/24/25 0608 06/25/25 0603   Weight: 103.6 kg (228 lb 8 oz) 103.9 kg (229 lb) 103.8 kg (228 lb 13.4 oz)     Vital Signs with  Ranges  Temp:  [98.5  F (36.9  C)-99  F (37.2  C)] 98.6  F (37  C)  Pulse:  [89-97] 89  Resp:  [18] 18  BP: (120-126)/(63-69) 123/66  SpO2:  [95 %-97 %] 96 %    Constitutional: Awake, alert, cooperative, no apparent distress  Lungs: Clear to auscultation bilaterally, no crackles or wheezing  Cardiovascular: Regular rate and rhythm, normal S1 and S2, and no murmur noted  Abdomen: Normal bowel sounds, soft, non-distended, non-tender  Skin: No rashes, no cyanosis, no edema  Other:    Medications   Current Facility-Administered Medications   Medication Dose Route Frequency Provider Last Rate Last Admin     Current Facility-Administered Medications   Medication Dose Route Frequency Provider Last Rate Last Admin    anastrozole (ARIMIDEX) tablet 1 mg  1 mg Oral At Bedtime Margarita Serna, DO   1 mg at 06/24/25 2134    fluconazole (DIFLUCAN) tablet 200 mg  200 mg Oral Daily Thais Osborn MD   200 mg at 06/24/25 0942    heparin ANTICOAGULANT injection 5,000 Units  5,000 Units Subcutaneous Q8H Margarita Serna DO   5,000 Units at 06/25/25 0103    heparin lock flush 10 unit/mL injection 5-10 mL  5-10 mL Intracatheter Q24H Dante Romo MD   5 mL at 06/24/25 2134    heparin lock flush 100 unit/mL injection 5-10 mL  5-10 mL Intracatheter Q28 Days Dante Romo MD        PARoxetine (PAXIL) tablet 20 mg  20 mg Oral At Bedtime Margarita Serna DO   20 mg at 06/24/25 2134    sodium chloride (PF) 0.9% PF flush 10-20 mL  10-20 mL Intracatheter Q28 Days Dante Romo MD        sodium chloride (PF) 0.9% PF flush 3 mL  3 mL Intracatheter Q8H Brien Allen MD   3 mL at 06/24/25 2134       Data   All microbiology laboratory data reviewed.  Recent Labs   Lab Test 06/24/25  0527 06/21/25  0609 06/20/25  1842   WBC 8.5 11.2* 11.1*   HGB 10.2* 10.4* 12.6   HCT 30.5* 32.0* 39.7   MCV 94 95 98    374 435     Recent Labs   Lab Test 06/25/25  0609 06/24/25  0527 06/23/25  0525   CR 0.97*  1.00* 1.12*     Recent Labs   Lab Test 12/07/23  1624   SED 42*     Microbiology  06/23/2025 1454 06/24/2025 1701 Blood Culture Peripheral blood (BC) Hand, Right [[id number]]    Peripheral blood (BC) from Hand, Right    Preliminary result Component Value   Culture No growth after 1 day P             06/21/2025 0046 06/25/2025 0503 Blood Culture Peripheral blood (BC) Hand, Right [[id number]]   Peripheral blood (BC) from Hand, Right    Preliminary result Component Value   Culture No growth after 4 days P             06/21/2025 0046 06/24/2025 1247 Blood Culture Peripheral blood (BC) Arm, Right [[id number]]   (Abnormal)   Peripheral blood (BC) from Arm, Right    Preliminary result Component Value   Culture Positive on the 2nd day of incubation Abnormal  P    Staphylococcus capitis Panic  P    1 of 2 bottles  The recovery of this organism from a single blood culture bottle most likely represents contamination. If susceptibility testing is needed, please refer to the antibiogram or contact [facility]. If contamination is suspected, it is important to repeat two sets of blood cultures from two different sites.             06/21/2025 0046 06/23/2025 0822 Blood Culture ID Panel, PCR [[id number]]    (Abnormal)   Peripheral blood (BC) from Arm, Right    Final result Component Value   Enterococcus faecalis Not Detected   Enterococcus faecium Not Detected   Listeria monocytogenes Not Detected   Staphylococcus species Detected Abnormal    Staphylococcus species, other than S. aureus, S. epidermidis and S. lugdunensis, detected by Redicam BCID2 assay.  Final identification and antimicrobial susceptibility testing will be verified by standard methods.   Staphylococcus aureus Not Detected   Staphylococcus epidermidis Not Detected   Staphylococcus lugdunensis Not Detected   Streptococcus species Not Detected   Streptococcus agalactiae Not Detected   Streptococcus pneumoniae Not Detected   Streptococcus pyogenes Not Detected   A.  baumannii complex Not Detected   Bacteroides fragilis Not Detected   Enterobacterales Not Detected   Enterobacter cloacae complex Not Detected   Escherichia coli Not Detected   Klebsiella aerogenes Not Detected   Klebsiella oxytoca Not Detected   Klebsiella pneumoniae group Not Detected   Proteus species Not Detected   Salmonella species Not Detected   Serratia marcescens Not Detected   Haemophilus influenzae Not Detected   Neisseria meningitidis Not Detected   Pseudomonas aeruginosa Not Detected   Stenotrophomonas maltophilia Not Detected   Candida albicans Not Detected   Candida auris Not Detected   Candida glabrata Not Detected   Sena krusei Not Detected   Candida parapsilosis Not Detected   Candida tropicalis Not Detected   Cryptococcus neoformans/gattii Not Detected          06/20/2025 2308 06/23/2025 0540 Urine Culture [[id number]]   (Abnormal)   Urine, Catheter    Final result Component Value   Culture <10,000 CFU/mL Candida albicans Abnormal         Imaging  EXAM: CT ABDOMEN PELVIS W/O CONTRAST  LOCATION: [facility]  DATE: 6/20/2025     INDICATION: Recent right ureteral stent placement. Now with hypotension.  COMPARISON: 9/21/2024  TECHNIQUE: CT scan of the abdomen and pelvis was performed without IV contrast. Multiplanar reformats were obtained. Dose reduction techniques were used.  CONTRAST: None.     FINDINGS:   LOWER CHEST: Mild dependent atelectasis. No focal airspace disease.     HEPATOBILIARY: Liver appears unremarkable. Status post cholecystectomy.     PANCREAS: Normal.     SPLEEN: Normal.     ADRENAL GLANDS: Normal.     KIDNEYS/BLADDER: Right double-J ureteral stent with proximal coil in the renal pelvis and distal coil in the bladder. Nonobstructing right renal midpole calculus measuring 1.6 cm. No ureteral calculi. No hydronephrosis. Urinary bladder appears normal.     BOWEL: Status post subtotal colectomy with Gutierrez's pouch formation. Right lower quadrant  ileostomy. Parastomal hernia containing several nondilated small bowel loops. No bowel obstruction or evidence of bowel inflammation.     LYMPH NODES: No lymphadenopathy.     VASCULATURE: Mild atherosclerotic calcifications.     PELVIC ORGANS: Unremarkable.     MUSCULOSKELETAL: Mild multilevel degenerative changes of the spine.                                                                      IMPRESSION:   1.  No acute findings in the abdomen or pelvis, within limitations of noncontrast exam.     2.  Right double-J ureteral stent in place. No hydronephrosis. Nonobstructing right renal midpole 1.6 cm calculus. No ureteral calculi.     3.  Right lower quadrant ileostomy with parastomal hernia containing nondilated small bowel. No bowel obstruction or evidence of strangulation.

## 2025-06-25 NOTE — PROGRESS NOTES
"   06/25/25 1000   Appointment Info   Signing Clinician's Name / Credentials (PT) Juany Young, PT   Living Environment   People in Home spouse   Current Living Arrangements house   Home Accessibility stairs to enter home   Number of Stairs, Main Entrance 3   Stair Railings, Main Entrance railings safe and in good condition   Living Environment Comments Reports everything on one level once she gets into the home   Self-Care   Usual Activity Tolerance good   Current Activity Tolerance moderate   Regular Exercise No   Equipment Currently Used at Home colostomy/ostomy supplies   Activity/Exercise/Self-Care Comment Reports a couple months ago she \"fell\" against a dresser and hurt her ribs; later had a PET scan for her ca and it noted rib fxs. Is I without an AD. Drives. Usually drives her  to dialysis. As noted elsewhere,  is an angry person and any assistance she pulls in gets pushed away by him. Pt. reports she does not have to physically assist him but does get emotionally drained. Has chickens and a large garden that she attends to and enjoys but feels she doesn't know her own limites. Feels she needs to take care of herself more.  has been involved to give recommendations for support. Reports  cooks and they both get groceries.   General Information   Onset of Illness/Injury or Date of Surgery 06/20/25   Referring Physician Eliezer Rodriguez, DO   Patient/Family Therapy Goals Statement (PT) Plans to go to son's home for a few days. Doesn't feel ready to return to home setting where she feels her  adds stress.   Pertinent History of Current Problem (include personal factors and/or comorbidities that impact the POC) Mariluz Stoner is a 64 year old female with PMHx of hypertension, chemotherapy-induced cardiomyopathy with chronic HFrEF and CKD among other medical problems.      She was seen in [facility] ED on 6/8 for evaluation of nausea/vomiting and diagnosed with " Vertigo and a UTI.    Found to be in JESUS.   General Observations Lying in bed; agreeable to participate.   Cognition   Affect/Mental Status (Cognition) WNL   Orientation Status (Cognition) oriented x 4   Follows Commands (Cognition) WNL   Pain Assessment   Patient Currently in Pain No   Integumentary/Edema   Integumentary/Edema Comments L U/E edema; chronic; from breast ca   Posture    Posture Forward head position   Range of Motion (ROM)   Range of Motion ROM is WFL   Strength (Manual Muscle Testing)   Strength (Manual Muscle Testing) strength is WFL   Bed Mobility   Comment, (Bed Mobility) I supine to and fom sit   Transfers   Comment, (Transfers) STS I'lly   Gait/Stairs (Locomotion)   Comment, (Gait/Stairs) Ambulated 400' I'lly without AD; no LOB. Ambulated up and down 4 stairs with light hold on rail; I.   Balance   Balance Comments WIdened ANA M I'lly but no LOB; no AD. Performed high level balance drills including backwards steps, sidestepping (in which she discovered her lower legs hurt; this gave her a clue that her legs have likely been hurting since dancing at her son's wedding the end of May). Also performed frequent turns and marching. No LOB and no gait disturbances.   Sensory Examination   Sensory Perception patient reports no sensory changes   Coordination   Coordination no deficits were identified   Muscle Tone   Muscle Tone no deficits were identified   Clinical Impression   Criteria for Skilled Therapeutic Intervention Evaluation only   PT Diagnosis (PT) prior dizziness   Influenced by the following impairments generalized deconditioning   Functional limitations due to impairments decreased endurnce   Clinical Presentation (PT Evaluation Complexity) stable   Clinical Presentation Rationale per clinical judgement   Clinical Decision Making (Complexity) low complexity   Planned Therapy Interventions (PT)   (evaluation only)   Risk & Benefits of therapy have been explained evaluation/treatment results  reviewed;care plan/treatment goals reviewed;risks/benefits reviewed;current/potential barriers reviewed;participants voiced agreement with care plan;participants included;patient   PT Total Evaluation Time   PT José Miguel Low Complexity Minutes ([id number]) 26   Physical Therapy Goals   PT Frequency   (evaluation only)   PT Predicted Duration/Target Date for Goal Attainment 06/25/25   PT Discharge Planning   PT Plan D/C PT   PT Discharge Recommendation (DC Rec) home   PT Rationale for DC Rec Questioned nursing if PT needed to see. Nursing reported pt. had been dizzy. Upon interview of patient she also expressed concern about being dizzy when walking. Both had reported that pt. had been I in room for several days with no dizziness. Pt. reports only being dizzy on day of admit when she came with JESUS and UTI; also believes she was dehydrated. Observed bed mobility, transfers, ambulation in halls without AD, high level gait drills, stairs with rail and toilet transfer all I'lly. Pt. repors feeling a little weak from being in the hospital. No dizziness. No further skilled PT indicated.   PT Brief overview of current status Functionally I without AD. Goals of therapy will be to address safe mobility and make recs for d/c to next level of care. Pt and RN will continue to follow all falls risk precautions as documented by RN staff while hospitalized   PT Total Distance Amb During Session (feet) 450   Physical Therapy Time and Intention   Total Session Time (sum of timed and untimed services) 26

## 2025-06-25 NOTE — PLAN OF CARE
Physical Therapy Discharge Summary    Reason for therapy discharge:    Evaluation only; no skilled PT needs indicated.    Progress towards therapy goal(s). See goals on Care Plan in [facility] electronic health record for goal details.  Evaluation only    Therapy recommendation(s):    No further therapy is recommended.Questioned nursing if PT needed to see. Nursing reported pt. had been dizzy. Upon interview of patient she also expressed concern about being dizzy when walking. Both had reported that pt. had been I in room for several days with no dizziness. Pt. reports only being dizzy on day of admit when she came with JESUS and UTI; also believes she was dehydrated. Observed bed mobility, transfers, ambulation in halls without AD, high level gait drills, stairs with rail and toilet transfer all I'lly. Pt. repors feeling a little weak from being in the hospital. No dizziness. No further skilled PT indicated.

## 2025-06-25 NOTE — PLAN OF CARE
Goal Outcome Evaluation:    A&Ox4. Ind in room. Colostomy in place, pt self manages. PRN tylenol given for pain.

## 2025-06-25 NOTE — CARE PLAN
Pt is alert and oriented x 4 , Vss on Ra , regular diet , independent in room, continent of bowel and bladder voided in bathroom. Pt was discharge home with son,   right upper chest port was de access before discharge, peripheral Iv removed.Discharged instruction was given upon  discharged, patient personal medication and discharged medication  was given to patient.

## 2025-06-25 NOTE — PROGRESS NOTES
Care Management Discharge Note    Discharge Date: 06/25/2025       Discharge Disposition: Home    Discharge Services:      Discharge DME:      Discharge Transportation: public transportation    Private pay costs discussed: Not applicable    Does the patient's insurance plan have a 3 day qualifying hospital stay waiver?  No    PAS Confirmation Code:    Patient/family educated on Medicare website which has current facility and service quality ratings:      Education Provided on the Discharge Plan:    Persons Notified of Discharge Plans: Patient and nursing staff  Patient/Family in Agreement with the Plan: yes    Handoff Referral Completed: No, handoff not indicated or clinically appropriate    Additional Information:  Writer was informed from rounds this AM of patient needing a ride home. Writer stated he will follow up.    Writer met with the patient at bedside. Patient confirmed they are going to their son's home, however, their son was going to pick them up, therefore they would not need a ride.     [facility] with Family:  son picking up.  Please refer to  progress notes for further details.No further care management intervention anticipated at this time.  Please re-consult if further needs arise.  Care management signing off.        DAMON Su  [facility]  Social Work

## 2025-06-26 ENCOUNTER — PATIENT OUTREACH (OUTPATIENT)
Dept: CARE COORDINATION | Facility: CLINIC | Age: 65
End: 2025-06-26
Payer: COMMERCIAL

## 2025-06-26 ENCOUNTER — RESULTS FOLLOW-UP (OUTPATIENT)
Dept: FAMILY MEDICINE | Facility: CLINIC | Age: 65
End: 2025-06-26

## 2025-06-26 LAB
BACTERIA SPEC CULT: ABNORMAL
BACTERIA SPEC CULT: NO GROWTH
BACTERIA SPEC CULT: NORMAL

## 2025-06-26 NOTE — PROGRESS NOTES
Clinic Care Coordination Contact  [facility]/Voicemail -  TCM call     Clinical Data:  Care Coordinator Outreach    Outreach Documentation Number of Outreach Attempt   6/26/2025  10:18 AM 1       Left message on patient's voicemail with call back information and requested return call.      Plan:  Care Coordinator will try to reach patient again in 1-2 business days.      DAMON Zimmerman / nunu Jordan RN  [facility] Primary Care   Care Coordination  [facility]  6/26/2025 10:19 AM

## 2025-06-28 LAB — BACTERIA SPEC CULT: NO GROWTH

## 2025-06-30 ENCOUNTER — MYC MEDICAL ADVICE (OUTPATIENT)
Dept: FAMILY MEDICINE | Facility: CLINIC | Age: 65
End: 2025-06-30
Payer: COMMERCIAL

## 2025-06-30 NOTE — TELEPHONE ENCOUNTER
Sent mychart asking patient if needing preop or hospital follow up as type is ed/hospital follow up but appt notes state pre op. Hospital can be done virtually if that is what she needs but if needs pre op that will need to be done in person. Visit is scheduled for 7/2/25.    Luzmaria Ramos CMA ([facility])

## 2025-07-02 ENCOUNTER — OFFICE VISIT (OUTPATIENT)
Dept: FAMILY MEDICINE | Facility: CLINIC | Age: 65
End: 2025-07-02
Attending: HOSPITALIST
Payer: COMMERCIAL

## 2025-07-02 ENCOUNTER — RESULTS FOLLOW-UP (OUTPATIENT)
Dept: FAMILY MEDICINE | Facility: CLINIC | Age: 65
End: 2025-07-02

## 2025-07-02 VITALS
OXYGEN SATURATION: 97 % | WEIGHT: 226 LBS | HEART RATE: 82 BPM | DIASTOLIC BLOOD PRESSURE: 80 MMHG | SYSTOLIC BLOOD PRESSURE: 122 MMHG | HEIGHT: 65 IN | BODY MASS INDEX: 37.65 KG/M2 | TEMPERATURE: 97.4 F | RESPIRATION RATE: 18 BRPM

## 2025-07-02 DIAGNOSIS — I42.7 CARDIOMYOPATHY SECONDARY TO DRUG: ICD-10-CM

## 2025-07-02 DIAGNOSIS — Z09 HOSPITAL DISCHARGE FOLLOW-UP: Primary | ICD-10-CM

## 2025-07-02 DIAGNOSIS — C79.51 SECONDARY MALIGNANT NEOPLASM OF BONE (H): ICD-10-CM

## 2025-07-02 DIAGNOSIS — B37.31 YEAST INFECTION OF THE VAGINA: ICD-10-CM

## 2025-07-02 DIAGNOSIS — F33.0 MILD EPISODE OF RECURRENT MAJOR DEPRESSIVE DISORDER: ICD-10-CM

## 2025-07-02 DIAGNOSIS — N20.0 CALCIUM KIDNEY STONE: ICD-10-CM

## 2025-07-02 DIAGNOSIS — B37.31 YEAST INFECTION OF THE VAGINA: Primary | ICD-10-CM

## 2025-07-02 DIAGNOSIS — Z93.2 ILEOSTOMY PRESENT (H): ICD-10-CM

## 2025-07-02 DIAGNOSIS — Z01.818 PREOP GENERAL PHYSICAL EXAM: ICD-10-CM

## 2025-07-02 PROBLEM — L30.9 DERMATITIS: Status: RESOLVED | Noted: 2019-12-11 | Resolved: 2025-07-02

## 2025-07-02 LAB
ALBUMIN UR-MCNC: 30 MG/DL
APPEARANCE UR: ABNORMAL
BACTERIA #/AREA URNS HPF: ABNORMAL /HPF
BASOPHILS # BLD AUTO: 0 10E3/UL (ref 0–0.2)
BASOPHILS NFR BLD AUTO: 0 %
BILIRUB UR QL STRIP: NEGATIVE
CLUE CELLS: ABNORMAL
COLOR UR AUTO: YELLOW
EOSINOPHIL # BLD AUTO: 0.3 10E3/UL (ref 0–0.7)
EOSINOPHIL NFR BLD AUTO: 4 %
ERYTHROCYTE [DISTWIDTH] IN BLOOD BY AUTOMATED COUNT: 13.9 % (ref 10–15)
GLUCOSE UR STRIP-MCNC: NEGATIVE MG/DL
HCT VFR BLD AUTO: 34.6 % (ref 35–47)
HGB BLD-MCNC: 11.3 G/DL (ref 11.7–15.7)
HGB UR QL STRIP: ABNORMAL
IMM GRANULOCYTES # BLD: 0 10E3/UL
IMM GRANULOCYTES NFR BLD: 1 %
KETONES UR STRIP-MCNC: NEGATIVE MG/DL
LEUKOCYTE ESTERASE UR QL STRIP: ABNORMAL
LYMPHOCYTES # BLD AUTO: 2 10E3/UL (ref 0.8–5.3)
LYMPHOCYTES NFR BLD AUTO: 23 %
MCH RBC QN AUTO: 31.3 PG (ref 26.5–33)
MCHC RBC AUTO-ENTMCNC: 32.7 G/DL (ref 31.5–36.5)
MCV RBC AUTO: 96 FL (ref 78–100)
MONOCYTES # BLD AUTO: 0.9 10E3/UL (ref 0–1.3)
MONOCYTES NFR BLD AUTO: 10 %
NEUTROPHILS # BLD AUTO: 5.6 10E3/UL (ref 1.6–8.3)
NEUTROPHILS NFR BLD AUTO: 64 %
NITRATE UR QL: NEGATIVE
PH UR STRIP: 5.5 [PH] (ref 5–7)
PLATELET # BLD AUTO: 327 10E3/UL (ref 150–450)
RBC # BLD AUTO: 3.61 10E6/UL (ref 3.8–5.2)
RBC #/AREA URNS AUTO: ABNORMAL /HPF
SP GR UR STRIP: 1.02 (ref 1–1.03)
TRICHOMONAS, WET PREP: ABNORMAL
UROBILINOGEN UR STRIP-ACNC: 0.2 E.U./DL
WBC # BLD AUTO: 8.9 10E3/UL (ref 4–11)
WBC #/AREA URNS AUTO: ABNORMAL /HPF
WBC'S/HIGH POWER FIELD, WET PREP: ABNORMAL
YEAST, WET PREP: PRESENT

## 2025-07-02 PROCEDURE — 36415 COLL VENOUS BLD VENIPUNCTURE: CPT | Performed by: FAMILY MEDICINE

## 2025-07-02 PROCEDURE — 85025 COMPLETE CBC W/AUTO DIFF WBC: CPT | Performed by: FAMILY MEDICINE

## 2025-07-02 PROCEDURE — 80048 BASIC METABOLIC PNL TOTAL CA: CPT | Performed by: FAMILY MEDICINE

## 2025-07-02 PROCEDURE — 87210 SMEAR WET MOUNT SALINE/INK: CPT | Performed by: FAMILY MEDICINE

## 2025-07-02 PROCEDURE — 1111F DSCHRG MED/CURRENT MED MERGE: CPT | Performed by: FAMILY MEDICINE

## 2025-07-02 PROCEDURE — 1125F AMNT PAIN NOTED PAIN PRSNT: CPT | Performed by: FAMILY MEDICINE

## 2025-07-02 PROCEDURE — 81001 URINALYSIS AUTO W/SCOPE: CPT | Performed by: FAMILY MEDICINE

## 2025-07-02 PROCEDURE — G2211 COMPLEX E/M VISIT ADD ON: HCPCS | Performed by: FAMILY MEDICINE

## 2025-07-02 PROCEDURE — 3079F DIAST BP 80-89 MM HG: CPT | Performed by: FAMILY MEDICINE

## 2025-07-02 PROCEDURE — 99214 OFFICE O/P EST MOD 30 MIN: CPT | Performed by: FAMILY MEDICINE

## 2025-07-02 PROCEDURE — 3074F SYST BP LT 130 MM HG: CPT | Performed by: FAMILY MEDICINE

## 2025-07-02 RX ORDER — FLUCONAZOLE 150 MG/1
150 TABLET ORAL
Qty: 3 TABLET | Refills: 0 | Status: SHIPPED | OUTPATIENT
Start: 2025-07-02 | End: 2025-07-09

## 2025-07-02 RX ORDER — PAROXETINE 20 MG/1
20 TABLET, FILM COATED ORAL AT BEDTIME
Qty: 90 TABLET | Refills: 1 | Status: SHIPPED | OUTPATIENT
Start: 2025-07-02

## 2025-07-02 ASSESSMENT — PAIN SCALES - GENERAL: PAINLEVEL_OUTOF10: MODERATE PAIN (4)

## 2025-07-02 ASSESSMENT — PATIENT HEALTH QUESTIONNAIRE - PHQ9
SUM OF ALL RESPONSES TO PHQ QUESTIONS 1-9: 9
10. IF YOU CHECKED OFF ANY PROBLEMS, HOW DIFFICULT HAVE THESE PROBLEMS MADE IT FOR YOU TO DO YOUR WORK, TAKE CARE OF THINGS AT HOME, OR GET ALONG WITH OTHER PEOPLE: SOMEWHAT DIFFICULT
SUM OF ALL RESPONSES TO PHQ QUESTIONS 1-9: 9

## 2025-07-02 NOTE — LETTER
July 2, 2025      Mariluz Stoner  [id number] [facility] [address]        To Whom It May Concern:    Mariluz Stoner was taken to the Emergency Department on 6/20/25 and admitted to the hospital from 6/21/25 - 6/25/25 due to a life threatening illness. She also has an upcoming associated surgery on 7/11/25. Due to this sudden, serious illness I ask that you please refund her travel expenses for her trip that was previously scheduled from 6/20/25 - 7/5/25.      Sincerely,          Guy Brink MD    Electronically signed

## 2025-07-02 NOTE — PATIENT INSTRUCTIONS
How to Take Your Medication Before Surgery  Preoperative Medication Instructions   Antiplatelet or Anticoagulation Medication Instructions   - We reviewed the medication list and the patient is not on an antiplatelet or anticoagulation medications.    Additional Medication Instructions  Take all scheduled medications on the day of surgery EXCEPT for modifications listed below:   - Herbal medications and vitamins: DO NOT TAKE 14 days prior to surgery.   - Beta Blockers (atenolol, metoprolol, propranolol) : Continue taking on the day of surgery.   - fiber, laxatives: DO NOT TAKE day of surgery and  - loperamide, diphenoxylate/atropine: DO NOT TAKE day of surgery.   - SSRIs, SNRIs, TCAs, Antipsychotics: Continue without modification.        Patient Education   Preparing for Your Surgery  For Adults  Getting started  In most cases, a nurse will call to review your health history and instructions. They will give you an arrival time based on your scheduled surgery time. Please be ready to share:  Your doctor's clinic name and phone number  Your medical, surgical, and anesthesia history  A list of allergies and sensitivities  A list of medicines, including herbal treatments and over-the-counter drugs  Whether the patient has a legal guardian (ask how to send us the papers in advance)  Note: You may not receive a call if you were seen at our PAC (Preoperative Assessment Center).  Please tell us if you're pregnant--or if there's any chance you might be pregnant. Some surgeries may injure a fetus (unborn baby), so they require a pregnancy test. Surgeries that are safe for a fetus don't always need a test, and you can choose whether to have one.   Preparing for surgery  Within 10 to 30 days of surgery: Have a pre-op exam (sometimes called an H&P, or History and Physical). This can be done at a clinic or pre-operative center.  If you're having a , you may not need this exam. Talk to your care team.  At your pre-op  exam, talk to your care team about all medicines you take. (This includes CBD oil and any drugs, such as THC, marijuana, and other forms of cannabis.) If you need to stop any medicine before surgery, ask when to start taking it again.  This is for your safety. Many medicines and drugs can make you bleed too much during surgery. Some change how well surgery (anesthesia) drugs work.  Call your insurance company to let them know you're having surgery. (If you don't have insurance, call [phone].)  Call your clinic if there's any change in your health. This includes a scrape or scratch near the surgery site, or any signs of a cold (sore throat, runny nose, cough, rash, fever).  Eating and drinking guidelines  For your safety: Unless your surgeon tells you otherwise, follow the guidelines below.  Eat and drink as normal until 8 hours before you arrive for surgery. After that, no food or milk. You can spit out gum when you arrive.  Drink clear liquids until 2 hours before you arrive. These are liquids you can see through, like water, Gatorade, and Propel Water. They also include plain black coffee and tea (no cream or milk).  No alcohol for 24 hours before you arrive. The night before surgery, stop any drinks that contain THC.  If your care team tells you to take medicine on the morning of surgery, it's okay to take it with a sip of water. No other medicines or drugs are allowed (including CBD oil)--follow your care team's instructions.  If you have questions the day of surgery, call your hospital or surgery center.   Preventing infection  Shower or bathe the night before and the morning of surgery. Follow the instructions your clinic gave you. (If no instructions, use regular soap.)  Don't shave or clip hair near your surgery site. We'll remove the hair if needed.  Don't smoke or vape the morning of surgery. No chewing tobacco for 6 hours before you arrive. A nicotine patch is okay. You may spit out nicotine gum when  you arrive.  For some surgeries, the surgeon will tell you to fully quit smoking and nicotine.  We will make every effort to keep you safe from infection. We will:  Clean our hands often with soap and water (or an alcohol-based hand rub).  Clean the skin at your surgery site with a special soap that kills germs.  Give you a special gown to keep you warm. (Cold raises the risk of infection.)  Wear hair covers, masks, gowns, and gloves during surgery.  Give antibiotic medicine, if prescribed. Not all surgeries need this medicine.  What to bring on the day of surgery  Photo ID and insurance card  Copy of your health care directive, if you have one  Glasses and hearing aids (bring cases)  You can't wear contacts during surgery  Inhaler and eye drops, if you use them (tell us about these when you arrive)  CPAP machine or breathing device, if you use them  A few personal items, if spending the night  If you have . . .  A pacemaker, ICD (cardiac defibrillator), or other implant: Bring the ID card.  An implanted stimulator: Bring the remote control.  A legal guardian: Bring a copy of the certified (court-stamped) guardianship papers.  Please remove any jewelry, including body piercings. Leave jewelry and other valuables at home.  If you're going home the day of surgery  You must have a support person drive you home. They should stay with you overnight, and they may need to help with your self-care.  If you don't have a support person, please tells us as soon as possible. We can help.  After surgery  If it's hard to control your pain or you need more pain medicine, please call your surgeon's office.  Questions?   If you have any questions for your care team, list them here:    ____________________________________________________________________________________________________________________________________________________________________________________________________________________________________________________________  For informational purposes only. Not to replace the advice of your health care provider. Copyright   2003, 2019 [facility]. All rights reserved. Clinically reviewed by Curt Juarez MD. [employer] 261196 - REV 02/25.

## 2025-07-02 NOTE — PROGRESS NOTES
Preoperative Evaluation  [facility] [place]  [address], [address]  [place] [address]  Phone: [phone]  Fax: [phone]  Primary Provider: Guy Brink MD  Pre-op Performing Provider: Guy Brink MD  Jul 2, 2025 7/2/2025   Surgical Information   What procedure is being done? CYSTOSCOPY, RIGHT RETROGRADE, RIGHT URETEROSCOPY WITH HOLMIUM LASER LITHOTRIPSY, RIGHT STENT EXCHANGE    Facility or Hospital where procedure/surgery will be performed: [facility]   Who is doing the procedure / surgery? Dr. Licea   Date of surgery / procedure: July 11,2025   Time of surgery / procedure: TBD   Where do you plan to recover after surgery? at home with family     Fax number for surgical facility: [phone]      Assessment & Plan     The proposed surgical procedure is considered INTERMEDIATE risk.    1. Hospital discharge follow-up (Primary)  2. Preop general physical exam  Medically optimized for proposed procedure.  May proceed as planned.  Chronic conditions controlled.  Able to tolerate 4 METS. Saw Cardiology in May. Recently tolerated stent placement/similar procedure.  - REVIEW OF HEALTH MAINTENANCE PROTOCOL ORDERS  - Basic metabolic panel  (Ca, Cl, CO2, Creat, Gluc, K, Na, BUN); Future  - CBC with platelets and differential; Future  - Basic metabolic panel  (Ca, Cl, CO2, Creat, Gluc, K, Na, BUN)  - CBC with platelets and differential    3. Calcium kidney stone - nonobstruting 4 mm stone on right side  Plan for procedure above.  - UA with Microscopic reflex to Culture - lab collect; Future  - UA with Microscopic reflex to Culture - lab collect    4. Yeast infection of the vagina  Show clearance with diflucan use.  - UA with Microscopic reflex to Culture - lab collect; Future  - Wet prep - lab collect; Future  - UA with Microscopic reflex to Culture - lab collect  - Wet prep - lab collect    5. Mild episode of recurrent major depressive disorder  Continue  selective serotonin reuptake inhibitor. Refill given today.  - PARoxetine (PAXIL) 20 MG tablet; Take 1 tablet (20 mg) by mouth at bedtime.  Dispense: 90 tablet; Refill: 1    6. 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 Were ok with procedures at that time. No changes in cardiac status since that time. Recently tolerated similar procedure.    7. Ileostomy present (H)  Stoma present. Hold imodium/stool softeners.    8. Secondary malignant neoplasm of bone (H)  Continue anastrozole.    MED REC REQUIRED{  Post Medication Reconciliation Status:  Discharge medications reconciled and changed, see notes/orders    Risks and Recommendations  The patient has the following additional risks and recommendations for perioperative complications:   - No identified additional risk factors other than previously addressed    Preoperative Medication Instructions  Antiplatelet or Anticoagulation Medication Instructions   - We reviewed the medication list and the patient is not on an antiplatelet or anticoagulation medications.    Additional Medication Instructions  Take all scheduled medications on the day of surgery EXCEPT for modifications listed below:   - Herbal medications and vitamins: DO NOT TAKE 14 days prior to surgery.   - Beta Blockers (atenolol, metoprolol, propranolol) : Continue taking on the day of surgery.   - fiber, laxatives: DO NOT TAKE day of surgery and  - loperamide, diphenoxylate/atropine: DO NOT TAKE day of surgery.   - SSRIs, SNRIs, TCAs, Antipsychotics: Continue without modification.     Recommendation  Approval given to proceed with proposed procedure, without further diagnostic evaluation.    Guy Brink MD  [facility]    Disclaimer: This note consists of symbols derived from keyboarding, dictation and/or voice recognition software. As a result, there may be errors in the script that have gone undetected. Please  "consider this when interpreting information found in this chart.    The longitudinal plan of care for the diagnosis(es)/condition(s) as documented were addressed during this visit. Due to the added complexity in care, I will continue to support Mariluz in the subsequent management and with ongoing continuity of care.    Chrissy Mcgraw is a 64 year old, presenting for the following:  Pre-Op Exam    HPI:   Per Discharge summary:  \"Hospital Course  Mariluz Stoner is a 64 year old female with PMHx of hypertension, chemotherapy-induced cardiomyopathy with chronic HFrEF and CKD among other medical problems.      She was seen in [facility] ED on 6/8 for evaluation of nausea/vomiting and diagnosed with Vertigo and a UTI. UC obtained and grew 2 strains of >100k klebsiella aerogenes (susceptible to quinolones). She was then admitted to [facility] from 6/10/25-6/14/25 for management of severe sepsis dt pyelonephritis complicated by obstructive uropathy and right-sided nephrolithiasis s/p ureteral stent placement 6/11/25. UC that stay grew low quantity GP organism and GNR. Discharged from that stay on an additional 5 days of Levaquin, and prns for symptom mgmt (Flomax, Ditropan, Norco, Pyridium).      On the day of admission, patient was at the airport to leave for a trip to [facility] when she developed new onset lightheadedness, dizziness and anxiety/panic symptoms and was referred to [facility] ED for evaluation. On presentation the ED, she was found to be hypotensive with several lab abnormalities and was subsequently admitted for management of JESUS and associated electrolyte abnormalities.      Acute kidney injury: Improved  Hypovolemic hyponatremia: Improved  Hyperkalemia/hypokalemia  AGMA: Resolved  *With recent history and presentation as above.   *On admission this stay, was afebrile, hypotensive with SBPs 80-90s, HRs 80-100s. Labs notable for Na 1125, K 6.1, bicarb 12, Cr 5.0 (baseline 1.1-1.2), AG 19; WBC 11.1, " trops 51 -- 28 (in the context of severe JESUS).  Following IV fluids and shifting of K initially dropped to 2.3, suspected spurious with repeat BMP notable for Na 129, K 4.2, Cr 2.23, bicarb 9, AG 22. Lactic acid normal. Urine sodium <20, unable to calculate FENa. BPs improved after IVFs. Clinical suspicion was for pre-renal JESUS dt recent nausea/vomiting episodes after having taking Ditropan earlier in the day for ureteral stent discomfort. Denied recent illness or other changes in po intake, mild increase in ostomy output since being on abx. Admitted to the hospital for continued hydration.   -- stop IVF and follow-up on the BMP     Recent severe sepsis dt acute pyelonephritis complicated by obstructive uropathy and right-sided nephrolithiasis, s/p R ureteral stent placement 6/11/25  Abnormal UA, UTI ruled out  Staph capitis 1/2 bottles late growth suspected contaminant  *With presentation/labs as above.   *UA abnl with 45 WBCs and small leuk est, 149 RBCs. CT abd/pelvis benign, showed stent in place with no hydronephrosis. Given dose of IV ciprofloxacin on admission to cover for possible concurrent UTI.   *UA this stay grew <10k candida albicans. Blood cultures remained negative. Cipro stopped 6/22.   ID evaluated the patient and determined the staph was contaminant  Plan  - complete course of fluconzole given the candida and plan for future stone treatment has appointment in July per the patient  - otherwise off of antiinfectives        Chemotherapy-induced cardiomyopathy   Chronic HFrEF  Mild troponin elevation with recent probable nonischemic myocardial injury  Hypertension, with hypotension on admission  *TTE 6/11/25 with EF 20-25%. Troponin during recent admission 233->211.   *Trops on admission this stay improved,  51->28. Denies chest pain.   *Appeared hypovolemic on admission.   -- holding PTA lisinopril on discharge until OP follow-up  -- resume metoprolol on discharge  -- monitor for volume overload    "  Depression   *Chronic and stable on Paxil     Metastatic breast cancer  *Chronic and stable on anastrozole     Hx of subtotal colectomy with ileostomy in 11/2023 for history of complicated diverticulitis  *Noted more liquid-like stools in the past week since completing antibiotics. Typically takes Imodium or metamucil to bulk stools.  -- monitor ostomy output\"                7/2/2025   Pre-Op Questionnaire   Have you ever had a heart attack or stroke? No   Have you ever had surgery on your heart or blood vessels, such as a stent placement, a coronary artery bypass, or surgery on an artery in your head, neck, heart, or legs? No   Do you have chest pain with activity? No   Do you have a history of heart failure? (!) YES - chemo induced   Do you currently have a cold, bronchitis or symptoms of other infection? No   Do you have a cough, shortness of breath, or wheezing? (!) YES - Baseline given HFrEF   Do you or anyone in your family have previous history of blood clots? No   Do you or does anyone in your family have a serious bleeding problem such as prolonged bleeding following surgeries or cuts? No   Have you ever had problems with anemia or been told to take iron pills? No   Have you had any abnormal blood loss such as black, tarry or bloody stools, or abnormal vaginal bleeding? No   Have you ever had a blood transfusion? No   Are you willing to have a blood transfusion if it is medically needed before, during, or after your surgery? Yes   Have you or any of your relatives ever had problems with anesthesia? No   Do you have sleep apnea, excessive snoring or daytime drowsiness? (!) YES   Do you have a CPAP machine? (!) NO - improved with weight loss, won't use cpap   Do you have any artifical heart valves or other implanted medical devices like a pacemaker, defibrillator, or continuous glucose monitor? No   Do you have artificial joints? No   Are you allergic to latex? No     Advance Care Planning    Extended " Emergency Contact Information  Primary Emergency Contact: William Stoner  Address: [address] [place] [address] [place]  Mobile Phone: [phone]  Relation: Spouse    Secondary Emergency Contact: Gregory Montaño  Address: [address] [place] [address] [place] [employer]  Mobile Phone: [phone]  Relation: Son    Discussed advance care planning with patient; however, patient declined at this time.    Preoperative Review of    reviewed - controlled substances reflected in medication list.    Status of Chronic Conditions:  See problem list for active medical problems.  Problems all longstanding and stable, except as noted/documented.  See ROS for pertinent symptoms related to these conditions.    Patient Active Problem List    Diagnosis Date Noted    Acute kidney injury 06/21/2025     Priority: Medium    Hypotension due to hypovolemia 06/21/2025     Priority: Medium    Ileostomy present (H) 09/23/2024     Priority: Medium    History of venous thrombosis 09/23/2024     Priority: Medium    Calcium kidney stone - nonobstruting 4 mm stone on right side 09/21/2024     Priority: Medium    Vertigo 02/07/2024     Priority: Medium    Acute diverticulitis with probable intramural abscess 09/29/2023     Priority: Medium    History of wheezing due to allergy 09/29/2023     Priority: Medium    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% in 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 09/29/2023     Priority: Medium    Sinus bradycardia 09/29/2023     Priority: Medium    Secondary malignant neoplasm of bone (H) 06/13/2023     Priority: Medium    Morbid obesity (H) 03/18/2020     Priority: Medium    Metastatic breast cancer 12/11/2019     Priority: Medium    Mild episode of recurrent major depressive disorder 12/11/2019     Priority: Medium    Essential hypertension 12/11/2019     Priority: Medium    Dermatitis 12/11/2019     Priority: Medium      Past Medical History:   Diagnosis Date    Arthritis 2022    Basal  cell carcinoma     Breast cancer (H)     Depressive disorder 2013    HFrEF (heart failure with reduced ejection fraction) (H)     Hypertension     Nausea with vomiting 09/21/2024    Nephrolithiasis     Obesity     Pyelonephritis     Secondary cardiomyopathy (H)      Past Surgical History:   Procedure Laterality Date    BREAST SURGERY  2013    left tissue expander, LEFT MASTECTOMY    CHOLECYSTECTOMY      COLECTOMY SUBTOTAL  11/2023    with ileostomy; done at [facility] for history of complicated diverticulitis    GYN SURGERY      oophorectomy    IR CHEST PORT PLACEMENT > 5 YRS OF AGE  01/18/2019    IR CHEST PORT PLACEMENT > 5 YRS OF AGE  04/16/2013    REMOVE TISSUE EXPANDER BREAST  04/16/2014    Procedure: REMOVAL OF LEFT BREAST TISSUE EXPANDER, IRRIGATION AND DEBRIDEMENT OF LEFT BREAST;  Surgeon: Marlon Luz MD;  Location: [facility]     Current Outpatient Medications   Medication Sig Dispense Refill    acetaminophen (TYLENOL) 500 MG tablet Take 1,000 mg by mouth every 6 hours as needed for mild pain      anastrozole (ARIMIDEX) 1 MG tablet Take 1 tablet (1 mg) by mouth daily (Patient taking differently: Take 1 mg by mouth at bedtime.) 90 tablet 1    calcium carbonate 600 mg-vitamin D 400 units (CALTRATE) 600-400 MG-UNIT per tablet Take 1 tablet by mouth at bedtime.      docusate sodium (COLACE) 100 MG capsule Take 100 mg by mouth 2 times daily.      HYDROcodone-acetaminophen (NORCO) 5-325 MG tablet Take 1 tablet by mouth every 6 hours as needed for severe pain.      loperamide (IMODIUM A-D) 2 MG tablet Take 2 mg by mouth every other day. At bedtime      medical cannabis (Patient's own supply) See Admin Instructions (The purpose of this order is to document that the patient reports taking medical cannabis.  This is not a prescription, and is not used to certify that the patient has a qualifying medical condition.)      melatonin 5 MG tablet Take 5 mg by mouth nightly as needed for sleep.      metoprolol  "succinate ER (TOPROL XL) 50 MG 24 hr tablet Take 1 tablet (50 mg) by mouth at bedtime. 90 tablet 3    oxyBUTYnin (DITROPAN) 5 MG tablet Take 5 mg by mouth 3 times daily. for bladder cramps/pain from stent      PARoxetine (PAXIL) 20 MG tablet TAKE 1 TABLET BY MOUTH ONCE DAILY .  APPOINTMENT  NEEDED  FOR  ADDITIONAL  REFILLS (Patient taking differently: Take 20 mg by mouth at bedtime.) 90 tablet 1    phenazopyridine (PYRIDIUM) 200 MG tablet Take 200 mg by mouth 3 times daily as needed for irritation.      tamsulosin (FLOMAX) 0.4 MG capsule Take 0.4 mg by mouth daily as needed (stent discomfort).      Vitamin D3 (VITAMIN D-1000 MAX ST) 25 mcg (1000 units) tablet Take 25 mcg by mouth at bedtime.         Allergies   Allergen Reactions    Spironolactone      Suspected cause of acute kidney injury.        Social History     Tobacco Use    Smoking status: Former     Current packs/day: 0.00     Types: Cigarettes     Quit date: 2000     Years since quittin.5     Passive exposure: Past    Smokeless tobacco: Never   Substance Use Topics    Alcohol use: Not Currently     Comment: social drinking     Family History   Problem Relation Age of Onset    Diabetes Father      History   Drug Use Unknown     Comment: medical marijuana syrup          Review of Systems  Constitutional, HEENT, cardiovascular, pulmonary, GI, , musculoskeletal, neuro, skin, endocrine and psych systems are negative, except as otherwise noted.    Objective    /80   Pulse 82   Temp 97.4  F (36.3  C) (Temporal)   Resp 18   Ht 1.651 m (5' 5\")   Wt 102.5 kg (226 lb)   LMP  (LMP Unknown)   SpO2 97%   Breastfeeding No   BMI 37.61 kg/m     Estimated body mass index is 37.61 kg/m  as calculated from the following:    Height as of this encounter: 1.651 m (5' 5\").    Weight as of this encounter: 102.5 kg (226 lb).  Physical Exam  Vitals reviewed.   Constitutional:       Appearance: She is obese.   HENT:      Head: Normocephalic and atraumatic. "   Eyes:      General:         Right eye: No discharge.         Left eye: No discharge.      Extraocular Movements: Extraocular movements intact.      Conjunctiva/sclera: Conjunctivae normal.      Pupils: Pupils are equal, round, and reactive to light.   Cardiovascular:      Rate and Rhythm: Normal rate and regular rhythm.      Heart sounds: Normal heart sounds. No murmur heard.     No friction rub.   Pulmonary:      Effort: Pulmonary effort is normal. No respiratory distress.      Breath sounds: Normal breath sounds. No wheezing or rhonchi.   Abdominal:      Comments: Ostomy in place   Musculoskeletal:         General: No swelling.      Cervical back: Normal range of motion and neck supple. No tenderness.   Lymphadenopathy:      Cervical: No cervical adenopathy.   Skin:     General: Skin is warm.      Findings: No rash.   Neurological:      General: No focal deficit present.      Mental Status: She is alert.      Motor: No weakness.      Coordination: Coordination normal.      Gait: Gait normal.   Psychiatric:         Mood and Affect: Mood normal.         Behavior: Behavior normal.         Thought Content: Thought content normal.         Judgment: Judgment normal.       Recent Labs   Lab Test 06/25/25  0609 06/24/25  0527 06/21/25  1623 06/21/25  0609   HGB  --  10.2*  --  10.4*   PLT  --  373  --  374    134*   < > 131*   POTASSIUM 4.4 4.6   < > 4.6   CR 0.97* 1.00*   < > 3.70*    < > = values in this interval not displayed.        Diagnostics  Labs pending at this time.  Results will be reviewed when available.   No EKG this visit, completed in the last 90 days.    Revised Cardiac Risk Index (RCRI)  The patient has the following serious cardiovascular risks for perioperative complications:   - Congestive Heart Failure (pulmonary edema, PND, s3 ligia, CXR with pulmonary congestion, basilar rales) = 1 point     RCRI Interpretation: 1 point: Class II (low risk - 0.9% complication rate)       Signed  Electronically by: Guy Brink MD  A copy of this evaluation report is provided to the requesting physician.    Answers submitted by the patient for this visit:  Patient Health Questionnaire (Submitted on 7/2/2025)  If you checked off any problems, how difficult have these problems made it for you to do your work, take care of things at home, or get along with other people?: Somewhat difficult  PHQ9 TOTAL SCORE: 9

## 2025-07-03 LAB
ANION GAP SERPL CALCULATED.3IONS-SCNC: 12 MMOL/L (ref 7–15)
BUN SERPL-MCNC: 17.7 MG/DL (ref 8–23)
CALCIUM SERPL-MCNC: 9.9 MG/DL (ref 8.8–10.4)
CHLORIDE SERPL-SCNC: 100 MMOL/L (ref 98–107)
CREAT SERPL-MCNC: 1.1 MG/DL (ref 0.51–0.95)
EGFRCR SERPLBLD CKD-EPI 2021: 56 ML/MIN/1.73M2
GLUCOSE SERPL-MCNC: 93 MG/DL (ref 70–99)
HCO3 SERPL-SCNC: 22 MMOL/L (ref 22–29)
POTASSIUM SERPL-SCNC: 4.5 MMOL/L (ref 3.4–5.3)
SODIUM SERPL-SCNC: 134 MMOL/L (ref 135–145)

## 2025-07-08 ENCOUNTER — HOSPITAL ENCOUNTER (OUTPATIENT)
Dept: CARDIOLOGY | Facility: CLINIC | Age: 65
Discharge: HOME | End: 2025-07-08
Payer: COMMERCIAL

## 2025-07-08 DIAGNOSIS — I42.7 CARDIOMYOPATHY SECONDARY TO DRUG: ICD-10-CM

## 2025-07-08 LAB — LVEF ECHO: NORMAL

## 2025-07-08 PROCEDURE — 255N000002 HC RX 255 OP 636: Performed by: INTERNAL MEDICINE

## 2025-07-08 PROCEDURE — C8929 TTE W OR WO FOL WCON,DOPPLER: HCPCS

## 2025-07-08 RX ADMIN — PERFLUTREN 4 ML (DILUTED): 6.52 INJECTION, SUSPENSION INTRAVENOUS at 10:32

## 2025-08-03 ENCOUNTER — HEALTH MAINTENANCE LETTER (OUTPATIENT)
Age: 65
End: 2025-08-03

## (undated) RX ORDER — REGADENOSON 0.08 MG/ML
INJECTION, SOLUTION INTRAVENOUS
Status: DISPENSED
Start: 2024-07-05

## (undated) RX ORDER — AMINOPHYLLINE 25 MG/ML
INJECTION, SOLUTION INTRAVENOUS
Status: DISPENSED
Start: 2024-07-05